# Patient Record
Sex: MALE | Race: WHITE | Employment: OTHER | URBAN - METROPOLITAN AREA
[De-identification: names, ages, dates, MRNs, and addresses within clinical notes are randomized per-mention and may not be internally consistent; named-entity substitution may affect disease eponyms.]

---

## 2017-05-13 PROBLEM — J96.01 ACUTE RESPIRATORY FAILURE WITH HYPOXEMIA (HCC): Status: ACTIVE | Noted: 2017-05-13

## 2017-07-11 PROBLEM — H01.004 BLEPHARITIS OF LEFT UPPER EYELID: Status: ACTIVE | Noted: 2017-07-11

## 2017-07-11 PROBLEM — H52.209 ASTIGMATISM: Status: ACTIVE | Noted: 2017-07-11

## 2017-07-11 PROBLEM — H01.001 BLEPHARITIS OF RIGHT UPPER EYELID: Status: ACTIVE | Noted: 2017-07-11

## 2017-12-06 LAB
INR BLD: 1.8
PROTIME: NORMAL SECONDS

## 2018-03-14 ENCOUNTER — ANTI-COAG VISIT (OUTPATIENT)
Dept: PRIMARY CARE CLINIC | Age: 73
End: 2018-03-14

## 2018-03-14 LAB — INR BLD: 2.5

## 2018-03-21 ENCOUNTER — ANTI-COAG VISIT (OUTPATIENT)
Dept: PRIMARY CARE CLINIC | Age: 73
End: 2018-03-21

## 2018-03-21 LAB — INR BLD: 2.9

## 2018-03-27 ENCOUNTER — HOSPITAL ENCOUNTER (OUTPATIENT)
Age: 73
Discharge: HOME | End: 2018-03-27
Payer: MEDICARE

## 2018-03-27 LAB
ALBUMIN SERPL-MCNC: 4.2 G/DL (ref 3.5–5.2)
ALP BLD-CCNC: 64 U/L (ref 40–129)
ALT SERPL-CCNC: 15 U/L (ref 0–40)
ANION GAP SERPL CALCULATED.3IONS-SCNC: 11 MMOL/L (ref 7–16)
AST SERPL-CCNC: 21 U/L (ref 0–39)
BILIRUB SERPL-MCNC: 0.6 MG/DL (ref 0–1.2)
BUN BLDV-MCNC: 35 MG/DL (ref 8–23)
CALCIUM SERPL-MCNC: 10.1 MG/DL (ref 8.6–10.2)
CHLORIDE BLD-SCNC: 107 MMOL/L (ref 98–107)
CO2: 26 MMOL/L (ref 22–29)
CREAT SERPL-MCNC: 1.3 MG/DL (ref 0.7–1.2)
GFR AFRICAN AMERICAN: >60
GFR NON-AFRICAN AMERICAN: 54 ML/MIN/1.73
GLUCOSE BLD-MCNC: 111 MG/DL (ref 74–109)
HBA1C MFR BLD: 4.7 % (ref 4.8–5.9)
POTASSIUM SERPL-SCNC: 4.5 MMOL/L (ref 3.5–5)
SODIUM BLD-SCNC: 144 MMOL/L (ref 132–146)
T4 FREE: 1.04 NG/DL (ref 0.93–1.7)
TOTAL PROTEIN: 7 G/DL (ref 6.4–8.3)
TSH SERPL DL<=0.05 MIU/L-ACNC: 2.66 UIU/ML (ref 0.27–4.2)

## 2018-03-27 PROCEDURE — 83036 HEMOGLOBIN GLYCOSYLATED A1C: CPT

## 2018-03-27 PROCEDURE — 84586 ASSAY OF VIP: CPT

## 2018-03-27 PROCEDURE — 80053 COMPREHEN METABOLIC PANEL: CPT

## 2018-03-27 PROCEDURE — 84443 ASSAY THYROID STIM HORMONE: CPT

## 2018-03-27 PROCEDURE — 36415 COLL VENOUS BLD VENIPUNCTURE: CPT

## 2018-03-27 PROCEDURE — 84439 ASSAY OF FREE THYROXINE: CPT

## 2018-03-28 ENCOUNTER — ANTI-COAG VISIT (OUTPATIENT)
Dept: PRIMARY CARE CLINIC | Age: 73
End: 2018-03-28

## 2018-03-28 LAB — INR BLD: 2.5

## 2018-04-04 ENCOUNTER — ANTI-COAG VISIT (OUTPATIENT)
Dept: PRIMARY CARE CLINIC | Age: 73
End: 2018-04-04

## 2018-04-04 LAB — INR BLD: 3.1

## 2018-04-09 RX ORDER — SOTALOL HYDROCHLORIDE 160 MG/1
160 TABLET ORAL 2 TIMES DAILY
Qty: 60 TABLET | Refills: 5 | Status: CANCELLED | OUTPATIENT
Start: 2018-04-09

## 2018-04-11 ENCOUNTER — ANTI-COAG VISIT (OUTPATIENT)
Dept: PRIMARY CARE CLINIC | Age: 73
End: 2018-04-11

## 2018-04-11 LAB — INR BLD: 3.3

## 2018-04-18 ENCOUNTER — ANTI-COAG VISIT (OUTPATIENT)
Dept: PRIMARY CARE CLINIC | Age: 73
End: 2018-04-18

## 2018-04-18 LAB — INR BLD: 3.5

## 2018-04-24 PROBLEM — J96.01 ACUTE RESPIRATORY FAILURE WITH HYPOXEMIA (HCC): Status: RESOLVED | Noted: 2017-05-13 | Resolved: 2018-04-24

## 2018-04-24 PROBLEM — R60.0 LOWER LEG EDEMA: Status: ACTIVE | Noted: 2018-04-24

## 2018-04-24 PROBLEM — E21.3 HYPERPARATHYROIDISM (HCC): Status: ACTIVE | Noted: 2018-03-23

## 2018-04-25 ENCOUNTER — ANTI-COAG VISIT (OUTPATIENT)
Dept: PRIMARY CARE CLINIC | Age: 73
End: 2018-04-25

## 2018-04-25 LAB — INR BLD: 3.1

## 2018-05-02 ENCOUNTER — ANTI-COAG VISIT (OUTPATIENT)
Dept: PRIMARY CARE CLINIC | Age: 73
End: 2018-05-02

## 2018-05-02 LAB — INR BLD: 2.4

## 2018-05-08 LAB
Lab: NORMAL
REPORT: NORMAL
THIS TEST SENT TO: NORMAL

## 2018-05-09 ENCOUNTER — ANTI-COAG VISIT (OUTPATIENT)
Dept: PRIMARY CARE CLINIC | Age: 73
End: 2018-05-09

## 2018-05-09 LAB — INR BLD: 2.7

## 2018-05-16 ENCOUNTER — ANTI-COAG VISIT (OUTPATIENT)
Dept: PRIMARY CARE CLINIC | Age: 73
End: 2018-05-16

## 2018-05-16 LAB — INR BLD: 3.1

## 2018-05-16 RX ORDER — WARFARIN SODIUM 5 MG/1
TABLET ORAL
Qty: 30 TABLET | Refills: 3 | Status: CANCELLED | OUTPATIENT
Start: 2018-05-16

## 2018-05-16 RX ORDER — WARFARIN SODIUM 1 MG/1
1 TABLET ORAL DAILY PRN
Qty: 30 TABLET | Refills: 3 | Status: CANCELLED | OUTPATIENT
Start: 2018-05-16

## 2018-05-16 RX ORDER — WARFARIN SODIUM 5 MG/1
5 TABLET ORAL
Qty: 30 TABLET | Status: CANCELLED | OUTPATIENT
Start: 2018-05-16

## 2018-05-16 RX ORDER — WARFARIN SODIUM 5 MG/1
5 TABLET ORAL DAILY
Qty: 30 TABLET | Refills: 2 | Status: SHIPPED | OUTPATIENT
Start: 2018-05-16 | End: 2018-08-07 | Stop reason: SDUPTHER

## 2018-05-16 RX ORDER — WARFARIN SODIUM 1 MG/1
1 TABLET ORAL DAILY PRN
Qty: 30 TABLET | Refills: 2 | Status: SHIPPED | OUTPATIENT
Start: 2018-05-16 | End: 2019-02-26 | Stop reason: SDUPTHER

## 2018-05-16 RX ORDER — WARFARIN SODIUM 1 MG/1
1 TABLET ORAL DAILY PRN
COMMUNITY
End: 2018-05-16 | Stop reason: SDUPTHER

## 2018-05-23 ENCOUNTER — ANTI-COAG VISIT (OUTPATIENT)
Dept: PRIMARY CARE CLINIC | Age: 73
End: 2018-05-23

## 2018-05-23 LAB — INR BLD: 3

## 2018-05-30 ENCOUNTER — ANTI-COAG VISIT (OUTPATIENT)
Dept: PRIMARY CARE CLINIC | Age: 73
End: 2018-05-30

## 2018-05-30 LAB — INR BLD: 2.5

## 2018-06-06 ENCOUNTER — ANTI-COAG VISIT (OUTPATIENT)
Dept: PRIMARY CARE CLINIC | Age: 73
End: 2018-06-06

## 2018-06-06 LAB — INR BLD: 2.4

## 2018-06-13 ENCOUNTER — ANTI-COAG VISIT (OUTPATIENT)
Dept: PRIMARY CARE CLINIC | Age: 73
End: 2018-06-13

## 2018-06-13 LAB — INR BLD: 2.4

## 2018-06-22 ENCOUNTER — ANTI-COAG VISIT (OUTPATIENT)
Dept: PRIMARY CARE CLINIC | Age: 73
End: 2018-06-22

## 2018-06-22 LAB — INR BLD: 2.9

## 2018-06-29 ENCOUNTER — ANTI-COAG VISIT (OUTPATIENT)
Dept: PRIMARY CARE CLINIC | Age: 73
End: 2018-06-29

## 2018-06-29 LAB — INR BLD: 3.3

## 2018-07-06 ENCOUNTER — ANTI-COAG VISIT (OUTPATIENT)
Dept: PRIMARY CARE CLINIC | Age: 73
End: 2018-07-06

## 2018-07-06 LAB — INR BLD: 3.6

## 2018-07-06 RX ORDER — ALLOPURINOL 100 MG/1
100 TABLET ORAL SEE ADMIN INSTRUCTIONS
Qty: 38 TABLET | Refills: 1 | Status: SHIPPED | OUTPATIENT
Start: 2018-07-06 | End: 2018-12-27 | Stop reason: SDUPTHER

## 2018-07-13 ENCOUNTER — ANTI-COAG VISIT (OUTPATIENT)
Dept: PRIMARY CARE CLINIC | Age: 73
End: 2018-07-13

## 2018-07-13 LAB — INR BLD: 3.7

## 2018-07-20 ENCOUNTER — ANTI-COAG VISIT (OUTPATIENT)
Dept: PRIMARY CARE CLINIC | Age: 73
End: 2018-07-20

## 2018-07-20 LAB — INR BLD: 3.5

## 2018-07-27 ENCOUNTER — ANTI-COAG VISIT (OUTPATIENT)
Dept: PRIMARY CARE CLINIC | Age: 73
End: 2018-07-27

## 2018-07-27 LAB — INR BLD: 2.4

## 2018-08-03 LAB — INR BLD: 2.3

## 2018-08-06 ENCOUNTER — ANTI-COAG VISIT (OUTPATIENT)
Dept: PRIMARY CARE CLINIC | Age: 73
End: 2018-08-06

## 2018-08-07 DIAGNOSIS — E34.0 CARCINOID SYNDROME (HCC): ICD-10-CM

## 2018-08-07 RX ORDER — WARFARIN SODIUM 5 MG/1
TABLET ORAL
Qty: 30 TABLET | Refills: 2 | Status: SHIPPED | OUTPATIENT
Start: 2018-08-07 | End: 2018-10-27 | Stop reason: SDUPTHER

## 2018-08-10 ENCOUNTER — ANTI-COAG VISIT (OUTPATIENT)
Dept: PRIMARY CARE CLINIC | Age: 73
End: 2018-08-10

## 2018-08-10 LAB — INR BLD: 2.3

## 2018-08-15 ENCOUNTER — OFFICE VISIT (OUTPATIENT)
Dept: PRIMARY CARE CLINIC | Age: 73
End: 2018-08-15
Payer: MEDICARE

## 2018-08-15 VITALS
HEIGHT: 67 IN | SYSTOLIC BLOOD PRESSURE: 90 MMHG | TEMPERATURE: 98.4 F | WEIGHT: 232 LBS | HEART RATE: 72 BPM | BODY MASS INDEX: 36.41 KG/M2 | DIASTOLIC BLOOD PRESSURE: 62 MMHG | OXYGEN SATURATION: 96 % | RESPIRATION RATE: 20 BRPM

## 2018-08-15 DIAGNOSIS — I48.20 CHRONIC ATRIAL FIBRILLATION (HCC): ICD-10-CM

## 2018-08-15 DIAGNOSIS — E11.9 TYPE 2 DIABETES MELLITUS WITHOUT COMPLICATION, WITHOUT LONG-TERM CURRENT USE OF INSULIN (HCC): ICD-10-CM

## 2018-08-15 DIAGNOSIS — J44.9 CHRONIC OBSTRUCTIVE PULMONARY DISEASE, UNSPECIFIED COPD TYPE (HCC): Primary | ICD-10-CM

## 2018-08-15 DIAGNOSIS — L81.9 PIGMENTED SKIN LESION: ICD-10-CM

## 2018-08-15 DIAGNOSIS — J30.9 ALLERGIC RHINITIS, UNSPECIFIED SEASONALITY, UNSPECIFIED TRIGGER: ICD-10-CM

## 2018-08-15 DIAGNOSIS — Z95.0 S/P CARDIAC PACEMAKER PROCEDURE: ICD-10-CM

## 2018-08-15 DIAGNOSIS — N18.30 STAGE 3 CHRONIC KIDNEY DISEASE (HCC): ICD-10-CM

## 2018-08-15 DIAGNOSIS — E34.0 CARCINOID SYNDROME (HCC): ICD-10-CM

## 2018-08-15 DIAGNOSIS — E34.2 EXCESSIVE VASOACTIVE INTESTINAL PEPTIDE SECRETION: ICD-10-CM

## 2018-08-15 DIAGNOSIS — E21.3 HYPERPARATHYROIDISM (HCC): ICD-10-CM

## 2018-08-15 PROCEDURE — 99214 OFFICE O/P EST MOD 30 MIN: CPT | Performed by: INTERNAL MEDICINE

## 2018-08-15 ASSESSMENT — ENCOUNTER SYMPTOMS
BLURRED VISION: 0
STRIDOR: 0
EYE REDNESS: 1
DIARRHEA: 0
HEMOPTYSIS: 0
SHORTNESS OF BREATH: 1
DOUBLE VISION: 0
EYE PAIN: 0
BLOOD IN STOOL: 0
NAUSEA: 0

## 2018-08-15 NOTE — PROGRESS NOTES
of the abdomen precludes identifying any masses   Musculoskeletal: Normal range of motion. He exhibits edema ( trace of edema both lower extremities). Neurological: He is alert and oriented to person, place, and time. Skin: Skin is warm and dry. Capillary refill takes less than 2 seconds. Psychiatric: He has a normal mood and affect. Vitals reviewed. Assessment/Plan:  Emiliano Bertrand was seen today for congestion, cough and dizziness. Patient hasn't been seen in the past 6 months. He did see his pulmonologist recently and made no changes    He's been seen by nephrology as well no changes made    Follows up with cardiology and electrophysiology on a regular basis regarding his pacemaker    Endocrinologist followed up on the carcinoid syndrome, and injections of Sandostatin    His seeing endocrine surgeon Dr. Fernando Hall, at the [facility] for carcinoid syndrome in the past    Skin lesions declines to see pulmonology    Last colonoscopy 12/30/13 Dr. Jenna Garcia    No laboratory studies ordered by me, they will be ordered by endocrine and nephrology we will get a copy of those test results    Diagnoses and all orders for this visit:    Chronic obstructive pulmonary disease, unspecified COPD type (HCC);all his up with pulmonary    Carcinoid syndrome (HCC);follows up with endocrine both locally and at the [facility] clinic    Type 2 diabetes mellitus without complication, without long-term current use of insulin ([address] labs monitored by his nephrologist and endocrinologist    Hyperparathyroidism [facility]); on a treated by endocrine    Chronic atrial fibrillation ([address] cc electrophysiology    S/P cardiac pacemaker procedure;  electrophysiologist    Stage 3 chronic kidney disease; for allergy    Excessive vasoactive intestinal peptide secretion; endocrinology.     Pigmented skin lesion; see dermatology when he agrees    Allergic rhinitis, unspecified seasonality, unspecified trigger; device patient to use

## 2018-08-17 ENCOUNTER — ANTI-COAG VISIT (OUTPATIENT)
Dept: PRIMARY CARE CLINIC | Age: 73
End: 2018-08-17

## 2018-08-17 LAB — INR BLD: 2.7

## 2018-08-24 ENCOUNTER — ANTI-COAG VISIT (OUTPATIENT)
Dept: PRIMARY CARE CLINIC | Age: 73
End: 2018-08-24

## 2018-08-24 LAB — INR BLD: 3.3

## 2018-08-31 ENCOUNTER — ANTI-COAG VISIT (OUTPATIENT)
Dept: PRIMARY CARE CLINIC | Age: 73
End: 2018-08-31

## 2018-08-31 LAB — INR BLD: 2

## 2018-09-04 ENCOUNTER — HOSPITAL ENCOUNTER (OUTPATIENT)
Age: 73
Discharge: HOME | End: 2018-09-04
Payer: MEDICARE

## 2018-09-04 LAB
ALBUMIN SERPL-MCNC: 4 G/DL (ref 3.5–5.2)
ALP BLD-CCNC: 67 U/L (ref 40–129)
ALT SERPL-CCNC: 18 U/L (ref 0–40)
ANION GAP SERPL CALCULATED.3IONS-SCNC: 9 MMOL/L (ref 7–16)
AST SERPL-CCNC: 22 U/L (ref 0–39)
BASOPHILS ABSOLUTE: 0.07 E9/L (ref 0–0.2)
BASOPHILS RELATIVE PERCENT: 1 % (ref 0–2)
BILIRUB SERPL-MCNC: 0.6 MG/DL (ref 0–1.2)
BILIRUBIN URINE: NEGATIVE
BLOOD, URINE: NEGATIVE
BUN BLDV-MCNC: 30 MG/DL (ref 8–23)
CALCIUM SERPL-MCNC: 10.2 MG/DL (ref 8.6–10.2)
CHLORIDE BLD-SCNC: 106 MMOL/L (ref 98–107)
CLARITY: CLEAR
CO2: 27 MMOL/L (ref 22–29)
COLOR: YELLOW
CREAT SERPL-MCNC: 1.6 MG/DL (ref 0.7–1.2)
CREATININE URINE: 149 MG/DL (ref 40–278)
EOSINOPHILS ABSOLUTE: 0.22 E9/L (ref 0.05–0.5)
EOSINOPHILS RELATIVE PERCENT: 3.1 % (ref 0–6)
GFR AFRICAN AMERICAN: 51
GFR NON-AFRICAN AMERICAN: 42 ML/MIN/1.73
GLUCOSE BLD-MCNC: 121 MG/DL (ref 74–109)
GLUCOSE URINE: NEGATIVE MG/DL
HCT VFR BLD CALC: 39 % (ref 37–54)
HEMOGLOBIN: 13.3 G/DL (ref 12.5–16.5)
IMMATURE GRANULOCYTES #: 0.04 E9/L
IMMATURE GRANULOCYTES %: 0.6 % (ref 0–5)
KETONES, URINE: ABNORMAL MG/DL
LEUKOCYTE ESTERASE, URINE: NEGATIVE
LYMPHOCYTES ABSOLUTE: 2.03 E9/L (ref 1.5–4)
LYMPHOCYTES RELATIVE PERCENT: 28.4 % (ref 20–42)
MCH RBC QN AUTO: 32.4 PG (ref 26–35)
MCHC RBC AUTO-ENTMCNC: 34.1 % (ref 32–34.5)
MCV RBC AUTO: 94.9 FL (ref 80–99.9)
MONOCYTES ABSOLUTE: 0.76 E9/L (ref 0.1–0.95)
MONOCYTES RELATIVE PERCENT: 10.6 % (ref 2–12)
NEUTROPHILS ABSOLUTE: 4.03 E9/L (ref 1.8–7.3)
NEUTROPHILS RELATIVE PERCENT: 56.3 % (ref 43–80)
NITRITE, URINE: NEGATIVE
PARATHYROID HORMONE INTACT: 120 PG/ML (ref 15–65)
PDW BLD-RTO: 14.4 FL (ref 11.5–15)
PH UA: 5.5 (ref 5–9)
PHOSPHORUS: 3.7 MG/DL (ref 2.5–4.5)
PLATELET # BLD: 229 E9/L (ref 130–450)
PMV BLD AUTO: 8.9 FL (ref 7–12)
POTASSIUM SERPL-SCNC: 4.3 MMOL/L (ref 3.5–5)
PROTEIN PROTEIN: 16 MG/DL (ref 0–12)
PROTEIN UA: NEGATIVE MG/DL
PROTEIN/CREAT RATIO: 0.1
PROTEIN/CREAT RATIO: 0.1 (ref 0–0.2)
RBC # BLD: 4.11 E12/L (ref 3.8–5.8)
SODIUM BLD-SCNC: 142 MMOL/L (ref 132–146)
SPECIFIC GRAVITY UA: 1.02 (ref 1–1.03)
TOTAL PROTEIN: 6.7 G/DL (ref 6.4–8.3)
UROBILINOGEN, URINE: 0.2 E.U./DL
WBC # BLD: 7.2 E9/L (ref 4.5–11.5)

## 2018-09-04 PROCEDURE — 84100 ASSAY OF PHOSPHORUS: CPT

## 2018-09-04 PROCEDURE — 84156 ASSAY OF PROTEIN URINE: CPT

## 2018-09-04 PROCEDURE — 81003 URINALYSIS AUTO W/O SCOPE: CPT

## 2018-09-04 PROCEDURE — 80053 COMPREHEN METABOLIC PANEL: CPT

## 2018-09-04 PROCEDURE — 82570 ASSAY OF URINE CREATININE: CPT

## 2018-09-04 PROCEDURE — 36415 COLL VENOUS BLD VENIPUNCTURE: CPT

## 2018-09-04 PROCEDURE — 85025 COMPLETE CBC W/AUTO DIFF WBC: CPT

## 2018-09-04 PROCEDURE — 83970 ASSAY OF PARATHORMONE: CPT

## 2018-09-07 ENCOUNTER — ANTI-COAG VISIT (OUTPATIENT)
Dept: PRIMARY CARE CLINIC | Age: 73
End: 2018-09-07

## 2018-09-07 LAB — INR BLD: 2.4

## 2018-09-14 ENCOUNTER — ANTI-COAG VISIT (OUTPATIENT)
Dept: PRIMARY CARE CLINIC | Age: 73
End: 2018-09-14

## 2018-09-14 LAB — INR BLD: 2.1

## 2018-09-21 ENCOUNTER — ANTI-COAG VISIT (OUTPATIENT)
Dept: PRIMARY CARE CLINIC | Age: 73
End: 2018-09-21

## 2018-09-21 LAB — INR BLD: 2.2

## 2018-09-28 ENCOUNTER — ANTI-COAG VISIT (OUTPATIENT)
Dept: PRIMARY CARE CLINIC | Age: 73
End: 2018-09-28

## 2018-09-28 LAB — INR BLD: 2.3

## 2018-09-28 RX ORDER — SIMVASTATIN 40 MG
40 TABLET ORAL DAILY
Qty: 90 TABLET | Refills: 1 | Status: SHIPPED | OUTPATIENT
Start: 2018-09-28 | End: 2019-03-26 | Stop reason: SDUPTHER

## 2018-10-05 LAB — INR BLD: 2.2

## 2018-10-08 ENCOUNTER — ANTI-COAG VISIT (OUTPATIENT)
Dept: PRIMARY CARE CLINIC | Age: 73
End: 2018-10-08

## 2018-10-12 ENCOUNTER — ANTI-COAG VISIT (OUTPATIENT)
Dept: PRIMARY CARE CLINIC | Age: 73
End: 2018-10-12

## 2018-10-12 LAB — INR BLD: 2.3

## 2018-10-15 ENCOUNTER — HOSPITAL ENCOUNTER (OUTPATIENT)
Age: 73
Discharge: HOME | End: 2018-10-15
Payer: MEDICARE

## 2018-10-15 LAB
ALBUMIN SERPL-MCNC: 4 G/DL (ref 3.5–5.2)
ALP BLD-CCNC: 58 U/L (ref 40–129)
ALT SERPL-CCNC: 21 U/L (ref 0–40)
ANION GAP SERPL CALCULATED.3IONS-SCNC: 9 MMOL/L (ref 7–16)
AST SERPL-CCNC: 22 U/L (ref 0–39)
BILIRUB SERPL-MCNC: 0.6 MG/DL (ref 0–1.2)
BUN BLDV-MCNC: 30 MG/DL (ref 8–23)
CALCIUM SERPL-MCNC: 10.1 MG/DL (ref 8.6–10.2)
CHLORIDE BLD-SCNC: 109 MMOL/L (ref 98–107)
CO2: 28 MMOL/L (ref 22–29)
CREAT SERPL-MCNC: 1.6 MG/DL (ref 0.7–1.2)
GFR AFRICAN AMERICAN: 51
GFR NON-AFRICAN AMERICAN: 42 ML/MIN/1.73
GLUCOSE BLD-MCNC: 132 MG/DL (ref 74–109)
POTASSIUM SERPL-SCNC: 4.4 MMOL/L (ref 3.5–5)
SODIUM BLD-SCNC: 146 MMOL/L (ref 132–146)
T4 FREE: 1.13 NG/DL (ref 0.93–1.7)
TOTAL PROTEIN: 6.7 G/DL (ref 6.4–8.3)
TSH SERPL DL<=0.05 MIU/L-ACNC: 2.26 UIU/ML (ref 0.27–4.2)

## 2018-10-15 PROCEDURE — 84586 ASSAY OF VIP: CPT

## 2018-10-15 PROCEDURE — 84443 ASSAY THYROID STIM HORMONE: CPT

## 2018-10-15 PROCEDURE — 36415 COLL VENOUS BLD VENIPUNCTURE: CPT

## 2018-10-15 PROCEDURE — 84439 ASSAY OF FREE THYROXINE: CPT

## 2018-10-15 PROCEDURE — 80053 COMPREHEN METABOLIC PANEL: CPT

## 2018-10-16 ENCOUNTER — HOSPITAL ENCOUNTER (OUTPATIENT)
Age: 73
Discharge: HOME | End: 2018-10-16
Payer: MEDICARE

## 2018-10-16 LAB
DIABETIC RETINOPATHY: NEGATIVE
HBA1C MFR BLD: 4.9 % (ref 4–5.6)

## 2018-10-16 PROCEDURE — 36415 COLL VENOUS BLD VENIPUNCTURE: CPT

## 2018-10-16 PROCEDURE — 83036 HEMOGLOBIN GLYCOSYLATED A1C: CPT

## 2018-10-19 ENCOUNTER — ANTI-COAG VISIT (OUTPATIENT)
Dept: PRIMARY CARE CLINIC | Age: 73
End: 2018-10-19

## 2018-10-19 LAB — INR BLD: 1.4

## 2018-10-29 ENCOUNTER — ANTI-COAG VISIT (OUTPATIENT)
Dept: PRIMARY CARE CLINIC | Age: 73
End: 2018-10-29

## 2018-10-29 LAB — INR BLD: 1.3

## 2018-10-29 RX ORDER — WARFARIN SODIUM 5 MG/1
TABLET ORAL
Qty: 30 TABLET | Refills: 2 | Status: SHIPPED | OUTPATIENT
Start: 2018-10-29 | End: 2019-01-24 | Stop reason: SDUPTHER

## 2018-10-30 ENCOUNTER — TELEPHONE (OUTPATIENT)
Dept: PRIMARY CARE CLINIC | Age: 73
End: 2018-10-30

## 2018-10-30 DIAGNOSIS — I48.20 CHRONIC ATRIAL FIBRILLATION (HCC): Primary | ICD-10-CM

## 2018-11-01 ENCOUNTER — OFFICE VISIT (OUTPATIENT)
Dept: PRIMARY CARE CLINIC | Age: 73
End: 2018-11-01
Payer: MEDICARE

## 2018-11-01 ENCOUNTER — ANTI-COAG VISIT (OUTPATIENT)
Dept: PRIMARY CARE CLINIC | Age: 73
End: 2018-11-01

## 2018-11-01 ENCOUNTER — HOSPITAL ENCOUNTER (OUTPATIENT)
Age: 73
Discharge: HOME | End: 2018-11-03
Payer: MEDICARE

## 2018-11-01 VITALS
SYSTOLIC BLOOD PRESSURE: 116 MMHG | BODY MASS INDEX: 35.79 KG/M2 | OXYGEN SATURATION: 96 % | HEIGHT: 67 IN | DIASTOLIC BLOOD PRESSURE: 78 MMHG | TEMPERATURE: 98.8 F | HEART RATE: 74 BPM | RESPIRATION RATE: 12 BRPM | WEIGHT: 228 LBS

## 2018-11-01 DIAGNOSIS — R42 DIZZINESS: ICD-10-CM

## 2018-11-01 DIAGNOSIS — Z23 NEEDS FLU SHOT: Primary | ICD-10-CM

## 2018-11-01 DIAGNOSIS — I48.20 CHRONIC ATRIAL FIBRILLATION (HCC): ICD-10-CM

## 2018-11-01 LAB
INR BLD: 2.1
INR BLD: 2.1
PROTHROMBIN TIME: 23.9 SEC (ref 9.3–12.4)

## 2018-11-01 PROCEDURE — 93000 ELECTROCARDIOGRAM COMPLETE: CPT | Performed by: FAMILY MEDICINE

## 2018-11-01 PROCEDURE — 99213 OFFICE O/P EST LOW 20 MIN: CPT | Performed by: FAMILY MEDICINE

## 2018-11-01 PROCEDURE — G0008 ADMIN INFLUENZA VIRUS VAC: HCPCS | Performed by: FAMILY MEDICINE

## 2018-11-01 PROCEDURE — 90686 IIV4 VACC NO PRSV 0.5 ML IM: CPT | Performed by: FAMILY MEDICINE

## 2018-11-01 PROCEDURE — 85610 PROTHROMBIN TIME: CPT

## 2018-11-01 ASSESSMENT — ENCOUNTER SYMPTOMS
RHINORRHEA: 0
SORE THROAT: 0
WHEEZING: 0
COUGH: 1
SHORTNESS OF BREATH: 0
ABDOMINAL PAIN: 0

## 2018-11-01 NOTE — PROGRESS NOTES
Vaccine Information Sheet, \"Influenza - Inactivated\"  given to Rashawn Liang, or parent/legal guardian of  Rashawn Liang and verbalized understanding. Patient responses:    Have you ever had a reaction to a flu vaccine? No  Are you able to eat eggs without adverse effects? Yes  Do you have any current illness? No  Have you ever had [place] Syndrome? No    Flu vaccine given per order. Please see immunization tab.

## 2018-11-05 LAB
Lab: NORMAL
REPORT: NORMAL
THIS TEST SENT TO: NORMAL

## 2018-11-08 ENCOUNTER — ANTI-COAG VISIT (OUTPATIENT)
Dept: PRIMARY CARE CLINIC | Age: 73
End: 2018-11-08

## 2018-11-08 LAB — INR BLD: 1.8

## 2018-11-15 ENCOUNTER — ANTI-COAG VISIT (OUTPATIENT)
Dept: PRIMARY CARE CLINIC | Age: 73
End: 2018-11-15

## 2018-11-15 LAB — INR BLD: 1.8

## 2018-11-19 ENCOUNTER — PROCEDURE VISIT (OUTPATIENT)
Dept: AUDIOLOGY | Age: 73
End: 2018-11-19
Payer: MEDICARE

## 2018-11-19 ENCOUNTER — OFFICE VISIT (OUTPATIENT)
Dept: ENT CLINIC | Age: 73
End: 2018-11-19
Payer: MEDICARE

## 2018-11-19 VITALS
OXYGEN SATURATION: 96 % | BODY MASS INDEX: 35.94 KG/M2 | HEART RATE: 76 BPM | SYSTOLIC BLOOD PRESSURE: 103 MMHG | WEIGHT: 229 LBS | HEIGHT: 67 IN | DIASTOLIC BLOOD PRESSURE: 63 MMHG

## 2018-11-19 DIAGNOSIS — R42 VERTIGO: ICD-10-CM

## 2018-11-19 DIAGNOSIS — H91.92 UNILATERAL HEARING LOSS, LEFT: Primary | ICD-10-CM

## 2018-11-19 PROCEDURE — 99204 OFFICE O/P NEW MOD 45 MIN: CPT | Performed by: OTOLARYNGOLOGY

## 2018-11-19 PROCEDURE — 92557 COMPREHENSIVE HEARING TEST: CPT | Performed by: AUDIOLOGIST

## 2018-11-19 PROCEDURE — 92567 TYMPANOMETRY: CPT | Performed by: AUDIOLOGIST

## 2018-11-19 ASSESSMENT — ENCOUNTER SYMPTOMS
SORE THROAT: 0
ABDOMINAL PAIN: 0
EYE DISCHARGE: 0
SHORTNESS OF BREATH: 0
NAUSEA: 0
EYE REDNESS: 0
BLOOD IN STOOL: 0
WHEEZING: 0
TROUBLE SWALLOWING: 0
COUGH: 0
SINUS PRESSURE: 1
EYE PAIN: 0
DIARRHEA: 0
BACK PAIN: 0
RHINORRHEA: 1
VOMITING: 0

## 2018-11-20 ENCOUNTER — TELEPHONE (OUTPATIENT)
Dept: ENT CLINIC | Age: 73
End: 2018-11-20

## 2018-11-20 DIAGNOSIS — H91.90 UNILATERAL HEARING LOSS, UNSPECIFIED LATERALITY: Primary | ICD-10-CM

## 2018-11-21 ENCOUNTER — ANTI-COAG VISIT (OUTPATIENT)
Dept: PRIMARY CARE CLINIC | Age: 73
End: 2018-11-21

## 2018-11-21 LAB — INR BLD: 2.1

## 2018-11-28 ENCOUNTER — ANTI-COAG VISIT (OUTPATIENT)
Dept: PRIMARY CARE CLINIC | Age: 73
End: 2018-11-28

## 2018-11-28 ENCOUNTER — HOSPITAL ENCOUNTER (OUTPATIENT)
Age: 73
Discharge: HOME | End: 2018-11-30
Payer: MEDICARE

## 2018-11-28 DIAGNOSIS — E78.5 HYPERLIPIDEMIA, UNSPECIFIED HYPERLIPIDEMIA TYPE: ICD-10-CM

## 2018-11-28 LAB
CHOLESTEROL, TOTAL: 172 MG/DL (ref 0–199)
HDLC SERPL-MCNC: 49 MG/DL
INR BLD: 2.9
LDL CHOLESTEROL CALCULATED: 82 MG/DL (ref 0–99)
TRIGL SERPL-MCNC: 205 MG/DL (ref 0–149)
VLDLC SERPL CALC-MCNC: 41 MG/DL

## 2018-11-28 PROCEDURE — 80061 LIPID PANEL: CPT

## 2018-12-05 ENCOUNTER — ANTI-COAG VISIT (OUTPATIENT)
Dept: PRIMARY CARE CLINIC | Age: 73
End: 2018-12-05

## 2018-12-05 LAB — INR BLD: 2.4

## 2018-12-06 ENCOUNTER — HOSPITAL ENCOUNTER (OUTPATIENT)
Dept: CT IMAGING | Age: 73
Discharge: HOME | End: 2018-12-08
Payer: MEDICARE

## 2018-12-06 DIAGNOSIS — H91.90 UNILATERAL HEARING LOSS, UNSPECIFIED LATERALITY: ICD-10-CM

## 2018-12-06 PROCEDURE — 70480 CT ORBIT/EAR/FOSSA W/O DYE: CPT

## 2018-12-10 ENCOUNTER — OFFICE VISIT (OUTPATIENT)
Dept: ENT CLINIC | Age: 73
End: 2018-12-10
Payer: MEDICARE

## 2018-12-10 VITALS
HEIGHT: 67 IN | WEIGHT: 229 LBS | DIASTOLIC BLOOD PRESSURE: 75 MMHG | BODY MASS INDEX: 35.94 KG/M2 | OXYGEN SATURATION: 97 % | SYSTOLIC BLOOD PRESSURE: 109 MMHG | HEART RATE: 83 BPM

## 2018-12-10 DIAGNOSIS — H81.03 MENIERE'S DISEASE (COCHLEAR HYDROPS), BILATERAL: Primary | ICD-10-CM

## 2018-12-10 PROCEDURE — 99213 OFFICE O/P EST LOW 20 MIN: CPT | Performed by: OTOLARYNGOLOGY

## 2018-12-12 ENCOUNTER — ANTI-COAG VISIT (OUTPATIENT)
Dept: PRIMARY CARE CLINIC | Age: 73
End: 2018-12-12

## 2018-12-12 LAB — INR BLD: 2.6

## 2018-12-19 ENCOUNTER — ANTI-COAG VISIT (OUTPATIENT)
Dept: PRIMARY CARE CLINIC | Age: 73
End: 2018-12-19

## 2018-12-19 LAB — INR BLD: 2.7

## 2018-12-22 ASSESSMENT — ENCOUNTER SYMPTOMS
FACIAL SWELLING: 0
SHORTNESS OF BREATH: 0
COUGH: 0
VOMITING: 0

## 2018-12-26 ENCOUNTER — ANTI-COAG VISIT (OUTPATIENT)
Dept: PRIMARY CARE CLINIC | Age: 73
End: 2018-12-26

## 2018-12-26 LAB — INR BLD: 2.8

## 2018-12-27 ENCOUNTER — TELEPHONE (OUTPATIENT)
Dept: PRIMARY CARE CLINIC | Age: 73
End: 2018-12-27

## 2018-12-27 RX ORDER — ALLOPURINOL 100 MG/1
100 TABLET ORAL SEE ADMIN INSTRUCTIONS
Qty: 38 TABLET | Refills: 1 | Status: SHIPPED | OUTPATIENT
Start: 2018-12-27 | End: 2019-06-21 | Stop reason: SDUPTHER

## 2019-01-02 ENCOUNTER — ANTI-COAG VISIT (OUTPATIENT)
Dept: PRIMARY CARE CLINIC | Age: 74
End: 2019-01-02

## 2019-01-02 LAB — INR BLD: 2.3

## 2019-01-09 ENCOUNTER — ANTI-COAG VISIT (OUTPATIENT)
Dept: PRIMARY CARE CLINIC | Age: 74
End: 2019-01-09

## 2019-01-09 LAB — INR BLD: 2.7

## 2019-01-16 ENCOUNTER — ANTI-COAG VISIT (OUTPATIENT)
Dept: PRIMARY CARE CLINIC | Age: 74
End: 2019-01-16

## 2019-01-16 LAB — INR BLD: 2.3

## 2019-01-23 ENCOUNTER — ANTI-COAG VISIT (OUTPATIENT)
Dept: PRIMARY CARE CLINIC | Age: 74
End: 2019-01-23

## 2019-01-23 LAB — INR BLD: 2.7

## 2019-01-24 RX ORDER — WARFARIN SODIUM 5 MG/1
TABLET ORAL
Qty: 30 TABLET | Refills: 2 | Status: SHIPPED | OUTPATIENT
Start: 2019-01-24 | End: 2019-02-26 | Stop reason: SDUPTHER

## 2019-01-30 ENCOUNTER — ANTI-COAG VISIT (OUTPATIENT)
Dept: PRIMARY CARE CLINIC | Age: 74
End: 2019-01-30

## 2019-01-30 LAB — INR BLD: 2.5

## 2019-02-06 ENCOUNTER — ANTI-COAG VISIT (OUTPATIENT)
Dept: PRIMARY CARE CLINIC | Age: 74
End: 2019-02-06

## 2019-02-06 LAB — INR BLD: 2.3

## 2019-02-13 ENCOUNTER — ANTI-COAG VISIT (OUTPATIENT)
Dept: PRIMARY CARE CLINIC | Age: 74
End: 2019-02-13

## 2019-02-13 LAB — INR BLD: 2.5

## 2019-02-20 ENCOUNTER — ANTI-COAG VISIT (OUTPATIENT)
Dept: PRIMARY CARE CLINIC | Age: 74
End: 2019-02-20

## 2019-02-20 LAB — INR BLD: 2.3

## 2019-02-26 ENCOUNTER — OFFICE VISIT (OUTPATIENT)
Dept: PRIMARY CARE CLINIC | Age: 74
End: 2019-02-26
Payer: MEDICARE

## 2019-02-26 VITALS
WEIGHT: 228 LBS | DIASTOLIC BLOOD PRESSURE: 62 MMHG | HEART RATE: 72 BPM | HEIGHT: 67 IN | BODY MASS INDEX: 35.79 KG/M2 | SYSTOLIC BLOOD PRESSURE: 100 MMHG | TEMPERATURE: 98.9 F | OXYGEN SATURATION: 97 % | RESPIRATION RATE: 16 BRPM

## 2019-02-26 DIAGNOSIS — I48.20 CHRONIC ATRIAL FIBRILLATION (HCC): ICD-10-CM

## 2019-02-26 DIAGNOSIS — E11.9 TYPE 2 DIABETES MELLITUS WITHOUT COMPLICATION, WITHOUT LONG-TERM CURRENT USE OF INSULIN (HCC): ICD-10-CM

## 2019-02-26 DIAGNOSIS — E34.2 EXCESSIVE VASOACTIVE INTESTINAL PEPTIDE SECRETION: ICD-10-CM

## 2019-02-26 DIAGNOSIS — E11.21 TYPE 2 DIABETES MELLITUS WITH DIABETIC NEPHROPATHY, WITHOUT LONG-TERM CURRENT USE OF INSULIN (HCC): ICD-10-CM

## 2019-02-26 DIAGNOSIS — N18.30 STAGE 3 CHRONIC KIDNEY DISEASE (HCC): ICD-10-CM

## 2019-02-26 DIAGNOSIS — J44.9 CHRONIC OBSTRUCTIVE PULMONARY DISEASE, UNSPECIFIED COPD TYPE (HCC): Primary | ICD-10-CM

## 2019-02-26 DIAGNOSIS — E21.3 HYPERPARATHYROIDISM (HCC): ICD-10-CM

## 2019-02-26 DIAGNOSIS — E34.0 CARCINOID SYNDROME (HCC): ICD-10-CM

## 2019-02-26 DIAGNOSIS — R42 DIZZINESS: ICD-10-CM

## 2019-02-26 PROBLEM — H01.004 BLEPHARITIS OF LEFT UPPER EYELID: Status: RESOLVED | Noted: 2017-07-11 | Resolved: 2019-02-26

## 2019-02-26 PROBLEM — H01.001 BLEPHARITIS OF RIGHT UPPER EYELID: Status: RESOLVED | Noted: 2017-07-11 | Resolved: 2019-02-26

## 2019-02-26 PROCEDURE — 99214 OFFICE O/P EST MOD 30 MIN: CPT | Performed by: NURSE PRACTITIONER

## 2019-02-26 RX ORDER — WARFARIN SODIUM 1 MG/1
1 TABLET ORAL DAILY PRN
Qty: 30 TABLET | Refills: 5 | Status: SHIPPED | OUTPATIENT
Start: 2019-02-26 | End: 2019-10-20 | Stop reason: SDUPTHER

## 2019-02-26 RX ORDER — WARFARIN SODIUM 5 MG/1
5 TABLET ORAL DAILY
Qty: 30 TABLET | Refills: 5 | Status: SHIPPED | OUTPATIENT
Start: 2019-02-26 | End: 2019-11-15 | Stop reason: SDUPTHER

## 2019-02-26 ASSESSMENT — PATIENT HEALTH QUESTIONNAIRE - PHQ9
SUM OF ALL RESPONSES TO PHQ QUESTIONS 1-9: 0
1. LITTLE INTEREST OR PLEASURE IN DOING THINGS: 0
SUM OF ALL RESPONSES TO PHQ9 QUESTIONS 1 & 2: 0
2. FEELING DOWN, DEPRESSED OR HOPELESS: 0
SUM OF ALL RESPONSES TO PHQ QUESTIONS 1-9: 0

## 2019-02-26 ASSESSMENT — ENCOUNTER SYMPTOMS
DIARRHEA: 1
SHORTNESS OF BREATH: 1
COUGH: 1
CONSTIPATION: 1

## 2019-02-27 ENCOUNTER — ANTI-COAG VISIT (OUTPATIENT)
Dept: PRIMARY CARE CLINIC | Age: 74
End: 2019-02-27

## 2019-02-27 LAB — INR BLD: 2.9

## 2019-02-28 ENCOUNTER — TELEPHONE (OUTPATIENT)
Dept: PRIMARY CARE CLINIC | Age: 74
End: 2019-02-28

## 2019-03-06 ENCOUNTER — ANTI-COAG VISIT (OUTPATIENT)
Dept: PRIMARY CARE CLINIC | Age: 74
End: 2019-03-06

## 2019-03-06 LAB — INR BLD: 2.7

## 2019-03-11 ENCOUNTER — HOSPITAL ENCOUNTER (OUTPATIENT)
Age: 74
Discharge: HOME | End: 2019-03-11
Payer: MEDICARE

## 2019-03-11 LAB
ALBUMIN SERPL-MCNC: 4.3 G/DL (ref 3.5–5.2)
ALP BLD-CCNC: 56 U/L (ref 40–129)
ALT SERPL-CCNC: 16 U/L (ref 0–40)
ANION GAP SERPL CALCULATED.3IONS-SCNC: 12 MMOL/L (ref 7–16)
AST SERPL-CCNC: 24 U/L (ref 0–39)
BASOPHILS ABSOLUTE: 0.08 E9/L (ref 0–0.2)
BASOPHILS RELATIVE PERCENT: 0.8 % (ref 0–2)
BILIRUB SERPL-MCNC: 0.8 MG/DL (ref 0–1.2)
BILIRUBIN URINE: NEGATIVE
BLOOD, URINE: NEGATIVE
BUN BLDV-MCNC: 35 MG/DL (ref 8–23)
CALCIUM SERPL-MCNC: 10.2 MG/DL (ref 8.6–10.2)
CHLORIDE BLD-SCNC: 106 MMOL/L (ref 98–107)
CLARITY: CLEAR
CO2: 25 MMOL/L (ref 22–29)
COLOR: YELLOW
CREAT SERPL-MCNC: 1.6 MG/DL (ref 0.7–1.2)
CREATININE URINE: 79 MG/DL (ref 40–278)
EOSINOPHILS ABSOLUTE: 0.09 E9/L (ref 0.05–0.5)
EOSINOPHILS RELATIVE PERCENT: 0.9 % (ref 0–6)
GFR AFRICAN AMERICAN: 51
GFR NON-AFRICAN AMERICAN: 42 ML/MIN/1.73
GLUCOSE BLD-MCNC: 100 MG/DL (ref 74–99)
GLUCOSE URINE: NEGATIVE MG/DL
HCT VFR BLD CALC: 40.8 % (ref 37–54)
HEMOGLOBIN: 13.8 G/DL (ref 12.5–16.5)
IMMATURE GRANULOCYTES #: 0.07 E9/L
IMMATURE GRANULOCYTES %: 0.7 % (ref 0–5)
KETONES, URINE: NEGATIVE MG/DL
LEUKOCYTE ESTERASE, URINE: NEGATIVE
LYMPHOCYTES ABSOLUTE: 1.63 E9/L (ref 1.5–4)
LYMPHOCYTES RELATIVE PERCENT: 16.7 % (ref 20–42)
MCH RBC QN AUTO: 32.3 PG (ref 26–35)
MCHC RBC AUTO-ENTMCNC: 33.8 % (ref 32–34.5)
MCV RBC AUTO: 95.6 FL (ref 80–99.9)
MONOCYTES ABSOLUTE: 0.66 E9/L (ref 0.1–0.95)
MONOCYTES RELATIVE PERCENT: 6.8 % (ref 2–12)
NEUTROPHILS ABSOLUTE: 7.22 E9/L (ref 1.8–7.3)
NEUTROPHILS RELATIVE PERCENT: 74.1 % (ref 43–80)
NITRITE, URINE: NEGATIVE
PDW BLD-RTO: 13.9 FL (ref 11.5–15)
PH UA: 5.5 (ref 5–9)
PLATELET # BLD: 197 E9/L (ref 130–450)
PMV BLD AUTO: 9.5 FL (ref 7–12)
POTASSIUM SERPL-SCNC: 4.9 MMOL/L (ref 3.5–5)
PROTEIN PROTEIN: 9 MG/DL (ref 0–12)
PROTEIN UA: NEGATIVE MG/DL
PROTEIN/CREAT RATIO: 0.1
PROTEIN/CREAT RATIO: 0.1 (ref 0–0.2)
RBC # BLD: 4.27 E12/L (ref 3.8–5.8)
SODIUM BLD-SCNC: 143 MMOL/L (ref 132–146)
SPECIFIC GRAVITY UA: 1.01 (ref 1–1.03)
TOTAL PROTEIN: 7 G/DL (ref 6.4–8.3)
UROBILINOGEN, URINE: 0.2 E.U./DL
VITAMIN D 25-HYDROXY: 29 NG/ML (ref 30–100)
WBC # BLD: 9.8 E9/L (ref 4.5–11.5)

## 2019-03-11 PROCEDURE — 82570 ASSAY OF URINE CREATININE: CPT

## 2019-03-11 PROCEDURE — 85025 COMPLETE CBC W/AUTO DIFF WBC: CPT

## 2019-03-11 PROCEDURE — 82306 VITAMIN D 25 HYDROXY: CPT

## 2019-03-11 PROCEDURE — 81003 URINALYSIS AUTO W/O SCOPE: CPT

## 2019-03-11 PROCEDURE — 36415 COLL VENOUS BLD VENIPUNCTURE: CPT

## 2019-03-11 PROCEDURE — 84156 ASSAY OF PROTEIN URINE: CPT

## 2019-03-11 PROCEDURE — 80053 COMPREHEN METABOLIC PANEL: CPT

## 2019-03-13 ENCOUNTER — ANTI-COAG VISIT (OUTPATIENT)
Dept: PRIMARY CARE CLINIC | Age: 74
End: 2019-03-13
Payer: MEDICARE

## 2019-03-13 DIAGNOSIS — I48.20 CHRONIC ATRIAL FIBRILLATION (HCC): ICD-10-CM

## 2019-03-13 LAB — INR BLD: 2.6

## 2019-03-13 PROCEDURE — 93793 ANTICOAG MGMT PT WARFARIN: CPT | Performed by: INTERNAL MEDICINE

## 2019-03-20 ENCOUNTER — ANTI-COAG VISIT (OUTPATIENT)
Dept: PRIMARY CARE CLINIC | Age: 74
End: 2019-03-20
Payer: MEDICARE

## 2019-03-20 DIAGNOSIS — I48.20 CHRONIC ATRIAL FIBRILLATION (HCC): ICD-10-CM

## 2019-03-20 LAB — INR BLD: 2.4

## 2019-03-20 PROCEDURE — 93793 ANTICOAG MGMT PT WARFARIN: CPT | Performed by: INTERNAL MEDICINE

## 2019-03-26 DIAGNOSIS — R60.0 LOWER LEG EDEMA: ICD-10-CM

## 2019-03-26 RX ORDER — HYDROCHLOROTHIAZIDE 25 MG/1
25 TABLET ORAL DAILY
Qty: 90 TABLET | Refills: 1 | Status: SHIPPED | OUTPATIENT
Start: 2019-03-26 | End: 2019-10-21 | Stop reason: SDUPTHER

## 2019-03-26 RX ORDER — SIMVASTATIN 40 MG
40 TABLET ORAL DAILY
Qty: 90 TABLET | Refills: 1 | Status: SHIPPED | OUTPATIENT
Start: 2019-03-26 | End: 2019-09-05 | Stop reason: SDUPTHER

## 2019-03-27 ENCOUNTER — ANTI-COAG VISIT (OUTPATIENT)
Dept: PRIMARY CARE CLINIC | Age: 74
End: 2019-03-27
Payer: MEDICARE

## 2019-03-27 DIAGNOSIS — I48.20 CHRONIC ATRIAL FIBRILLATION (HCC): ICD-10-CM

## 2019-03-27 LAB — INR BLD: 2.1

## 2019-03-27 PROCEDURE — 93793 ANTICOAG MGMT PT WARFARIN: CPT | Performed by: INTERNAL MEDICINE

## 2019-04-03 ENCOUNTER — ANTI-COAG VISIT (OUTPATIENT)
Dept: PRIMARY CARE CLINIC | Age: 74
End: 2019-04-03
Payer: MEDICARE

## 2019-04-03 DIAGNOSIS — I48.20 CHRONIC ATRIAL FIBRILLATION (HCC): ICD-10-CM

## 2019-04-03 LAB — INR BLD: 2.3

## 2019-04-03 PROCEDURE — 93793 ANTICOAG MGMT PT WARFARIN: CPT | Performed by: INTERNAL MEDICINE

## 2019-04-03 NOTE — PROGRESS NOTES
Previous INR: 2.10     Previous dose: 7.5mg Sun and thurs and 5mg all others     Current INR: 2.30     Recommendation: Continue 7.5mg Sun and thurs and 5mg all others     Next INR: 1 week     Mandeep Myers MD  4/3/2019  1:08 PM

## 2019-04-10 ENCOUNTER — ANTI-COAG VISIT (OUTPATIENT)
Dept: PRIMARY CARE CLINIC | Age: 74
End: 2019-04-10
Payer: MEDICARE

## 2019-04-10 DIAGNOSIS — I48.20 CHRONIC ATRIAL FIBRILLATION (HCC): ICD-10-CM

## 2019-04-10 LAB — INR BLD: 2.3

## 2019-04-10 PROCEDURE — 93793 ANTICOAG MGMT PT WARFARIN: CPT | Performed by: INTERNAL MEDICINE

## 2019-04-10 NOTE — PROGRESS NOTES
Previous INR: 2.30           Previous dose: 7.5mg Sun and thurs and 5mg all others      Current INR: 2.30             Recommendation: Continue 7.5mg Sun and thurs and 5mg all others      Next INR: 1 week     Frank Mccrary MD  4/10/2019  3:00 PM

## 2019-04-16 ENCOUNTER — HOSPITAL ENCOUNTER (OUTPATIENT)
Age: 74
Discharge: HOME | End: 2019-04-16
Payer: MEDICARE

## 2019-04-16 LAB
ALBUMIN SERPL-MCNC: 4.4 G/DL (ref 3.5–5.2)
ALP BLD-CCNC: 62 U/L (ref 40–129)
ALT SERPL-CCNC: 17 U/L (ref 0–40)
ANION GAP SERPL CALCULATED.3IONS-SCNC: 10 MMOL/L (ref 7–16)
AST SERPL-CCNC: 21 U/L (ref 0–39)
BACTERIA: NORMAL /HPF
BASOPHILS ABSOLUTE: 0.07 E9/L (ref 0–0.2)
BASOPHILS RELATIVE PERCENT: 1 % (ref 0–2)
BILIRUB SERPL-MCNC: 0.8 MG/DL (ref 0–1.2)
BILIRUBIN URINE: NEGATIVE
BLOOD, URINE: NEGATIVE
BUN BLDV-MCNC: 34 MG/DL (ref 8–23)
CALCIUM SERPL-MCNC: 10.4 MG/DL (ref 8.6–10.2)
CASTS: NORMAL /LPF
CHLORIDE BLD-SCNC: 107 MMOL/L (ref 98–107)
CHOLESTEROL, TOTAL: 174 MG/DL (ref 0–199)
CLARITY: CLEAR
CO2: 26 MMOL/L (ref 22–29)
COLOR: YELLOW
CREAT SERPL-MCNC: 1.6 MG/DL (ref 0.7–1.2)
CREATININE URINE: 238 MG/DL (ref 40–278)
CREATININE URINE: 250 MG/DL (ref 40–278)
EOSINOPHILS ABSOLUTE: 0.12 E9/L (ref 0.05–0.5)
EOSINOPHILS RELATIVE PERCENT: 1.8 % (ref 0–6)
GFR AFRICAN AMERICAN: 51
GFR NON-AFRICAN AMERICAN: 42 ML/MIN/1.73
GLUCOSE BLD-MCNC: 113 MG/DL (ref 74–99)
GLUCOSE URINE: NEGATIVE MG/DL
HBA1C MFR BLD: 4.8 % (ref 4–5.6)
HCT VFR BLD CALC: 40.6 % (ref 37–54)
HDLC SERPL-MCNC: 47 MG/DL
HEMOGLOBIN: 13.7 G/DL (ref 12.5–16.5)
IMMATURE GRANULOCYTES #: 0.03 E9/L
IMMATURE GRANULOCYTES %: 0.4 % (ref 0–5)
KETONES, URINE: NEGATIVE MG/DL
LDL CHOLESTEROL CALCULATED: 78 MG/DL (ref 0–99)
LEUKOCYTE ESTERASE, URINE: NEGATIVE
LYMPHOCYTES ABSOLUTE: 1.96 E9/L (ref 1.5–4)
LYMPHOCYTES RELATIVE PERCENT: 29 % (ref 20–42)
MCH RBC QN AUTO: 32.5 PG (ref 26–35)
MCHC RBC AUTO-ENTMCNC: 33.7 % (ref 32–34.5)
MCV RBC AUTO: 96.2 FL (ref 80–99.9)
MICROALBUMIN UR-MCNC: 298.7 MG/L
MICROALBUMIN/CREAT UR-RTO: 119.5 (ref 0–30)
MONOCYTES ABSOLUTE: 0.73 E9/L (ref 0.1–0.95)
MONOCYTES RELATIVE PERCENT: 10.8 % (ref 2–12)
NEUTROPHILS ABSOLUTE: 3.84 E9/L (ref 1.8–7.3)
NEUTROPHILS RELATIVE PERCENT: 57 % (ref 43–80)
NITRITE, URINE: NEGATIVE
PARATHYROID HORMONE INTACT: 136 PG/ML (ref 15–65)
PDW BLD-RTO: 14 FL (ref 11.5–15)
PH UA: 6 (ref 5–9)
PLATELET # BLD: 213 E9/L (ref 130–450)
PMV BLD AUTO: 8.9 FL (ref 7–12)
POTASSIUM SERPL-SCNC: 4.3 MMOL/L (ref 3.5–5)
PROTEIN PROTEIN: 55 MG/DL (ref 0–12)
PROTEIN UA: 30 MG/DL
PROTEIN/CREAT RATIO: 0.2
PROTEIN/CREAT RATIO: 0.2 (ref 0–0.2)
RBC # BLD: 4.22 E12/L (ref 3.8–5.8)
RBC UA: NORMAL /HPF (ref 0–2)
SODIUM BLD-SCNC: 143 MMOL/L (ref 132–146)
SPECIFIC GRAVITY UA: 1.02 (ref 1–1.03)
T4 FREE: 1.04 NG/DL (ref 0.93–1.7)
TOTAL PROTEIN: 7.1 G/DL (ref 6.4–8.3)
TRIGL SERPL-MCNC: 247 MG/DL (ref 0–149)
TSH SERPL DL<=0.05 MIU/L-ACNC: 2.16 UIU/ML (ref 0.27–4.2)
UROBILINOGEN, URINE: 0.2 E.U./DL
VLDLC SERPL CALC-MCNC: 49 MG/DL
WBC # BLD: 6.8 E9/L (ref 4.5–11.5)
WBC UA: NORMAL /HPF (ref 0–5)

## 2019-04-16 PROCEDURE — 84439 ASSAY OF FREE THYROXINE: CPT

## 2019-04-16 PROCEDURE — 36415 COLL VENOUS BLD VENIPUNCTURE: CPT

## 2019-04-16 PROCEDURE — 80053 COMPREHEN METABOLIC PANEL: CPT

## 2019-04-16 PROCEDURE — 82044 UR ALBUMIN SEMIQUANTITATIVE: CPT

## 2019-04-16 PROCEDURE — 85025 COMPLETE CBC W/AUTO DIFF WBC: CPT

## 2019-04-16 PROCEDURE — 84586 ASSAY OF VIP: CPT

## 2019-04-16 PROCEDURE — 81001 URINALYSIS AUTO W/SCOPE: CPT

## 2019-04-16 PROCEDURE — 84156 ASSAY OF PROTEIN URINE: CPT

## 2019-04-16 PROCEDURE — 83970 ASSAY OF PARATHORMONE: CPT

## 2019-04-16 PROCEDURE — 82570 ASSAY OF URINE CREATININE: CPT

## 2019-04-16 PROCEDURE — 84443 ASSAY THYROID STIM HORMONE: CPT

## 2019-04-16 PROCEDURE — 80061 LIPID PANEL: CPT

## 2019-04-16 PROCEDURE — 83036 HEMOGLOBIN GLYCOSYLATED A1C: CPT

## 2019-04-17 ENCOUNTER — ANTI-COAG VISIT (OUTPATIENT)
Dept: PRIMARY CARE CLINIC | Age: 74
End: 2019-04-17
Payer: MEDICARE

## 2019-04-17 DIAGNOSIS — I48.20 CHRONIC ATRIAL FIBRILLATION (HCC): ICD-10-CM

## 2019-04-17 PROBLEM — R60.0 LOWER LEG EDEMA: Status: RESOLVED | Noted: 2018-04-24 | Resolved: 2019-04-17

## 2019-04-17 PROBLEM — R42 DIZZINESS: Status: RESOLVED | Noted: 2018-11-01 | Resolved: 2019-04-17

## 2019-04-17 LAB — INR BLD: 2.3

## 2019-04-17 PROCEDURE — 93793 ANTICOAG MGMT PT WARFARIN: CPT | Performed by: INTERNAL MEDICINE

## 2019-04-17 NOTE — PROGRESS NOTES
Previous INR: 2.30           Previous dose: 7.5mg Sun and thurs and 5mg all others      Current INR: 2.30             Recommendation: Continue 7.5mg Sun and thurs and 5mg all others      Next INR: 1 week     Aura Gastelum MD  4/17/2019  12:10 PM

## 2019-04-23 LAB
Lab: NORMAL
REPORT: NORMAL
THIS TEST SENT TO: NORMAL

## 2019-04-24 ENCOUNTER — ANTI-COAG VISIT (OUTPATIENT)
Dept: PRIMARY CARE CLINIC | Age: 74
End: 2019-04-24
Payer: MEDICARE

## 2019-04-24 DIAGNOSIS — I48.20 CHRONIC ATRIAL FIBRILLATION (HCC): ICD-10-CM

## 2019-04-24 LAB — INR BLD: 2.5

## 2019-04-24 PROCEDURE — 93793 ANTICOAG MGMT PT WARFARIN: CPT | Performed by: INTERNAL MEDICINE

## 2019-04-24 NOTE — PROGRESS NOTES
Previous INR: 2.30           Previous dose: 7.5mg Sun and thurs and 5mg all others      Current INR: 2.50             Recommendation: Continue 7.5mg Sun and thurs and 5mg all others      Next INR: 1 April Cam MD  4/24/2019  1:03 PM

## 2019-05-01 ENCOUNTER — ANTI-COAG VISIT (OUTPATIENT)
Dept: PRIMARY CARE CLINIC | Age: 74
End: 2019-05-01
Payer: MEDICARE

## 2019-05-01 DIAGNOSIS — I48.20 CHRONIC ATRIAL FIBRILLATION (HCC): ICD-10-CM

## 2019-05-01 LAB — INR BLD: 2

## 2019-05-01 PROCEDURE — 93793 ANTICOAG MGMT PT WARFARIN: CPT | Performed by: INTERNAL MEDICINE

## 2019-05-01 NOTE — PROGRESS NOTES
Previous XAT: 9.54           Previous dose: 7.5mg Sun and thurs and 5mg all others      Current INR: 2.00             Recommendation: Continue 7.5mg Sun and thurs and 5mg all others      Next INR: 1 week    Chris Young MD  5/1/2019  1:04 PM

## 2019-05-08 ENCOUNTER — ANTI-COAG VISIT (OUTPATIENT)
Dept: PRIMARY CARE CLINIC | Age: 74
End: 2019-05-08
Payer: MEDICARE

## 2019-05-08 DIAGNOSIS — I48.20 CHRONIC ATRIAL FIBRILLATION (HCC): ICD-10-CM

## 2019-05-08 LAB — INR BLD: 2.7

## 2019-05-08 PROCEDURE — 93793 ANTICOAG MGMT PT WARFARIN: CPT | Performed by: INTERNAL MEDICINE

## 2019-05-15 ENCOUNTER — ANTI-COAG VISIT (OUTPATIENT)
Dept: PRIMARY CARE CLINIC | Age: 74
End: 2019-05-15
Payer: MEDICARE

## 2019-05-15 DIAGNOSIS — I48.20 CHRONIC ATRIAL FIBRILLATION (HCC): ICD-10-CM

## 2019-05-15 LAB — INR BLD: 2

## 2019-05-15 PROCEDURE — 93793 ANTICOAG MGMT PT WARFARIN: CPT | Performed by: INTERNAL MEDICINE

## 2019-05-15 NOTE — PROGRESS NOTES
Previous INR: 2.70           Previous dose: 7.5mg Sun and thurs and 5mg all others      Current INR: 2.00             Recommendation: Continue 7.5mg Sun and thurs and 5mg all others      Next INR: 1 week    Frank Mccrary MD  5/15/2019  2:02 PM

## 2019-05-22 ENCOUNTER — ANTI-COAG VISIT (OUTPATIENT)
Dept: PRIMARY CARE CLINIC | Age: 74
End: 2019-05-22
Payer: MEDICARE

## 2019-05-22 DIAGNOSIS — I48.20 CHRONIC ATRIAL FIBRILLATION (HCC): ICD-10-CM

## 2019-05-22 LAB — INR BLD: 2.7

## 2019-05-22 PROCEDURE — 93793 ANTICOAG MGMT PT WARFARIN: CPT | Performed by: INTERNAL MEDICINE

## 2019-05-22 NOTE — PROGRESS NOTES
Previous INR: 2.0           Previous dose: 7.5mg Sun and thurs and 5mg all others      Current INR: 2.70             Recommendation: Continue 7.5mg Sun and thurs and 5mg all others      Next INR: 1 week    Roopa Barth MD  5/22/2019  12:22 PM

## 2019-05-30 ENCOUNTER — ANTI-COAG VISIT (OUTPATIENT)
Dept: PRIMARY CARE CLINIC | Age: 74
End: 2019-05-30
Payer: MEDICARE

## 2019-05-30 DIAGNOSIS — I48.20 CHRONIC ATRIAL FIBRILLATION (HCC): ICD-10-CM

## 2019-05-30 LAB — INR BLD: 2.2

## 2019-05-30 PROCEDURE — 93793 ANTICOAG MGMT PT WARFARIN: CPT | Performed by: INTERNAL MEDICINE

## 2019-05-30 NOTE — PROGRESS NOTES
Previous INR: 2.70           Previous dose: 7.5mg Sun and thurs and 5mg all others      Current INR: 2.20             Recommendation: Continue 7.5mg Sun and thurs and 5mg all others      Next INR: 1 week    Areli Wade MD  5/30/2019  12:24 PM

## 2019-05-31 NOTE — PROGRESS NOTES
5/31/2019    Advised Regla INR 2.2, continue dose:  7.5 mg Sun and Thurs and 5 mg all other days. Redraw 6/6/2019.

## 2019-06-06 ENCOUNTER — ANTI-COAG VISIT (OUTPATIENT)
Dept: PRIMARY CARE CLINIC | Age: 74
End: 2019-06-06
Payer: MEDICARE

## 2019-06-06 DIAGNOSIS — I48.20 CHRONIC ATRIAL FIBRILLATION (HCC): ICD-10-CM

## 2019-06-06 LAB — INR BLD: 2.7

## 2019-06-06 PROCEDURE — 93793 ANTICOAG MGMT PT WARFARIN: CPT | Performed by: INTERNAL MEDICINE

## 2019-06-06 NOTE — PROGRESS NOTES
Previous INR: 2.20           Previous dose: 7.5mg Sun and thurs and 5mg all others      Current INR: 2.70             Recommendation: Continue 7.5mg Sun and thurs and 5mg all others      Next INR: 1 week    Anselmo Brambila MD  6/6/2019  5:17 PM

## 2019-06-07 ENCOUNTER — TELEPHONE (OUTPATIENT)
Dept: PRIMARY CARE CLINIC | Age: 74
End: 2019-06-07

## 2019-06-13 ENCOUNTER — ANTI-COAG VISIT (OUTPATIENT)
Dept: PRIMARY CARE CLINIC | Age: 74
End: 2019-06-13
Payer: MEDICARE

## 2019-06-13 DIAGNOSIS — I48.20 CHRONIC ATRIAL FIBRILLATION (HCC): ICD-10-CM

## 2019-06-13 LAB — INR BLD: 2.9

## 2019-06-13 PROCEDURE — 93793 ANTICOAG MGMT PT WARFARIN: CPT | Performed by: INTERNAL MEDICINE

## 2019-06-20 ENCOUNTER — ANTI-COAG VISIT (OUTPATIENT)
Dept: PRIMARY CARE CLINIC | Age: 74
End: 2019-06-20
Payer: MEDICARE

## 2019-06-20 DIAGNOSIS — I48.20 CHRONIC ATRIAL FIBRILLATION (HCC): ICD-10-CM

## 2019-06-20 LAB — INR BLD: 2.7

## 2019-06-20 PROCEDURE — 93793 ANTICOAG MGMT PT WARFARIN: CPT | Performed by: INTERNAL MEDICINE

## 2019-06-20 NOTE — PROGRESS NOTES
Previous OUY: 7.20           Previous dose: 7.5mg Sun and thurs and 5mg all others      Current INR: 2.70             Recommendation: Continue 7.5mg Sun and thurs and 5mg all others      Next INR: 1 week    Steve Delgado MD  6/20/2019  12:40 PM

## 2019-06-21 RX ORDER — ALLOPURINOL 100 MG/1
TABLET ORAL
Qty: 38 TABLET | Refills: 1 | Status: SHIPPED | OUTPATIENT
Start: 2019-06-21 | End: 2019-12-10 | Stop reason: SDUPTHER

## 2019-06-27 ENCOUNTER — ANTI-COAG VISIT (OUTPATIENT)
Dept: PRIMARY CARE CLINIC | Age: 74
End: 2019-06-27
Payer: MEDICARE

## 2019-06-27 DIAGNOSIS — I48.20 CHRONIC ATRIAL FIBRILLATION (HCC): ICD-10-CM

## 2019-06-27 LAB — INR BLD: 2.8

## 2019-06-27 PROCEDURE — 93793 ANTICOAG MGMT PT WARFARIN: CPT | Performed by: INTERNAL MEDICINE

## 2019-06-27 NOTE — PROGRESS NOTES
Previous INR: 2.70           Previous dose: 7.5mg Sun and thurs and 5mg all others      Current INR: 2.80             Recommendation: Continue 7.5mg Sun and thurs and 5mg all others      Next INR: 1 week    Edward Mederos MD  6/27/2019  12:50 PM

## 2019-07-03 ENCOUNTER — ANTI-COAG VISIT (OUTPATIENT)
Dept: PRIMARY CARE CLINIC | Age: 74
End: 2019-07-03
Payer: MEDICARE

## 2019-07-03 DIAGNOSIS — I48.20 CHRONIC ATRIAL FIBRILLATION (HCC): ICD-10-CM

## 2019-07-03 LAB — INR BLD: 3

## 2019-07-03 PROCEDURE — 93793 ANTICOAG MGMT PT WARFARIN: CPT | Performed by: INTERNAL MEDICINE

## 2019-07-03 NOTE — PROGRESS NOTES
Previous INR: 2.80           Previous dose: 7.5mg Sun and thurs and 5mg all others      Current INR: 3.00            Recommendation: Continue 7.5mg Sun and thurs and 5mg all others      Next INR: 1 week    Maggi Garcia MD  7/3/2019  1:27 PM

## 2019-07-11 ENCOUNTER — ANTI-COAG VISIT (OUTPATIENT)
Dept: PRIMARY CARE CLINIC | Age: 74
End: 2019-07-11
Payer: MEDICARE

## 2019-07-11 DIAGNOSIS — I48.20 CHRONIC ATRIAL FIBRILLATION (HCC): ICD-10-CM

## 2019-07-11 LAB — INR BLD: 2.3

## 2019-07-11 PROCEDURE — 93793 ANTICOAG MGMT PT WARFARIN: CPT | Performed by: INTERNAL MEDICINE

## 2019-07-18 ENCOUNTER — ANTI-COAG VISIT (OUTPATIENT)
Dept: PRIMARY CARE CLINIC | Age: 74
End: 2019-07-18
Payer: MEDICARE

## 2019-07-18 DIAGNOSIS — I48.20 CHRONIC ATRIAL FIBRILLATION (HCC): ICD-10-CM

## 2019-07-18 LAB — INR BLD: 3.3

## 2019-07-18 PROCEDURE — 93793 ANTICOAG MGMT PT WARFARIN: CPT | Performed by: INTERNAL MEDICINE

## 2019-07-18 NOTE — PROGRESS NOTES
7/18/2019    Advised patient's daughter Osiris Martinez INR 3.3, take 5 mg today only, then 7.5mg Sun/Thurs and 5mg all other days.   Redraw 7/25/2019

## 2019-07-25 ENCOUNTER — ANTI-COAG VISIT (OUTPATIENT)
Dept: PRIMARY CARE CLINIC | Age: 74
End: 2019-07-25
Payer: MEDICARE

## 2019-07-25 DIAGNOSIS — I48.20 CHRONIC ATRIAL FIBRILLATION (HCC): ICD-10-CM

## 2019-07-25 LAB — INR BLD: 2.9

## 2019-07-25 PROCEDURE — 93793 ANTICOAG MGMT PT WARFARIN: CPT | Performed by: INTERNAL MEDICINE

## 2019-08-01 ENCOUNTER — ANTI-COAG VISIT (OUTPATIENT)
Dept: PRIMARY CARE CLINIC | Age: 74
End: 2019-08-01
Payer: MEDICARE

## 2019-08-01 DIAGNOSIS — I48.20 CHRONIC ATRIAL FIBRILLATION (HCC): ICD-10-CM

## 2019-08-01 LAB — INR BLD: 2.2

## 2019-08-01 PROCEDURE — 93793 ANTICOAG MGMT PT WARFARIN: CPT | Performed by: INTERNAL MEDICINE

## 2019-08-01 NOTE — PROGRESS NOTES
Previous INR: 2.90      Previous dose:  7.5mg Sun and thurs and 5mg all others     Current INR: 2.20            Recommendation: continue 7.5mg Sun and thurs and 5mg all others      Next INR: 1 week    Evon Guerra MD  8/1/2019  12:15 PM

## 2019-08-08 ENCOUNTER — ANTI-COAG VISIT (OUTPATIENT)
Dept: PRIMARY CARE CLINIC | Age: 74
End: 2019-08-08
Payer: MEDICARE

## 2019-08-08 DIAGNOSIS — I48.20 CHRONIC ATRIAL FIBRILLATION (HCC): ICD-10-CM

## 2019-08-08 LAB — INR BLD: 2.8

## 2019-08-08 PROCEDURE — 93793 ANTICOAG MGMT PT WARFARIN: CPT | Performed by: INTERNAL MEDICINE

## 2019-08-15 ENCOUNTER — ANTI-COAG VISIT (OUTPATIENT)
Dept: PRIMARY CARE CLINIC | Age: 74
End: 2019-08-15
Payer: MEDICARE

## 2019-08-15 DIAGNOSIS — I48.20 CHRONIC ATRIAL FIBRILLATION (HCC): ICD-10-CM

## 2019-08-15 LAB — INR BLD: 2.6

## 2019-08-15 PROCEDURE — 93793 ANTICOAG MGMT PT WARFARIN: CPT | Performed by: INTERNAL MEDICINE

## 2019-08-15 NOTE — PROGRESS NOTES
Previous INR: 2.80      Previous dose:  7.5mg Sun and thurs and 5mg all others     Current INR: 2.60            Recommendation: continue 7.5mg Sun and thurs and 5mg all others      Next INR: 1 week    Mireille Dallas MD  8/15/2019  12:57 PM

## 2019-08-22 ENCOUNTER — ANTI-COAG VISIT (OUTPATIENT)
Dept: PRIMARY CARE CLINIC | Age: 74
End: 2019-08-22
Payer: MEDICARE

## 2019-08-22 DIAGNOSIS — I48.20 CHRONIC ATRIAL FIBRILLATION (HCC): ICD-10-CM

## 2019-08-22 LAB — INR BLD: 1.6

## 2019-08-22 PROCEDURE — 93793 ANTICOAG MGMT PT WARFARIN: CPT | Performed by: INTERNAL MEDICINE

## 2019-08-29 ENCOUNTER — ANTI-COAG VISIT (OUTPATIENT)
Dept: PRIMARY CARE CLINIC | Age: 74
End: 2019-08-29
Payer: MEDICARE

## 2019-08-29 DIAGNOSIS — I48.20 CHRONIC ATRIAL FIBRILLATION (HCC): ICD-10-CM

## 2019-08-29 LAB — INR BLD: 2.5

## 2019-08-29 PROCEDURE — 93793 ANTICOAG MGMT PT WARFARIN: CPT | Performed by: INTERNAL MEDICINE

## 2019-08-29 NOTE — PROGRESS NOTES
Previous INR: 1.60      Previous dose:  7.5mg Sun and thurs and 5mg all others     Current INR: 2.50            Recommendation: Continue 7.5mg Sun and thurs and 5mg all others      Next INR: 1 week    Giovanna Estrada MD  8/29/2019  4:37 PM

## 2019-08-30 NOTE — PROGRESS NOTES
8/30/2019    Lukas Marvin [phone], INR 2.5, continue 7.5mg Sun/Thur and 5mg all other days. Redraw 9/5/2019.

## 2019-09-03 ENCOUNTER — HOSPITAL ENCOUNTER (OUTPATIENT)
Age: 74
Discharge: HOME | End: 2019-09-03
Payer: MEDICARE

## 2019-09-03 LAB
ALBUMIN SERPL-MCNC: 4.4 G/DL (ref 3.5–5.2)
ALP BLD-CCNC: 63 U/L (ref 40–129)
ALT SERPL-CCNC: 19 U/L (ref 0–40)
ANION GAP SERPL CALCULATED.3IONS-SCNC: 11 MMOL/L (ref 7–16)
AST SERPL-CCNC: 23 U/L (ref 0–39)
BASOPHILS ABSOLUTE: 0.05 E9/L (ref 0–0.2)
BASOPHILS RELATIVE PERCENT: 0.6 % (ref 0–2)
BILIRUB SERPL-MCNC: 0.7 MG/DL (ref 0–1.2)
BILIRUBIN URINE: NEGATIVE
BLOOD, URINE: NEGATIVE
BUN BLDV-MCNC: 27 MG/DL (ref 8–23)
CALCIUM SERPL-MCNC: 10.3 MG/DL (ref 8.6–10.2)
CHLORIDE BLD-SCNC: 107 MMOL/L (ref 98–107)
CLARITY: CLEAR
CO2: 27 MMOL/L (ref 22–29)
COLOR: YELLOW
CREAT SERPL-MCNC: 1.6 MG/DL (ref 0.7–1.2)
CREATININE URINE: 103 MG/DL (ref 40–278)
EOSINOPHILS ABSOLUTE: 0.15 E9/L (ref 0.05–0.5)
EOSINOPHILS RELATIVE PERCENT: 1.9 % (ref 0–6)
GFR AFRICAN AMERICAN: 51
GFR NON-AFRICAN AMERICAN: 42 ML/MIN/1.73
GLUCOSE BLD-MCNC: 120 MG/DL (ref 74–99)
GLUCOSE URINE: NEGATIVE MG/DL
HCT VFR BLD CALC: 40.9 % (ref 37–54)
HEMOGLOBIN: 14 G/DL (ref 12.5–16.5)
IMMATURE GRANULOCYTES #: 0.05 E9/L
IMMATURE GRANULOCYTES %: 0.6 % (ref 0–5)
KETONES, URINE: NEGATIVE MG/DL
LEUKOCYTE ESTERASE, URINE: NEGATIVE
LYMPHOCYTES ABSOLUTE: 2.3 E9/L (ref 1.5–4)
LYMPHOCYTES RELATIVE PERCENT: 29.5 % (ref 20–42)
MAGNESIUM: 2.1 MG/DL (ref 1.6–2.6)
MCH RBC QN AUTO: 33.1 PG (ref 26–35)
MCHC RBC AUTO-ENTMCNC: 34.2 % (ref 32–34.5)
MCV RBC AUTO: 96.7 FL (ref 80–99.9)
MONOCYTES ABSOLUTE: 0.75 E9/L (ref 0.1–0.95)
MONOCYTES RELATIVE PERCENT: 9.6 % (ref 2–12)
NEUTROPHILS ABSOLUTE: 4.5 E9/L (ref 1.8–7.3)
NEUTROPHILS RELATIVE PERCENT: 57.8 % (ref 43–80)
NITRITE, URINE: NEGATIVE
PARATHYROID HORMONE INTACT: 117 PG/ML (ref 15–65)
PDW BLD-RTO: 14.1 FL (ref 11.5–15)
PH UA: 5.5 (ref 5–9)
PHOSPHORUS: 3.5 MG/DL (ref 2.5–4.5)
PLATELET # BLD: 207 E9/L (ref 130–450)
PMV BLD AUTO: 9 FL (ref 7–12)
POTASSIUM SERPL-SCNC: 4.5 MMOL/L (ref 3.5–5)
PROTEIN PROTEIN: 9 MG/DL (ref 0–12)
PROTEIN UA: NEGATIVE MG/DL
PROTEIN/CREAT RATIO: 0.1
PROTEIN/CREAT RATIO: 0.1 (ref 0–0.2)
RBC # BLD: 4.23 E12/L (ref 3.8–5.8)
SODIUM BLD-SCNC: 145 MMOL/L (ref 132–146)
SPECIFIC GRAVITY UA: 1.01 (ref 1–1.03)
TOTAL PROTEIN: 7.2 G/DL (ref 6.4–8.3)
UROBILINOGEN, URINE: 0.2 E.U./DL
VITAMIN D 25-HYDROXY: 24 NG/ML (ref 30–100)
WBC # BLD: 7.8 E9/L (ref 4.5–11.5)

## 2019-09-03 PROCEDURE — 84100 ASSAY OF PHOSPHORUS: CPT

## 2019-09-03 PROCEDURE — 83970 ASSAY OF PARATHORMONE: CPT

## 2019-09-03 PROCEDURE — 85025 COMPLETE CBC W/AUTO DIFF WBC: CPT

## 2019-09-03 PROCEDURE — 36415 COLL VENOUS BLD VENIPUNCTURE: CPT

## 2019-09-03 PROCEDURE — 82306 VITAMIN D 25 HYDROXY: CPT

## 2019-09-03 PROCEDURE — 82570 ASSAY OF URINE CREATININE: CPT

## 2019-09-03 PROCEDURE — 84156 ASSAY OF PROTEIN URINE: CPT

## 2019-09-03 PROCEDURE — 80053 COMPREHEN METABOLIC PANEL: CPT

## 2019-09-03 PROCEDURE — 83735 ASSAY OF MAGNESIUM: CPT

## 2019-09-03 PROCEDURE — 81003 URINALYSIS AUTO W/O SCOPE: CPT

## 2019-09-05 ENCOUNTER — ANTI-COAG VISIT (OUTPATIENT)
Dept: PRIMARY CARE CLINIC | Age: 74
End: 2019-09-05

## 2019-09-05 ENCOUNTER — TELEPHONE (OUTPATIENT)
Dept: PRIMARY CARE CLINIC | Age: 74
End: 2019-09-05

## 2019-09-05 ENCOUNTER — OFFICE VISIT (OUTPATIENT)
Dept: PRIMARY CARE CLINIC | Age: 74
End: 2019-09-05
Payer: MEDICARE

## 2019-09-05 VITALS
HEART RATE: 70 BPM | RESPIRATION RATE: 16 BRPM | TEMPERATURE: 98.4 F | DIASTOLIC BLOOD PRESSURE: 72 MMHG | BODY MASS INDEX: 35.31 KG/M2 | WEIGHT: 225 LBS | SYSTOLIC BLOOD PRESSURE: 118 MMHG | HEIGHT: 67 IN | OXYGEN SATURATION: 96 %

## 2019-09-05 DIAGNOSIS — J44.9 CHRONIC OBSTRUCTIVE PULMONARY DISEASE, UNSPECIFIED COPD TYPE (HCC): ICD-10-CM

## 2019-09-05 DIAGNOSIS — E21.3 HYPERPARATHYROIDISM (HCC): ICD-10-CM

## 2019-09-05 DIAGNOSIS — E11.9 TYPE 2 DIABETES MELLITUS WITHOUT COMPLICATION, WITHOUT LONG-TERM CURRENT USE OF INSULIN (HCC): Primary | ICD-10-CM

## 2019-09-05 DIAGNOSIS — R53.83 OTHER FATIGUE: ICD-10-CM

## 2019-09-05 DIAGNOSIS — I48.20 CHRONIC ATRIAL FIBRILLATION (HCC): ICD-10-CM

## 2019-09-05 DIAGNOSIS — E55.9 VITAMIN D DEFICIENCY: ICD-10-CM

## 2019-09-05 DIAGNOSIS — E78.2 MIXED HYPERLIPIDEMIA: ICD-10-CM

## 2019-09-05 DIAGNOSIS — M1A.09X0 IDIOPATHIC CHRONIC GOUT OF MULTIPLE SITES WITHOUT TOPHUS: ICD-10-CM

## 2019-09-05 DIAGNOSIS — D3A.00 CARCINOID (EXCEPT OF APPENDIX): ICD-10-CM

## 2019-09-05 DIAGNOSIS — Z12.5 SCREENING FOR MALIGNANT NEOPLASM OF PROSTATE: ICD-10-CM

## 2019-09-05 DIAGNOSIS — E34.0 CARCINOID SYNDROME (HCC): ICD-10-CM

## 2019-09-05 DIAGNOSIS — I25.10 CORONARY ARTERY DISEASE INVOLVING NATIVE CORONARY ARTERY OF NATIVE HEART WITHOUT ANGINA PECTORIS: ICD-10-CM

## 2019-09-05 DIAGNOSIS — I10 ESSENTIAL HYPERTENSION: ICD-10-CM

## 2019-09-05 DIAGNOSIS — N18.30 STAGE 3 CHRONIC KIDNEY DISEASE (HCC): ICD-10-CM

## 2019-09-05 LAB — INR BLD: 2.5

## 2019-09-05 PROCEDURE — 99214 OFFICE O/P EST MOD 30 MIN: CPT | Performed by: INTERNAL MEDICINE

## 2019-09-05 RX ORDER — SIMVASTATIN 40 MG
40 TABLET ORAL DAILY
Qty: 90 TABLET | Refills: 2 | Status: SHIPPED | OUTPATIENT
Start: 2019-09-05 | End: 2019-12-10 | Stop reason: SDUPTHER

## 2019-09-05 RX ORDER — MONTELUKAST SODIUM 10 MG/1
10 TABLET ORAL DAILY
Qty: 30 TABLET | Refills: 5 | Status: SHIPPED | OUTPATIENT
Start: 2019-09-05 | End: 2019-12-12

## 2019-09-05 ASSESSMENT — ENCOUNTER SYMPTOMS
BLOOD IN STOOL: 0
ABDOMINAL PAIN: 0
COUGH: 1
VOMITING: 0
RHINORRHEA: 1
SORE THROAT: 0
CHEST TIGHTNESS: 0
DIARRHEA: 0
EYE PAIN: 0
SHORTNESS OF BREATH: 1
NAUSEA: 0
CONSTIPATION: 0

## 2019-09-05 NOTE — PROGRESS NOTES
pectoris  - Continue present treatment   - s/p stent  - following with cardiology and EP   - on sotalol   - on simvastatin   - no aspirin due to coumadin   - stable     Essential hypertension  - Continue present treatment   - watch diet   - on hctz   - on sotalol for afib   - stable     Mixed hyperlipidemia  - Continue present treatment   - watch diet   - on simvastatin  - follow labs     Idiopathic chronic gout of multiple sites without tophus  - continue present treatment  - watch diet   - on allopurinol   - stable     Hyperparathyroidism ([address]  - Possible due to CKD     Vitamin D deficiency  - Continue present treatment  - not on treatment at present   - follow labs     Return in about 6 months (around 3/5/2020) for check up and review.     Orders Placed This Encounter   Procedures    CBC Auto Differential     Standing Status:   Future     Standing Expiration Date:   9/5/2020    Comprehensive Metabolic Panel     Standing Status:   Future     Standing Expiration Date:   9/5/2020    Hemoglobin A1C     Standing Status:   Future     Standing Expiration Date:   9/5/2020    Lipid, Fasting     Standing Status:   Future     Standing Expiration Date:   9/5/2020    Magnesium     Standing Status:   Future     Standing Expiration Date:   9/5/2020    Urinalysis     Standing Status:   Future     Standing Expiration Date:   9/5/2020    TSH without Reflex     Standing Status:   Future     Standing Expiration Date:   9/5/2020    Psa screening     Standing Status:   Future     Standing Expiration Date:   9/5/2020    Microalbumin, Ur     Standing Status:   Future     Standing Expiration Date:   9/5/2020    Uric Acid     Standing Status:   Future     Standing Expiration Date:   9/5/2020       Requested Prescriptions     Signed Prescriptions Disp Refills    simvastatin (ZOCOR) 40 MG tablet 90 tablet 2     Sig: Take 1 tablet by mouth daily    montelukast (SINGULAIR) 10 MG tablet 30 tablet 5     Sig: Take 1 tablet by mouth

## 2019-09-05 NOTE — PROGRESS NOTES
Previous INR: 2.50     Previous dose:  7.5mg Sun and thurs and 5mg all others     Current INR: 2.50            Recommendation: Continue 7.5mg Sun and thurs and 5mg all others      Next INR: 1 week    Maggi Garcia MD  9/5/2019  4:33 PM

## 2019-09-06 NOTE — PROGRESS NOTES
9/6/2019    Advised Regla INR 2.5, continue 7.5mg Sun/Thurs and 5mg all other days.   Rredraw 9/12/2019

## 2019-09-12 ENCOUNTER — ANTI-COAG VISIT (OUTPATIENT)
Dept: PRIMARY CARE CLINIC | Age: 74
End: 2019-09-12
Payer: MEDICARE

## 2019-09-12 DIAGNOSIS — I48.20 CHRONIC ATRIAL FIBRILLATION (HCC): ICD-10-CM

## 2019-09-12 LAB — INR BLD: 2.9

## 2019-09-12 PROCEDURE — 93793 ANTICOAG MGMT PT WARFARIN: CPT | Performed by: INTERNAL MEDICINE

## 2019-09-12 NOTE — PROGRESS NOTES
9/12/2019    Advised Regla INR 2.9, continue 7.5 mg Sun/Thurs and 5 mg all other days. Redraw 9/19/2019.

## 2019-09-19 ENCOUNTER — ANTI-COAG VISIT (OUTPATIENT)
Dept: PRIMARY CARE CLINIC | Age: 74
End: 2019-09-19
Payer: MEDICARE

## 2019-09-19 DIAGNOSIS — I48.20 CHRONIC ATRIAL FIBRILLATION (HCC): ICD-10-CM

## 2019-09-19 LAB — INR BLD: 2.9

## 2019-09-19 PROCEDURE — 93793 ANTICOAG MGMT PT WARFARIN: CPT | Performed by: INTERNAL MEDICINE

## 2019-09-26 ENCOUNTER — ANTI-COAG VISIT (OUTPATIENT)
Dept: PRIMARY CARE CLINIC | Age: 74
End: 2019-09-26
Payer: MEDICARE

## 2019-09-26 DIAGNOSIS — I48.20 CHRONIC ATRIAL FIBRILLATION (HCC): ICD-10-CM

## 2019-09-26 LAB — INR BLD: 2.8

## 2019-09-26 PROCEDURE — 93793 ANTICOAG MGMT PT WARFARIN: CPT | Performed by: INTERNAL MEDICINE

## 2019-09-26 NOTE — PROGRESS NOTES
Previous INR: 2.90     Previous dose:  7.5mg Sun and thurs and 5mg all others     Current INR: 2.80            Recommendation: Continue 7.5mg Sun and thurs and 5mg all others      Next INR: 1 week    Eva Johnson MD  9/26/2019  11:29 AM

## 2019-10-03 ENCOUNTER — ANTI-COAG VISIT (OUTPATIENT)
Dept: PRIMARY CARE CLINIC | Age: 74
End: 2019-10-03
Payer: MEDICARE

## 2019-10-03 DIAGNOSIS — I48.20 CHRONIC ATRIAL FIBRILLATION (HCC): ICD-10-CM

## 2019-10-03 LAB — INR BLD: 2.2

## 2019-10-03 PROCEDURE — 93793 ANTICOAG MGMT PT WARFARIN: CPT | Performed by: INTERNAL MEDICINE

## 2019-10-10 ENCOUNTER — ANTI-COAG VISIT (OUTPATIENT)
Dept: PRIMARY CARE CLINIC | Age: 74
End: 2019-10-10
Payer: MEDICARE

## 2019-10-10 DIAGNOSIS — I48.20 CHRONIC ATRIAL FIBRILLATION (HCC): ICD-10-CM

## 2019-10-10 LAB — INR BLD: 2.2

## 2019-10-10 PROCEDURE — 93793 ANTICOAG MGMT PT WARFARIN: CPT | Performed by: INTERNAL MEDICINE

## 2019-10-17 ENCOUNTER — ANTI-COAG VISIT (OUTPATIENT)
Dept: PRIMARY CARE CLINIC | Age: 74
End: 2019-10-17
Payer: MEDICARE

## 2019-10-17 ENCOUNTER — HOSPITAL ENCOUNTER (OUTPATIENT)
Age: 74
Discharge: HOME | End: 2019-10-17
Payer: MEDICARE

## 2019-10-17 DIAGNOSIS — I48.20 CHRONIC ATRIAL FIBRILLATION (HCC): ICD-10-CM

## 2019-10-17 LAB
ALBUMIN SERPL-MCNC: 4.2 G/DL (ref 3.5–5.2)
ALP BLD-CCNC: 60 U/L (ref 40–129)
ALT SERPL-CCNC: 16 U/L (ref 0–40)
ANION GAP SERPL CALCULATED.3IONS-SCNC: 8 MMOL/L (ref 7–16)
AST SERPL-CCNC: 19 U/L (ref 0–39)
BILIRUB SERPL-MCNC: 0.6 MG/DL (ref 0–1.2)
BUN BLDV-MCNC: 33 MG/DL (ref 8–23)
CALCIUM SERPL-MCNC: 10.4 MG/DL (ref 8.6–10.2)
CHLORIDE BLD-SCNC: 109 MMOL/L (ref 98–107)
CO2: 27 MMOL/L (ref 22–29)
CREAT SERPL-MCNC: 1.7 MG/DL (ref 0.7–1.2)
GFR AFRICAN AMERICAN: 48
GFR NON-AFRICAN AMERICAN: 39 ML/MIN/1.73
GLUCOSE BLD-MCNC: 115 MG/DL (ref 74–99)
HBA1C MFR BLD: 4.9 % (ref 4–5.6)
INR BLD: 2.5
PARATHYROID HORMONE INTACT: 118 PG/ML (ref 15–65)
POTASSIUM SERPL-SCNC: 4.9 MMOL/L (ref 3.5–5)
SODIUM BLD-SCNC: 144 MMOL/L (ref 132–146)
T4 FREE: 0.94 NG/DL (ref 0.93–1.7)
TOTAL PROTEIN: 7 G/DL (ref 6.4–8.3)
TSH SERPL DL<=0.05 MIU/L-ACNC: 1.85 UIU/ML (ref 0.27–4.2)
VITAMIN D 25-HYDROXY: 28 NG/ML (ref 30–100)

## 2019-10-17 PROCEDURE — 93793 ANTICOAG MGMT PT WARFARIN: CPT | Performed by: INTERNAL MEDICINE

## 2019-10-17 PROCEDURE — 83970 ASSAY OF PARATHORMONE: CPT

## 2019-10-17 PROCEDURE — 84439 ASSAY OF FREE THYROXINE: CPT

## 2019-10-17 PROCEDURE — 82652 VIT D 1 25-DIHYDROXY: CPT

## 2019-10-17 PROCEDURE — 84443 ASSAY THYROID STIM HORMONE: CPT

## 2019-10-17 PROCEDURE — 36415 COLL VENOUS BLD VENIPUNCTURE: CPT

## 2019-10-17 PROCEDURE — 80053 COMPREHEN METABOLIC PANEL: CPT

## 2019-10-17 PROCEDURE — 84586 ASSAY OF VIP: CPT

## 2019-10-17 PROCEDURE — 83036 HEMOGLOBIN GLYCOSYLATED A1C: CPT

## 2019-10-17 PROCEDURE — 82306 VITAMIN D 25 HYDROXY: CPT

## 2019-10-20 LAB — VITAMIN D 1,25-DIHYDROXY: 45.5 PG/ML (ref 19.9–79.3)

## 2019-10-21 DIAGNOSIS — R60.0 LOWER LEG EDEMA: ICD-10-CM

## 2019-10-21 RX ORDER — HYDROCHLOROTHIAZIDE 25 MG/1
25 TABLET ORAL DAILY
Qty: 90 TABLET | Refills: 1 | Status: SHIPPED | OUTPATIENT
Start: 2019-10-21 | End: 2019-11-18 | Stop reason: SDUPTHER

## 2019-10-21 RX ORDER — WARFARIN SODIUM 1 MG/1
1 TABLET ORAL DAILY PRN
Qty: 30 TABLET | Refills: 5 | Status: SHIPPED
Start: 2019-10-21 | End: 2020-10-26 | Stop reason: SDUPTHER

## 2019-10-24 ENCOUNTER — ANTI-COAG VISIT (OUTPATIENT)
Dept: PRIMARY CARE CLINIC | Age: 74
End: 2019-10-24
Payer: MEDICARE

## 2019-10-24 DIAGNOSIS — I48.20 CHRONIC ATRIAL FIBRILLATION (HCC): ICD-10-CM

## 2019-10-24 LAB — INR BLD: 2.2

## 2019-10-24 PROCEDURE — 93793 ANTICOAG MGMT PT WARFARIN: CPT | Performed by: INTERNAL MEDICINE

## 2019-10-30 LAB
Lab: NORMAL
REPORT: NORMAL
THIS TEST SENT TO: NORMAL

## 2019-10-31 ENCOUNTER — ANTI-COAG VISIT (OUTPATIENT)
Dept: PRIMARY CARE CLINIC | Age: 74
End: 2019-10-31
Payer: MEDICARE

## 2019-10-31 DIAGNOSIS — I48.20 CHRONIC ATRIAL FIBRILLATION (HCC): ICD-10-CM

## 2019-10-31 LAB — INR BLD: 1.8

## 2019-10-31 PROCEDURE — 93793 ANTICOAG MGMT PT WARFARIN: CPT | Performed by: INTERNAL MEDICINE

## 2019-11-07 ENCOUNTER — ANTI-COAG VISIT (OUTPATIENT)
Dept: PRIMARY CARE CLINIC | Age: 74
End: 2019-11-07
Payer: MEDICARE

## 2019-11-07 DIAGNOSIS — I48.20 CHRONIC ATRIAL FIBRILLATION (HCC): ICD-10-CM

## 2019-11-07 LAB — INR BLD: 1.6

## 2019-11-07 PROCEDURE — 93793 ANTICOAG MGMT PT WARFARIN: CPT | Performed by: INTERNAL MEDICINE

## 2019-11-13 ENCOUNTER — ANTI-COAG VISIT (OUTPATIENT)
Dept: PRIMARY CARE CLINIC | Age: 74
End: 2019-11-13

## 2019-11-13 LAB — INR BLD: 2.5

## 2019-11-15 RX ORDER — WARFARIN SODIUM 5 MG/1
5 TABLET ORAL DAILY
Qty: 30 TABLET | Refills: 5 | Status: SHIPPED | OUTPATIENT
Start: 2019-11-15 | End: 2019-11-15 | Stop reason: SDUPTHER

## 2019-11-15 RX ORDER — WARFARIN SODIUM 5 MG/1
5 TABLET ORAL DAILY
Qty: 90 TABLET | Refills: 1 | Status: SHIPPED
Start: 2019-11-15 | End: 2020-05-12 | Stop reason: SDUPTHER

## 2019-11-21 ENCOUNTER — ANTI-COAG VISIT (OUTPATIENT)
Dept: PRIMARY CARE CLINIC | Age: 74
End: 2019-11-21
Payer: MEDICARE

## 2019-11-21 DIAGNOSIS — I48.20 CHRONIC ATRIAL FIBRILLATION (HCC): ICD-10-CM

## 2019-11-21 LAB — INR BLD: 3.5

## 2019-11-21 PROCEDURE — 93793 ANTICOAG MGMT PT WARFARIN: CPT | Performed by: INTERNAL MEDICINE

## 2019-11-27 ENCOUNTER — ANTI-COAG VISIT (OUTPATIENT)
Dept: PRIMARY CARE CLINIC | Age: 74
End: 2019-11-27
Payer: MEDICARE

## 2019-11-27 DIAGNOSIS — I48.20 CHRONIC ATRIAL FIBRILLATION (HCC): ICD-10-CM

## 2019-11-27 LAB — INR BLD: 2.6

## 2019-11-27 PROCEDURE — 93793 ANTICOAG MGMT PT WARFARIN: CPT | Performed by: INTERNAL MEDICINE

## 2019-12-05 ENCOUNTER — ANTI-COAG VISIT (OUTPATIENT)
Dept: PRIMARY CARE CLINIC | Age: 74
End: 2019-12-05
Payer: MEDICARE

## 2019-12-05 DIAGNOSIS — I48.20 CHRONIC ATRIAL FIBRILLATION (HCC): ICD-10-CM

## 2019-12-05 LAB — INR BLD: 2.8

## 2019-12-05 PROCEDURE — 93793 ANTICOAG MGMT PT WARFARIN: CPT | Performed by: INTERNAL MEDICINE

## 2019-12-10 DIAGNOSIS — E78.2 MIXED HYPERLIPIDEMIA: ICD-10-CM

## 2019-12-10 RX ORDER — SIMVASTATIN 40 MG
40 TABLET ORAL DAILY
Qty: 90 TABLET | Refills: 1 | Status: SHIPPED
Start: 2019-12-10 | End: 2020-05-12 | Stop reason: SDUPTHER

## 2019-12-10 RX ORDER — ALLOPURINOL 100 MG/1
TABLET ORAL
Qty: 38 TABLET | Refills: 1 | Status: SHIPPED
Start: 2019-12-10 | End: 2020-05-20 | Stop reason: SDUPTHER

## 2019-12-12 ENCOUNTER — ANTI-COAG VISIT (OUTPATIENT)
Dept: PRIMARY CARE CLINIC | Age: 74
End: 2019-12-12
Payer: MEDICARE

## 2019-12-12 DIAGNOSIS — I48.20 CHRONIC ATRIAL FIBRILLATION (HCC): ICD-10-CM

## 2019-12-12 LAB — INR BLD: 2.5

## 2019-12-12 PROCEDURE — 93793 ANTICOAG MGMT PT WARFARIN: CPT | Performed by: INTERNAL MEDICINE

## 2019-12-13 ENCOUNTER — TELEPHONE (OUTPATIENT)
Dept: PRIMARY CARE CLINIC | Age: 74
End: 2019-12-13

## 2019-12-16 ENCOUNTER — PROCEDURE VISIT (OUTPATIENT)
Dept: AUDIOLOGY | Age: 74
End: 2019-12-16
Payer: MEDICARE

## 2019-12-16 ENCOUNTER — OFFICE VISIT (OUTPATIENT)
Dept: ENT CLINIC | Age: 74
End: 2019-12-16
Payer: MEDICARE

## 2019-12-16 VITALS
HEIGHT: 67 IN | DIASTOLIC BLOOD PRESSURE: 58 MMHG | WEIGHT: 224 LBS | HEART RATE: 79 BPM | SYSTOLIC BLOOD PRESSURE: 96 MMHG | BODY MASS INDEX: 35.16 KG/M2

## 2019-12-16 DIAGNOSIS — H93.13 BILATERAL TINNITUS: ICD-10-CM

## 2019-12-16 DIAGNOSIS — H81.03 MENIERE'S DISEASE (COCHLEAR HYDROPS), BILATERAL: Primary | ICD-10-CM

## 2019-12-16 DIAGNOSIS — H90.3 SENSORINEURAL HEARING LOSS, BILATERAL: Primary | ICD-10-CM

## 2019-12-16 DIAGNOSIS — R42 VERTIGO: ICD-10-CM

## 2019-12-16 PROCEDURE — 99213 OFFICE O/P EST LOW 20 MIN: CPT | Performed by: OTOLARYNGOLOGY

## 2019-12-16 PROCEDURE — 92557 COMPREHENSIVE HEARING TEST: CPT | Performed by: AUDIOLOGIST

## 2019-12-19 ENCOUNTER — ANTI-COAG VISIT (OUTPATIENT)
Dept: PRIMARY CARE CLINIC | Age: 74
End: 2019-12-19
Payer: MEDICARE

## 2019-12-19 DIAGNOSIS — I48.20 CHRONIC ATRIAL FIBRILLATION (HCC): ICD-10-CM

## 2019-12-19 LAB — INR BLD: 2.6

## 2019-12-19 PROCEDURE — 93793 ANTICOAG MGMT PT WARFARIN: CPT | Performed by: INTERNAL MEDICINE

## 2019-12-26 ENCOUNTER — ANTI-COAG VISIT (OUTPATIENT)
Dept: PRIMARY CARE CLINIC | Age: 74
End: 2019-12-26
Payer: MEDICARE

## 2019-12-26 DIAGNOSIS — I48.20 CHRONIC ATRIAL FIBRILLATION (HCC): ICD-10-CM

## 2019-12-26 LAB — INR BLD: 2.6

## 2019-12-26 PROCEDURE — 93793 ANTICOAG MGMT PT WARFARIN: CPT | Performed by: INTERNAL MEDICINE

## 2019-12-26 ASSESSMENT — ENCOUNTER SYMPTOMS
SHORTNESS OF BREATH: 0
VOMITING: 0
COUGH: 0

## 2020-01-02 ENCOUNTER — ANTI-COAG VISIT (OUTPATIENT)
Dept: PRIMARY CARE CLINIC | Age: 75
End: 2020-01-02
Payer: MEDICARE

## 2020-01-02 LAB — INR BLD: 2.8

## 2020-01-02 PROCEDURE — 93793 ANTICOAG MGMT PT WARFARIN: CPT | Performed by: INTERNAL MEDICINE

## 2020-01-02 NOTE — PROGRESS NOTES
Previous INR: 2.60     Previous dose:  7.5mg Sun,tues and thurs and 5mg all others       Current ZFT: 7.80        Recommendation: Continue 7.5mg Sun,tues and thurs and 5mg all others       Next INR: 1 week    Ernst Baron MD  1/2/2020  12:38 PM

## 2020-01-09 ENCOUNTER — ANTI-COAG VISIT (OUTPATIENT)
Dept: PRIMARY CARE CLINIC | Age: 75
End: 2020-01-09
Payer: MEDICARE

## 2020-01-09 LAB — INR BLD: 2.8

## 2020-01-09 PROCEDURE — 93793 ANTICOAG MGMT PT WARFARIN: CPT | Performed by: INTERNAL MEDICINE

## 2020-01-09 NOTE — PROGRESS NOTES
Previous INR: 2.80     Previous dose:  7.5mg Sun, tues and thurs and 5mg all others       Current KFZ: 4.98        Recommendation: Continue 7.5mg Sun, tues and thurs and 5mg all others       Next INR: 1 week    Priyanka Su MD  1/9/2020  10:07 AM

## 2020-01-16 ENCOUNTER — ANTI-COAG VISIT (OUTPATIENT)
Dept: PRIMARY CARE CLINIC | Age: 75
End: 2020-01-16
Payer: MEDICARE

## 2020-01-16 LAB — INR BLD: 3.4

## 2020-01-16 PROCEDURE — 93793 ANTICOAG MGMT PT WARFARIN: CPT | Performed by: INTERNAL MEDICINE

## 2020-01-16 NOTE — PROGRESS NOTES
Previous INR: 2.80     Previous dose:  7.5mg Sun, tues and thurs and 5mg all others       Current INR: 3.40       Recommendation: 2.5mg x 1 then Continue 7.5mg Sun, tues and thurs and 5mg all others       Next INR: 1 week    Dang Moses MD  1/16/2020  2:43 PM

## 2020-01-23 ENCOUNTER — ANTI-COAG VISIT (OUTPATIENT)
Dept: PRIMARY CARE CLINIC | Age: 75
End: 2020-01-23
Payer: MEDICARE

## 2020-01-23 LAB — INR BLD: 2.3

## 2020-01-23 PROCEDURE — 93793 ANTICOAG MGMT PT WARFARIN: CPT | Performed by: INTERNAL MEDICINE

## 2020-01-30 ENCOUNTER — ANTI-COAG VISIT (OUTPATIENT)
Dept: PRIMARY CARE CLINIC | Age: 75
End: 2020-01-30
Payer: MEDICARE

## 2020-01-30 LAB — INR BLD: 2.5

## 2020-01-30 PROCEDURE — 93793 ANTICOAG MGMT PT WARFARIN: CPT | Performed by: INTERNAL MEDICINE

## 2020-02-06 ENCOUNTER — ANTI-COAG VISIT (OUTPATIENT)
Dept: PRIMARY CARE CLINIC | Age: 75
End: 2020-02-06
Payer: MEDICARE

## 2020-02-06 LAB — INR BLD: 2.5

## 2020-02-06 PROCEDURE — 93793 ANTICOAG MGMT PT WARFARIN: CPT | Performed by: INTERNAL MEDICINE

## 2020-02-06 NOTE — PROGRESS NOTES
Previous INR: 2.50     Previous dose: 7.5mg Sun, tues and thurs and 5mg all others       Current INR: 2.50     Recommendation:  Continue 7.5mg Sun, tues and thurs and 5mg all others       Next INR: 1 week    Lucie Berumen MD  2/6/2020  1:36 PM

## 2020-02-13 ENCOUNTER — ANTI-COAG VISIT (OUTPATIENT)
Dept: PRIMARY CARE CLINIC | Age: 75
End: 2020-02-13
Payer: MEDICARE

## 2020-02-13 LAB — INR BLD: 2.1

## 2020-02-13 PROCEDURE — 93793 ANTICOAG MGMT PT WARFARIN: CPT | Performed by: INTERNAL MEDICINE

## 2020-02-13 NOTE — PROGRESS NOTES
Previous INR: 2.50     Previous dose: 7.5mg Sun, tues and thurs and 5mg all others       Current INR: 2.10     Recommendation:  Continue 7.5mg Sun, tues and thurs and 5mg all others       Next INR: 1 week    Hortencia Bass MD  2/13/2020  11:07 AM

## 2020-02-20 ENCOUNTER — ANTI-COAG VISIT (OUTPATIENT)
Dept: PRIMARY CARE CLINIC | Age: 75
End: 2020-02-20
Payer: MEDICARE

## 2020-02-20 LAB — INR BLD: 2.3

## 2020-02-20 PROCEDURE — 93793 ANTICOAG MGMT PT WARFARIN: CPT | Performed by: INTERNAL MEDICINE

## 2020-02-20 NOTE — PROGRESS NOTES
Previous INR: 2.10     Previous dose: 7.5mg Sun, tues and thurs and 5mg all others       Current INR: 2.30     Recommendation:  Continue 7.5mg Sun, tues and thurs and 5mg all others       Next INR: 1 week    Sienna Swain MD  2/20/2020  12:40 PM

## 2020-02-20 NOTE — PROGRESS NOTES
Advised daughter patient to continue current dose 7.5 MG Sun-Tures-Thurs and 5 [facility] all other days and recheck 2/27/2020

## 2020-02-27 ENCOUNTER — ANTI-COAG VISIT (OUTPATIENT)
Dept: PRIMARY CARE CLINIC | Age: 75
End: 2020-02-27
Payer: MEDICARE

## 2020-02-27 LAB — INR BLD: 2.9

## 2020-02-27 PROCEDURE — 93793 ANTICOAG MGMT PT WARFARIN: CPT | Performed by: INTERNAL MEDICINE

## 2020-02-27 NOTE — PROGRESS NOTES
Daughter advised to have patient continue 7.5 MG Sun-Tues-Thurs and 5 MG all other days.   Recheck 3/5/2020

## 2020-03-03 ENCOUNTER — HOSPITAL ENCOUNTER (OUTPATIENT)
Age: 75
Discharge: HOME | End: 2020-03-03
Payer: MEDICARE

## 2020-03-03 LAB
ALBUMIN SERPL-MCNC: 4.2 G/DL (ref 3.5–5.2)
ALP BLD-CCNC: 65 U/L (ref 40–129)
ALT SERPL-CCNC: 18 U/L (ref 0–40)
ANION GAP SERPL CALCULATED.3IONS-SCNC: 12 MMOL/L (ref 7–16)
AST SERPL-CCNC: 24 U/L (ref 0–39)
BASOPHILS ABSOLUTE: 0.07 E9/L (ref 0–0.2)
BASOPHILS RELATIVE PERCENT: 1 % (ref 0–2)
BILIRUB SERPL-MCNC: 0.7 MG/DL (ref 0–1.2)
BILIRUBIN URINE: NEGATIVE
BLOOD, URINE: NEGATIVE
BUN BLDV-MCNC: 31 MG/DL (ref 8–23)
CALCIUM SERPL-MCNC: 10.2 MG/DL (ref 8.6–10.2)
CHLORIDE BLD-SCNC: 109 MMOL/L (ref 98–107)
CLARITY: CLEAR
CO2: 23 MMOL/L (ref 22–29)
COLOR: YELLOW
CREAT SERPL-MCNC: 1.6 MG/DL (ref 0.7–1.2)
CREATININE URINE: 99 MG/DL (ref 40–278)
EOSINOPHILS ABSOLUTE: 0.13 E9/L (ref 0.05–0.5)
EOSINOPHILS RELATIVE PERCENT: 1.9 % (ref 0–6)
GFR AFRICAN AMERICAN: 51
GFR NON-AFRICAN AMERICAN: 42 ML/MIN/1.73
GLUCOSE BLD-MCNC: 121 MG/DL (ref 74–99)
GLUCOSE URINE: NEGATIVE MG/DL
HCT VFR BLD CALC: 40.3 % (ref 37–54)
HEMOGLOBIN: 13.9 G/DL (ref 12.5–16.5)
IMMATURE GRANULOCYTES #: 0.09 E9/L
IMMATURE GRANULOCYTES %: 1.3 % (ref 0–5)
KETONES, URINE: NEGATIVE MG/DL
LEUKOCYTE ESTERASE, URINE: NEGATIVE
LYMPHOCYTES ABSOLUTE: 1.76 E9/L (ref 1.5–4)
LYMPHOCYTES RELATIVE PERCENT: 26.3 % (ref 20–42)
MAGNESIUM: 1.9 MG/DL (ref 1.6–2.6)
MCH RBC QN AUTO: 32.3 PG (ref 26–35)
MCHC RBC AUTO-ENTMCNC: 34.5 % (ref 32–34.5)
MCV RBC AUTO: 93.7 FL (ref 80–99.9)
MONOCYTES ABSOLUTE: 0.65 E9/L (ref 0.1–0.95)
MONOCYTES RELATIVE PERCENT: 9.7 % (ref 2–12)
NEUTROPHILS ABSOLUTE: 3.98 E9/L (ref 1.8–7.3)
NEUTROPHILS RELATIVE PERCENT: 59.8 % (ref 43–80)
NITRITE, URINE: NEGATIVE
PARATHYROID HORMONE INTACT: 155 PG/ML (ref 15–65)
PDW BLD-RTO: 14.2 FL (ref 11.5–15)
PH UA: 5.5 (ref 5–9)
PHOSPHORUS: 3.1 MG/DL (ref 2.5–4.5)
PLATELET # BLD: 224 E9/L (ref 130–450)
PMV BLD AUTO: 8.5 FL (ref 7–12)
POTASSIUM SERPL-SCNC: 4.9 MMOL/L (ref 3.5–5)
PROTEIN PROTEIN: 8 MG/DL (ref 0–12)
PROTEIN UA: NEGATIVE MG/DL
PROTEIN/CREAT RATIO: 0.1
PROTEIN/CREAT RATIO: 0.1 (ref 0–0.2)
RBC # BLD: 4.3 E12/L (ref 3.8–5.8)
SODIUM BLD-SCNC: 144 MMOL/L (ref 132–146)
SPECIFIC GRAVITY UA: 1.02 (ref 1–1.03)
TOTAL PROTEIN: 7.2 G/DL (ref 6.4–8.3)
UROBILINOGEN, URINE: 0.2 E.U./DL
VITAMIN D 25-HYDROXY: 26 NG/ML (ref 30–100)
WBC # BLD: 6.7 E9/L (ref 4.5–11.5)

## 2020-03-03 PROCEDURE — 81003 URINALYSIS AUTO W/O SCOPE: CPT

## 2020-03-03 PROCEDURE — 80053 COMPREHEN METABOLIC PANEL: CPT

## 2020-03-03 PROCEDURE — 83735 ASSAY OF MAGNESIUM: CPT

## 2020-03-03 PROCEDURE — 85025 COMPLETE CBC W/AUTO DIFF WBC: CPT

## 2020-03-03 PROCEDURE — 82306 VITAMIN D 25 HYDROXY: CPT

## 2020-03-03 PROCEDURE — 82570 ASSAY OF URINE CREATININE: CPT

## 2020-03-03 PROCEDURE — 84100 ASSAY OF PHOSPHORUS: CPT

## 2020-03-03 PROCEDURE — 36415 COLL VENOUS BLD VENIPUNCTURE: CPT

## 2020-03-03 PROCEDURE — 83970 ASSAY OF PARATHORMONE: CPT

## 2020-03-03 PROCEDURE — 84156 ASSAY OF PROTEIN URINE: CPT

## 2020-03-05 ENCOUNTER — HOSPITAL ENCOUNTER (OUTPATIENT)
Age: 75
Discharge: HOME | End: 2020-03-07
Payer: MEDICARE

## 2020-03-05 ENCOUNTER — ANTI-COAG VISIT (OUTPATIENT)
Dept: PRIMARY CARE CLINIC | Age: 75
End: 2020-03-05
Payer: MEDICARE

## 2020-03-05 ENCOUNTER — TELEPHONE (OUTPATIENT)
Dept: PRIMARY CARE CLINIC | Age: 75
End: 2020-03-05

## 2020-03-05 LAB
ALBUMIN SERPL-MCNC: 4.2 G/DL (ref 3.5–5.2)
ALP BLD-CCNC: 60 U/L (ref 40–129)
ALT SERPL-CCNC: 15 U/L (ref 0–40)
ANION GAP SERPL CALCULATED.3IONS-SCNC: 15 MMOL/L (ref 7–16)
AST SERPL-CCNC: 22 U/L (ref 0–39)
BACTERIA: ABNORMAL /HPF
BASOPHILS ABSOLUTE: 0.05 E9/L (ref 0–0.2)
BASOPHILS RELATIVE PERCENT: 0.8 % (ref 0–2)
BILIRUB SERPL-MCNC: 0.7 MG/DL (ref 0–1.2)
BILIRUBIN URINE: NEGATIVE
BLOOD, URINE: NEGATIVE
BUN BLDV-MCNC: 30 MG/DL (ref 8–23)
CALCIUM SERPL-MCNC: 9.9 MG/DL (ref 8.6–10.2)
CHLORIDE BLD-SCNC: 104 MMOL/L (ref 98–107)
CHOLESTEROL, FASTING: 170 MG/DL (ref 0–199)
CLARITY: CLEAR
CO2: 22 MMOL/L (ref 22–29)
COLOR: YELLOW
CREAT SERPL-MCNC: 1.7 MG/DL (ref 0.7–1.2)
EOSINOPHILS ABSOLUTE: 0.1 E9/L (ref 0.05–0.5)
EOSINOPHILS RELATIVE PERCENT: 1.7 % (ref 0–6)
GFR AFRICAN AMERICAN: 48
GFR NON-AFRICAN AMERICAN: 39 ML/MIN/1.73
GLUCOSE BLD-MCNC: 91 MG/DL (ref 74–99)
GLUCOSE URINE: NEGATIVE MG/DL
HBA1C MFR BLD: 4.8 % (ref 4–5.6)
HCT VFR BLD CALC: 40.6 % (ref 37–54)
HDLC SERPL-MCNC: 51 MG/DL
HEMOGLOBIN: 12.9 G/DL (ref 12.5–16.5)
IMMATURE GRANULOCYTES #: 0.05 E9/L
IMMATURE GRANULOCYTES %: 0.8 % (ref 0–5)
INR BLD: 2.6
KETONES, URINE: NEGATIVE MG/DL
LDL CHOLESTEROL CALCULATED: 73 MG/DL (ref 0–99)
LEUKOCYTE ESTERASE, URINE: ABNORMAL
LYMPHOCYTES ABSOLUTE: 1.5 E9/L (ref 1.5–4)
LYMPHOCYTES RELATIVE PERCENT: 25.2 % (ref 20–42)
MAGNESIUM: 2.3 MG/DL (ref 1.6–2.6)
MCH RBC QN AUTO: 30.6 PG (ref 26–35)
MCHC RBC AUTO-ENTMCNC: 31.8 % (ref 32–34.5)
MCV RBC AUTO: 96.4 FL (ref 80–99.9)
MICROALBUMIN UR-MCNC: 35.2 MG/L
MONOCYTES ABSOLUTE: 0.68 E9/L (ref 0.1–0.95)
MONOCYTES RELATIVE PERCENT: 11.4 % (ref 2–12)
NEUTROPHILS ABSOLUTE: 3.57 E9/L (ref 1.8–7.3)
NEUTROPHILS RELATIVE PERCENT: 60.1 % (ref 43–80)
NITRITE, URINE: NEGATIVE
PDW BLD-RTO: 14.4 FL (ref 11.5–15)
PH UA: 5.5 (ref 5–9)
PLATELET # BLD: 209 E9/L (ref 130–450)
PMV BLD AUTO: 9 FL (ref 7–12)
POTASSIUM SERPL-SCNC: 4.7 MMOL/L (ref 3.5–5)
PROSTATE SPECIFIC ANTIGEN: <0.01 NG/ML (ref 0–4)
PROTEIN UA: NEGATIVE MG/DL
RBC # BLD: 4.21 E12/L (ref 3.8–5.8)
RBC UA: ABNORMAL /HPF (ref 0–2)
SODIUM BLD-SCNC: 141 MMOL/L (ref 132–146)
SPECIFIC GRAVITY UA: 1.02 (ref 1–1.03)
TOTAL PROTEIN: 6.6 G/DL (ref 6.4–8.3)
TRIGLYCERIDE, FASTING: 230 MG/DL (ref 0–149)
TSH SERPL DL<=0.05 MIU/L-ACNC: 1.83 UIU/ML (ref 0.27–4.2)
URIC ACID, SERUM: 7.9 MG/DL (ref 3.4–7)
UROBILINOGEN, URINE: 1 E.U./DL
VLDLC SERPL CALC-MCNC: 46 MG/DL
WBC # BLD: 6 E9/L (ref 4.5–11.5)
WBC UA: ABNORMAL /HPF (ref 0–5)

## 2020-03-05 PROCEDURE — G0103 PSA SCREENING: HCPCS

## 2020-03-05 PROCEDURE — 83735 ASSAY OF MAGNESIUM: CPT

## 2020-03-05 PROCEDURE — 80053 COMPREHEN METABOLIC PANEL: CPT

## 2020-03-05 PROCEDURE — 84550 ASSAY OF BLOOD/URIC ACID: CPT

## 2020-03-05 PROCEDURE — 85025 COMPLETE CBC W/AUTO DIFF WBC: CPT

## 2020-03-05 PROCEDURE — 80061 LIPID PANEL: CPT

## 2020-03-05 PROCEDURE — 81001 URINALYSIS AUTO W/SCOPE: CPT

## 2020-03-05 PROCEDURE — 83036 HEMOGLOBIN GLYCOSYLATED A1C: CPT

## 2020-03-05 PROCEDURE — 84443 ASSAY THYROID STIM HORMONE: CPT

## 2020-03-05 PROCEDURE — 93793 ANTICOAG MGMT PT WARFARIN: CPT | Performed by: INTERNAL MEDICINE

## 2020-03-05 PROCEDURE — 82044 UR ALBUMIN SEMIQUANTITATIVE: CPT

## 2020-03-05 NOTE — PROGRESS NOTES
Advised daughter patient to continue 7.5 MG Sun-Tues-Thurs and 5 MG all other days.   Recheck 3/12/2020

## 2020-03-07 ENCOUNTER — TELEPHONE (OUTPATIENT)
Dept: FAMILY MEDICINE CLINIC | Age: 75
End: 2020-03-07

## 2020-03-07 NOTE — LETTER
[facility]  [address] [facility] [address]  Phone: [phone]  Fax: [phone]    Kamilah Servin MD      March 23, 2020     [address]  [facility] [address]      Dear Emery Delgadillo:    Below are the results from your recent visit:    Resulted Orders   Comprehensive Metabolic Panel   Result Value Ref Range    Sodium 141 132 - 146 mmol/L    Potassium 4.7 3.5 - 5.0 mmol/L    Chloride 104 98 - 107 mmol/L    CO2 22 22 - 29 mmol/L    Anion Gap 15 7 - 16 mmol/L    Glucose 91 74 - 99 mg/dL    BUN 30 (H) 8 - 23 mg/dL    CREATININE 1.7 (H) 0.7 - 1.2 mg/dL    GFR Non-African American 39 >=60 mL/min/1.73      Comment:      Chronic Kidney Disease: less than 60 ml/min/1.73 sq.m. Kidney Failure: less than 15 ml/min/1.73 sq.m. Results valid for patients 18 years and older.       GFR  48     Calcium 9.9 8.6 - 10.2 mg/dL    Total Protein 6.6 6.4 - 8.3 g/dL    Alb 4.2 3.5 - 5.2 g/dL    Total Bilirubin 0.7 0.0 - 1.2 mg/dL    Alkaline Phosphatase 60 40 - 129 U/L    ALT 15 0 - 40 U/L    AST 22 0 - 39 U/L   Hemoglobin A1C   Result Value Ref Range    Hemoglobin A1C 4.8 4.0 - 5.6 %   Lipid, Fasting   Result Value Ref Range    Cholesterol, Fasting 170 0 - 199 mg/dL    Triglyceride, Fasting 230 (H) 0 - 149 mg/dL    HDL 51 >40 mg/dL    LDL Calculated 73 0 - 99 mg/dL    VLDL Cholesterol Calculated 46 mg/dL   CBC Auto Differential   Result Value Ref Range    WBC 6.0 4.5 - 11.5 E9/L    RBC 4.21 3.80 - 5.80 E12/L    Hemoglobin 12.9 12.5 - 16.5 g/dL    Hematocrit 40.6 37.0 - 54.0 %    MCV 96.4 80.0 - 99.9 fL    MCH 30.6 26.0 - 35.0 pg    MCHC 31.8 (L) 32.0 - 34.5 %    RDW 14.4 11.5 - 15.0 fL    Platelets 536 765 - 421 E9/L    MPV 9.0 7.0 - 12.0 fL    Neutrophils % 60.1 43.0 - 80.0 %    Immature Granulocytes % 0.8 0.0 - 5.0 %    Lymphocytes % 25.2 20.0 - 42.0 %    Monocytes % 11.4 2.0 - 12.0 %    Eosinophils % 1.7 0.0 - 6.0 %    Basophils % 0.8 0.0 - 2.0 % Neutrophils Absolute 3.57 1.80 - 7.30 E9/L    Immature Granulocytes # 0.05 E9/L    Lymphocytes Absolute 1.50 1.50 - 4.00 E9/L    Monocytes Absolute 0.68 0.10 - 0.95 E9/L    Eosinophils Absolute 0.10 0.05 - 0.50 E9/L    Basophils Absolute 0.05 0.00 - 0.20 E9/L   Magnesium   Result Value Ref Range    Magnesium 2.3 1.6 - 2.6 mg/dL   TSH without Reflex   Result Value Ref Range    TSH 1.830 0.270 - 4.200 uIU/mL   Psa screening   Result Value Ref Range    PSA <0.01 0.00 - 4.00 ng/mL   Uric Acid   Result Value Ref Range    Uric Acid, Serum 7.9 (H) 3.4 - 7.0 mg/dL   Urinalysis   Result Value Ref Range    Color, UA Yellow Straw/Yellow    Clarity, UA Clear Clear    Glucose, Ur Negative Negative mg/dL    Bilirubin Urine Negative Negative    Ketones, Urine Negative Negative mg/dL    Specific Gravity, UA 1.025 1.005 - 1.030    Blood, Urine Negative Negative    pH, UA 5.5 5.0 - 9.0    Protein, UA Negative Negative mg/dL    Urobilinogen, Urine 1.0 <2.0 E.U./dL    Nitrite, Urine Negative Negative    Leukocyte Esterase, Urine TRACE (A) Negative   Microalbumin, Ur   Result Value Ref Range    Microalbumin, Random Urine 35.2 (H) Not Established mg/L   Microscopic Urinalysis   Result Value Ref Range    WBC, UA 0-1 0 - 5 /HPF    RBC, UA NONE 0 - 2 /HPF    Bacteria, UA RARE (A) None Seen /HPF     The test results show:    Labs still show kidney dysfunction, results similar to previous lab results     A1c for 3-month sugar control assessment was normal at 4.8, would need to reevaluate medication and make sure no low blood sugars     Uric acid level was again elevated and would consider increasing allopurinol dose     Thyroid labs were normal     Cholesterol levels were fair, triglycerides were elevated.   Would recommend continued present treatment and would consider adding vascepa help with the triglycerides and added cardiovascular risk reduction benefit     PSA for prostate was normal

## 2020-03-07 NOTE — TELEPHONE ENCOUNTER
Please let the patient know that blood work results showed    Labs still show kidney dysfunction, results similar to previous lab results    A1c for 3-month sugar control assessment was normal at 4.8, would need to reevaluate medication and make sure no low blood sugars    Uric acid level was again elevated and would consider increasing allopurinol dose    Thyroid labs were normal    Cholesterol levels were fair, triglycerides were elevated.   Would recommend continued present treatment and would consider adding vascepa help with the triglycerides and added cardiovascular risk reduction benefit    PSA for prostate was normal    Urine analysis showed rare bacteria slight white cells and some microscopic protein but otherwise was normal    Electrolytes and liver functions were normal    Blood counts were normal    Thanks

## 2020-03-12 ENCOUNTER — ANTI-COAG VISIT (OUTPATIENT)
Dept: PRIMARY CARE CLINIC | Age: 75
End: 2020-03-12
Payer: MEDICARE

## 2020-03-12 LAB — INR BLD: 1.9

## 2020-03-12 PROCEDURE — 93793 ANTICOAG MGMT PT WARFARIN: CPT | Performed by: INTERNAL MEDICINE

## 2020-03-12 NOTE — PROGRESS NOTES
Previous INR: 2.60     Previous dose: 7.5mg Sun, tues and thurs and 5mg all others       Current INR: 1.90     Recommendation:  7.5mg on Friday Continue 7.5mg Sun, tues and thurs and 5mg all others       Next INR: 1 week    Lillian Pisano MD  3/12/2020  5:01 PM

## 2020-03-19 LAB — INR BLD: 2.8

## 2020-03-20 ENCOUNTER — ANTI-COAG VISIT (OUTPATIENT)
Dept: FAMILY MEDICINE CLINIC | Age: 75
End: 2020-03-20
Payer: MEDICARE

## 2020-03-20 ENCOUNTER — TELEPHONE (OUTPATIENT)
Dept: FAMILY MEDICINE CLINIC | Age: 75
End: 2020-03-20

## 2020-03-20 PROCEDURE — 93793 ANTICOAG MGMT PT WARFARIN: CPT | Performed by: INTERNAL MEDICINE

## 2020-03-26 LAB
INR BLD: 2.7
INR BLD: 2.7

## 2020-03-27 ENCOUNTER — ANTI-COAG VISIT (OUTPATIENT)
Dept: FAMILY MEDICINE CLINIC | Age: 75
End: 2020-03-27
Payer: MEDICARE

## 2020-03-27 ENCOUNTER — ANTI-COAG VISIT (OUTPATIENT)
Dept: FAMILY MEDICINE CLINIC | Age: 75
End: 2020-03-27

## 2020-03-27 PROCEDURE — 93793 ANTICOAG MGMT PT WARFARIN: CPT | Performed by: INTERNAL MEDICINE

## 2020-03-27 NOTE — PROGRESS NOTES
Previous INR: 2.80     Previous dose: 7.5mg Sun, tues and thurs and 5mg all others        Current INR: 2.70     Recommendation:  7.5mg on Friday Continue 7.5mg Sun, tues and thurs and 5mg all others       Next INR: 1 week    [facility]  3/27/2020  1:05 PM

## 2020-04-02 ENCOUNTER — ANTI-COAG VISIT (OUTPATIENT)
Dept: PRIMARY CARE CLINIC | Age: 75
End: 2020-04-02
Payer: MEDICARE

## 2020-04-02 LAB — INR BLD: 2.6

## 2020-04-02 PROCEDURE — 93793 ANTICOAG MGMT PT WARFARIN: CPT | Performed by: INTERNAL MEDICINE

## 2020-04-02 NOTE — PROGRESS NOTES
Previous INR: 2.70     Previous dose: 7.5mg Sun, tues and thurs and 5mg all others       Current INR: 2.60     Recommendation:  Continue 7.5mg Sun, tues and thurs and 5mg all others       Next INR: 1 week    Pernell Dillard MD  4/2/2020  2:32 PM

## 2020-04-09 ENCOUNTER — ANTI-COAG VISIT (OUTPATIENT)
Dept: FAMILY MEDICINE CLINIC | Age: 75
End: 2020-04-09

## 2020-04-09 LAB
INR BLD: 3.4
INR BLD: 3.4

## 2020-04-09 NOTE — PROGRESS NOTES
Spoke with pt's daughter with instructions. Pt's daughter states she has always checked and called in every week for years she wanted to double check the next test should be 2-3 weeks and not 1 week. Pt's daughter also states the pt was just started on vitamin D 50,000 units per week and states she was not sure if that would be the reason why his INR was a little high. Please advise.

## 2020-04-09 NOTE — PROGRESS NOTES
His INR remains slightly high at 3.4. He needs to take Coumadin 7.5 mg on Sundays and Wednesdays with 5 mg all other days. He should only take 5 mg of Coumadin today. Have him recheck his INR in 2-3 weeks.

## 2020-04-10 ENCOUNTER — ANTI-COAG VISIT (OUTPATIENT)
Dept: PRIMARY CARE CLINIC | Age: 75
End: 2020-04-10

## 2020-04-10 NOTE — PROGRESS NOTES
INR is high for atrial fib at 3.4, but is still in range. Change the 7.5mg to just Sunday and Thursday with 5mg on Mon,Tues,Wed,Friday and Saturday.   Recheck INR in 3-4 weeks

## 2020-04-16 ENCOUNTER — ANTI-COAG VISIT (OUTPATIENT)
Dept: PRIMARY CARE CLINIC | Age: 75
End: 2020-04-16

## 2020-04-16 LAB — INR BLD: 3.1

## 2020-04-23 ENCOUNTER — TELEPHONE (OUTPATIENT)
Dept: FAMILY MEDICINE CLINIC | Age: 75
End: 2020-04-23

## 2020-04-23 LAB — INR BLD: 2.8

## 2020-04-24 ENCOUNTER — ANTI-COAG VISIT (OUTPATIENT)
Dept: PRIMARY CARE CLINIC | Age: 75
End: 2020-04-24
Payer: MEDICARE

## 2020-04-24 PROCEDURE — 93793 ANTICOAG MGMT PT WARFARIN: CPT | Performed by: INTERNAL MEDICINE

## 2020-04-24 NOTE — PROGRESS NOTES
Previous INR: 3.10     Previous dose: 5mg x 1 then 7.5mg Sun and thurs and 5mg all others       Current INR: 2.80     Recommendation:  Continue 7.5mg Sun and thurs and 5mg all others         Next INR: 1 week    Miheala Curtis MD  4/24/2020  3:27 PM

## 2020-04-27 RX ORDER — HYDROCHLOROTHIAZIDE 25 MG/1
25 TABLET ORAL DAILY
Qty: 90 TABLET | Refills: 0 | Status: SHIPPED | OUTPATIENT
Start: 2020-04-27 | End: 2020-08-11 | Stop reason: SDUPTHER

## 2020-04-27 NOTE — TELEPHONE ENCOUNTER
Last Appointment:  9/5/2019  Future Appointments   Date Time Provider Raegan Gold   5/5/2020  8:15 AM DO RIVERA Hernandez   12/21/2020 10:00 AM DO Amadeo Dumont [facility]   12/23/2020  8:00 AM DO RIVERA Churchill      Daughter Rachel Forbes calling in for a refill on hctz to [facility] pended

## 2020-04-28 ENCOUNTER — TELEPHONE (OUTPATIENT)
Dept: PRIMARY CARE CLINIC | Age: 75
End: 2020-04-28

## 2020-04-28 NOTE — TELEPHONE ENCOUNTER
I called and spoke w/ Dr Napolean Olszewski office. I confirmed that Dr Brandie Turcios is internal med and sees pt every 6 months. Dr Brandie Turcios and Goble Osler are friends but the distance to Dr Napolean Olszewski office makes managing his Coumadin difficult. Dr Brandie Turcios is okay w/ you seeing the pt to manage Coumadin and addressing issues in between his 6 month appointments.

## 2020-04-30 ENCOUNTER — ANTI-COAG VISIT (OUTPATIENT)
Dept: PRIMARY CARE CLINIC | Age: 75
End: 2020-04-30
Payer: MEDICARE

## 2020-04-30 ENCOUNTER — TELEPHONE (OUTPATIENT)
Dept: FAMILY MEDICINE CLINIC | Age: 75
End: 2020-04-30

## 2020-04-30 LAB — INR BLD: 3.7

## 2020-04-30 PROCEDURE — 93793 ANTICOAG MGMT PT WARFARIN: CPT | Performed by: INTERNAL MEDICINE

## 2020-05-07 ENCOUNTER — ANTI-COAG VISIT (OUTPATIENT)
Dept: FAMILY MEDICINE CLINIC | Age: 75
End: 2020-05-07
Payer: MEDICARE

## 2020-05-07 LAB — INTERNATIONAL NORMALIZATION RATIO, POC: 3.3

## 2020-05-07 PROCEDURE — 85610 PROTHROMBIN TIME: CPT | Performed by: INTERNAL MEDICINE

## 2020-05-07 PROCEDURE — 93793 ANTICOAG MGMT PT WARFARIN: CPT | Performed by: INTERNAL MEDICINE

## 2020-05-12 ENCOUNTER — TELEPHONE (OUTPATIENT)
Dept: FAMILY MEDICINE CLINIC | Age: 75
End: 2020-05-12

## 2020-05-12 ENCOUNTER — OFFICE VISIT (OUTPATIENT)
Dept: FAMILY MEDICINE CLINIC | Age: 75
End: 2020-05-12
Payer: MEDICARE

## 2020-05-12 VITALS
TEMPERATURE: 98.1 F | DIASTOLIC BLOOD PRESSURE: 82 MMHG | HEART RATE: 77 BPM | WEIGHT: 228 LBS | OXYGEN SATURATION: 97 % | SYSTOLIC BLOOD PRESSURE: 126 MMHG | HEIGHT: 67 IN | BODY MASS INDEX: 35.79 KG/M2

## 2020-05-12 PROBLEM — M25.561 CHRONIC PAIN OF BOTH KNEES: Status: ACTIVE | Noted: 2020-05-12

## 2020-05-12 PROBLEM — G89.29 CHRONIC PAIN OF BOTH KNEES: Status: ACTIVE | Noted: 2020-05-12

## 2020-05-12 PROBLEM — M25.562 CHRONIC PAIN OF BOTH KNEES: Status: ACTIVE | Noted: 2020-05-12

## 2020-05-12 PROCEDURE — 99214 OFFICE O/P EST MOD 30 MIN: CPT | Performed by: INTERNAL MEDICINE

## 2020-05-12 RX ORDER — SIMVASTATIN 40 MG
40 TABLET ORAL DAILY
Qty: 90 TABLET | Refills: 1 | Status: SHIPPED
Start: 2020-05-12 | End: 2020-10-26 | Stop reason: SDUPTHER

## 2020-05-12 RX ORDER — CHOLECALCIFEROL (VITAMIN D3) 1250 MCG
1 CAPSULE ORAL WEEKLY
COMMUNITY

## 2020-05-12 RX ORDER — WARFARIN SODIUM 5 MG/1
TABLET ORAL
Qty: 90 TABLET | Refills: 1 | Status: SHIPPED
Start: 2020-05-12 | End: 2020-10-26 | Stop reason: SDUPTHER

## 2020-05-12 ASSESSMENT — ENCOUNTER SYMPTOMS
NAUSEA: 0
SHORTNESS OF BREATH: 1
ABDOMINAL PAIN: 0
EYE PAIN: 0
COUGH: 1
CHEST TIGHTNESS: 0
BLOOD IN STOOL: 0
RHINORRHEA: 1
SORE THROAT: 0
CONSTIPATION: 0
VOMITING: 0
DIARRHEA: 0

## 2020-05-12 NOTE — PROGRESS NOTES
MG SL tablet, Place 0.4 mg under the tongue every 5 minutes as needed for Chest pain up to max of 3 total doses. If no relief after 1 dose, call 911., Disp: , Rfl:     albuterol (PROVENTIL) (5 MG/ML) 0.5% nebulizer solution, Take 0.5 mLs by nebulization every 6 hours as needed for Wheezing, Disp: 120 each, Rfl: 0    glimepiride (AMARYL) 2 MG tablet, Take 0.5 mg by mouth every morning (before breakfast) , Disp: , Rfl:     octreotide ACETATE (SANDOSTATIN LAR DEPOT) 30 MG injection, Inject 60 mg into the muscle once Every two weeks, Disp: , Rfl:     Glucose Blood (BLOOD GLUCOSE TEST STRIPS) STRP, Freestyle lite, Disp: 120 strip, Rfl: 0    Fingerstix Lancets MISC, , Disp: 100 each, Rfl: 2    Allergies   Allergen Reactions    Lisinopril      Other reaction(s): Other: See Comments  Black out, nausea       Past Medical History:   Diagnosis Date    Atrial fibrillation ([address] 1/4/2014    CAD (coronary artery disease)     Cancer (HCC)     [facility]    Carcinoid syndrome (HCC)     Chronic kidney disease     COPD (chronic obstructive pulmonary disease) ([address]     Diabetes mellitus ([address]     Dizziness 11/1/2018    Hypertension     Idiopathic chronic gout of multiple sites without tophus 9/5/2019    Meniere disease     MI (myocardial infarction) ([address]     x3    Mixed hyperlipidemia 9/5/2019    Obesity     Pacemaker     Type 2 diabetes mellitus without complication ([address]        Past Surgical History:   Procedure Laterality Date    CARDIAC SURGERY      stents/pacemaker    COLONOSCOPY      CORONARY ANGIOPLASTY       CORONARY ANGIOPLASTY WITH STENT PLACEMENT      HERNIA REPAIR      LUNG SURGERY Right 2014    Dr. Vikram Titus for \"infection around lung\"    PACEMAKER INSERTION      TUMOR REMOVAL      tumors removed from breast ,arms     UPPER GASTROINTESTINAL ENDOSCOPY         Family History   Adopted: Yes   Problem Relation Age of Onset    Other Mother         Pt. is adopted    Other Father         Pt.  is adopted. Social History     Socioeconomic History    Marital status: Single     Spouse name: None    Number of children: 3    Years of education: 12    Highest education level: None   Occupational History    Occupation: [employer] worker     Comment: retired   Social Needs    Financial resource strain: Not hard at all   Suzie-Ximena insecurity     Worry: Never true     Inability: Never true    Transportation needs     Medical: No     Non-medical: No   Tobacco Use    Smoking status: Former Smoker     Packs/day: 2.00     Years: 40.00     Pack years: 80.00     Last attempt to quit: 2000     Years since quittin.6    Smokeless tobacco: Never Used   Substance and Sexual Activity    Alcohol use: No    Drug use: No    Sexual activity: Not Currently     Partners: Female   Lifestyle    Physical activity     Days per week: 3 days     Minutes per session: 20 min    Stress: Only a little   Relationships    Social connections     Talks on phone: More than three times a week     Gets together: More than three times a week     Attends Jewish service: 1 to 4 times per year     Active member of club or organization: No     Attends meetings of clubs or organizations: Never     Relationship status:     Intimate partner violence     Fear of current or ex partner: No     Emotionally abused: No     Physically abused: No     Forced sexual activity: No   Other Topics Concern    None   Social History Narrative    None        Vitals:    20 0812   BP: 126/82   Site: Left Upper Arm   Position: Sitting   Cuff Size: Large Adult   Pulse: 77   Temp: 98.1 °F (36.7 °C)   TempSrc: Temporal   SpO2: 97%   Weight: 228 lb (103.4 kg)   Height: 5' 7\" (1.702 m)       Exam:  Physical Exam  Constitutional:       Appearance: He is well-developed. HENT:      Head: Normocephalic and atraumatic.       Right Ear: External ear normal.      Left Ear: External ear normal.   Eyes:      Pupils: Pupils are equal, round, EP   - on sotalol - decreased dose (12/2019)   - on coumadin - INR today 2.5 - continue current dose   - stable     Chronic obstructive pulmonary disease, unspecified COPD type ([address]  - Continue present treatment   - following with pulmonary   - stopped anoro   - now on trelegy   - has home nebulizer with albuterol   - has chronic cough   - has tried flonase or nasacort  - has tried mult antihistamine   - trial of singulair     Coronary artery disease involving native coronary artery of native heart without angina pectoris  - Continue present treatment   - s/p stent  - following with cardiology and EP   - on sotalol   - on simvastatin   - no aspirin due to coumadin   - stable     Essential hypertension  - Continue present treatment   - watch diet   - on hctz - nephrology decreased to 12.5mg   - on sotalol for afib   - stable     Mixed hyperlipidemia  - Continue present treatment   - watch diet   - on simvastatin  - follow labs     Idiopathic chronic gout of multiple sites without tophus  - continue present treatment  - watch diet   - on allopurinol - increase to daily   - stable     Hyperparathyroidism ([facility]  - Possible due to CKD     Vitamin D deficiency  - Continue present treatment  - nephrology added 50k weekly   - follow labs     Chronic pain of both knees  - check xray     Return in about 6 months (around 11/12/2020) for check up and review and labs.     Orders Placed This Encounter   Procedures    XR KNEE LEFT (MIN 4 VIEWS)     Standing Status:   Future     Number of Occurrences:   1     Standing Expiration Date:   5/12/2021     Order Specific Question:   Reason for exam:     Answer:   swelling    XR KNEE RIGHT (MIN 4 VIEWS)     Standing Status:   Future     Number of Occurrences:   1     Standing Expiration Date:   5/12/2021     Order Specific Question:   Reason for exam:     Answer:   swelling    CBC Auto Differential     Standing Status:   Future     Standing Expiration Date:   5/12/2021   aNi Garcia

## 2020-05-12 NOTE — TELEPHONE ENCOUNTER
Please let the patient know that knee x-ray per radiology report suggested: Moderate right knee arthritis and mild left knee arthritis, no overt joint effusions (fluid collections).      If becomes bothersome could consider referral to orthopedics    Thanks

## 2020-05-14 ENCOUNTER — ANTI-COAG VISIT (OUTPATIENT)
Dept: FAMILY MEDICINE CLINIC | Age: 75
End: 2020-05-14
Payer: MEDICARE

## 2020-05-14 LAB — INR BLD: 3

## 2020-05-14 PROCEDURE — 93793 ANTICOAG MGMT PT WARFARIN: CPT | Performed by: INTERNAL MEDICINE

## 2020-05-20 ENCOUNTER — HOSPITAL ENCOUNTER (OUTPATIENT)
Age: 75
Discharge: HOME | End: 2020-05-20
Payer: MEDICARE

## 2020-05-20 LAB
ALBUMIN SERPL-MCNC: 4.1 G/DL (ref 3.5–5.2)
ALP BLD-CCNC: 59 U/L (ref 40–129)
ALT SERPL-CCNC: 15 U/L (ref 0–40)
ANION GAP SERPL CALCULATED.3IONS-SCNC: 9 MMOL/L (ref 7–16)
AST SERPL-CCNC: 22 U/L (ref 0–39)
BILIRUB SERPL-MCNC: 0.7 MG/DL (ref 0–1.2)
BUN BLDV-MCNC: 32 MG/DL (ref 8–23)
CALCIUM SERPL-MCNC: 10.4 MG/DL (ref 8.6–10.2)
CHLORIDE BLD-SCNC: 105 MMOL/L (ref 98–107)
CHOLESTEROL, TOTAL: 176 MG/DL (ref 0–199)
CO2: 28 MMOL/L (ref 22–29)
CORTISOL TOTAL: 8.2 MCG/DL (ref 2.68–18.4)
CREAT SERPL-MCNC: 1.6 MG/DL (ref 0.7–1.2)
GFR AFRICAN AMERICAN: 51
GFR NON-AFRICAN AMERICAN: 42 ML/MIN/1.73
GLUCOSE BLD-MCNC: 115 MG/DL (ref 74–99)
HBA1C MFR BLD: 5.1 % (ref 4–5.6)
HDLC SERPL-MCNC: 52 MG/DL
LDL CHOLESTEROL CALCULATED: 86 MG/DL (ref 0–99)
PARATHYROID HORMONE INTACT: 131 PG/ML (ref 15–65)
POTASSIUM SERPL-SCNC: 4.5 MMOL/L (ref 3.5–5)
SODIUM BLD-SCNC: 142 MMOL/L (ref 132–146)
TOTAL PROTEIN: 7.4 G/DL (ref 6.4–8.3)
TRIGL SERPL-MCNC: 192 MG/DL (ref 0–149)
VLDLC SERPL CALC-MCNC: 38 MG/DL

## 2020-05-20 PROCEDURE — 36415 COLL VENOUS BLD VENIPUNCTURE: CPT

## 2020-05-20 PROCEDURE — 83970 ASSAY OF PARATHORMONE: CPT

## 2020-05-20 PROCEDURE — 80061 LIPID PANEL: CPT

## 2020-05-20 PROCEDURE — 80053 COMPREHEN METABOLIC PANEL: CPT

## 2020-05-20 PROCEDURE — 82533 TOTAL CORTISOL: CPT

## 2020-05-20 PROCEDURE — 84586 ASSAY OF VIP: CPT

## 2020-05-20 PROCEDURE — 83036 HEMOGLOBIN GLYCOSYLATED A1C: CPT

## 2020-05-20 PROCEDURE — 82652 VIT D 1 25-DIHYDROXY: CPT

## 2020-05-20 RX ORDER — ALLOPURINOL 100 MG/1
TABLET ORAL
Qty: 38 TABLET | Refills: 1 | Status: SHIPPED
Start: 2020-05-20 | End: 2020-10-26 | Stop reason: SDUPTHER

## 2020-05-20 NOTE — TELEPHONE ENCOUNTER
Name of Medication(s) Requested:  Allopurinol    Pharmacy Requested:   [facility]    Medication(s) pended? [x] Yes  [] No    Last Appointment:  5/12/2020    Future appts:  Future Appointments   Date Time Provider Raegan Gold   10/26/2020  8:15 AM Hannah Julien  [address] [place] [address]   12/2/2020  8:00 AM Bryanna Bradley, DO [facility]   12/21/2020 10:00 AM Claudeen Groom, DO [facility]   12/23/2020  8:00 AM Tyrel Craft, DO [facility]        Does patient need call back?   [] Yes  [x] No

## 2020-05-21 ENCOUNTER — ANTI-COAG VISIT (OUTPATIENT)
Dept: FAMILY MEDICINE CLINIC | Age: 75
End: 2020-05-21
Payer: MEDICARE

## 2020-05-21 LAB — INR BLD: 3

## 2020-05-21 PROCEDURE — 93793 ANTICOAG MGMT PT WARFARIN: CPT | Performed by: INTERNAL MEDICINE

## 2020-05-21 NOTE — PROGRESS NOTES
Previous INR: 3.0     Previous dose: 5mg daily             Current INR: 3.0     Recommendation: continue 5mg daily       Next INR: 1 week    Milton Flannery  5/21/2020  10:54 AM

## 2020-05-22 LAB — VITAMIN D 1,25-DIHYDROXY: 55.4 PG/ML (ref 19.9–79.3)

## 2020-05-28 ENCOUNTER — ANTI-COAG VISIT (OUTPATIENT)
Dept: FAMILY MEDICINE CLINIC | Age: 75
End: 2020-05-28
Payer: MEDICARE

## 2020-05-28 LAB — INR BLD: 2.5

## 2020-05-28 PROCEDURE — 93793 ANTICOAG MGMT PT WARFARIN: CPT | Performed by: INTERNAL MEDICINE

## 2020-05-28 NOTE — PROGRESS NOTES
Previous INR: 3.0     Previous dose: 5mg daily             Current INR: 2.5     Recommendation: continue 5mg daily       Next INR: 1 week    Nav New  5/28/2020  10:47 AM

## 2020-06-01 LAB
Lab: NORMAL
REPORT: NORMAL
THIS TEST SENT TO: NORMAL

## 2020-06-04 ENCOUNTER — ANTI-COAG VISIT (OUTPATIENT)
Dept: FAMILY MEDICINE CLINIC | Age: 75
End: 2020-06-04
Payer: MEDICARE

## 2020-06-04 LAB — INR BLD: 3.3

## 2020-06-04 PROCEDURE — 93793 ANTICOAG MGMT PT WARFARIN: CPT | Performed by: INTERNAL MEDICINE

## 2020-06-11 ENCOUNTER — ANTI-COAG VISIT (OUTPATIENT)
Dept: FAMILY MEDICINE CLINIC | Age: 75
End: 2020-06-11
Payer: MEDICARE

## 2020-06-11 LAB — INR BLD: 2.3

## 2020-06-11 PROCEDURE — 93793 ANTICOAG MGMT PT WARFARIN: CPT | Performed by: INTERNAL MEDICINE

## 2020-06-18 ENCOUNTER — ANTI-COAG VISIT (OUTPATIENT)
Dept: FAMILY MEDICINE CLINIC | Age: 75
End: 2020-06-18
Payer: MEDICARE

## 2020-06-18 LAB — INR BLD: 2.4

## 2020-06-18 PROCEDURE — 93793 ANTICOAG MGMT PT WARFARIN: CPT | Performed by: INTERNAL MEDICINE

## 2020-06-18 NOTE — PROGRESS NOTES
Previous INR: 2.30     Previous dose:  5mg daily            Current INR: 2.40     Recommendation: continue 5mg daily       Next INR: 1 week    Bryanta Memory  6/18/2020  11:37 AM

## 2020-06-25 ENCOUNTER — ANTI-COAG VISIT (OUTPATIENT)
Dept: FAMILY MEDICINE CLINIC | Age: 75
End: 2020-06-25
Payer: MEDICARE

## 2020-06-25 LAB — INR BLD: 2.5

## 2020-06-25 PROCEDURE — 93793 ANTICOAG MGMT PT WARFARIN: CPT | Performed by: INTERNAL MEDICINE

## 2020-07-02 ENCOUNTER — ANTI-COAG VISIT (OUTPATIENT)
Dept: FAMILY MEDICINE CLINIC | Age: 75
End: 2020-07-02
Payer: MEDICARE

## 2020-07-02 LAB — INR BLD: 2.6

## 2020-07-02 PROCEDURE — 93793 ANTICOAG MGMT PT WARFARIN: CPT | Performed by: INTERNAL MEDICINE

## 2020-07-02 NOTE — PROGRESS NOTES
Previous INR: 2.50     Previous dose:  5mg daily            Current INR: 2.60     Recommendation: continue 5mg daily       Next INR: 1 week    Tanisha Arriola  7/2/2020  12:15 PM

## 2020-07-09 ENCOUNTER — ANTI-COAG VISIT (OUTPATIENT)
Dept: FAMILY MEDICINE CLINIC | Age: 75
End: 2020-07-09
Payer: MEDICARE

## 2020-07-09 LAB — INR BLD: 2.1

## 2020-07-09 PROCEDURE — 93793 ANTICOAG MGMT PT WARFARIN: CPT | Performed by: INTERNAL MEDICINE

## 2020-07-09 NOTE — PROGRESS NOTES
Previous INR: 2.60     Previous dose:  5mg daily            Current INR: 2.10     Recommendation: continue 5mg daily       Next INR: 1 week    Judie Jeong  7/9/2020  12:22 PM

## 2020-07-16 ENCOUNTER — ANTI-COAG VISIT (OUTPATIENT)
Dept: FAMILY MEDICINE CLINIC | Age: 75
End: 2020-07-16
Payer: MEDICARE

## 2020-07-16 LAB — INR BLD: 1.8

## 2020-07-16 PROCEDURE — 93793 ANTICOAG MGMT PT WARFARIN: CPT | Performed by: INTERNAL MEDICINE

## 2020-07-22 LAB
AVERAGE GLUCOSE: NORMAL
HBA1C MFR BLD: 5.4 %

## 2020-07-23 ENCOUNTER — ANTI-COAG VISIT (OUTPATIENT)
Dept: FAMILY MEDICINE CLINIC | Age: 75
End: 2020-07-23
Payer: MEDICARE

## 2020-07-23 LAB — INR BLD: 2.3

## 2020-07-23 PROCEDURE — 93793 ANTICOAG MGMT PT WARFARIN: CPT | Performed by: INTERNAL MEDICINE

## 2020-07-23 NOTE — PROGRESS NOTES
Previous INR: 1.80     Previous dose: 7.5mg x 1 today then continued 5mg daily              Current INR: 2.30      Recommendation: continue 5mg daily       Next INR: 1 week    Chandana Ingram  7/23/2020  12:04 PM

## 2020-07-30 ENCOUNTER — ANTI-COAG VISIT (OUTPATIENT)
Dept: FAMILY MEDICINE CLINIC | Age: 75
End: 2020-07-30
Payer: MEDICARE

## 2020-07-30 LAB — INR BLD: 2

## 2020-07-30 PROCEDURE — 93793 ANTICOAG MGMT PT WARFARIN: CPT | Performed by: INTERNAL MEDICINE

## 2020-07-30 NOTE — PROGRESS NOTES
Previous INR: 2.30     Previous dose: 5mg daily              Current INR: 2.00     Recommendation: continue 5mg daily       Next INR: 1 week    Catrachito Elizalde  7/30/2020  12:07 PM

## 2020-08-06 ENCOUNTER — ANTI-COAG VISIT (OUTPATIENT)
Dept: FAMILY MEDICINE CLINIC | Age: 75
End: 2020-08-06
Payer: MEDICARE

## 2020-08-06 LAB — INR BLD: 1.9

## 2020-08-06 PROCEDURE — 93793 ANTICOAG MGMT PT WARFARIN: CPT | Performed by: INTERNAL MEDICINE

## 2020-08-13 ENCOUNTER — ANTI-COAG VISIT (OUTPATIENT)
Dept: FAMILY MEDICINE CLINIC | Age: 75
End: 2020-08-13
Payer: MEDICARE

## 2020-08-13 LAB — INR BLD: 1.7

## 2020-08-13 PROCEDURE — 93793 ANTICOAG MGMT PT WARFARIN: CPT | Performed by: INTERNAL MEDICINE

## 2020-08-13 RX ORDER — WARFARIN SODIUM 1 MG/1
1 TABLET ORAL DAILY
Qty: 30 TABLET | Refills: 3 | Status: SHIPPED
Start: 2020-08-13 | End: 2021-12-06 | Stop reason: SDUPTHER

## 2020-08-13 NOTE — PROGRESS NOTES
Previous INR: 1.90     Previous dose: 7.5mg x 1 then 5mg daily              Current INR: 1.70     Recommendation: would recommend adding 1mg tablet and changing to 6mg (5mg +1mg tablets) on mon and Friday and 5mg all others        Next INR: 1 week    Requested Prescriptions     Pending Prescriptions Disp Refills    warfarin (COUMADIN) 1 MG tablet 30 tablet 3     Sig: Take 1 tablet by mouth daily     Pended - not sent - Did not know which [facility] to send to     Maria Luisa Amaya  8/13/2020  12:26 PM

## 2020-08-20 ENCOUNTER — ANTI-COAG VISIT (OUTPATIENT)
Dept: FAMILY MEDICINE CLINIC | Age: 75
End: 2020-08-20
Payer: MEDICARE

## 2020-08-20 LAB — INR BLD: 2.3

## 2020-08-20 PROCEDURE — 93793 ANTICOAG MGMT PT WARFARIN: CPT | Performed by: INTERNAL MEDICINE

## 2020-08-21 ENCOUNTER — CARE COORDINATION (OUTPATIENT)
Dept: CARE COORDINATION | Age: 75
End: 2020-08-21

## 2020-08-21 NOTE — CARE COORDINATION
ACM spoke with patient's daughter and primary contact, Theo Swartz (on HIPAA), to discuss care coordination. Theo Swartz states that at this time his health is being managed well and she cannot identify any needs for which care coordination needs to be involved. Enrollment into care coordination was declined at this time. ACM encouraged Regla/pt to reach out through PCP if any needs develop or they feel care coordination would be beneficial, understanding verbalized.      -Pt will be temporarily excluded from CC at this time

## 2020-08-27 ENCOUNTER — ANTI-COAG VISIT (OUTPATIENT)
Dept: FAMILY MEDICINE CLINIC | Age: 75
End: 2020-08-27
Payer: MEDICARE

## 2020-08-27 LAB — INR BLD: 2.4

## 2020-08-27 PROCEDURE — 93793 ANTICOAG MGMT PT WARFARIN: CPT | Performed by: INTERNAL MEDICINE

## 2020-08-27 NOTE — PROGRESS NOTES
Previous INR: 2.30     Previous dose:  6mg (5mg +1mg tablets) on mon and Friday and 5mg all others                 Current INR: 2.40     Recommendation: continue 6mg (5mg +1mg tablets) on mon and Friday and 5mg all others        Next INR: 1 week     Natacha Fofana  8/27/2020  10:44 AM

## 2020-09-03 ENCOUNTER — ANTI-COAG VISIT (OUTPATIENT)
Dept: FAMILY MEDICINE CLINIC | Age: 75
End: 2020-09-03
Payer: MEDICARE

## 2020-09-03 LAB — INR BLD: 2.3

## 2020-09-03 PROCEDURE — 93793 ANTICOAG MGMT PT WARFARIN: CPT | Performed by: INTERNAL MEDICINE

## 2020-09-03 NOTE — PROGRESS NOTES
Previous INR: 2.40     Previous dose:  6mg (5mg +1mg tablets) on mon and Friday and 5mg all others                 Current INR: 2.30     Recommendation: continue 6mg (5mg +1mg tablets) on mon and Friday and 5mg all others        Next INR: 1 week     Teresa Sue  9/3/2020  2:34 PM

## 2020-09-10 ENCOUNTER — ANTI-COAG VISIT (OUTPATIENT)
Dept: FAMILY MEDICINE CLINIC | Age: 75
End: 2020-09-10
Payer: MEDICARE

## 2020-09-10 LAB — INR BLD: 2.5

## 2020-09-10 PROCEDURE — 93793 ANTICOAG MGMT PT WARFARIN: CPT | Performed by: INTERNAL MEDICINE

## 2020-09-10 NOTE — PROGRESS NOTES
Previous INR: 2.30     Previous dose:  6mg (5mg +1mg tablets) on mon and Friday and 5mg all others                 Current INR: 2.50     Recommendation: continue 6mg (5mg +1mg tablets) on mon and Friday and 5mg all others        Next INR: 1 week     Harmony   9/10/2020  1:52 PM

## 2020-09-17 ENCOUNTER — ANTI-COAG VISIT (OUTPATIENT)
Dept: FAMILY MEDICINE CLINIC | Age: 75
End: 2020-09-17
Payer: MEDICARE

## 2020-09-17 LAB — INR BLD: 2.1

## 2020-09-17 PROCEDURE — 93793 ANTICOAG MGMT PT WARFARIN: CPT | Performed by: INTERNAL MEDICINE

## 2020-09-17 NOTE — PROGRESS NOTES
Previous INR: 2.50     Previous dose:  6mg (5mg +1mg tablets) on mon and Friday and 5mg all others                 Current INR: 2.10     Recommendation: continue 6mg (5mg +1mg tablets) on mon and Friday and 5mg all others        Next INR: 1 week     Eulogio Perez  9/17/2020  4:35 PM

## 2020-09-24 ENCOUNTER — HOSPITAL ENCOUNTER (OUTPATIENT)
Age: 75
Discharge: HOME | End: 2020-09-24
Payer: MEDICARE

## 2020-09-24 ENCOUNTER — ANTI-COAG VISIT (OUTPATIENT)
Dept: FAMILY MEDICINE CLINIC | Age: 75
End: 2020-09-24
Payer: MEDICARE

## 2020-09-24 LAB
ALBUMIN SERPL-MCNC: 4.2 G/DL (ref 3.5–5.2)
ALP BLD-CCNC: 68 U/L (ref 40–129)
ALT SERPL-CCNC: 14 U/L (ref 0–40)
ANION GAP SERPL CALCULATED.3IONS-SCNC: 9 MMOL/L (ref 7–16)
AST SERPL-CCNC: 22 U/L (ref 0–39)
BASOPHILS ABSOLUTE: 0.04 E9/L (ref 0–0.2)
BASOPHILS RELATIVE PERCENT: 0.6 % (ref 0–2)
BILIRUB SERPL-MCNC: 0.8 MG/DL (ref 0–1.2)
BILIRUBIN URINE: NEGATIVE
BLOOD, URINE: NEGATIVE
BUN BLDV-MCNC: 35 MG/DL (ref 8–23)
CALCIUM SERPL-MCNC: 10.6 MG/DL (ref 8.6–10.2)
CHLORIDE BLD-SCNC: 102 MMOL/L (ref 98–107)
CLARITY: CLEAR
CO2: 28 MMOL/L (ref 22–29)
COLOR: YELLOW
CREAT SERPL-MCNC: 1.8 MG/DL (ref 0.7–1.2)
CREATININE URINE: 74 MG/DL (ref 40–278)
EOSINOPHILS ABSOLUTE: 0.07 E9/L (ref 0.05–0.5)
EOSINOPHILS RELATIVE PERCENT: 1 % (ref 0–6)
GFR AFRICAN AMERICAN: 45
GFR NON-AFRICAN AMERICAN: 37 ML/MIN/1.73
GLUCOSE BLD-MCNC: 140 MG/DL (ref 74–99)
GLUCOSE URINE: NEGATIVE MG/DL
HCT VFR BLD CALC: 42.1 % (ref 37–54)
HEMOGLOBIN: 14.4 G/DL (ref 12.5–16.5)
IMMATURE GRANULOCYTES #: 0.04 E9/L
IMMATURE GRANULOCYTES %: 0.6 % (ref 0–5)
INR BLD: 2.1
KETONES, URINE: NEGATIVE MG/DL
LEUKOCYTE ESTERASE, URINE: NEGATIVE
LYMPHOCYTES ABSOLUTE: 1.29 E9/L (ref 1.5–4)
LYMPHOCYTES RELATIVE PERCENT: 19.2 % (ref 20–42)
MAGNESIUM: 2.1 MG/DL (ref 1.6–2.6)
MCH RBC QN AUTO: 32.5 PG (ref 26–35)
MCHC RBC AUTO-ENTMCNC: 34.2 % (ref 32–34.5)
MCV RBC AUTO: 95 FL (ref 80–99.9)
MONOCYTES ABSOLUTE: 0.62 E9/L (ref 0.1–0.95)
MONOCYTES RELATIVE PERCENT: 9.2 % (ref 2–12)
NEUTROPHILS ABSOLUTE: 4.65 E9/L (ref 1.8–7.3)
NEUTROPHILS RELATIVE PERCENT: 69.4 % (ref 43–80)
NITRITE, URINE: NEGATIVE
PDW BLD-RTO: 13.7 FL (ref 11.5–15)
PH UA: 6 (ref 5–9)
PHOSPHORUS: 2.7 MG/DL (ref 2.5–4.5)
PLATELET # BLD: 216 E9/L (ref 130–450)
PMV BLD AUTO: 8.9 FL (ref 7–12)
POTASSIUM SERPL-SCNC: 4.6 MMOL/L (ref 3.5–5)
PROTEIN PROTEIN: 6 MG/DL (ref 0–12)
PROTEIN UA: NEGATIVE MG/DL
PROTEIN/CREAT RATIO: 0.1
PROTEIN/CREAT RATIO: 0.1 (ref 0–0.2)
RBC # BLD: 4.43 E12/L (ref 3.8–5.8)
SODIUM BLD-SCNC: 139 MMOL/L (ref 132–146)
SPECIFIC GRAVITY UA: 1.01 (ref 1–1.03)
TOTAL PROTEIN: 7.1 G/DL (ref 6.4–8.3)
UROBILINOGEN, URINE: 0.2 E.U./DL
VITAMIN D 25-HYDROXY: 33 NG/ML (ref 30–100)
WBC # BLD: 6.7 E9/L (ref 4.5–11.5)

## 2020-09-24 PROCEDURE — 85025 COMPLETE CBC W/AUTO DIFF WBC: CPT

## 2020-09-24 PROCEDURE — 80053 COMPREHEN METABOLIC PANEL: CPT

## 2020-09-24 PROCEDURE — 83735 ASSAY OF MAGNESIUM: CPT

## 2020-09-24 PROCEDURE — 82306 VITAMIN D 25 HYDROXY: CPT

## 2020-09-24 PROCEDURE — 84156 ASSAY OF PROTEIN URINE: CPT

## 2020-09-24 PROCEDURE — 81003 URINALYSIS AUTO W/O SCOPE: CPT

## 2020-09-24 PROCEDURE — 93793 ANTICOAG MGMT PT WARFARIN: CPT | Performed by: INTERNAL MEDICINE

## 2020-09-24 PROCEDURE — 84100 ASSAY OF PHOSPHORUS: CPT

## 2020-09-24 PROCEDURE — 36415 COLL VENOUS BLD VENIPUNCTURE: CPT

## 2020-09-24 PROCEDURE — 82570 ASSAY OF URINE CREATININE: CPT

## 2020-09-24 NOTE — PROGRESS NOTES
Previous INR: 2.10     Previous dose:  6mg (5mg +1mg tablets) on mon and Friday and 5mg all others                 Current INR: 2.10     Recommendation: continue 6mg (5mg +1mg tablets) on mon and Friday and 5mg all others        Next INR: 1 week     Natacha Fofana  9/24/2020  2:18 PM

## 2020-10-01 ENCOUNTER — ANTI-COAG VISIT (OUTPATIENT)
Dept: FAMILY MEDICINE CLINIC | Age: 75
End: 2020-10-01
Payer: MEDICARE

## 2020-10-01 LAB — INR BLD: 2.2

## 2020-10-01 PROCEDURE — 93793 ANTICOAG MGMT PT WARFARIN: CPT | Performed by: INTERNAL MEDICINE

## 2020-10-01 NOTE — PROGRESS NOTES
Previous INR: 2.10     Previous dose:  6mg (5mg +1mg tablets) on mon and Friday and 5mg all others                 Current INR: 2.20     Recommendation: continue 6mg (5mg +1mg tablets) on mon and Friday and 5mg all others        Next INR: 1 week     Antwon Giles  10/1/2020  12:36 PM

## 2020-10-08 ENCOUNTER — ANTI-COAG VISIT (OUTPATIENT)
Dept: FAMILY MEDICINE CLINIC | Age: 75
End: 2020-10-08
Payer: MEDICARE

## 2020-10-08 LAB — INR BLD: 2.2

## 2020-10-08 PROCEDURE — 93793 ANTICOAG MGMT PT WARFARIN: CPT | Performed by: INTERNAL MEDICINE

## 2020-10-08 NOTE — PROGRESS NOTES
Previous INR: 2.20     Previous dose:  6mg (5mg +1mg tablets) on mon and Friday and 5mg all others                 Current INR: 2.20     Recommendation: continue 6mg (5mg +1mg tablets) on mon and Friday and 5mg all others        Next INR: 1 week     Harmony Lal  10/8/2020  4:27 PM

## 2020-10-10 ENCOUNTER — HOSPITAL ENCOUNTER (OUTPATIENT)
Age: 75
Discharge: HOME | End: 2020-10-10
Payer: MEDICARE

## 2020-10-10 LAB
ALBUMIN SERPL-MCNC: 4.3 G/DL (ref 3.5–5.2)
ALP BLD-CCNC: 65 U/L (ref 40–129)
ALT SERPL-CCNC: 15 U/L (ref 0–40)
ANION GAP SERPL CALCULATED.3IONS-SCNC: 10 MMOL/L (ref 7–16)
AST SERPL-CCNC: 22 U/L (ref 0–39)
BASOPHILS ABSOLUTE: 0.04 E9/L (ref 0–0.2)
BASOPHILS RELATIVE PERCENT: 0.7 % (ref 0–2)
BILIRUB SERPL-MCNC: 0.9 MG/DL (ref 0–1.2)
BUN BLDV-MCNC: 36 MG/DL (ref 8–23)
CALCIUM SERPL-MCNC: 10.5 MG/DL (ref 8.6–10.2)
CHLORIDE BLD-SCNC: 105 MMOL/L (ref 98–107)
CHOLESTEROL, FASTING: 177 MG/DL (ref 0–199)
CO2: 26 MMOL/L (ref 22–29)
CREAT SERPL-MCNC: 1.9 MG/DL (ref 0.7–1.2)
EOSINOPHILS ABSOLUTE: 0.12 E9/L (ref 0.05–0.5)
EOSINOPHILS RELATIVE PERCENT: 2 % (ref 0–6)
GFR AFRICAN AMERICAN: 42
GFR NON-AFRICAN AMERICAN: 35 ML/MIN/1.73
GLUCOSE BLD-MCNC: 84 MG/DL (ref 74–99)
HBA1C MFR BLD: 4.8 % (ref 4–5.6)
HCT VFR BLD CALC: 41.6 % (ref 37–54)
HDLC SERPL-MCNC: 54 MG/DL
HEMOGLOBIN: 14.2 G/DL (ref 12.5–16.5)
IMMATURE GRANULOCYTES #: 0.05 E9/L
IMMATURE GRANULOCYTES %: 0.8 % (ref 0–5)
LDL CHOLESTEROL CALCULATED: 77 MG/DL (ref 0–99)
LYMPHOCYTES ABSOLUTE: 2.04 E9/L (ref 1.5–4)
LYMPHOCYTES RELATIVE PERCENT: 33.7 % (ref 20–42)
MAGNESIUM: 2 MG/DL (ref 1.6–2.6)
MCH RBC QN AUTO: 32.5 PG (ref 26–35)
MCHC RBC AUTO-ENTMCNC: 34.1 % (ref 32–34.5)
MCV RBC AUTO: 95.2 FL (ref 80–99.9)
MICROALBUMIN UR-MCNC: 29.7 MG/L
MONOCYTES ABSOLUTE: 0.63 E9/L (ref 0.1–0.95)
MONOCYTES RELATIVE PERCENT: 10.4 % (ref 2–12)
NEUTROPHILS ABSOLUTE: 3.18 E9/L (ref 1.8–7.3)
NEUTROPHILS RELATIVE PERCENT: 52.4 % (ref 43–80)
PDW BLD-RTO: 13.8 FL (ref 11.5–15)
PLATELET # BLD: 200 E9/L (ref 130–450)
PMV BLD AUTO: 8.9 FL (ref 7–12)
POTASSIUM SERPL-SCNC: 3.7 MMOL/L (ref 3.5–5)
RBC # BLD: 4.37 E12/L (ref 3.8–5.8)
SODIUM BLD-SCNC: 141 MMOL/L (ref 132–146)
TOTAL PROTEIN: 7.2 G/DL (ref 6.4–8.3)
TRIGLYCERIDE, FASTING: 229 MG/DL (ref 0–149)
TSH SERPL DL<=0.05 MIU/L-ACNC: 2.61 UIU/ML (ref 0.27–4.2)
URIC ACID, SERUM: 9.8 MG/DL (ref 3.4–7)
VITAMIN D 25-HYDROXY: 35 NG/ML (ref 30–100)
VLDLC SERPL CALC-MCNC: 46 MG/DL
WBC # BLD: 6.1 E9/L (ref 4.5–11.5)

## 2020-10-10 PROCEDURE — 80053 COMPREHEN METABOLIC PANEL: CPT

## 2020-10-10 PROCEDURE — 83735 ASSAY OF MAGNESIUM: CPT

## 2020-10-10 PROCEDURE — 80061 LIPID PANEL: CPT

## 2020-10-10 PROCEDURE — 83036 HEMOGLOBIN GLYCOSYLATED A1C: CPT

## 2020-10-10 PROCEDURE — 85025 COMPLETE CBC W/AUTO DIFF WBC: CPT

## 2020-10-10 PROCEDURE — 82306 VITAMIN D 25 HYDROXY: CPT

## 2020-10-10 PROCEDURE — 36415 COLL VENOUS BLD VENIPUNCTURE: CPT

## 2020-10-10 PROCEDURE — 84443 ASSAY THYROID STIM HORMONE: CPT

## 2020-10-10 PROCEDURE — 82044 UR ALBUMIN SEMIQUANTITATIVE: CPT

## 2020-10-10 PROCEDURE — 84550 ASSAY OF BLOOD/URIC ACID: CPT

## 2020-10-11 ENCOUNTER — TELEPHONE (OUTPATIENT)
Dept: PRIMARY CARE CLINIC | Age: 75
End: 2020-10-11

## 2020-10-11 NOTE — LETTER
[facility]  [address] [place] [address]  Phone: [phone]  Fax: [phone]    Edmund Amaral MD        October 12, 2020     [address]  [facility] [address]      Dear Leonardo Mohamud:    Below are the results from your recent visit:    Resulted Orders   Microalbumin, Ur   Result Value Ref Range    Microalbumin, Random Urine 29.7 (H) Not Established mg/L   CBC Auto Differential   Result Value Ref Range    WBC 6.1 4.5 - 11.5 E9/L    RBC 4.37 3.80 - 5.80 E12/L    Hemoglobin 14.2 12.5 - 16.5 g/dL    Hematocrit 41.6 37.0 - 54.0 %    MCV 95.2 80.0 - 99.9 fL    MCH 32.5 26.0 - 35.0 pg    MCHC 34.1 32.0 - 34.5 %    RDW 13.8 11.5 - 15.0 fL    Platelets 062 347 - 182 E9/L    MPV 8.9 7.0 - 12.0 fL    Neutrophils % 52.4 43.0 - 80.0 %    Immature Granulocytes % 0.8 0.0 - 5.0 %    Lymphocytes % 33.7 20.0 - 42.0 %    Monocytes % 10.4 2.0 - 12.0 %    Eosinophils % 2.0 0.0 - 6.0 %    Basophils % 0.7 0.0 - 2.0 %    Neutrophils Absolute 3.18 1.80 - 7.30 E9/L    Immature Granulocytes # 0.05 E9/L    Lymphocytes Absolute 2.04 1.50 - 4.00 E9/L    Monocytes Absolute 0.63 0.10 - 0.95 E9/L    Eosinophils Absolute 0.12 0.05 - 0.50 E9/L    Basophils Absolute 0.04 0.00 - 0.20 E9/L   Comprehensive Metabolic Panel   Result Value Ref Range    Sodium 141 132 - 146 mmol/L    Potassium 3.7 3.5 - 5.0 mmol/L    Chloride 105 98 - 107 mmol/L    CO2 26 22 - 29 mmol/L    Anion Gap 10 7 - 16 mmol/L    Glucose 84 74 - 99 mg/dL    BUN 36 (H) 8 - 23 mg/dL    CREATININE 1.9 (H) 0.7 - 1.2 mg/dL    GFR Non-African American 35 >=60 mL/min/1.73      Comment:      Chronic Kidney Disease: less than 60 ml/min/1.73 sq.m. Kidney Failure: less than 15 ml/min/1.73 sq.m. Results valid for patients 18 years and older.       GFR  42     Calcium 10.5 (H) 8.6 - 10.2 mg/dL    Total Protein 7.2 6.4 - 8.3 g/dL    Alb 4.3 3.5 - 5.2 g/dL    Total Bilirubin 0.9 0.0 - 1.2 mg/dL Alkaline Phosphatase 65 40 - 129 U/L    ALT 15 0 - 40 U/L    AST 22 0 - 39 U/L   Hemoglobin A1C   Result Value Ref Range    Hemoglobin A1C 4.8 4.0 - 5.6 %   Lipid, Fasting   Result Value Ref Range    Cholesterol, Fasting 177 0 - 199 mg/dL    Triglyceride, Fasting 229 (H) 0 - 149 mg/dL    HDL 54 >40 mg/dL    LDL Calculated 77 0 - 99 mg/dL    VLDL Cholesterol Calculated 46 mg/dL   Magnesium   Result Value Ref Range    Magnesium 2.0 1.6 - 2.6 mg/dL   Vitamin D 25 Hydroxy   Result Value Ref Range    Vit D, 25-Hydroxy 35 30 - 100 ng/mL      Comment:      <20 ng/mL. ........... Bry Salter Deficient  20-30 ng/mL. ......... Bry Salter Insufficient   ng/mL. ........ Bry Salter Sufficient  >100 ng/mL. .......... Bry Salter Toxic     Uric Acid   Result Value Ref Range    Uric Acid, Serum 9.8 (H) 3.4 - 7.0 mg/dL   TSH without Reflex   Result Value Ref Range    TSH 2.610 0.270 - 4.200 uIU/mL                               The test results show:      Labs still showed kidney dysfunction, overall similar when compared to previous. Would recommend to continue follow-up with nephrology     Calcium was elevated. Recommend further evaluation and follow-up with nephrology     A1c for 3-month sugar control assessment was normal at 4.8, would consider adjustment to diabetic medications     Cholesterol levels were fair. Triglycerides were slightly elevated. Recommend to continue present medications     Uric acid level was increased, may need to consider medications, would discuss with nephrology     Thyroid levels were normal     Vitamin D levels were normal     Urine analysis showed slight protein     Other electrolytes and liver functions were normal     Blood counts were normal      If you have any questions or concerns, please don't hesitate to call.     Sincerely,        Belem Curtis MD

## 2020-10-12 NOTE — TELEPHONE ENCOUNTER
Courtney Marquez informed. She would like a referral to a nephrologist in the [facility] area. Do you want to reduce his diabetic medication now or wait for his appt on 10/26? Result copy mailed.

## 2020-10-13 NOTE — TELEPHONE ENCOUNTER
Believe per chart review patient is already following with a nephrologist    Regards to his diabetes medication I guess we can wait until his upcoming appointment

## 2020-10-13 NOTE — TELEPHONE ENCOUNTER
Rk Howard called back and said she did just find ou that he has a kidney doctor. He actually just seen Dr Nilson Griffin a week or so ago. He lowered his water pill at that visit. She said discussing all this on his next appointment is ok. Please close.

## 2020-10-15 ENCOUNTER — ANTI-COAG VISIT (OUTPATIENT)
Dept: FAMILY MEDICINE CLINIC | Age: 75
End: 2020-10-15
Payer: MEDICARE

## 2020-10-15 LAB — INR BLD: 2.3

## 2020-10-15 PROCEDURE — 93793 ANTICOAG MGMT PT WARFARIN: CPT | Performed by: INTERNAL MEDICINE

## 2020-10-15 NOTE — PROGRESS NOTES
Previous INR: 2.20     Previous dose:  6mg (5mg +1mg tablets) on mon and Friday and 5mg all others                 Current INR: 2.30     Recommendation: continue 6mg (5mg +1mg tablets) on mon and Friday and 5mg all others        Next INR: 1 week     Jace Box  10/15/2020  12:57 PM

## 2020-10-22 ENCOUNTER — ANTI-COAG VISIT (OUTPATIENT)
Dept: FAMILY MEDICINE CLINIC | Age: 75
End: 2020-10-22
Payer: MEDICARE

## 2020-10-22 LAB — INR BLD: 2

## 2020-10-22 PROCEDURE — 93793 ANTICOAG MGMT PT WARFARIN: CPT | Performed by: INTERNAL MEDICINE

## 2020-10-22 NOTE — PROGRESS NOTES
Previous INR: 2.30     Previous dose:  6mg (5mg +1mg tablets) on mon and Friday and 5mg all others                 Current INR: 2.00     Recommendation: continue 6mg (5mg +1mg tablets) on mon and Friday and 5mg all others        Next INR: 1 week     Edmund Amaral  10/22/2020  12:37 PM

## 2020-10-26 ENCOUNTER — OFFICE VISIT (OUTPATIENT)
Dept: FAMILY MEDICINE CLINIC | Age: 75
End: 2020-10-26
Payer: MEDICARE

## 2020-10-26 VITALS
HEIGHT: 67 IN | TEMPERATURE: 97.3 F | WEIGHT: 236 LBS | DIASTOLIC BLOOD PRESSURE: 70 MMHG | SYSTOLIC BLOOD PRESSURE: 128 MMHG | OXYGEN SATURATION: 98 % | BODY MASS INDEX: 37.04 KG/M2 | HEART RATE: 70 BPM

## 2020-10-26 PROCEDURE — 99214 OFFICE O/P EST MOD 30 MIN: CPT | Performed by: INTERNAL MEDICINE

## 2020-10-26 RX ORDER — ALLOPURINOL 100 MG/1
TABLET ORAL
Qty: 38 TABLET | Refills: 1 | Status: SHIPPED
Start: 2020-10-26 | End: 2021-04-05 | Stop reason: SDUPTHER

## 2020-10-26 RX ORDER — WARFARIN SODIUM 5 MG/1
TABLET ORAL
Qty: 90 TABLET | Refills: 1 | Status: SHIPPED
Start: 2020-10-26 | End: 2021-05-27 | Stop reason: SDUPTHER

## 2020-10-26 RX ORDER — SIMVASTATIN 40 MG
40 TABLET ORAL DAILY
Qty: 90 TABLET | Refills: 1 | Status: SHIPPED
Start: 2020-10-26 | End: 2021-04-16 | Stop reason: SDUPTHER

## 2020-10-26 RX ORDER — WARFARIN SODIUM 1 MG/1
1 TABLET ORAL DAILY PRN
Qty: 30 TABLET | Refills: 5 | Status: SHIPPED
Start: 2020-10-26 | End: 2020-12-23

## 2020-10-26 ASSESSMENT — ENCOUNTER SYMPTOMS
NAUSEA: 0
SHORTNESS OF BREATH: 1
VOMITING: 0
BLOOD IN STOOL: 0
COUGH: 1
CHEST TIGHTNESS: 0
EYE PAIN: 0
RHINORRHEA: 1
ABDOMINAL PAIN: 0
DIARRHEA: 0
CONSTIPATION: 0
SORE THROAT: 0

## 2020-10-26 NOTE — PROGRESS NOTES
[facility]) Physicians   Internal Medicine     10/26/2020  Jorge Patricia : 1945 Sex: male  Age:73 y.o. Chief Complaint   Patient presents with    6 Month Follow-Up    Diabetes        HPI:   Patient presents to office for review and management of chronic medical conditions.    - States has diabetes. States follows with endocrinology. On glimepiride and tradjenta. No overt hypoglycemia. Follows with optho. On Statin. 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 States following with endocrinology and with [facility] clinic. Receiving Sandostatin monthly. Has had excessive vasoactive intestinal peptide secretion     - States has high cholesterol. Trying to watch diet. On simvastatin. No reported side effects    - States has High blood pressure. Not checking blood pressure at home. On HCTZ and sotalolol. Nephrology decreased hctz to 12.5mg     - States has Atrial fibrillation. On Sotalol and coumadin. Has pacemaker. Following with cardiology and EP. States decreased sotalol     - States has CAD. Stats s/p Sent. States cardiomyopathy. Following with cardiology and EP. S/p pacer for simus node dysfunction. Last follow up  () - no medication changes follow-up in 6 months    - States has COPD. States has had previous surgery for lobectomy for concern for mesothelioma but was negative. Currently following with pulmonary. On anoro. Also has nebulizer. - States has chronic kidney disease. Following with nephrology. Last follow up  (10/20) - decreased diuretic dose decreased hydrochlorothiazide to 5 days a week, skipping Tuesday and Saturday, follow-up in 3 months     - States has elevated uric acid. States on allopurinol.     - States has vitamin D def. On vitamin D 50k weekly. ROS:  Review of Systems   Constitutional: Negative for appetite change, chills, fever and unexpected weight change.    HENT: Positive for rhinorrhea. Negative for congestion and sore throat. Eyes: Negative for pain and visual disturbance. Respiratory: Positive for cough and shortness of breath. Negative for chest tightness. Cardiovascular: Negative for chest pain and palpitations. Gastrointestinal: Negative for abdominal pain, blood in stool, constipation, diarrhea, nausea and vomiting. Genitourinary: Negative for difficulty urinating, dysuria, hematuria, scrotal swelling, testicular pain and urgency. Musculoskeletal: Negative for arthralgias and gait problem. Skin: Negative for rash. Neurological: Negative for dizziness, syncope, weakness, light-headedness and headaches. Hematological: Negative for adenopathy. Does not bruise/bleed easily. Psychiatric/Behavioral: Negative for suicidal ideas. The patient is not nervous/anxious.           Current Outpatient Medications:     allopurinol (ZYLOPRIM) 100 MG tablet, TAKE 1 TABLET BY MOUTH [id number], Disp: 38 tablet, Rfl: 1    simvastatin (ZOCOR) 40 MG tablet, Take 1 tablet by mouth daily, Disp: 90 tablet, Rfl: 1    warfarin (COUMADIN) 1 MG tablet, Take 1 tablet by mouth daily as needed (ADDITIONAL DOSE), Disp: 30 tablet, Rfl: 5    warfarin (COUMADIN) 5 MG tablet, 1 po qd or as directed, Disp: 90 tablet, Rfl: 1    warfarin (COUMADIN) 1 MG tablet, Take 1 tablet by mouth daily, Disp: 30 tablet, Rfl: 3    hydrocortisone (CORTEF) 10 MG tablet, TAKE 1 1/2 TABLETS BY MOUTH EVERY MORNING AND 1 TABLET EVERY EVENING, Disp: 225 tablet, Rfl: 1    hydroCHLOROthiazide (HYDRODIURIL) 25 MG tablet, Take 1 tablet by mouth daily (Patient taking differently: Take 25 mg by mouth daily 1 po 5 days a week (hold on Tues and Sat)), Disp: 90 tablet, Rfl: 0    Cholecalciferol (VITAMIN D3) 1.25 MG (60591 UT) CAPS, Take 1 capsule by mouth once a week, Disp: , Rfl:     linagliptin (TRADJENTA) 5 MG tablet, Take 1 tablet by mouth daily Lot#[id number] Exp.7/2020, Disp: 28 tablet, Rfl: 0   umeclidinium-vilanterol (ANORO ELLIPTA) 62.5-25 MCG/INH AEPB inhaler, Inhale 1 puff into the lungs daily, Disp: , Rfl:     sotalol (BETAPACE) 160 MG tablet, Take 1 tablet by mouth 2 times daily (Patient taking differently: Take 160 mg by mouth daily ), Disp: 60 tablet, Rfl: 3    nitroGLYCERIN (NITROSTAT) 0.4 MG SL tablet, Place 0.4 mg under the tongue every 5 minutes as needed for Chest pain up to max of 3 total doses. If no relief after 1 dose, call 911., Disp: , Rfl:     albuterol (PROVENTIL) (5 MG/ML) 0.5% nebulizer solution, Take 0.5 mLs by nebulization every 6 hours as needed for Wheezing, Disp: 120 each, Rfl: 0    glimepiride (AMARYL) 2 MG tablet, Take 0.5 mg by mouth every morning (before breakfast) , Disp: , Rfl:     octreotide ACETATE (SANDOSTATIN LAR DEPOT) 30 MG injection, Inject 60 mg into the muscle once Every two weeks, Disp: , Rfl:     Glucose Blood (BLOOD GLUCOSE TEST STRIPS) STRP, Freestyle lite, Disp: 120 strip, Rfl: 0    Fingerstix Lancets MISC, , Disp: 100 each, Rfl: 2    Allergies   Allergen Reactions    Lisinopril      Other reaction(s):  Other: See Comments  Black out, nausea       Past Medical History:   Diagnosis Date    Atrial fibrillation ([address] 1/4/2014    CAD (coronary artery disease)     Cancer (HCC)     VIPoma    Carcinoid syndrome (HCC)     Chronic kidney disease     COPD (chronic obstructive pulmonary disease) ([address]     Diabetes mellitus ([address]     Dizziness 11/1/2018    Hypertension     Idiopathic chronic gout of multiple sites without tophus 9/5/2019    Meniere disease     MI (myocardial infarction) ([address]     x3    Mixed hyperlipidemia 9/5/2019    Obesity     Pacemaker     Type 2 diabetes mellitus without complication ([address]        Past Surgical History:   Procedure Laterality Date    CARDIAC SURGERY      stents/pacemaker    COLONOSCOPY      CORONARY ANGIOPLASTY       CORONARY ANGIOPLASTY WITH STENT PLACEMENT      HERNIA REPAIR      LUNG SURGERY Right 2014 Dr. Madsen Henderson for \"infection around lung\"    PACEMAKER INSERTION      TUMOR REMOVAL      tumors removed from breast ,arms     UPPER GASTROINTESTINAL ENDOSCOPY         Family History   Adopted: Yes   Problem Relation Age of Onset    Other Mother         Pt. is adopted    Other Father         Pt. is adopted. Social History     Socioeconomic History    Marital status: Single     Spouse name: None    Number of children: 3    Years of education: 12    Highest education level: None   Occupational History    Occupation: [employer] worker     Comment: retired   Social Needs    Financial resource strain: Not hard at all   Suzie-Ximena insecurity     Worry: Never true     Inability: Never true    Transportation needs     Medical: No     Non-medical: No   Tobacco Use    Smoking status: Former Smoker     Packs/day: 2.00     Years: 40.00     Pack years: 80.00     Last attempt to quit: 2000     Years since quittin.1    Smokeless tobacco: Never Used   Substance and Sexual Activity    Alcohol use: No    Drug use: No    Sexual activity: Not Currently     Partners: Female   Lifestyle    Physical activity     Days per week: 3 days     Minutes per session: 20 min    Stress: Only a little   Relationships    Social connections     Talks on phone: More than three times a week     Gets together: More than three times a week     Attends Confucianist service: 1 to 4 times per year     Active member of club or organization: No     Attends meetings of clubs or organizations: Never     Relationship status:      Intimate partner violence     Fear of current or ex partner: No     Emotionally abused: No     Physically abused: No     Forced sexual activity: No   Other Topics Concern    None   Social History Narrative    None        Vitals:    10/26/20 0811   BP: 128/70   Pulse: 70   Temp: 97.3 °F (36.3 °C)   SpO2: 98%   Weight: 236 lb (107 kg)   Height: 5' 7\" (1.702 m)       Exam:  Physical Exam  Constitutional:       Appearance: He is well-developed. HENT:      Head: Normocephalic and atraumatic. Right Ear: External ear normal.      Left Ear: External ear normal.   Eyes:      Pupils: Pupils are equal, round, and reactive to light. Neck:      Musculoskeletal: Normal range of motion and neck supple. Thyroid: No thyromegaly. Cardiovascular:      Rate and Rhythm: Normal rate and regular rhythm. Heart sounds: Normal heart sounds. No murmur. Pulmonary:      Effort: Pulmonary effort is normal.      Breath sounds: Decreased breath sounds and rhonchi present. No wheezing or rales. Abdominal:      General: Bowel sounds are normal.      Palpations: Abdomen is soft. Tenderness: There is no abdominal tenderness. There is no guarding or rebound. Musculoskeletal: Normal range of motion. Lymphadenopathy:      Cervical: No cervical adenopathy. Skin:     General: Skin is warm and dry. Neurological:      Mental Status: He is alert and oriented to person, place, and time. Cranial Nerves: No cranial nerve deficit. Psychiatric:         Judgment: Judgment normal.         Assessment and Plan:    Robert Quintero was seen today for atrial fibrillation, hyperlipidemia and health maintenance.     Diagnoses and all orders for this visit:    Type 2 diabetes mellitus without complication, without long-term current use of insulin ([address]  - Continue present treatment   - watch diet   - monitor blood glucose at home   - following with endocrinology   - on glimepiride   - on tradjenta   - follow A1c - last was 4.8 (10/2020)   - discussed decreasing medications     Not on Aspirin - coumadin   Not on A/ARB - CKD   On statin   Follows with optho     Carcinoid (except of appendix) ([address]  - Continue present treatment  - following with endocrinology and endocrinology surgery (dr. Payton Boles) at [facility]  - States excessive Vasoactive intestinal peptide excretion   - On sandostatin monthly   - on cortef   - Stable Stage 3 chronic kidney disease ([facility]  - Continue present treatment   - following with nephrology   - follow labs     Chronic atrial fibrillation ([facility]  - Continue present treatment   - s/p pacer  - following with cardiology and EP   - on sotalol - decreased dose (12/2019)   - on coumadin - INR today 2.5 - continue current dose   - stable     Chronic obstructive pulmonary disease, unspecified COPD type ([facility]  - Continue present treatment   - following with pulmonary   - on anoro - could not afford trelegy   - has home nebulizer with albuterol   - has chronic cough   - has tried flonase or nasacort  - has tried mult antihistamine     Coronary artery disease involving native coronary artery of native heart without angina pectoris  - Continue present treatment   - s/p stent  - following with cardiology and EP   - on sotalol   - on simvastatin   - no aspirin due to coumadin   - stable     Essential hypertension  - Continue present treatment   - watch diet   - on hctz - nephrology decreased to 12.5mg   - on sotalol for afib   - stable     Mixed hyperlipidemia  - Continue present treatment   - watch diet   - on simvastatin  - follow labs     Idiopathic chronic gout of multiple sites without tophus  - continue present treatment  - watch diet   - on allopurinol - increase to daily   - stable     Hyperparathyroidism ([facility]  - Possible due to CKD     Vitamin D deficiency  - Continue present treatment  - nephrology added 50k weekly   - follow labs     Chronic pain of both knees  - check xray     Class 2 obesity due to excess calories without serious comorbidity with body mass index (BMI) of 36.0 to 36.9 in adult  - discussed diet and exercise     Return in about 6 months (around 4/26/2021) for check up and review.     Orders Placed This Encounter   Procedures    CBC Auto Differential     Standing Status:   Future     Standing Expiration Date:   10/26/2021    Comprehensive Metabolic Panel     Standing Status:   Future Standing Expiration Date:   10/26/2021    TSH without Reflex     Standing Status:   Future     Standing Expiration Date:   10/26/2021    Uric Acid     Standing Status:   Future     Standing Expiration Date:   10/26/2021    Urinalysis     Standing Status:   Future     Standing Expiration Date:   10/26/2021    Hemoglobin A1C     Standing Status:   Future     Standing Expiration Date:   10/26/2021    Lipid, Fasting     Standing Status:   Future     Standing Expiration Date:   10/26/2021    Magnesium     Standing Status:   Future     Standing Expiration Date:   10/26/2021    Vitamin D 25 Hydroxy     Standing Status:   Future     Standing Expiration Date:   10/26/2021    Microalbumin, Ur     Standing Status:   Future     Standing Expiration Date:   10/26/2021     Requested Prescriptions     Signed Prescriptions Disp Refills    allopurinol (ZYLOPRIM) 100 MG tablet 38 tablet 1     Sig: TAKE 1 TABLET BY MOUTH [id number]    simvastatin (ZOCOR) 40 MG tablet 90 tablet 1     Sig: Take 1 tablet by mouth daily    warfarin (COUMADIN) 1 MG tablet 30 tablet 5     Sig: Take 1 tablet by mouth daily as needed (ADDITIONAL DOSE)    warfarin (COUMADIN) 5 MG tablet 90 tablet 1     Si po qd or as directed        Alfonso Bhandari MD  10/26/2020  9:34 AM

## 2020-10-29 ENCOUNTER — ANTI-COAG VISIT (OUTPATIENT)
Dept: FAMILY MEDICINE CLINIC | Age: 75
End: 2020-10-29
Payer: MEDICARE

## 2020-10-29 LAB — INR BLD: 2.3

## 2020-10-29 PROCEDURE — 93793 ANTICOAG MGMT PT WARFARIN: CPT | Performed by: INTERNAL MEDICINE

## 2020-10-29 NOTE — PROGRESS NOTES
Previous INR: 2.00     Previous dose:  6mg (5mg +1mg tablets) on mon and Friday and 5mg all others                 Current INR: 2.30     Recommendation: continue 6mg (5mg +1mg tablets) on mon and Friday and 5mg all others        Next INR: 1 week     Belem Curtis  10/29/2020  4:09 PM

## 2020-11-05 ENCOUNTER — ANTI-COAG VISIT (OUTPATIENT)
Dept: FAMILY MEDICINE CLINIC | Age: 75
End: 2020-11-05
Payer: MEDICARE

## 2020-11-05 LAB — INR BLD: 2.2

## 2020-11-05 PROCEDURE — 93793 ANTICOAG MGMT PT WARFARIN: CPT | Performed by: INTERNAL MEDICINE

## 2020-11-05 NOTE — PROGRESS NOTES
Previous INR: 2.30     Previous dose:  6mg (5mg +1mg tablets) on mon and Friday and 5mg all others                 Current INR: 2.20     Recommendation: continue 6mg (5mg +1mg tablets) on mon and Friday and 5mg all others        Next INR: 1 week     Rimma Prince  11/5/2020  12:11 PM

## 2020-11-12 ENCOUNTER — ANTI-COAG VISIT (OUTPATIENT)
Dept: FAMILY MEDICINE CLINIC | Age: 75
End: 2020-11-12
Payer: MEDICARE

## 2020-11-12 LAB — INR BLD: 2.6

## 2020-11-12 PROCEDURE — 93793 ANTICOAG MGMT PT WARFARIN: CPT | Performed by: INTERNAL MEDICINE

## 2020-11-12 NOTE — PROGRESS NOTES
Previous INR: 2.20     Previous dose:  6mg (5mg +1mg tablets) on mon and Friday and 5mg all others                 Current INR: 2.60     Recommendation: continue 6mg (5mg +1mg tablets) on mon and Friday and 5mg all others        Next INR: 1 week     Antwon Giles  11/12/2020  12:06 PM

## 2020-11-19 ENCOUNTER — ANTI-COAG VISIT (OUTPATIENT)
Dept: FAMILY MEDICINE CLINIC | Age: 75
End: 2020-11-19
Payer: MEDICARE

## 2020-11-19 LAB — INR BLD: 2.6

## 2020-11-19 PROCEDURE — 93793 ANTICOAG MGMT PT WARFARIN: CPT | Performed by: INTERNAL MEDICINE

## 2020-11-19 NOTE — PROGRESS NOTES
Previous INR: 2.60     Previous dose:  6mg (5mg +1mg tablets) on mon and Friday and 5mg all others                 Current INR: 2.60     Recommendation: continue 6mg (5mg +1mg tablets) on mon and Friday and 5mg all others        Next INR: 1 week     Sunil Rocha  11/19/2020  5:33 PM

## 2020-11-24 ENCOUNTER — ANTI-COAG VISIT (OUTPATIENT)
Dept: FAMILY MEDICINE CLINIC | Age: 75
End: 2020-11-24
Payer: MEDICARE

## 2020-11-24 LAB — INR BLD: 2.6

## 2020-11-24 PROCEDURE — 93793 ANTICOAG MGMT PT WARFARIN: CPT | Performed by: INTERNAL MEDICINE

## 2020-12-03 ENCOUNTER — ANTI-COAG VISIT (OUTPATIENT)
Dept: FAMILY MEDICINE CLINIC | Age: 75
End: 2020-12-03
Payer: MEDICARE

## 2020-12-03 LAB — INR BLD: 3.5

## 2020-12-03 PROCEDURE — 93793 ANTICOAG MGMT PT WARFARIN: CPT | Performed by: INTERNAL MEDICINE

## 2020-12-03 NOTE — PROGRESS NOTES
Previous INR: 2.60     Previous dose:  6mg (5mg +1mg tablets) on mon and Friday and 5mg all others                 Current INR: 3.50     Recommendation: hold x 1 then continue 6mg (5mg +1mg tablets) on mon and Friday and 5mg all others        Next INR: 1 week     Yuko Rothman  12/3/2020  1:02 PM

## 2020-12-10 ENCOUNTER — ANTI-COAG VISIT (OUTPATIENT)
Dept: FAMILY MEDICINE CLINIC | Age: 75
End: 2020-12-10
Payer: MEDICARE

## 2020-12-10 LAB — INR BLD: 1.8

## 2020-12-10 PROCEDURE — 93793 ANTICOAG MGMT PT WARFARIN: CPT | Performed by: INTERNAL MEDICINE

## 2020-12-10 NOTE — PROGRESS NOTES
Previous INR: 3.50     Previous dose:  held x 1 then continue 6mg (5mg +1mg tablets) on mon and Friday and 5mg all others              Current INR: 1.80     Recommendation: 6mg x 1 (12/10/20), then continue 6mg (5mg +1mg tablets) on mon and Friday and 5mg all others        Next INR: 1 week     Warriormine Pain  12/10/2020  10:50 AM

## 2020-12-17 ENCOUNTER — ANTI-COAG VISIT (OUTPATIENT)
Dept: FAMILY MEDICINE CLINIC | Age: 75
End: 2020-12-17
Payer: MEDICARE

## 2020-12-17 LAB — INR BLD: 2.8

## 2020-12-17 PROCEDURE — 93793 ANTICOAG MGMT PT WARFARIN: CPT | Performed by: INTERNAL MEDICINE

## 2020-12-17 NOTE — PROGRESS NOTES
Previous INR: 1.80     Previous dose:  6mg x 1 (12/10/20), then continued 6mg (5mg +1mg tablets) on mon and Friday and 5mg all others                 Current INR: 2.80     Recommendation: continue 6mg (5mg +1mg tablets) on mon and Friday and 5mg all others        Next INR: 1 week     Belem Curtis  12/17/2020  12:36 PM

## 2020-12-23 ENCOUNTER — ANTI-COAG VISIT (OUTPATIENT)
Dept: FAMILY MEDICINE CLINIC | Age: 75
End: 2020-12-23
Payer: MEDICARE

## 2020-12-23 LAB — INR BLD: 2.7

## 2020-12-23 PROCEDURE — 93793 ANTICOAG MGMT PT WARFARIN: CPT | Performed by: INTERNAL MEDICINE

## 2020-12-23 NOTE — PROGRESS NOTES
Previous INR: 2.80     Previous dose:  6mg (5mg +1mg tablets) on mon and Friday and 5mg all others                 Current INR: 2.70     Recommendation: continue 6mg (5mg +1mg tablets) on mon and Friday and 5mg all others        Next INR: 1 week     Sara Ahumada  12/23/2020  1:35 PM

## 2020-12-28 ENCOUNTER — TELEPHONE (OUTPATIENT)
Dept: ENT CLINIC | Age: 75
End: 2020-12-28

## 2020-12-28 NOTE — TELEPHONE ENCOUNTER
Patient mailed a letter into the [facility] office stating he canceled his appointment 12/21/2020 due to his hearing getting a lot worse since last December and has tried hearing aids but nothing has seemed to help hearing. Patient feels the only way for hearing to improve is surgery however, he is on a fixed income and can not afford the surgery.

## 2020-12-30 ENCOUNTER — ANTI-COAG VISIT (OUTPATIENT)
Dept: FAMILY MEDICINE CLINIC | Age: 75
End: 2020-12-30
Payer: MEDICARE

## 2020-12-30 LAB — INR BLD: 3.1

## 2020-12-30 PROCEDURE — 93793 ANTICOAG MGMT PT WARFARIN: CPT | Performed by: INTERNAL MEDICINE

## 2020-12-30 NOTE — PROGRESS NOTES
Previous INR: 2.70     Previous dose:  6mg (5mg +1mg tablets) on mon and Friday and 5mg all others                 Current INR: 3.10     Recommendation: 2.5mg x 1 today (12/30/20) then continue 6mg (5mg +1mg tablets) on mon and Friday and 5mg all others        Next INR: 1 week     Carenna Jump  12/30/2020  12:42 PM

## 2021-01-04 ENCOUNTER — HOSPITAL ENCOUNTER (OUTPATIENT)
Age: 76
Discharge: HOME | End: 2021-01-04
Payer: MEDICARE

## 2021-01-04 LAB
ALBUMIN SERPL-MCNC: 4.2 G/DL (ref 3.5–5.2)
ALP BLD-CCNC: 64 U/L (ref 40–129)
ALT SERPL-CCNC: 17 U/L (ref 0–40)
ANION GAP SERPL CALCULATED.3IONS-SCNC: 6 MMOL/L (ref 7–16)
AST SERPL-CCNC: 23 U/L (ref 0–39)
BASOPHILS ABSOLUTE: 0.06 E9/L (ref 0–0.2)
BASOPHILS RELATIVE PERCENT: 0.9 % (ref 0–2)
BILIRUB SERPL-MCNC: 0.7 MG/DL (ref 0–1.2)
BUN BLDV-MCNC: 28 MG/DL (ref 8–23)
CALCIUM SERPL-MCNC: 10.3 MG/DL (ref 8.6–10.2)
CHLORIDE BLD-SCNC: 105 MMOL/L (ref 98–107)
CO2: 28 MMOL/L (ref 22–29)
CREAT SERPL-MCNC: 1.6 MG/DL (ref 0.7–1.2)
CREATININE URINE: 95 MG/DL (ref 40–278)
EOSINOPHILS ABSOLUTE: 0.16 E9/L (ref 0.05–0.5)
EOSINOPHILS RELATIVE PERCENT: 2.4 % (ref 0–6)
GFR AFRICAN AMERICAN: 51
GFR NON-AFRICAN AMERICAN: 42 ML/MIN/1.73
GLUCOSE BLD-MCNC: 131 MG/DL (ref 74–99)
HCT VFR BLD CALC: 41.9 % (ref 37–54)
HEMOGLOBIN: 14.1 G/DL (ref 12.5–16.5)
IMMATURE GRANULOCYTES #: 0.04 E9/L
IMMATURE GRANULOCYTES %: 0.6 % (ref 0–5)
LYMPHOCYTES ABSOLUTE: 1.72 E9/L (ref 1.5–4)
LYMPHOCYTES RELATIVE PERCENT: 25.6 % (ref 20–42)
MAGNESIUM: 2 MG/DL (ref 1.6–2.6)
MCH RBC QN AUTO: 32.4 PG (ref 26–35)
MCHC RBC AUTO-ENTMCNC: 33.7 % (ref 32–34.5)
MCV RBC AUTO: 96.3 FL (ref 80–99.9)
MONOCYTES ABSOLUTE: 0.67 E9/L (ref 0.1–0.95)
MONOCYTES RELATIVE PERCENT: 10 % (ref 2–12)
NEUTROPHILS ABSOLUTE: 4.06 E9/L (ref 1.8–7.3)
NEUTROPHILS RELATIVE PERCENT: 60.5 % (ref 43–80)
PDW BLD-RTO: 13.9 FL (ref 11.5–15)
PHOSPHORUS: 3 MG/DL (ref 2.5–4.5)
PLATELET # BLD: 217 E9/L (ref 130–450)
PMV BLD AUTO: 8.8 FL (ref 7–12)
POTASSIUM SERPL-SCNC: 4.6 MMOL/L (ref 3.5–5)
PROTEIN PROTEIN: 11 MG/DL (ref 0–12)
PROTEIN/CREAT RATIO: 0.1
PROTEIN/CREAT RATIO: 0.1 (ref 0–0.2)
RBC # BLD: 4.35 E12/L (ref 3.8–5.8)
SODIUM BLD-SCNC: 139 MMOL/L (ref 132–146)
TOTAL PROTEIN: 7 G/DL (ref 6.4–8.3)
URIC ACID, SERUM: 10.7 MG/DL (ref 3.4–7)
VITAMIN D 25-HYDROXY: 34 NG/ML (ref 30–100)
WBC # BLD: 6.7 E9/L (ref 4.5–11.5)

## 2021-01-04 PROCEDURE — 80053 COMPREHEN METABOLIC PANEL: CPT

## 2021-01-04 PROCEDURE — 84100 ASSAY OF PHOSPHORUS: CPT

## 2021-01-04 PROCEDURE — 85025 COMPLETE CBC W/AUTO DIFF WBC: CPT

## 2021-01-04 PROCEDURE — 84156 ASSAY OF PROTEIN URINE: CPT

## 2021-01-04 PROCEDURE — 82306 VITAMIN D 25 HYDROXY: CPT

## 2021-01-04 PROCEDURE — 36415 COLL VENOUS BLD VENIPUNCTURE: CPT

## 2021-01-04 PROCEDURE — 84550 ASSAY OF BLOOD/URIC ACID: CPT

## 2021-01-04 PROCEDURE — 83735 ASSAY OF MAGNESIUM: CPT

## 2021-01-04 PROCEDURE — 82570 ASSAY OF URINE CREATININE: CPT

## 2021-01-07 ENCOUNTER — ANTI-COAG VISIT (OUTPATIENT)
Dept: FAMILY MEDICINE CLINIC | Age: 76
End: 2021-01-07
Payer: MEDICARE

## 2021-01-07 DIAGNOSIS — I48.20 CHRONIC ATRIAL FIBRILLATION (HCC): ICD-10-CM

## 2021-01-07 LAB — INR BLD: 2

## 2021-01-07 PROCEDURE — 93793 ANTICOAG MGMT PT WARFARIN: CPT | Performed by: INTERNAL MEDICINE

## 2021-01-07 NOTE — PROGRESS NOTES
Previous INR: 3.10     Previous dose:  2.5mg x 1 today (12/30/20) then continued 6mg (5mg +1mg tablets) on mon and Friday and 5mg all others                  Current INR: 2.00     Recommendation: 6mg (5mg +1mg tablets) on mon and Friday and 5mg all others    Next INR: 1 week     Mily Wilhelm  1/7/2021  2:06 PM

## 2021-01-08 ENCOUNTER — HOSPITAL ENCOUNTER (OUTPATIENT)
Age: 76
Discharge: HOME | End: 2021-01-08
Payer: MEDICARE

## 2021-01-08 LAB
ALBUMIN SERPL-MCNC: 4.1 G/DL (ref 3.5–5.2)
ALP BLD-CCNC: 63 U/L (ref 40–129)
ALT SERPL-CCNC: 17 U/L (ref 0–40)
ANION GAP SERPL CALCULATED.3IONS-SCNC: 10 MMOL/L (ref 7–16)
AST SERPL-CCNC: 24 U/L (ref 0–39)
BILIRUB SERPL-MCNC: 0.8 MG/DL (ref 0–1.2)
BUN BLDV-MCNC: 26 MG/DL (ref 8–23)
CALCIUM SERPL-MCNC: 10.1 MG/DL (ref 8.6–10.2)
CHLORIDE BLD-SCNC: 106 MMOL/L (ref 98–107)
CHOLESTEROL, TOTAL: 175 MG/DL (ref 0–199)
CO2: 26 MMOL/L (ref 22–29)
CREAT SERPL-MCNC: 1.6 MG/DL (ref 0.7–1.2)
GFR AFRICAN AMERICAN: 51
GFR NON-AFRICAN AMERICAN: 42 ML/MIN/1.73
GLUCOSE BLD-MCNC: 116 MG/DL (ref 74–99)
HDLC SERPL-MCNC: 57 MG/DL
LDL CHOLESTEROL CALCULATED: 87 MG/DL (ref 0–99)
PARATHYROID HORMONE INTACT: 132 PG/ML (ref 15–65)
POTASSIUM SERPL-SCNC: 4.3 MMOL/L (ref 3.5–5)
SODIUM BLD-SCNC: 142 MMOL/L (ref 132–146)
TOTAL PROTEIN: 7.1 G/DL (ref 6.4–8.3)
TRIGL SERPL-MCNC: 155 MG/DL (ref 0–149)
VLDLC SERPL CALC-MCNC: 31 MG/DL

## 2021-01-08 PROCEDURE — 36415 COLL VENOUS BLD VENIPUNCTURE: CPT

## 2021-01-08 PROCEDURE — 80061 LIPID PANEL: CPT

## 2021-01-08 PROCEDURE — 80053 COMPREHEN METABOLIC PANEL: CPT

## 2021-01-08 PROCEDURE — 84586 ASSAY OF VIP: CPT

## 2021-01-08 PROCEDURE — 83970 ASSAY OF PARATHORMONE: CPT

## 2021-01-14 ENCOUNTER — ANTI-COAG VISIT (OUTPATIENT)
Dept: FAMILY MEDICINE CLINIC | Age: 76
End: 2021-01-14
Payer: MEDICARE

## 2021-01-14 DIAGNOSIS — I48.20 CHRONIC ATRIAL FIBRILLATION (HCC): ICD-10-CM

## 2021-01-14 LAB — INR BLD: 2.2

## 2021-01-14 PROCEDURE — 93793 ANTICOAG MGMT PT WARFARIN: CPT | Performed by: INTERNAL MEDICINE

## 2021-01-14 NOTE — PROGRESS NOTES
Previous INR: 2.00     Previous dose:  2.5mg x 1 today (12/30/20) then continued 6mg (5mg +1mg tablets) on mon and Friday and 5mg all others                  Current INR: 2.20     Recommendation: 6mg (5mg +1mg tablets) on mon and Friday and 5mg all others    Next INR: 1 week     Garry Hearn  1/14/2021  3:00 PM

## 2021-01-18 LAB
Lab: NORMAL
REPORT: NORMAL
THIS TEST SENT TO: NORMAL

## 2021-01-20 LAB
AVERAGE GLUCOSE: NORMAL
HBA1C MFR BLD: 5.3 %

## 2021-01-21 ENCOUNTER — ANTI-COAG VISIT (OUTPATIENT)
Dept: FAMILY MEDICINE CLINIC | Age: 76
End: 2021-01-21
Payer: MEDICARE

## 2021-01-21 DIAGNOSIS — I48.20 CHRONIC ATRIAL FIBRILLATION (HCC): ICD-10-CM

## 2021-01-21 LAB — INR BLD: 2.3

## 2021-01-21 PROCEDURE — 93793 ANTICOAG MGMT PT WARFARIN: CPT | Performed by: INTERNAL MEDICINE

## 2021-01-21 NOTE — PROGRESS NOTES
Previous INR: 2.20     Previous dose:  6mg (5mg +1mg tablets) on mon and Friday and 5mg all others                  Current INR: 2.30     Recommendation: continue 6mg (5mg +1mg tablets) on mon and Friday and 5mg all others    Next INR: 1 week     Kalpana Roberto  1/21/2021  4:00 PM

## 2021-01-28 ENCOUNTER — ANTI-COAG VISIT (OUTPATIENT)
Dept: FAMILY MEDICINE CLINIC | Age: 76
End: 2021-01-28
Payer: MEDICARE

## 2021-01-28 DIAGNOSIS — I48.20 CHRONIC ATRIAL FIBRILLATION (HCC): ICD-10-CM

## 2021-01-28 LAB — INR BLD: 2.5

## 2021-01-28 PROCEDURE — 93793 ANTICOAG MGMT PT WARFARIN: CPT | Performed by: INTERNAL MEDICINE

## 2021-01-28 NOTE — PROGRESS NOTES
Jhoan doherty
Previous INR: 2.30     Previous dose:  6mg (5mg +1mg tablets) on mon and Friday and 5mg all others                  Current INR: 2.50     Recommendation: continue 6mg (5mg +1mg tablets) on mon and Friday and 5mg all others    Next INR: 1 week     Winter Beam  1/28/2021  11:32 AM
no

## 2021-02-04 ENCOUNTER — ANTI-COAG VISIT (OUTPATIENT)
Dept: FAMILY MEDICINE CLINIC | Age: 76
End: 2021-02-04
Payer: MEDICARE

## 2021-02-04 DIAGNOSIS — I48.20 CHRONIC ATRIAL FIBRILLATION (HCC): ICD-10-CM

## 2021-02-04 LAB — INR BLD: 2.2

## 2021-02-04 PROCEDURE — 93793 ANTICOAG MGMT PT WARFARIN: CPT | Performed by: INTERNAL MEDICINE

## 2021-02-04 NOTE — PROGRESS NOTES
Previous INR: 2.50     Previous dose:  6mg (5mg +1mg tablets) on mon and Friday and 5mg all others                  Current INR: 2.20     Recommendation: continue 6mg (5mg +1mg tablets) on mon and Friday and 5mg all others    Next INR: 1 week     JuanCollege Tonight Drilling  2/4/2021  10:52 AM

## 2021-02-11 ENCOUNTER — ANTI-COAG VISIT (OUTPATIENT)
Dept: FAMILY MEDICINE CLINIC | Age: 76
End: 2021-02-11
Payer: MEDICARE

## 2021-02-11 DIAGNOSIS — I48.20 CHRONIC ATRIAL FIBRILLATION (HCC): ICD-10-CM

## 2021-02-11 LAB — INR BLD: 2.1

## 2021-02-11 PROCEDURE — 93793 ANTICOAG MGMT PT WARFARIN: CPT | Performed by: INTERNAL MEDICINE

## 2021-02-18 ENCOUNTER — ANTI-COAG VISIT (OUTPATIENT)
Dept: FAMILY MEDICINE CLINIC | Age: 76
End: 2021-02-18
Payer: MEDICARE

## 2021-02-18 DIAGNOSIS — I48.20 CHRONIC ATRIAL FIBRILLATION (HCC): ICD-10-CM

## 2021-02-18 LAB — INR BLD: 2.8

## 2021-02-18 PROCEDURE — 93793 ANTICOAG MGMT PT WARFARIN: CPT | Performed by: INTERNAL MEDICINE

## 2021-02-25 ENCOUNTER — ANTI-COAG VISIT (OUTPATIENT)
Dept: FAMILY MEDICINE CLINIC | Age: 76
End: 2021-02-25
Payer: MEDICARE

## 2021-02-25 DIAGNOSIS — I48.20 CHRONIC ATRIAL FIBRILLATION (HCC): ICD-10-CM

## 2021-02-25 LAB — INR BLD: 3.1

## 2021-02-25 PROCEDURE — 93793 ANTICOAG MGMT PT WARFARIN: CPT | Performed by: INTERNAL MEDICINE

## 2021-03-04 ENCOUNTER — ANTI-COAG VISIT (OUTPATIENT)
Dept: FAMILY MEDICINE CLINIC | Age: 76
End: 2021-03-04
Payer: MEDICARE

## 2021-03-04 DIAGNOSIS — I48.20 CHRONIC ATRIAL FIBRILLATION (HCC): ICD-10-CM

## 2021-03-04 LAB — INR BLD: 2.4

## 2021-03-04 PROCEDURE — 93793 ANTICOAG MGMT PT WARFARIN: CPT | Performed by: INTERNAL MEDICINE

## 2021-03-04 NOTE — PROGRESS NOTES
Previous INR: 3.10     Previous dose:   2.5mg x 1 today then continue 6mg (5mg +1mg tablets) on mon and Friday and 5mg all others               Current INR: 2.40     Recommendation: continue 6mg (5mg +1mg tablets) on mon and Friday and 5mg all others    Next INR: 1 week     Meli Murphy  3/4/2021  2:00 PM

## 2021-03-11 ENCOUNTER — ANTI-COAG VISIT (OUTPATIENT)
Dept: FAMILY MEDICINE CLINIC | Age: 76
End: 2021-03-11
Payer: MEDICARE

## 2021-03-11 DIAGNOSIS — I48.20 CHRONIC ATRIAL FIBRILLATION (HCC): ICD-10-CM

## 2021-03-11 LAB — INR BLD: 2.5

## 2021-03-11 PROCEDURE — 93793 ANTICOAG MGMT PT WARFARIN: CPT | Performed by: INTERNAL MEDICINE

## 2021-03-11 NOTE — PROGRESS NOTES
Previous INR: 2.40     Previous dose:   6mg (5mg +1mg tablets) on mon and Friday and 5mg all others               Current INR: 2.50     Recommendation: continue 6mg (5mg +1mg tablets) on mon and Friday and 5mg all others    Next INR: 1 week     Onesimo Becker  3/11/2021  12:09 PM

## 2021-03-18 ENCOUNTER — ANTI-COAG VISIT (OUTPATIENT)
Dept: FAMILY MEDICINE CLINIC | Age: 76
End: 2021-03-18
Payer: MEDICARE

## 2021-03-18 DIAGNOSIS — I48.20 CHRONIC ATRIAL FIBRILLATION (HCC): ICD-10-CM

## 2021-03-18 LAB — INR BLD: 2.5

## 2021-03-18 PROCEDURE — 93793 ANTICOAG MGMT PT WARFARIN: CPT | Performed by: INTERNAL MEDICINE

## 2021-03-18 NOTE — PROGRESS NOTES
Previous INR: 2.50     Previous dose:   6mg (5mg +1mg tablets) on mon and Friday and 5mg all others               Current INR: 2.50     Recommendation: continue 6mg (5mg +1mg tablets) on mon and Friday and 5mg all others    Next INR: 1 week     Rey Rudolph  3/18/2021  12:48 PM

## 2021-03-25 ENCOUNTER — ANTI-COAG VISIT (OUTPATIENT)
Dept: FAMILY MEDICINE CLINIC | Age: 76
End: 2021-03-25
Payer: MEDICARE

## 2021-03-25 DIAGNOSIS — I48.20 CHRONIC ATRIAL FIBRILLATION (HCC): ICD-10-CM

## 2021-03-25 LAB — INR BLD: 2.5

## 2021-03-25 PROCEDURE — 93793 ANTICOAG MGMT PT WARFARIN: CPT | Performed by: INTERNAL MEDICINE

## 2021-03-25 NOTE — PROGRESS NOTES
Previous INR: 2.50     Previous dose:   6mg (5mg +1mg tablets) on mon and Friday and 5mg all others               Current INR: 2.50     Recommendation: continue 6mg (5mg +1mg tablets) on mon and Friday and 5mg all others    Next INR: 1 week     Rylee Rees  3/25/2021  11:59 AM

## 2021-04-01 ENCOUNTER — HOSPITAL ENCOUNTER (OUTPATIENT)
Age: 76
Discharge: HOME | End: 2021-04-01
Payer: MEDICARE

## 2021-04-01 ENCOUNTER — ANTI-COAG VISIT (OUTPATIENT)
Dept: FAMILY MEDICINE CLINIC | Age: 76
End: 2021-04-01
Payer: MEDICARE

## 2021-04-01 DIAGNOSIS — I48.20 CHRONIC ATRIAL FIBRILLATION (HCC): ICD-10-CM

## 2021-04-01 LAB
ALBUMIN SERPL-MCNC: 4.4 G/DL (ref 3.5–5.2)
ALP BLD-CCNC: 62 U/L (ref 40–129)
ALT SERPL-CCNC: 17 U/L (ref 0–40)
ANION GAP SERPL CALCULATED.3IONS-SCNC: 10 MMOL/L (ref 7–16)
AST SERPL-CCNC: 25 U/L (ref 0–39)
BASOPHILS ABSOLUTE: 0.05 E9/L (ref 0–0.2)
BASOPHILS RELATIVE PERCENT: 0.8 % (ref 0–2)
BILIRUB SERPL-MCNC: 0.8 MG/DL (ref 0–1.2)
BUN BLDV-MCNC: 31 MG/DL (ref 8–23)
CALCIUM SERPL-MCNC: 10.7 MG/DL (ref 8.6–10.2)
CHLORIDE BLD-SCNC: 105 MMOL/L (ref 98–107)
CO2: 27 MMOL/L (ref 22–29)
CREAT SERPL-MCNC: 1.8 MG/DL (ref 0.7–1.2)
CREATININE URINE: 225 MG/DL (ref 40–278)
EOSINOPHILS ABSOLUTE: 0.09 E9/L (ref 0.05–0.5)
EOSINOPHILS RELATIVE PERCENT: 1.5 % (ref 0–6)
GFR AFRICAN AMERICAN: 44
GFR NON-AFRICAN AMERICAN: 37 ML/MIN/1.73
GLUCOSE BLD-MCNC: 144 MG/DL (ref 74–99)
HCT VFR BLD CALC: 42.2 % (ref 37–54)
HEMOGLOBIN: 14.2 G/DL (ref 12.5–16.5)
IMMATURE GRANULOCYTES #: 0.04 E9/L
IMMATURE GRANULOCYTES %: 0.7 % (ref 0–5)
INR BLD: 2.2
LYMPHOCYTES ABSOLUTE: 1.45 E9/L (ref 1.5–4)
LYMPHOCYTES RELATIVE PERCENT: 23.8 % (ref 20–42)
MAGNESIUM: 2.2 MG/DL (ref 1.6–2.6)
MCH RBC QN AUTO: 31.8 PG (ref 26–35)
MCHC RBC AUTO-ENTMCNC: 33.6 % (ref 32–34.5)
MCV RBC AUTO: 94.4 FL (ref 80–99.9)
MONOCYTES ABSOLUTE: 0.54 E9/L (ref 0.1–0.95)
MONOCYTES RELATIVE PERCENT: 8.9 % (ref 2–12)
NEUTROPHILS ABSOLUTE: 3.92 E9/L (ref 1.8–7.3)
NEUTROPHILS RELATIVE PERCENT: 64.3 % (ref 43–80)
PDW BLD-RTO: 13.9 FL (ref 11.5–15)
PHOSPHORUS: 3.7 MG/DL (ref 2.5–4.5)
PLATELET # BLD: 195 E9/L (ref 130–450)
PMV BLD AUTO: 8.8 FL (ref 7–12)
POTASSIUM SERPL-SCNC: 4.4 MMOL/L (ref 3.5–5)
PROTEIN PROTEIN: 63 MG/DL (ref 0–12)
PROTEIN/CREAT RATIO: 0.3
PROTEIN/CREAT RATIO: 0.3 (ref 0–0.2)
RBC # BLD: 4.47 E12/L (ref 3.8–5.8)
SODIUM BLD-SCNC: 142 MMOL/L (ref 132–146)
TOTAL PROTEIN: 7.1 G/DL (ref 6.4–8.3)
URIC ACID, SERUM: 10.2 MG/DL (ref 3.4–7)
WBC # BLD: 6.1 E9/L (ref 4.5–11.5)

## 2021-04-01 PROCEDURE — 84100 ASSAY OF PHOSPHORUS: CPT

## 2021-04-01 PROCEDURE — 85025 COMPLETE CBC W/AUTO DIFF WBC: CPT

## 2021-04-01 PROCEDURE — 82570 ASSAY OF URINE CREATININE: CPT

## 2021-04-01 PROCEDURE — 84156 ASSAY OF PROTEIN URINE: CPT

## 2021-04-01 PROCEDURE — 84550 ASSAY OF BLOOD/URIC ACID: CPT

## 2021-04-01 PROCEDURE — 83735 ASSAY OF MAGNESIUM: CPT

## 2021-04-01 PROCEDURE — 36415 COLL VENOUS BLD VENIPUNCTURE: CPT

## 2021-04-01 PROCEDURE — 80053 COMPREHEN METABOLIC PANEL: CPT

## 2021-04-01 PROCEDURE — 93793 ANTICOAG MGMT PT WARFARIN: CPT | Performed by: INTERNAL MEDICINE

## 2021-04-01 NOTE — PROGRESS NOTES
Previous INR: 2.50     Previous dose:   6mg (5mg +1mg tablets) on mon and Friday and 5mg all others               Current INR: 2.20     Recommendation: continue 6mg (5mg +1mg tablets) on mon and Friday and 5mg all others    Next INR: 1 week     Amy Farmer  4/1/2021  5:07 PM

## 2021-04-02 ENCOUNTER — HOSPITAL ENCOUNTER (OUTPATIENT)
Age: 76
Discharge: HOME | End: 2021-04-02
Payer: MEDICARE

## 2021-04-02 DIAGNOSIS — E78.2 MIXED HYPERLIPIDEMIA: ICD-10-CM

## 2021-04-02 DIAGNOSIS — R53.83 OTHER FATIGUE: ICD-10-CM

## 2021-04-02 DIAGNOSIS — E11.9 TYPE 2 DIABETES MELLITUS WITHOUT COMPLICATION, WITHOUT LONG-TERM CURRENT USE OF INSULIN (HCC): ICD-10-CM

## 2021-04-02 DIAGNOSIS — E55.9 VITAMIN D DEFICIENCY: ICD-10-CM

## 2021-04-02 DIAGNOSIS — M1A.09X0 IDIOPATHIC CHRONIC GOUT OF MULTIPLE SITES WITHOUT TOPHUS: ICD-10-CM

## 2021-04-02 LAB
ALBUMIN SERPL-MCNC: 4.3 G/DL (ref 3.5–5.2)
ALP BLD-CCNC: 63 U/L (ref 40–129)
ALT SERPL-CCNC: 17 U/L (ref 0–40)
ANION GAP SERPL CALCULATED.3IONS-SCNC: 9 MMOL/L (ref 7–16)
AST SERPL-CCNC: 26 U/L (ref 0–39)
BACTERIA: ABNORMAL /HPF
BASOPHILS ABSOLUTE: 0.06 E9/L (ref 0–0.2)
BASOPHILS RELATIVE PERCENT: 1 % (ref 0–2)
BILIRUB SERPL-MCNC: 0.8 MG/DL (ref 0–1.2)
BILIRUBIN URINE: NEGATIVE
BLOOD, URINE: NEGATIVE
BUN BLDV-MCNC: 29 MG/DL (ref 8–23)
CALCIUM SERPL-MCNC: 10.6 MG/DL (ref 8.6–10.2)
CHLORIDE BLD-SCNC: 103 MMOL/L (ref 98–107)
CHOLESTEROL, FASTING: 194 MG/DL (ref 0–199)
CLARITY: CLEAR
CO2: 27 MMOL/L (ref 22–29)
COLOR: YELLOW
CREAT SERPL-MCNC: 1.7 MG/DL (ref 0.7–1.2)
EOSINOPHILS ABSOLUTE: 0.12 E9/L (ref 0.05–0.5)
EOSINOPHILS RELATIVE PERCENT: 2 % (ref 0–6)
GFR AFRICAN AMERICAN: 48
GFR NON-AFRICAN AMERICAN: 39 ML/MIN/1.73
GLUCOSE BLD-MCNC: 126 MG/DL (ref 74–99)
GLUCOSE URINE: NEGATIVE MG/DL
HBA1C MFR BLD: 5.1 % (ref 4–5.6)
HCT VFR BLD CALC: 41.7 % (ref 37–54)
HDLC SERPL-MCNC: 52 MG/DL
HEMOGLOBIN: 14.3 G/DL (ref 12.5–16.5)
IMMATURE GRANULOCYTES #: 0.04 E9/L
IMMATURE GRANULOCYTES %: 0.7 % (ref 0–5)
KETONES, URINE: NEGATIVE MG/DL
LDL CHOLESTEROL CALCULATED: 105 MG/DL (ref 0–99)
LEUKOCYTE ESTERASE, URINE: NEGATIVE
LYMPHOCYTES ABSOLUTE: 2.03 E9/L (ref 1.5–4)
LYMPHOCYTES RELATIVE PERCENT: 33.1 % (ref 20–42)
MAGNESIUM: 2.1 MG/DL (ref 1.6–2.6)
MCH RBC QN AUTO: 32.2 PG (ref 26–35)
MCHC RBC AUTO-ENTMCNC: 34.3 % (ref 32–34.5)
MCV RBC AUTO: 93.9 FL (ref 80–99.9)
MICROALBUMIN UR-MCNC: 106.9 MG/L
MONOCYTES ABSOLUTE: 0.67 E9/L (ref 0.1–0.95)
MONOCYTES RELATIVE PERCENT: 10.9 % (ref 2–12)
NEUTROPHILS ABSOLUTE: 3.21 E9/L (ref 1.8–7.3)
NEUTROPHILS RELATIVE PERCENT: 52.3 % (ref 43–80)
NITRITE, URINE: NEGATIVE
PDW BLD-RTO: 13.7 FL (ref 11.5–15)
PH UA: 5.5 (ref 5–9)
PLATELET # BLD: 206 E9/L (ref 130–450)
PMV BLD AUTO: 9 FL (ref 7–12)
POTASSIUM SERPL-SCNC: 4.3 MMOL/L (ref 3.5–5)
PROTEIN UA: NORMAL MG/DL
RBC # BLD: 4.44 E12/L (ref 3.8–5.8)
RBC UA: ABNORMAL /HPF (ref 0–2)
SODIUM BLD-SCNC: 139 MMOL/L (ref 132–146)
SPECIFIC GRAVITY UA: 1.02 (ref 1–1.03)
TOTAL PROTEIN: 7.1 G/DL (ref 6.4–8.3)
TRIGLYCERIDE, FASTING: 184 MG/DL (ref 0–149)
TSH SERPL DL<=0.05 MIU/L-ACNC: 2.44 UIU/ML (ref 0.27–4.2)
URIC ACID, SERUM: 9.8 MG/DL (ref 3.4–7)
UROBILINOGEN, URINE: 0.2 E.U./DL
VITAMIN D 25-HYDROXY: 39 NG/ML (ref 30–100)
VLDLC SERPL CALC-MCNC: 37 MG/DL
WBC # BLD: 6.1 E9/L (ref 4.5–11.5)
WBC UA: ABNORMAL /HPF (ref 0–5)

## 2021-04-02 PROCEDURE — 82306 VITAMIN D 25 HYDROXY: CPT

## 2021-04-02 PROCEDURE — 84443 ASSAY THYROID STIM HORMONE: CPT

## 2021-04-02 PROCEDURE — 80061 LIPID PANEL: CPT

## 2021-04-02 PROCEDURE — 83735 ASSAY OF MAGNESIUM: CPT

## 2021-04-02 PROCEDURE — 84550 ASSAY OF BLOOD/URIC ACID: CPT

## 2021-04-02 PROCEDURE — 83036 HEMOGLOBIN GLYCOSYLATED A1C: CPT

## 2021-04-02 PROCEDURE — 80053 COMPREHEN METABOLIC PANEL: CPT

## 2021-04-02 PROCEDURE — 81001 URINALYSIS AUTO W/SCOPE: CPT

## 2021-04-02 PROCEDURE — 82044 UR ALBUMIN SEMIQUANTITATIVE: CPT

## 2021-04-02 PROCEDURE — 85025 COMPLETE CBC W/AUTO DIFF WBC: CPT

## 2021-04-02 PROCEDURE — 36415 COLL VENOUS BLD VENIPUNCTURE: CPT

## 2021-04-03 ENCOUNTER — TELEPHONE (OUTPATIENT)
Dept: FAMILY MEDICINE CLINIC | Age: 76
End: 2021-04-03

## 2021-04-03 NOTE — TELEPHONE ENCOUNTER
Please let the patient know that blood work results showed    Labs still show kidney dysfunction. Overall results are similar when compared to previous    Calcium levels were also mildly increased and similar when compared to previous. This could be related to kidney dysfunction but may need to consider additional work-up    Uric acid level was also increased and may need to consider change in dose of allopurinol    Cholesterol levels were elevated, when compared to previous. May need to consider change to high intensity statin like Crestor or Lipitor. Continue to watch diet    Sugar was elevated, hemoglobin A1c, as a measure of 3-month sugar control was normal at 5.1.   Recommend to continue to watch diet from carbohydrates and sugars    Thyroid levels were normal    Vitamin D levels were normal    Urine analysis showed protein and rare bacteria but otherwise was normal    Other electrolytes and liver functions were normal    Blood counts were normal    Please send a copy of the reports to the patient    Thanks

## 2021-04-05 DIAGNOSIS — M1A.09X0 IDIOPATHIC CHRONIC GOUT OF MULTIPLE SITES WITHOUT TOPHUS: ICD-10-CM

## 2021-04-05 RX ORDER — ALLOPURINOL 100 MG/1
TABLET ORAL
Qty: 38 TABLET | Refills: 1 | Status: SHIPPED
Start: 2021-04-05 | End: 2021-10-26 | Stop reason: SDUPTHER

## 2021-04-05 NOTE — TELEPHONE ENCOUNTER
Name of Medication(s) Requested:  [address] Requested:   Nichol    Medication(s) pended? [x] Yes  [] No    Last Appointment:  10/26/2020    Future appts:  Future Appointments   Date Time Provider Raegan Gold   4/16/2021  8:45 AM MD AARTI Razo [facility]   4/21/2021  8:45 AM Marni Craft DO [facility]   1/11/2022  8:00 AM Tyrel Craft, DO [facility]        Does patient need call back?   [] Yes  [x] No

## 2021-04-08 ENCOUNTER — ANTI-COAG VISIT (OUTPATIENT)
Dept: FAMILY MEDICINE CLINIC | Age: 76
End: 2021-04-08
Payer: MEDICARE

## 2021-04-08 ENCOUNTER — TELEPHONE (OUTPATIENT)
Dept: FAMILY MEDICINE CLINIC | Age: 76
End: 2021-04-08

## 2021-04-08 DIAGNOSIS — I48.20 CHRONIC ATRIAL FIBRILLATION (HCC): ICD-10-CM

## 2021-04-08 LAB — INR BLD: 2.6

## 2021-04-08 PROCEDURE — 93793 ANTICOAG MGMT PT WARFARIN: CPT | Performed by: INTERNAL MEDICINE

## 2021-04-08 NOTE — PROGRESS NOTES
Previous INR: 2.20     Previous dose:   6mg (5mg +1mg tablets) on mon and Friday and 5mg all others               Current INR: 2.60     Recommendation: continue 6mg (5mg +1mg tablets) on mon and Friday and 5mg all others    Next INR: 1 week     Mary Mathur  4/8/2021  4:07 PM

## 2021-04-15 ENCOUNTER — ANTI-COAG VISIT (OUTPATIENT)
Dept: FAMILY MEDICINE CLINIC | Age: 76
End: 2021-04-15
Payer: MEDICARE

## 2021-04-15 DIAGNOSIS — I48.20 CHRONIC ATRIAL FIBRILLATION (HCC): ICD-10-CM

## 2021-04-15 LAB — INR BLD: 2

## 2021-04-15 PROCEDURE — 93793 ANTICOAG MGMT PT WARFARIN: CPT | Performed by: INTERNAL MEDICINE

## 2021-04-15 NOTE — PROGRESS NOTES
Previous INR: 2.60     Previous dose:   6mg (5mg +1mg tablets) on mon and Friday and 5mg all others               Current INR: 2.00     Recommendation: continue 6mg (5mg +1mg tablets) on mon and Friday and 5mg all others    Next INR: 1 week     Noah Juarez  4/15/2021  2:52 PM

## 2021-04-16 ENCOUNTER — OFFICE VISIT (OUTPATIENT)
Dept: PRIMARY CARE CLINIC | Age: 76
End: 2021-04-16
Payer: MEDICARE

## 2021-04-16 VITALS
TEMPERATURE: 97.5 F | DIASTOLIC BLOOD PRESSURE: 78 MMHG | HEART RATE: 70 BPM | WEIGHT: 232 LBS | RESPIRATION RATE: 16 BRPM | HEIGHT: 67 IN | SYSTOLIC BLOOD PRESSURE: 130 MMHG | BODY MASS INDEX: 36.41 KG/M2

## 2021-04-16 DIAGNOSIS — I48.20 CHRONIC ATRIAL FIBRILLATION (HCC): ICD-10-CM

## 2021-04-16 DIAGNOSIS — E78.2 MIXED HYPERLIPIDEMIA: ICD-10-CM

## 2021-04-16 DIAGNOSIS — I25.10 CORONARY ARTERY DISEASE INVOLVING NATIVE CORONARY ARTERY OF NATIVE HEART WITHOUT ANGINA PECTORIS: ICD-10-CM

## 2021-04-16 DIAGNOSIS — Z12.5 SCREENING FOR MALIGNANT NEOPLASM OF PROSTATE: ICD-10-CM

## 2021-04-16 DIAGNOSIS — J44.9 CHRONIC OBSTRUCTIVE PULMONARY DISEASE, UNSPECIFIED COPD TYPE (HCC): ICD-10-CM

## 2021-04-16 DIAGNOSIS — E11.9 TYPE 2 DIABETES MELLITUS WITHOUT COMPLICATION, WITHOUT LONG-TERM CURRENT USE OF INSULIN (HCC): Primary | ICD-10-CM

## 2021-04-16 DIAGNOSIS — M25.562 CHRONIC PAIN OF BOTH KNEES: ICD-10-CM

## 2021-04-16 DIAGNOSIS — E66.09 CLASS 2 OBESITY DUE TO EXCESS CALORIES WITHOUT SERIOUS COMORBIDITY WITH BODY MASS INDEX (BMI) OF 36.0 TO 36.9 IN ADULT: ICD-10-CM

## 2021-04-16 DIAGNOSIS — M25.561 CHRONIC PAIN OF BOTH KNEES: ICD-10-CM

## 2021-04-16 DIAGNOSIS — I10 ESSENTIAL HYPERTENSION: ICD-10-CM

## 2021-04-16 DIAGNOSIS — N18.31 STAGE 3A CHRONIC KIDNEY DISEASE (HCC): ICD-10-CM

## 2021-04-16 DIAGNOSIS — D3A.00 CARCINOID (EXCEPT OF APPENDIX): ICD-10-CM

## 2021-04-16 DIAGNOSIS — E55.9 VITAMIN D DEFICIENCY: ICD-10-CM

## 2021-04-16 DIAGNOSIS — G89.29 CHRONIC PAIN OF BOTH KNEES: ICD-10-CM

## 2021-04-16 DIAGNOSIS — R53.83 OTHER FATIGUE: ICD-10-CM

## 2021-04-16 PROCEDURE — 99214 OFFICE O/P EST MOD 30 MIN: CPT | Performed by: INTERNAL MEDICINE

## 2021-04-16 RX ORDER — SIMVASTATIN 40 MG
40 TABLET ORAL DAILY
Qty: 90 TABLET | Refills: 1 | Status: SHIPPED
Start: 2021-04-16 | End: 2021-10-15

## 2021-04-16 ASSESSMENT — ENCOUNTER SYMPTOMS
DIARRHEA: 0
CHEST TIGHTNESS: 0
RHINORRHEA: 1
SHORTNESS OF BREATH: 1
SORE THROAT: 0
VOMITING: 0
EYE PAIN: 0
ABDOMINAL PAIN: 0
NAUSEA: 0
CONSTIPATION: 0
COUGH: 1
BLOOD IN STOOL: 0

## 2021-04-16 NOTE — PROGRESS NOTES
[facility]) Physicians   Internal Medicine     2021  Johnny Paula : 1945 Sex: male  Age:76 y.o. Chief Complaint   Patient presents with    Diabetes        HPI:   Patient presents to office for evaluation of the following medical concerns. - States has diabetes. States follows with endocrinology. On glimepiride and tradjenta. No overt hypoglycemia. Follows with optho. On Statin. 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 States following with endocrinology and with [facility]. Receiving Sandostatin monthly. Has had excessive vasoactive intestinal peptide secretion. 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 Trying to watch diet. On simvastatin. No reported side effects    - States has High blood pressure. Not checking blood pressure at home. On HCTZ and sotalolol. Nephrology decreased hctz to 12.5mg     - States has Atrial fibrillation. On Sotalol and coumadin. Has pacemaker. Following with cardiology and EP. States decreased sotalol     - States has CAD. Stats s/p Sent. States cardiomyopathy. Following with cardiology and EP. S/p pacer for simus node dysfunction. Last follow up () - no changes f/u 6 months     - States has COPD. States has had previous surgery for lobectomy for concern for mesothelioma but was negative. Currently following with pulmonary. On anoro. Also has nebulizer. Last visit with pulm per (3/21) -improved post prednisone, continue Anoro, investigate Brovana (was too expensive), follow-up 6 months     - States has chronic kidney disease. Following with nephrology.  Last follow up  (2021) - decreased diuretic dose decreased hydrochlorothiazide to 5 days a week, skipping Tuesday and Saturday, follow-up in 4 months - States has elevated uric acid. States on allopurinol.     - States has vitamin D def. On vitamin D 50k weekly. LAst lab was nornal and improved (4/2021)     - lab results from 4/1/2021 and 4/2/2021 were reviewed with patient 4/16/2021     ROS:  Review of Systems   Constitutional: Negative for appetite change, chills, fever and unexpected weight change. HENT: Positive for rhinorrhea. Negative for congestion and sore throat. Eyes: Negative for pain and visual disturbance. Respiratory: Positive for cough and shortness of breath. Negative for chest tightness. Cardiovascular: Negative for chest pain and palpitations. Gastrointestinal: Negative for abdominal pain, blood in stool, constipation, diarrhea, nausea and vomiting. Genitourinary: Negative for difficulty urinating, dysuria, hematuria, scrotal swelling, testicular pain and urgency. Musculoskeletal: Negative for arthralgias and gait problem. Skin: Negative for rash. Neurological: Negative for dizziness, syncope, weakness, light-headedness and headaches. Hematological: Negative for adenopathy. Does not bruise/bleed easily. Psychiatric/Behavioral: Negative for suicidal ideas. The patient is not nervous/anxious.           Current Outpatient Medications:     simvastatin (ZOCOR) 40 MG tablet, Take 1 tablet by mouth daily, Disp: 90 tablet, Rfl: 1    allopurinol (ZYLOPRIM) 100 MG tablet, TAKE 1 TABLET BY MOUTH [id number], Disp: 38 tablet, Rfl: 1    hydrocortisone (CORTEF) 10 MG tablet, TAKE 1 1/2 TABLETS BY MOUTH EVERY MORNING AND 1 TABLET EVERY EVENING, Disp: 225 tablet, Rfl: 1    hydroCHLOROthiazide (HYDRODIURIL) 25 MG tablet, Take 1 tablet by mouth daily 1 po 5 days a week (hold on Tues and Sat), Disp: 25 tablet, Rfl: 2    warfarin (COUMADIN) 5 MG tablet, 1 po qd or as directed, Disp: 90 tablet, Rfl: 1    warfarin (COUMADIN) 1 MG tablet, Take 1 tablet by mouth daily, Disp: 30 tablet, Rfl: 3    Cholecalciferol (VITAMIN D3) Laterality Date    CARDIAC SURGERY      stents/pacemaker    COLONOSCOPY      CORONARY ANGIOPLASTY       CORONARY ANGIOPLASTY WITH STENT PLACEMENT      HERNIA REPAIR      LUNG SURGERY Right 2014    Dr. Man Elizabeth for \"infection around lung\"    PACEMAKER INSERTION      TUMOR REMOVAL      tumors removed from breast ,arms     UPPER GASTROINTESTINAL ENDOSCOPY         Family History   Adopted: Yes   Problem Relation Age of Onset    Other Mother         Pt. is adopted    Other Father         Pt. is adopted. Social History     Socioeconomic History    Marital status: Single     Spouse name: None    Number of children: 3    Years of education: 12    Highest education level: None   Occupational History    Occupation: [employer] worker     Comment: retired   Social Needs    Financial resource strain: Not hard at all   [employer] insecurity     Worry: Never true     Inability: Never true    Transportation needs     Medical: No     Non-medical: No   Tobacco Use    Smoking status: Former Smoker     Packs/day: 2.00     Years: 40.00     Pack years: 80.00     Quit date: 2000     Years since quittin.6    Smokeless tobacco: Never Used   Substance and Sexual Activity    Alcohol use: No    Drug use: No    Sexual activity: Not Currently     Partners: Female   Lifestyle    Physical activity     Days per week: 3 days     Minutes per session: 20 min    Stress: Only a little   Relationships    Social connections     Talks on phone: More than three times a week     Gets together: More than three times a week     Attends Latter day service: 1 to 4 times per year     Active member of club or organization: No     Attends meetings of clubs or organizations: Never     Relationship status:      Intimate partner violence     Fear of current or ex partner: No     Emotionally abused: No     Physically abused: No     Forced sexual activity: No   Other Topics Concern    None   Social History Narrative  None        Vitals:    04/16/21 0852   BP: 130/78   Pulse: 70   Resp: 16   Temp: 97.5 °F (36.4 °C)   Weight: 232 lb (105.2 kg)   Height: 5' 7\" (1.702 m)       Exam:  Physical Exam  Constitutional:       Appearance: He is well-developed. HENT:      Head: Normocephalic and atraumatic. Right Ear: External ear normal.      Left Ear: External ear normal.   Eyes:      Pupils: Pupils are equal, round, and reactive to light. Neck:      Musculoskeletal: Normal range of motion and neck supple. Thyroid: No thyromegaly. Cardiovascular:      Rate and Rhythm: Normal rate and regular rhythm. Heart sounds: Normal heart sounds. No murmur. Pulmonary:      Effort: Pulmonary effort is normal.      Breath sounds: Decreased breath sounds and rhonchi present. No wheezing or rales. Abdominal:      General: Bowel sounds are normal.      Palpations: Abdomen is soft. Tenderness: There is no abdominal tenderness. There is no guarding or rebound. Musculoskeletal: Normal range of motion. Lymphadenopathy:      Cervical: No cervical adenopathy. Skin:     General: Skin is warm and dry. Neurological:      Mental Status: He is alert and oriented to person, place, and time. Cranial Nerves: No cranial nerve deficit.    Psychiatric:         Judgment: Judgment normal.         Assessment and Plan:    Diagnoses and all orders for this visit:    Type 2 diabetes mellitus without complication, without long-term current use of insulin ([address]  - Continue present treatment   - watch diet   - monitor blood glucose at home   - following with endocrinology   - on glimepiride   - on tradjenta   - follow A1c - last was 5.1 (4/2021)   - discussed decreasing medications     Not on Aspirin - coumadin   Not on A/ARB - CKD   On statin   Follows with optho     Carcinoid (except of appendix) ([address]  - following with endocrinology and endocrinology surgery (dr. Damaris Gomez) at [facility]  - States excessive Vasoactive intestinal peptide excretion   - On sandostatin monthly   - on cortef   - Stable     Stage 3a chronic kidney disease ([facility]  - following with nephrology   - follow labs     Chronic atrial fibrillation ([address]  - s/p pacer  - following with cardiology and EP   - on sotalol - decreased dose (12/2019)   - on coumadin - INR today 2.5 - continue current dose   - stable     Chronic obstructive pulmonary disease, unspecified COPD type ([facility]  - following with pulmonary   - on anoro - could not afford trelegy   - has home nebulizer with albuterol   - has chronic cough   - has tried flonase or nasacort  - has tried mult antihistamine     Coronary artery disease involving native coronary artery of native heart without angina pectoris  - s/p stent  - following with cardiology and EP   - on sotalol   - on simvastatin   - no aspirin due to coumadin   - stable     Essential hypertension  - watch diet   - on hctz - nephrology decreased to 12.5mg   - on sotalol for afib   - stable     Mixed hyperlipidemia  - watch diet   - on simvastatin  - follow labs     Idiopathic chronic gout of multiple sites without tophus  - watch diet   - on allopurinol - increase to daily   - stable     Hyperparathyroidism ([facility]  - Possible due to CKD   - last calcium was elevated     Vitamin D deficiency  - nephrology added 50k weekly   - follow labs     Chronic pain of both knees  - check xray     Class 2 obesity due to excess calories without serious comorbidity with body mass index (BMI) of 36.0 to 36.9 in adult  - discussed diet and exercise     Return for check up and review and labs.     Orders Placed This Encounter   Procedures    CBC Auto Differential     Standing Status:   Future     Standing Expiration Date:   4/16/2022    Comprehensive Metabolic Panel     Standing Status:   Future     Standing Expiration Date:   4/16/2022    Hemoglobin A1C     Standing Status:   Future     Standing Expiration Date:   4/16/2022    Lipid, Fasting     Standing Status:   Future

## 2021-04-20 PROBLEM — M79.89 RIGHT LEG SWELLING: Status: ACTIVE | Noted: 2021-04-20

## 2021-04-22 ENCOUNTER — ANTI-COAG VISIT (OUTPATIENT)
Dept: FAMILY MEDICINE CLINIC | Age: 76
End: 2021-04-22
Payer: MEDICARE

## 2021-04-22 DIAGNOSIS — I48.20 CHRONIC ATRIAL FIBRILLATION (HCC): ICD-10-CM

## 2021-04-22 LAB — INR BLD: 1.8

## 2021-04-22 PROCEDURE — 93793 ANTICOAG MGMT PT WARFARIN: CPT | Performed by: INTERNAL MEDICINE

## 2021-04-22 NOTE — PROGRESS NOTES
Previous INR: 2.00     Previous dose:   6mg (5mg +1mg tablets) on mon and Friday and 5mg all others               Current INR: 1.80     Recommendation: 6mg today then continue 6mg (5mg +1mg tablets) on mon and Friday and 5mg all others    Next INR: 1 week     Chavo Fan  4/22/2021  12:53 PM

## 2021-04-29 ENCOUNTER — NURSE ONLY (OUTPATIENT)
Dept: PRIMARY CARE CLINIC | Age: 76
End: 2021-04-29
Payer: MEDICARE

## 2021-04-29 DIAGNOSIS — I48.20 CHRONIC ATRIAL FIBRILLATION (HCC): Primary | ICD-10-CM

## 2021-04-29 LAB
INTERNATIONAL NORMALIZATION RATIO, POC: 2.1
PROTHROMBIN TIME, POC: NORMAL

## 2021-04-29 PROCEDURE — 93793 ANTICOAG MGMT PT WARFARIN: CPT | Performed by: INTERNAL MEDICINE

## 2021-04-29 PROCEDURE — 85610 PROTHROMBIN TIME: CPT | Performed by: INTERNAL MEDICINE

## 2021-04-29 NOTE — PROGRESS NOTES
Previous INR: 1.80     Previous dose: 6mg x 1 thurs (4/22/2021) then continued 6mg (5mg +1mg tablets) on mon and Friday and 5mg all others    Current INR: 2.1     Recommendation: 6mg (5mg +1mg tablets) on mon and Friday and 5mg all others     Next INR: 1 week    Rigo Forbes MD  4/29/2021  3:36 PM

## 2021-05-06 ENCOUNTER — HOSPITAL ENCOUNTER (OUTPATIENT)
Dept: CT IMAGING | Age: 76
Discharge: HOME | End: 2021-05-06
Payer: MEDICARE

## 2021-05-06 ENCOUNTER — ANTI-COAG VISIT (OUTPATIENT)
Dept: FAMILY MEDICINE CLINIC | Age: 76
End: 2021-05-06
Payer: MEDICARE

## 2021-05-06 DIAGNOSIS — M79.89 RIGHT LEG SWELLING: ICD-10-CM

## 2021-05-06 DIAGNOSIS — I48.20 CHRONIC ATRIAL FIBRILLATION (HCC): ICD-10-CM

## 2021-05-06 LAB — INR BLD: 2.1

## 2021-05-06 PROCEDURE — 74176 CT ABD & PELVIS W/O CONTRAST: CPT

## 2021-05-06 PROCEDURE — 93793 ANTICOAG MGMT PT WARFARIN: CPT | Performed by: INTERNAL MEDICINE

## 2021-05-13 ENCOUNTER — ANTI-COAG VISIT (OUTPATIENT)
Dept: FAMILY MEDICINE CLINIC | Age: 76
End: 2021-05-13
Payer: MEDICARE

## 2021-05-13 DIAGNOSIS — I48.20 CHRONIC ATRIAL FIBRILLATION (HCC): ICD-10-CM

## 2021-05-13 LAB — INR BLD: 2

## 2021-05-13 PROCEDURE — 93793 ANTICOAG MGMT PT WARFARIN: CPT | Performed by: INTERNAL MEDICINE

## 2021-05-20 ENCOUNTER — ANTI-COAG VISIT (OUTPATIENT)
Dept: FAMILY MEDICINE CLINIC | Age: 76
End: 2021-05-20
Payer: MEDICARE

## 2021-05-20 DIAGNOSIS — I48.20 CHRONIC ATRIAL FIBRILLATION (HCC): ICD-10-CM

## 2021-05-20 LAB — INTERNATIONAL NORMALIZATION RATIO, POC: 2.7

## 2021-05-20 PROCEDURE — 85610 PROTHROMBIN TIME: CPT | Performed by: INTERNAL MEDICINE

## 2021-05-20 PROCEDURE — 93793 ANTICOAG MGMT PT WARFARIN: CPT | Performed by: INTERNAL MEDICINE

## 2021-05-20 NOTE — PROGRESS NOTES
Previous INR: 2.00     Previous dose:   6mg (5mg +1mg tablets) on mon and Friday and 5mg all others               Current INR: 2.7     Recommendation: continue 6mg (5mg +1mg tablets) on mon and Friday and 5mg all others    Next INR: 1 week     Norm Calvin MD  5/20/2021  12:23 PM

## 2021-05-27 ENCOUNTER — TELEPHONE (OUTPATIENT)
Dept: FAMILY MEDICINE CLINIC | Age: 76
End: 2021-05-27

## 2021-05-27 ENCOUNTER — ANTI-COAG VISIT (OUTPATIENT)
Dept: FAMILY MEDICINE CLINIC | Age: 76
End: 2021-05-27
Payer: MEDICARE

## 2021-05-27 DIAGNOSIS — I48.20 CHRONIC ATRIAL FIBRILLATION (HCC): ICD-10-CM

## 2021-05-27 LAB
INR BLD: 2.2
INR BLD: NORMAL
PROTIME: NORMAL

## 2021-05-27 PROCEDURE — 93793 ANTICOAG MGMT PT WARFARIN: CPT | Performed by: INTERNAL MEDICINE

## 2021-05-27 RX ORDER — WARFARIN SODIUM 5 MG/1
TABLET ORAL
Qty: 90 TABLET | Refills: 1 | Status: SHIPPED
Start: 2021-05-27 | End: 2021-11-29 | Stop reason: SDUPTHER

## 2021-05-27 NOTE — TELEPHONE ENCOUNTER
Name of Medication(s) Requested:  warfarin    Pharmacy Requested:   [facility]    Medication(s) pended? [x] Yes  [] No    Last Appointment:  4/16/2021    Future appts:  Future Appointments   Date Time Provider Raegan Gold   10/15/2021  9:30 AM MD AARTI Cintron [facility]   10/26/2021  8:15 AM Hira Craft, DO [facility]   1/11/2022  8:00 AM Tyrel Craft, DO [facility]        Does patient need call back?   [] Yes  [x] No

## 2021-06-03 ENCOUNTER — ANTI-COAG VISIT (OUTPATIENT)
Dept: FAMILY MEDICINE CLINIC | Age: 76
End: 2021-06-03
Payer: MEDICARE

## 2021-06-03 DIAGNOSIS — I48.20 CHRONIC ATRIAL FIBRILLATION (HCC): ICD-10-CM

## 2021-06-03 LAB — INR BLD: 2.2

## 2021-06-03 PROCEDURE — 93793 ANTICOAG MGMT PT WARFARIN: CPT | Performed by: INTERNAL MEDICINE

## 2021-06-03 NOTE — PROGRESS NOTES
Previous INR: 2.2     Previous dose:   6mg (5mg +1mg tablets) on mon and Friday and 5mg all others               Current INR: 2.2     Recommendation: continue 6mg (5mg +1mg tablets) on mon and Friday and 5mg all others    Next INR: 1 week     Johnathon Lucas MD  6/3/2021  12:23 PM

## 2021-06-04 ENCOUNTER — CARE COORDINATION (OUTPATIENT)
Dept: CARE COORDINATION | Age: 76
End: 2021-06-04

## 2021-06-04 NOTE — LETTER
6/4/2021    Steven Catherine  [id number] [facility]      Dear Steven Catherine    My name is Jacinta Andrade RN and I am an Ambulatory Care Manager who partners with Dr Joyce Massey to improve patients' health. I've been trying to reach you via phone to let you know that, as a member of your care team, I will work with other providers involved in your care, offer education for your specific health conditions, and connect you with more resources as needed. This program is designed to provide you with the opportunity to have a [facility] partner with you for the following situations:     1) if you come home from the hospital or emergency room   2) to help manage your disease   3) when you would like assistance coordinating services or appointments    This added support is provided at no additional cost to you. My primary focus is to help you achieve specific goals and improve your health. Please call me at [phone] to further discuss how I can support your health care needs.     Sincerely,    Jacinta Andrade RN

## 2021-06-04 NOTE — CARE COORDINATION
Called patient to discuss Care Coordination Program. I left a voice mail message on pt's mobile number and home number listed for pt (home number is daughter Michela chavarria who is listed on pt's current HIPPA form) introducing myself and a brief description of the Care Coordination Program.   Requested a call back to discuss the program, enrollment, and schedule a time to speak with me. Direct contact information given. Will send introduction letter to pt via mail. ACM also will inquire as to pt seeing Dr Eber Smith and Dr Liset Kim.

## 2021-06-10 ENCOUNTER — ANTI-COAG VISIT (OUTPATIENT)
Dept: FAMILY MEDICINE CLINIC | Age: 76
End: 2021-06-10
Payer: MEDICARE

## 2021-06-10 DIAGNOSIS — I48.20 CHRONIC ATRIAL FIBRILLATION (HCC): ICD-10-CM

## 2021-06-10 LAB — INR BLD: 2.3

## 2021-06-10 PROCEDURE — 93793 ANTICOAG MGMT PT WARFARIN: CPT | Performed by: INTERNAL MEDICINE

## 2021-06-10 RX ORDER — SIMVASTATIN 40 MG
40 TABLET ORAL NIGHTLY
Qty: 90 TABLET | Refills: 1 | Status: SHIPPED
Start: 2021-06-10 | End: 2021-11-29 | Stop reason: SDUPTHER

## 2021-06-10 NOTE — PROGRESS NOTES
Needs Simvastatin 40mg sent to [facility]. Previous Rx was sent to [facility] [address]. Rx is ready to be sent.

## 2021-06-10 NOTE — PROGRESS NOTES
Patients daughter called back and was advised of the Updated Coumadin. And said he needs his script sent to [place].

## 2021-06-10 NOTE — PROGRESS NOTES
Previous INR: 2.2     Previous dose:   6mg (5mg +1mg tablets) on mon and Friday and 5mg all others               Current INR: 2.3     Recommendation: continue 6mg (5mg +1mg tablets) on mon and Friday and 5mg all others    Next INR: 1 week     Steve Kelly MD  6/10/2021  4:54 PM

## 2021-06-11 ENCOUNTER — CARE COORDINATION (OUTPATIENT)
Dept: CARE COORDINATION | Age: 76
End: 2021-06-11

## 2021-06-16 ENCOUNTER — CARE COORDINATION (OUTPATIENT)
Dept: CARE COORDINATION | Age: 76
End: 2021-06-16

## 2021-06-16 NOTE — CARE COORDINATION
3rd attempt, AC called and spoke with pt's daughter Lalo Patricia who is listed on pt's current HIPPA form. I introduced myself and gave a description of the Care Coordination Program. Latoshabola Mckeon reports that she will be speaking with the pt this evening and will discuss care coordination and get back to AC on 6/17. Contact information given.

## 2021-06-17 ENCOUNTER — ANTI-COAG VISIT (OUTPATIENT)
Dept: FAMILY MEDICINE CLINIC | Age: 76
End: 2021-06-17
Payer: MEDICARE

## 2021-06-17 DIAGNOSIS — I48.20 CHRONIC ATRIAL FIBRILLATION (HCC): ICD-10-CM

## 2021-06-17 LAB — INR BLD: 2.2

## 2021-06-17 PROCEDURE — 93793 ANTICOAG MGMT PT WARFARIN: CPT | Performed by: INTERNAL MEDICINE

## 2021-06-17 NOTE — CARE COORDINATION
ACM called and left  for pt's daughter Darrell Obrien to follow up to see if she was able to discuss care coordination with the pt. ACM asked for a call back and left contact information. If no call back, no further outreaches will be made at this time.

## 2021-06-17 NOTE — PROGRESS NOTES
Previous INR: 2.3     Previous dose:  6mg (5mg +1mg tablets) on mon and Friday and 5mg all others               Current INR: 2.2     Recommendation: continue 6mg (5mg +1mg tablets) on mon and Friday and 5mg all others    Next INR: 1 week     Suma Colon MD  6/17/2021  9:41 AM

## 2021-06-24 ENCOUNTER — ANTI-COAG VISIT (OUTPATIENT)
Dept: FAMILY MEDICINE CLINIC | Age: 76
End: 2021-06-24
Payer: MEDICARE

## 2021-06-24 DIAGNOSIS — I48.20 CHRONIC ATRIAL FIBRILLATION (HCC): ICD-10-CM

## 2021-06-24 LAB — INR BLD: 2.4

## 2021-06-24 PROCEDURE — 93793 ANTICOAG MGMT PT WARFARIN: CPT | Performed by: INTERNAL MEDICINE

## 2021-06-24 NOTE — PROGRESS NOTES
Previous INR: 2.2     Previous dose:  6mg (5mg +1mg tablets) on mon and Friday and 5mg all others               Current INR: 2.4     Recommendation: continue 6mg (5mg +1mg tablets) on mon and Friday and 5mg all others    Next INR: 1 week     Yoana Steel MD  6/24/2021  12:29 PM

## 2021-07-01 ENCOUNTER — ANTI-COAG VISIT (OUTPATIENT)
Dept: FAMILY MEDICINE CLINIC | Age: 76
End: 2021-07-01
Payer: MEDICARE

## 2021-07-01 DIAGNOSIS — I48.20 CHRONIC ATRIAL FIBRILLATION (HCC): ICD-10-CM

## 2021-07-01 LAB — INR BLD: 2

## 2021-07-01 PROCEDURE — 93793 ANTICOAG MGMT PT WARFARIN: CPT | Performed by: INTERNAL MEDICINE

## 2021-07-01 NOTE — PROGRESS NOTES
Previous INR: 2.4     Previous dose:  6mg (5mg +1mg tablets) on mon and Friday and 5mg all others               Current INR: 2.00     Recommendation: continue 6mg (5mg +1mg tablets) on mon and Friday and 5mg all others    Next INR: 1 week     Bebe Membreno MD  7/1/2021  12:07 PM

## 2021-07-01 NOTE — PROGRESS NOTES
Edger Nails informed. Understands that Warfarin dosing will remain the same and we will recheck the INR in 1 week.

## 2021-07-08 ENCOUNTER — ANTI-COAG VISIT (OUTPATIENT)
Dept: FAMILY MEDICINE CLINIC | Age: 76
End: 2021-07-08
Payer: MEDICARE

## 2021-07-08 DIAGNOSIS — I48.20 CHRONIC ATRIAL FIBRILLATION (HCC): ICD-10-CM

## 2021-07-08 LAB — INR BLD: 2.2

## 2021-07-08 PROCEDURE — 93793 ANTICOAG MGMT PT WARFARIN: CPT | Performed by: INTERNAL MEDICINE

## 2021-07-08 NOTE — PROGRESS NOTES
Previous INR: 2.00     Previous dose:  6mg (5mg +1mg tablets) on mon and Friday and 5mg all others               Current INR: 2.20     Recommendation: continue 6mg (5mg +1mg tablets) on mon and Friday and 5mg all others    Next INR: 1 week     Roney Whyte MD  7/8/2021  12:28 PM

## 2021-07-15 ENCOUNTER — ANTI-COAG VISIT (OUTPATIENT)
Dept: FAMILY MEDICINE CLINIC | Age: 76
End: 2021-07-15
Payer: MEDICARE

## 2021-07-15 ENCOUNTER — HOSPITAL ENCOUNTER (OUTPATIENT)
Age: 76
Discharge: HOME | End: 2021-07-15
Payer: MEDICARE

## 2021-07-15 DIAGNOSIS — I48.20 CHRONIC ATRIAL FIBRILLATION (HCC): ICD-10-CM

## 2021-07-15 LAB
ALBUMIN SERPL-MCNC: 4 G/DL (ref 3.5–5.2)
ALP BLD-CCNC: 64 U/L (ref 40–129)
ALT SERPL-CCNC: 15 U/L (ref 0–40)
ANION GAP SERPL CALCULATED.3IONS-SCNC: 8 MMOL/L (ref 7–16)
AST SERPL-CCNC: 22 U/L (ref 0–39)
BILIRUB SERPL-MCNC: 0.7 MG/DL (ref 0–1.2)
BUN BLDV-MCNC: 35 MG/DL (ref 6–23)
CALCIUM SERPL-MCNC: 10.2 MG/DL (ref 8.6–10.2)
CHLORIDE BLD-SCNC: 105 MMOL/L (ref 98–107)
CO2: 26 MMOL/L (ref 22–29)
CORTISOL TOTAL: 9.73 MCG/DL (ref 2.68–18.4)
CREAT SERPL-MCNC: 1.8 MG/DL (ref 0.7–1.2)
GFR AFRICAN AMERICAN: 44
GFR NON-AFRICAN AMERICAN: 37 ML/MIN/1.73
GLUCOSE BLD-MCNC: 118 MG/DL (ref 74–99)
HBA1C MFR BLD: 4.9 % (ref 4–5.6)
INR BLD: 2.4
PARATHYROID HORMONE INTACT: 93 PG/ML (ref 15–65)
POTASSIUM SERPL-SCNC: 4.4 MMOL/L (ref 3.5–5)
SODIUM BLD-SCNC: 139 MMOL/L (ref 132–146)
TOTAL PROTEIN: 6.7 G/DL (ref 6.4–8.3)

## 2021-07-15 PROCEDURE — 84586 ASSAY OF VIP: CPT

## 2021-07-15 PROCEDURE — 83036 HEMOGLOBIN GLYCOSYLATED A1C: CPT

## 2021-07-15 PROCEDURE — 36415 COLL VENOUS BLD VENIPUNCTURE: CPT

## 2021-07-15 PROCEDURE — 80053 COMPREHEN METABOLIC PANEL: CPT

## 2021-07-15 PROCEDURE — 82652 VIT D 1 25-DIHYDROXY: CPT

## 2021-07-15 PROCEDURE — 82533 TOTAL CORTISOL: CPT

## 2021-07-15 PROCEDURE — 93793 ANTICOAG MGMT PT WARFARIN: CPT | Performed by: INTERNAL MEDICINE

## 2021-07-15 PROCEDURE — 83970 ASSAY OF PARATHORMONE: CPT

## 2021-07-15 NOTE — PROGRESS NOTES
Previous INR: 2.20     Previous dose:  6mg (5mg +1mg tablets) on mon and Friday and 5mg all others               Current INR: 2.40     Recommendation: continue 6mg (5mg +1mg tablets) on mon and Friday and 5mg all others    Next INR: 1 week     Amy Ervin MD  7/15/2021  12:27 PM

## 2021-07-20 LAB — VITAMIN D 1,25-DIHYDROXY: 61.5 PG/ML (ref 19.9–79.3)

## 2021-07-22 ENCOUNTER — ANTI-COAG VISIT (OUTPATIENT)
Dept: FAMILY MEDICINE CLINIC | Age: 76
End: 2021-07-22
Payer: MEDICARE

## 2021-07-22 DIAGNOSIS — I48.20 CHRONIC ATRIAL FIBRILLATION (HCC): ICD-10-CM

## 2021-07-22 LAB
INR BLD: 2.9
Lab: NORMAL
REPORT: NORMAL
THIS TEST SENT TO: NORMAL

## 2021-07-22 PROCEDURE — 93793 ANTICOAG MGMT PT WARFARIN: CPT | Performed by: INTERNAL MEDICINE

## 2021-07-22 NOTE — PROGRESS NOTES
Per dr Rosalio Looney no changes in coumadin dose. Spoke to patients daughter per communication form to advise.

## 2021-07-22 NOTE — PROGRESS NOTES
Previous INR: 2.40     Previous dose:  6mg (5mg +1mg tablets) on mon and Friday and 5mg all others               Current INR: 2.90     Recommendation: continue 6mg (5mg +1mg tablets) on mon and Friday and 5mg all others    Next INR: 1 week     Roney Whyte MD  7/22/2021  5:10 PM

## 2021-07-29 ENCOUNTER — ANTI-COAG VISIT (OUTPATIENT)
Dept: FAMILY MEDICINE CLINIC | Age: 76
End: 2021-07-29
Payer: MEDICARE

## 2021-07-29 DIAGNOSIS — I48.20 CHRONIC ATRIAL FIBRILLATION (HCC): ICD-10-CM

## 2021-07-29 LAB — INR BLD: 2.4

## 2021-07-29 PROCEDURE — 93793 ANTICOAG MGMT PT WARFARIN: CPT | Performed by: INTERNAL MEDICINE

## 2021-07-29 NOTE — PROGRESS NOTES
Previous INR: 2.90     Previous dose:  6mg (5mg +1mg tablets) on mon and Friday and 5mg all others               Current INR: 2.40     Recommendation: continue 6mg (5mg +1mg tablets) on mon and Friday and 5mg all others    Next INR: 1 week     Maxine Dennis MD  7/29/2021  4:21 PM

## 2021-08-05 ENCOUNTER — ANTI-COAG VISIT (OUTPATIENT)
Dept: FAMILY MEDICINE CLINIC | Age: 76
End: 2021-08-05
Payer: MEDICARE

## 2021-08-05 DIAGNOSIS — I48.20 CHRONIC ATRIAL FIBRILLATION (HCC): ICD-10-CM

## 2021-08-05 LAB — INR BLD: 2.4

## 2021-08-05 PROCEDURE — 93793 ANTICOAG MGMT PT WARFARIN: CPT | Performed by: INTERNAL MEDICINE

## 2021-08-07 ENCOUNTER — HOSPITAL ENCOUNTER (OUTPATIENT)
Age: 76
Discharge: HOME | End: 2021-08-07
Payer: MEDICARE

## 2021-08-07 LAB
ALBUMIN SERPL-MCNC: 4 G/DL (ref 3.5–5.2)
ALP BLD-CCNC: 64 U/L (ref 40–129)
ALT SERPL-CCNC: 17 U/L (ref 0–40)
ANION GAP SERPL CALCULATED.3IONS-SCNC: 9 MMOL/L (ref 7–16)
AST SERPL-CCNC: 22 U/L (ref 0–39)
BASOPHILS ABSOLUTE: 0.07 E9/L (ref 0–0.2)
BASOPHILS RELATIVE PERCENT: 1.1 % (ref 0–2)
BILIRUB SERPL-MCNC: 0.6 MG/DL (ref 0–1.2)
BUN BLDV-MCNC: 29 MG/DL (ref 6–23)
CALCIUM SERPL-MCNC: 10.1 MG/DL (ref 8.6–10.2)
CHLORIDE BLD-SCNC: 109 MMOL/L (ref 98–107)
CO2: 25 MMOL/L (ref 22–29)
CREAT SERPL-MCNC: 1.5 MG/DL (ref 0.7–1.2)
CREATININE URINE: 110 MG/DL (ref 40–278)
EOSINOPHILS ABSOLUTE: 0.13 E9/L (ref 0.05–0.5)
EOSINOPHILS RELATIVE PERCENT: 2.1 % (ref 0–6)
GFR AFRICAN AMERICAN: 55
GFR NON-AFRICAN AMERICAN: 45 ML/MIN/1.73
GLUCOSE BLD-MCNC: 115 MG/DL (ref 74–99)
HCT VFR BLD CALC: 38.9 % (ref 37–54)
HEMOGLOBIN: 13.2 G/DL (ref 12.5–16.5)
IMMATURE GRANULOCYTES #: 0.05 E9/L
IMMATURE GRANULOCYTES %: 0.8 % (ref 0–5)
LYMPHOCYTES ABSOLUTE: 1.74 E9/L (ref 1.5–4)
LYMPHOCYTES RELATIVE PERCENT: 28.5 % (ref 20–42)
MAGNESIUM: 2 MG/DL (ref 1.6–2.6)
MCH RBC QN AUTO: 32.8 PG (ref 26–35)
MCHC RBC AUTO-ENTMCNC: 33.9 % (ref 32–34.5)
MCV RBC AUTO: 96.5 FL (ref 80–99.9)
MONOCYTES ABSOLUTE: 0.69 E9/L (ref 0.1–0.95)
MONOCYTES RELATIVE PERCENT: 11.3 % (ref 2–12)
NEUTROPHILS ABSOLUTE: 3.43 E9/L (ref 1.8–7.3)
NEUTROPHILS RELATIVE PERCENT: 56.2 % (ref 43–80)
PARATHYROID HORMONE INTACT: 93 PG/ML (ref 15–65)
PDW BLD-RTO: 13.6 FL (ref 11.5–15)
PHOSPHORUS: 3.1 MG/DL (ref 2.5–4.5)
PLATELET # BLD: 202 E9/L (ref 130–450)
PMV BLD AUTO: 8.6 FL (ref 7–12)
POTASSIUM SERPL-SCNC: 4.8 MMOL/L (ref 3.5–5)
PROTEIN PROTEIN: 9 MG/DL (ref 0–12)
PROTEIN/CREAT RATIO: 0.1
PROTEIN/CREAT RATIO: 0.1 (ref 0–0.2)
RBC # BLD: 4.03 E12/L (ref 3.8–5.8)
SODIUM BLD-SCNC: 143 MMOL/L (ref 132–146)
TOTAL PROTEIN: 6.7 G/DL (ref 6.4–8.3)
VITAMIN D 25-HYDROXY: 40 NG/ML (ref 30–100)
WBC # BLD: 6.1 E9/L (ref 4.5–11.5)

## 2021-08-07 PROCEDURE — 83970 ASSAY OF PARATHORMONE: CPT

## 2021-08-07 PROCEDURE — 85025 COMPLETE CBC W/AUTO DIFF WBC: CPT

## 2021-08-07 PROCEDURE — 82652 VIT D 1 25-DIHYDROXY: CPT

## 2021-08-07 PROCEDURE — 84100 ASSAY OF PHOSPHORUS: CPT

## 2021-08-07 PROCEDURE — 36415 COLL VENOUS BLD VENIPUNCTURE: CPT

## 2021-08-07 PROCEDURE — 84156 ASSAY OF PROTEIN URINE: CPT

## 2021-08-07 PROCEDURE — 82306 VITAMIN D 25 HYDROXY: CPT

## 2021-08-07 PROCEDURE — 80053 COMPREHEN METABOLIC PANEL: CPT

## 2021-08-07 PROCEDURE — 82570 ASSAY OF URINE CREATININE: CPT

## 2021-08-07 PROCEDURE — 83735 ASSAY OF MAGNESIUM: CPT

## 2021-08-10 LAB — VITAMIN D 1,25-DIHYDROXY: 55.6 PG/ML (ref 19.9–79.3)

## 2021-08-12 ENCOUNTER — ANTI-COAG VISIT (OUTPATIENT)
Dept: FAMILY MEDICINE CLINIC | Age: 76
End: 2021-08-12
Payer: MEDICARE

## 2021-08-12 DIAGNOSIS — I48.20 CHRONIC ATRIAL FIBRILLATION (HCC): ICD-10-CM

## 2021-08-12 LAB — INR BLD: 2.3

## 2021-08-12 PROCEDURE — 93793 ANTICOAG MGMT PT WARFARIN: CPT | Performed by: INTERNAL MEDICINE

## 2021-08-12 NOTE — PROGRESS NOTES
Left a detailed message for Jessica Stroud. Tammy Chau will stay on the same dosing and recheck in 1 week .

## 2021-08-19 ENCOUNTER — ANTI-COAG VISIT (OUTPATIENT)
Dept: FAMILY MEDICINE CLINIC | Age: 76
End: 2021-08-19
Payer: MEDICARE

## 2021-08-19 DIAGNOSIS — I48.20 CHRONIC ATRIAL FIBRILLATION (HCC): ICD-10-CM

## 2021-08-19 LAB — INR BLD: 2.3

## 2021-08-19 PROCEDURE — 93793 ANTICOAG MGMT PT WARFARIN: CPT | Performed by: INTERNAL MEDICINE

## 2021-08-26 ENCOUNTER — ANTI-COAG VISIT (OUTPATIENT)
Dept: FAMILY MEDICINE CLINIC | Age: 76
End: 2021-08-26
Payer: MEDICARE

## 2021-08-26 DIAGNOSIS — I48.20 CHRONIC ATRIAL FIBRILLATION (HCC): ICD-10-CM

## 2021-08-26 LAB — INR BLD: 2

## 2021-08-26 PROCEDURE — 93793 ANTICOAG MGMT PT WARFARIN: CPT | Performed by: INTERNAL MEDICINE

## 2021-09-02 ENCOUNTER — ANTI-COAG VISIT (OUTPATIENT)
Dept: FAMILY MEDICINE CLINIC | Age: 76
End: 2021-09-02
Payer: MEDICARE

## 2021-09-02 DIAGNOSIS — I48.20 CHRONIC ATRIAL FIBRILLATION (HCC): ICD-10-CM

## 2021-09-02 LAB
INR BLD: 2.7
INR BLD: 2.7
PROTIME: NORMAL

## 2021-09-02 PROCEDURE — 93793 ANTICOAG MGMT PT WARFARIN: CPT | Performed by: INTERNAL MEDICINE

## 2021-09-02 NOTE — PROGRESS NOTES
Previous INR: 2.00     Previous dose:  6mg (5mg +1mg tablets) on mon and Friday and 5mg all others               Current INR: 2.70     Recommendation: continue 6mg (5mg +1mg tablets) on mon and Friday and 5mg all others    Next INR: 1 week     Belem Curtis MD  9/2/2021  12:13 PM

## 2021-09-09 ENCOUNTER — ANTI-COAG VISIT (OUTPATIENT)
Dept: FAMILY MEDICINE CLINIC | Age: 76
End: 2021-09-09
Payer: MEDICARE

## 2021-09-09 DIAGNOSIS — I48.20 CHRONIC ATRIAL FIBRILLATION (HCC): ICD-10-CM

## 2021-09-09 LAB — INR BLD: 2.8

## 2021-09-09 PROCEDURE — 93793 ANTICOAG MGMT PT WARFARIN: CPT | Performed by: INTERNAL MEDICINE

## 2021-09-09 NOTE — PROGRESS NOTES
Sandra Elizabeth informed of result and Dr Mcdonald Abt recommendations. Pt will take 6mg (5mg +1mg tablets) on mon and Friday and 5mg all other days The patient is asked to return in 1 week.

## 2021-09-09 NOTE — PROGRESS NOTES
Previous INR: 2.70     Previous dose:  6mg (5mg +1mg tablets) on mon and Friday and 5mg all others               Current INR: 2.80     Recommendation: continue 6mg (5mg +1mg tablets) on mon and Friday and 5mg all others    Next INR: 1 week     Chiquis Olivo MD  9/9/2021  4:39 PM

## 2021-09-17 LAB — INR BLD: 2.4

## 2021-09-21 ENCOUNTER — ANTI-COAG VISIT (OUTPATIENT)
Dept: FAMILY MEDICINE CLINIC | Age: 76
End: 2021-09-21

## 2021-09-21 NOTE — PROGRESS NOTES
INR result came through after hours on Thursday. Dr Cassandra Anderson reviewed the result and said no changes and recheck in Thursday this week. I will call Sandra Elizabeth and let her know.

## 2021-09-23 ENCOUNTER — ANTI-COAG VISIT (OUTPATIENT)
Dept: FAMILY MEDICINE CLINIC | Age: 76
End: 2021-09-23
Payer: MEDICARE

## 2021-09-23 DIAGNOSIS — I48.20 CHRONIC ATRIAL FIBRILLATION (HCC): ICD-10-CM

## 2021-09-23 LAB — INR BLD: 2.4

## 2021-09-23 PROCEDURE — 93793 ANTICOAG MGMT PT WARFARIN: CPT | Performed by: INTERNAL MEDICINE

## 2021-09-23 NOTE — PROGRESS NOTES
Previous INR: 2.4     Previous dose:  6mg (5mg +1mg tablets) on mon and Friday and 5mg all others               Current INR: 2.40     Recommendation: continue 6mg (5mg +1mg tablets) on mon and Friday and 5mg all others    Next INR: 1 week     Roney Whyte MD  9/23/2021  12:20 PM

## 2021-09-30 ENCOUNTER — ANTI-COAG VISIT (OUTPATIENT)
Dept: FAMILY MEDICINE CLINIC | Age: 76
End: 2021-09-30
Payer: MEDICARE

## 2021-09-30 DIAGNOSIS — I48.20 CHRONIC ATRIAL FIBRILLATION (HCC): ICD-10-CM

## 2021-09-30 LAB — INR BLD: 2.7

## 2021-09-30 PROCEDURE — 93793 ANTICOAG MGMT PT WARFARIN: CPT | Performed by: INTERNAL MEDICINE

## 2021-09-30 NOTE — PROGRESS NOTES
Previous INR: 2.40     Previous dose:  6mg (5mg +1mg tablets) on mon and Friday and 5mg all others               Current INR: 2.70     Recommendation: continue 6mg (5mg +1mg tablets) on mon and Friday and 5mg all others    Next INR: 1 week     Belem Curtis MD  9/30/2021  10:41 AM

## 2021-10-07 ENCOUNTER — ANTI-COAG VISIT (OUTPATIENT)
Dept: FAMILY MEDICINE CLINIC | Age: 76
End: 2021-10-07
Payer: MEDICARE

## 2021-10-07 DIAGNOSIS — I48.20 CHRONIC ATRIAL FIBRILLATION (HCC): ICD-10-CM

## 2021-10-07 LAB — INR BLD: 2

## 2021-10-07 PROCEDURE — 93793 ANTICOAG MGMT PT WARFARIN: CPT | Performed by: INTERNAL MEDICINE

## 2021-10-07 NOTE — PROGRESS NOTES
Previous INR: 2.70     Previous dose:  6mg (5mg +1mg tablets) on mon and Friday and 5mg all others               Current INR: 2.00     Recommendation: continue 6mg (5mg +1mg tablets) on mon and Friday and 5mg all others    Next INR: 1 week     Milind Desouza MD  10/7/2021  12:59 PM

## 2021-10-14 ENCOUNTER — ANTI-COAG VISIT (OUTPATIENT)
Dept: FAMILY MEDICINE CLINIC | Age: 76
End: 2021-10-14
Payer: MEDICARE

## 2021-10-14 DIAGNOSIS — I48.20 CHRONIC ATRIAL FIBRILLATION (HCC): ICD-10-CM

## 2021-10-14 LAB — INR BLD: 2.5

## 2021-10-14 PROCEDURE — 93793 ANTICOAG MGMT PT WARFARIN: CPT | Performed by: INTERNAL MEDICINE

## 2021-10-14 NOTE — PROGRESS NOTES
Previous INR: 2.00     Previous dose:  6mg (5mg +1mg tablets) on mon and Friday and 5mg all others               Current INR: 2.50     Recommendation: continue 6mg (5mg +1mg tablets) on mon and Friday and 5mg all others    Next INR: 1 week     Lisa Abreu MD  10/14/2021  5:23 PM

## 2021-10-15 ENCOUNTER — OFFICE VISIT (OUTPATIENT)
Dept: PRIMARY CARE CLINIC | Age: 76
End: 2021-10-15
Payer: MEDICARE

## 2021-10-15 VITALS
BODY MASS INDEX: 37.43 KG/M2 | TEMPERATURE: 97.3 F | HEART RATE: 84 BPM | DIASTOLIC BLOOD PRESSURE: 76 MMHG | SYSTOLIC BLOOD PRESSURE: 136 MMHG | OXYGEN SATURATION: 95 % | WEIGHT: 239 LBS

## 2021-10-15 DIAGNOSIS — E66.09 CLASS 2 OBESITY DUE TO EXCESS CALORIES WITHOUT SERIOUS COMORBIDITY WITH BODY MASS INDEX (BMI) OF 36.0 TO 36.9 IN ADULT: ICD-10-CM

## 2021-10-15 DIAGNOSIS — I48.20 CHRONIC ATRIAL FIBRILLATION (HCC): ICD-10-CM

## 2021-10-15 DIAGNOSIS — Z79.899 LONG TERM CURRENT USE OF THERAPEUTIC DRUG: ICD-10-CM

## 2021-10-15 DIAGNOSIS — Z23 NEED FOR INFLUENZA VACCINATION: Primary | ICD-10-CM

## 2021-10-15 DIAGNOSIS — M1A.09X0 IDIOPATHIC CHRONIC GOUT OF MULTIPLE SITES WITHOUT TOPHUS: ICD-10-CM

## 2021-10-15 DIAGNOSIS — R53.83 OTHER FATIGUE: ICD-10-CM

## 2021-10-15 DIAGNOSIS — I10 ESSENTIAL HYPERTENSION: ICD-10-CM

## 2021-10-15 DIAGNOSIS — S00.452A FOREIGN BODY OF LEFT EXTERNAL EAR: ICD-10-CM

## 2021-10-15 DIAGNOSIS — N18.31 STAGE 3A CHRONIC KIDNEY DISEASE (HCC): ICD-10-CM

## 2021-10-15 DIAGNOSIS — Z12.5 SCREENING FOR MALIGNANT NEOPLASM OF PROSTATE: ICD-10-CM

## 2021-10-15 DIAGNOSIS — E11.9 TYPE 2 DIABETES MELLITUS WITHOUT COMPLICATION, WITHOUT LONG-TERM CURRENT USE OF INSULIN (HCC): ICD-10-CM

## 2021-10-15 DIAGNOSIS — J44.9 CHRONIC OBSTRUCTIVE PULMONARY DISEASE, UNSPECIFIED COPD TYPE (HCC): ICD-10-CM

## 2021-10-15 DIAGNOSIS — H61.22 IMPACTED CERUMEN, LEFT EAR: ICD-10-CM

## 2021-10-15 DIAGNOSIS — E78.2 MIXED HYPERLIPIDEMIA: ICD-10-CM

## 2021-10-15 DIAGNOSIS — I25.10 CORONARY ARTERY DISEASE INVOLVING NATIVE CORONARY ARTERY OF NATIVE HEART WITHOUT ANGINA PECTORIS: ICD-10-CM

## 2021-10-15 DIAGNOSIS — D3A.00 CARCINOID (EXCEPT OF APPENDIX): ICD-10-CM

## 2021-10-15 PROBLEM — H52.209 ASTIGMATISM: Status: RESOLVED | Noted: 2017-07-11 | Resolved: 2021-10-15

## 2021-10-15 PROCEDURE — 99214 OFFICE O/P EST MOD 30 MIN: CPT | Performed by: INTERNAL MEDICINE

## 2021-10-15 PROCEDURE — 90694 VACC AIIV4 NO PRSRV 0.5ML IM: CPT | Performed by: INTERNAL MEDICINE

## 2021-10-15 PROCEDURE — 69210 REMOVE IMPACTED EAR WAX UNI: CPT | Performed by: INTERNAL MEDICINE

## 2021-10-15 PROCEDURE — 69200 CLEAR OUTER EAR CANAL: CPT | Performed by: INTERNAL MEDICINE

## 2021-10-15 PROCEDURE — G0008 ADMIN INFLUENZA VIRUS VAC: HCPCS | Performed by: INTERNAL MEDICINE

## 2021-10-15 SDOH — ECONOMIC STABILITY: FOOD INSECURITY: WITHIN THE PAST 12 MONTHS, YOU WORRIED THAT YOUR FOOD WOULD RUN OUT BEFORE YOU GOT MONEY TO BUY MORE.: NEVER TRUE

## 2021-10-15 SDOH — ECONOMIC STABILITY: FOOD INSECURITY: WITHIN THE PAST 12 MONTHS, THE FOOD YOU BOUGHT JUST DIDN'T LAST AND YOU DIDN'T HAVE MONEY TO GET MORE.: NEVER TRUE

## 2021-10-15 ASSESSMENT — ENCOUNTER SYMPTOMS
BLOOD IN STOOL: 0
CHEST TIGHTNESS: 0
CONSTIPATION: 0
ABDOMINAL PAIN: 0
RHINORRHEA: 1
SHORTNESS OF BREATH: 1
SORE THROAT: 0
DIARRHEA: 0
EYE PAIN: 0
NAUSEA: 0
VOMITING: 0
COUGH: 1

## 2021-10-15 ASSESSMENT — SOCIAL DETERMINANTS OF HEALTH (SDOH): HOW HARD IS IT FOR YOU TO PAY FOR THE VERY BASICS LIKE FOOD, HOUSING, MEDICAL CARE, AND HEATING?: NOT HARD AT ALL

## 2021-10-15 NOTE — PROGRESS NOTES
[facility]) Physicians   Internal Medicine     10/15/2021  Radha Irving : 1945 Sex: male  Age:76 y.o. Chief Complaint   Patient presents with    Hypertension    Diabetes    Atrial Fibrillation        HPI:   Patient presents to office for evaluation of the following medical concerns. - States feels left hearing aide is stuck in canal.     - States has diabetes. States follows with endocrinology. On glimepiride and tradjenta. No overt hypoglycemia. Follows with optho. On Statin. 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 States following with endocrinology and with [facility]. Receiving Sandostatin monthly. Has had excessive vasoactive intestinal peptide secretion. 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 Trying to watch diet. On simvastatin. No reported side effects    - States has High blood pressure. Not checking blood pressure at home. On HCTZ and sotalolol. Nephrology decreased hctz to 12.5mg     - States has Atrial fibrillation. On Sotalol and coumadin. Has pacemaker. Following with cardiology and EP. States decreased sotalol     - States has CAD. Stats s/p Sent. States cardiomyopathy. Following with cardiology and EP. S/p pacer for simus node dysfunction. Last follow up () - no changes f/u 6 months     - States has COPD. States has had previous surgery for lobectomy for concern for mesothelioma but was negative. Following with pulmonary. On anoro. Also has nebulizer. Last visit with pulm per (3/21) -improved post prednisone, continue Anoro, investigate Brovana (was too expensive), follow-up 6 months. States per patient stopped anoroo and orders breztri - helpoed but canot afford.     - States has chronic kidney disease.  Following tablet, Take 1 tablet by mouth daily, Disp: 30 tablet, Rfl: 3    Cholecalciferol (VITAMIN D3) 1.25 MG (55981 UT) CAPS, Take 1 capsule by mouth once a week, Disp: , Rfl:     linagliptin (TRADJENTA) 5 MG tablet, Take 1 tablet by mouth daily Lot#[id number] Exp.7/2020, Disp: 28 tablet, Rfl: 0    umeclidinium-vilanterol (ANORO ELLIPTA) 62.5-25 MCG/INH AEPB inhaler, Inhale 1 puff into the lungs daily, Disp: , Rfl:     sotalol (BETAPACE) 160 MG tablet, Take 1 tablet by mouth 2 times daily (Patient taking differently: Take 160 mg by mouth daily ), Disp: 60 tablet, Rfl: 3    nitroGLYCERIN (NITROSTAT) 0.4 MG SL tablet, Place 0.4 mg under the tongue every 5 minutes as needed for Chest pain up to max of 3 total doses. If no relief after 1 dose, call 911., Disp: , Rfl:     albuterol (PROVENTIL) (5 MG/ML) 0.5% nebulizer solution, Take 0.5 mLs by nebulization every 6 hours as needed for Wheezing, Disp: 120 each, Rfl: 0    glimepiride (AMARYL) 2 MG tablet, Take 0.5 mg by mouth every morning (before breakfast) , Disp: , Rfl:     octreotide ACETATE (SANDOSTATIN LAR DEPOT) 30 MG injection, Inject 60 mg into the muscle once Every two weeks, Disp: , Rfl:     Glucose Blood (BLOOD GLUCOSE TEST STRIPS) STRP, Freestyle lite, Disp: 120 strip, Rfl: 0    Fingerstix Lancets MISC, , Disp: 100 each, Rfl: 2    Allergies   Allergen Reactions    Lisinopril      Other reaction(s):  Other: See Comments  Black out, nausea       Past Medical History:   Diagnosis Date    [facility] 1/4/2014    CAD (coronary artery disease)     Cancer (HCC)     VIPoma    Carcinoid syndrome (HCC)     Chronic kidney disease     COPD [facility]chronic obstructive pulmonary disease) [facility]     Diabetes mellitus ([facility]     Dizziness 11/1/2018    Hypertension     Idiopathic chronic gout of multiple sites without tophus 9/5/2019    Meniere disease     MI (myocardial infarction) ([facility]     x3    Mixed hyperlipidemia 9/5/2019    Obesity     Pacemaker     Type 2 diabetes mellitus without complication (HCC)        Past Surgical History:   Procedure Laterality Date    CARDIAC SURGERY      stents/pacemaker    COLONOSCOPY      CORONARY ANGIOPLASTY       CORONARY ANGIOPLASTY WITH STENT PLACEMENT      HERNIA REPAIR      LUNG SURGERY Right 2014    Dr. Karyle Gist for \"infection around lung\"    PACEMAKER INSERTION      TUMOR REMOVAL      tumors removed from breast ,arms     UPPER GASTROINTESTINAL ENDOSCOPY         Family History   Adopted: Yes   Problem Relation Age of Onset    Other Mother         Pt. is adopted    Other Father         Pt. is adopted. Social History     Socioeconomic History    Marital status: Single     Spouse name: None    Number of children: 3    Years of education: 12    Highest education level: None   Occupational History    Occupation: [employer] worker     Comment: retired   Tobacco Use    Smoking status: Former Smoker     Packs/day: 2.00     Years: 40.00     Pack years: 80.00     Quit date: 2000     Years since quittin.1    Smokeless tobacco: Never Used   Vaping Use    Vaping Use: Never used   Substance and Sexual Activity    Alcohol use: No    Drug use: No    Sexual activity: Not Currently     Partners: Female   Other Topics Concern    None   Social History Narrative    None     Social Determinants of Health     Financial Resource Strain: Low Risk     Difficulty of Paying Living Expenses: Not hard at all   Food Insecurity: No Food Insecurity    Worried About [facility] in the Last Year: Never true    [facility] in the Last Year: Never true   Transportation Needs:     Lack of Transportation (Medical):      Lack of Transportation (Non-Medical):    Physical Activity:     Days of Exercise per Week:     Minutes of Exercise per Session:    Stress:     Feeling of Stress :    Social Connections:     Frequency of Communication with Friends and Family:     Frequency of Social Gatherings with Friends and Family:     Attends [employer] Services:     Active Member of Clubs or Organizations:     Attends Club or Organization Meetings:     Marital Status:    Intimate Partner Violence:     Fear of Current or Ex-Partner:     Emotionally Abused:     Physically Abused:     Sexually Abused:         Vitals:    10/15/21 0924   BP: 136/76   Site: Right Upper Arm   Position: Sitting   Pulse: 84   Temp: 97.3 °F (36.3 °C)   TempSrc: Temporal   SpO2: 95%   Weight: 239 lb (108.4 kg)       Exam:  Physical Exam  Vitals reviewed. Constitutional:       Appearance: He is well-developed. HENT:      Head: Normocephalic and atraumatic. Comments: Hearing aide piece retained - removed with pick ups      Right Ear: External ear normal.      Left Ear: External ear normal. There is impacted cerumen. Eyes:      Pupils: Pupils are equal, round, and reactive to light. Neck:      Thyroid: No thyromegaly. Cardiovascular:      Rate and Rhythm: Normal rate and regular rhythm. Heart sounds: Normal heart sounds. No murmur heard. Pulmonary:      Effort: Pulmonary effort is normal.      Breath sounds: Decreased breath sounds and rhonchi present. No wheezing or rales. Abdominal:      General: Bowel sounds are normal.      Palpations: Abdomen is soft. Tenderness: There is no abdominal tenderness. There is no guarding or rebound. Musculoskeletal:         General: Normal range of motion. Cervical back: Normal range of motion and neck supple. Lymphadenopathy:      Cervical: No cervical adenopathy. Skin:     General: Skin is warm and dry. Neurological:      Mental Status: He is alert and oriented to person, place, and time. Cranial Nerves: No cranial nerve deficit.    Psychiatric:         Judgment: Judgment normal.         Assessment and Plan:    Diagnoses and all orders for this visit:    Foreign body of left external ear  - hearing aid plastic piece removed with pick ups - no trauma noted   - cerumen removed as well with curette - no trauma noted     Type 2 diabetes mellitus without complication, without long-term current use of insulin (HCC)  - Continue present treatment   - watch diet   - monitor blood glucose at home   - following with endocrinology   - on glimepiride   - on tradjenta   - follow A1c - last was 4.9 (7/2021)   - discussed decreasing medications     Not on Aspirin - coumadin   Not on A/ARB - CKD   On statin   Follows with optho     Carcinoid (except of appendix) ([facility]  - following with endocrinology and endocrinology surgery (dr. Barry Howard) at [facility]  - States excessive Vasoactive intestinal peptide excretion   - On sandostatin monthly   - on cortef   - Stable     Stage 3a chronic kidney disease ([facility]  - following with nephrology   - follow labs     Chronic atrial fibrillation ([facility]  - s/p pacer  - following with cardiology and EP   - on sotalol - decreased dose (12/2019)   - on coumadin - INR weekly   - stable     Chronic obstructive pulmonary disease, unspecified COPD type ([facility]  - following with pulmonary   - on anoro - could not afford trelegy   - has home nebulizer with albuterol   - has chronic cough   - has tried flonase or nasacort  - has tried mult antihistamine     Coronary artery disease involving native coronary artery of native heart without angina pectoris  - s/p stent  - following with cardiology and EP   - on sotalol   - on simvastatin   - no aspirin due to coumadin   - stable     Essential hypertension  - watch diet   - on hctz - nephrology decreased to 12.5mg   - on sotalol for afib   - stable     Mixed hyperlipidemia  - watch diet   - on simvastatin  - follow labs     Idiopathic chronic gout of multiple sites without tophus  - watch diet   - on allopurinol - increase to daily   - stable     Hyperparathyroidism ([address]  - Possible due to CKD   - last calcium was elevated     Vitamin D deficiency  - nephrology added 50k weekly   - follow labs     Chronic pain of both knees  - check xray     Class 2 obesity due to excess calories without serious comorbidity with body mass index (BMI) of 36.0 to 36.9 in adult  - discussed diet and exercise     Return in about 6 months (around 4/15/2022) for check up and review and labs.     Orders Placed This Encounter   Procedures    INFLUENZA, QUADV, ADJUVANTED, 72 YRS =, IM, PF, PREFILL SYR, 0.5ML (FLUAD)    CBC Auto Differential     Standing Status:   Future     Standing Expiration Date:   10/15/2022    Comprehensive Metabolic Panel     Standing Status:   Future     Standing Expiration Date:   10/15/2022    Hemoglobin A1C     Standing Status:   Future     Standing Expiration Date:   10/15/2022    Lipid, Fasting     Standing Status:   Future     Standing Expiration Date:   10/15/2022    Magnesium     Standing Status:   Future     Standing Expiration Date:   10/15/2022    Vitamin D 25 Hydroxy     Standing Status:   Future     Standing Expiration Date:   10/15/2022    Vitamin B12 & Folate     Standing Status:   Future     Standing Expiration Date:   10/15/2022    Urinalysis     Standing Status:   Future     Standing Expiration Date:   10/15/2022    TSH without Reflex     Standing Status:   Future     Standing Expiration Date:   10/15/2022    Microalbumin, Ur     Standing Status:   Future     Standing Expiration Date:   10/15/2022    PSA screening     Standing Status:   Future     Standing Expiration Date:   10/15/2022    NH REMOVAL IMPACTED CERUMEN INSTRUMENTATION UNILAT     Requested Prescriptions      No prescriptions requested or ordered in this encounter        Miguel Leonard MD  10/15/2021  10:13 AM

## 2021-10-21 ENCOUNTER — ANTI-COAG VISIT (OUTPATIENT)
Dept: FAMILY MEDICINE CLINIC | Age: 76
End: 2021-10-21
Payer: MEDICARE

## 2021-10-21 DIAGNOSIS — I48.20 CHRONIC ATRIAL FIBRILLATION (HCC): ICD-10-CM

## 2021-10-21 LAB — INR BLD: 2.6

## 2021-10-21 PROCEDURE — 93793 ANTICOAG MGMT PT WARFARIN: CPT | Performed by: INTERNAL MEDICINE

## 2021-10-21 NOTE — PROGRESS NOTES
Previous INR: 2.50     Previous dose:  6mg (5mg +1mg tablets) on mon and Friday and 5mg all others               Current INR: 2.60     Recommendation: continue 6mg (5mg +1mg tablets) on mon and Friday and 5mg all others    Next INR: 1 week     Manisha Martin MD  10/21/2021  4:33 PM

## 2021-10-25 ENCOUNTER — TELEPHONE (OUTPATIENT)
Dept: FAMILY MEDICINE CLINIC | Age: 76
End: 2021-10-25

## 2021-10-25 NOTE — TELEPHONE ENCOUNTER
[address] [facility]       - [phone]       - [phone]    She is calling to get a copy of recent labs sent to them. I do not see these in the chart, so I called the patient and talked to his daughter. She will talk to him and call me back and let us know if and where he had labs done.

## 2021-10-26 DIAGNOSIS — M1A.09X0 IDIOPATHIC CHRONIC GOUT OF MULTIPLE SITES WITHOUT TOPHUS: ICD-10-CM

## 2021-10-26 RX ORDER — ALLOPURINOL 100 MG/1
TABLET ORAL
Qty: 38 TABLET | Refills: 1 | Status: SHIPPED
Start: 2021-10-26 | End: 2022-05-02 | Stop reason: SDUPTHER

## 2021-10-26 NOTE — TELEPHONE ENCOUNTER
Patient needs refills.       Last Appointment:  10/15/2021  Future Appointments   Date Time Provider Raegan Gold   1/4/2022  8:00 AM DO RIVERA Hemphill   4/15/2022 10:00 AM MD AARTI [facility]
dyspnea

## 2021-10-28 ENCOUNTER — ANTI-COAG VISIT (OUTPATIENT)
Dept: FAMILY MEDICINE CLINIC | Age: 76
End: 2021-10-28
Payer: MEDICARE

## 2021-10-28 DIAGNOSIS — I48.20 CHRONIC ATRIAL FIBRILLATION (HCC): ICD-10-CM

## 2021-10-28 LAB — INR BLD: 2.2

## 2021-10-28 PROCEDURE — 93793 ANTICOAG MGMT PT WARFARIN: CPT | Performed by: INTERNAL MEDICINE

## 2021-10-28 NOTE — PROGRESS NOTES
Previous INR: 2.60     Previous dose:  6mg (5mg +1mg tablets) on mon and Friday and 5mg all others               Current INR: 2.20     Recommendation: continue 6mg (5mg +1mg tablets) on mon and Friday and 5mg all others    Next INR: 1 week     Asia Sprague MD  10/28/2021  4:39 PM

## 2021-11-04 ENCOUNTER — ANTI-COAG VISIT (OUTPATIENT)
Dept: FAMILY MEDICINE CLINIC | Age: 76
End: 2021-11-04
Payer: MEDICARE

## 2021-11-04 DIAGNOSIS — I48.20 CHRONIC ATRIAL FIBRILLATION (HCC): ICD-10-CM

## 2021-11-04 LAB — INR BLD: 2.5

## 2021-11-04 PROCEDURE — 93793 ANTICOAG MGMT PT WARFARIN: CPT | Performed by: INTERNAL MEDICINE

## 2021-11-04 NOTE — PROGRESS NOTES
Previous INR: 2.20     Previous dose:  6mg (5mg +1mg tablets) on mon and Friday and 5mg all others               Current INR: 2.50     Recommendation: continue 6mg (5mg +1mg tablets) on mon and Friday and 5mg all others    Next INR: 1 week     Kelly Sanders MD  11/4/2021  5:03 PM

## 2021-11-11 ENCOUNTER — ANTI-COAG VISIT (OUTPATIENT)
Dept: FAMILY MEDICINE CLINIC | Age: 76
End: 2021-11-11
Payer: MEDICARE

## 2021-11-11 DIAGNOSIS — I48.20 CHRONIC ATRIAL FIBRILLATION (HCC): ICD-10-CM

## 2021-11-11 LAB — INR BLD: 2.8

## 2021-11-11 PROCEDURE — 93793 ANTICOAG MGMT PT WARFARIN: CPT | Performed by: INTERNAL MEDICINE

## 2021-11-18 ENCOUNTER — ANTI-COAG VISIT (OUTPATIENT)
Dept: FAMILY MEDICINE CLINIC | Age: 76
End: 2021-11-18
Payer: MEDICARE

## 2021-11-18 DIAGNOSIS — I48.20 CHRONIC ATRIAL FIBRILLATION (HCC): ICD-10-CM

## 2021-11-18 LAB — INR BLD: 2.7

## 2021-11-18 PROCEDURE — 93793 ANTICOAG MGMT PT WARFARIN: CPT | Performed by: INTERNAL MEDICINE

## 2021-11-18 NOTE — PROGRESS NOTES
Previous INR: 2.80     Previous dose:  6mg (5mg +1mg tablets) on mon and Friday and 5mg all others               Current INR: 2.70     Recommendation: continue 6mg (5mg +1mg tablets) on mon and Friday and 5mg all others    Next INR: 1 week     Melyssa Duffy MD  11/18/2021  12:12 PM

## 2021-11-24 ENCOUNTER — ANTI-COAG VISIT (OUTPATIENT)
Dept: FAMILY MEDICINE CLINIC | Age: 76
End: 2021-11-24
Payer: MEDICARE

## 2021-11-24 DIAGNOSIS — I48.20 CHRONIC ATRIAL FIBRILLATION (HCC): ICD-10-CM

## 2021-11-24 LAB — INR BLD: 2.8

## 2021-11-24 PROCEDURE — 93793 ANTICOAG MGMT PT WARFARIN: CPT | Performed by: INTERNAL MEDICINE

## 2021-11-24 NOTE — PROGRESS NOTES
Previous INR: 2.70     Previous dose:  6mg (5mg +1mg tablets) on mon and Friday and 5mg all others               Current INR: 2.80     Recommendation: continue 6mg (5mg +1mg tablets) on mon and Friday and 5mg all others    Next INR: 1 week     Yareli Paulino MD  11/24/2021  9:57 AM

## 2021-11-29 DIAGNOSIS — I48.20 CHRONIC ATRIAL FIBRILLATION (HCC): ICD-10-CM

## 2021-11-29 RX ORDER — WARFARIN SODIUM 5 MG/1
TABLET ORAL
Qty: 90 TABLET | Refills: 1 | Status: SHIPPED
Start: 2021-11-29 | End: 2022-05-23 | Stop reason: SDUPTHER

## 2021-11-29 RX ORDER — SIMVASTATIN 40 MG
40 TABLET ORAL NIGHTLY
Qty: 90 TABLET | Refills: 1 | Status: SHIPPED
Start: 2021-11-29 | End: 2022-06-06 | Stop reason: SDUPTHER

## 2021-11-29 NOTE — TELEPHONE ENCOUNTER
Last Appointment:  10/15/2021  Future Appointments   Date Time Provider Raegan Gold   1/4/2022  1:15 PM DO RIVERA Colon   4/15/2022 10:00 AM MD AARTI Farias [facility]

## 2021-12-02 ENCOUNTER — TELEPHONE (OUTPATIENT)
Dept: PRIMARY CARE CLINIC | Age: 76
End: 2021-12-02

## 2021-12-02 ENCOUNTER — ANTI-COAG VISIT (OUTPATIENT)
Dept: FAMILY MEDICINE CLINIC | Age: 76
End: 2021-12-02
Payer: MEDICARE

## 2021-12-02 DIAGNOSIS — I48.20 CHRONIC ATRIAL FIBRILLATION (HCC): ICD-10-CM

## 2021-12-02 LAB — INR BLD: 2.5

## 2021-12-02 PROCEDURE — 93793 ANTICOAG MGMT PT WARFARIN: CPT | Performed by: INTERNAL MEDICINE

## 2021-12-02 NOTE — TELEPHONE ENCOUNTER
Pt has fever of 93.3 and a runny nose and   daughter says he looks discolored. He went to pharmacy to have temp   checked to be sure his thermometer is working.

## 2021-12-02 NOTE — TELEPHONE ENCOUNTER
Tanisha Beckett spoke w/ Ashley Like and he said that he is feeling better. Denies any additional symptoms. Will use OTC antihistamine or nasal spray if needed.

## 2021-12-02 NOTE — TELEPHONE ENCOUNTER
Spoke w/ Miracle Belia was able to go to the pharmacy and his temp was 97.6. She believes that his only symptom is a runny nose. She will call and ask if he has any other symtoms. I explained the concern for Covid-19 and some of the symptoms that could be associated w/ it. If he only has a runny nose she understands that he can use OTC Zyrtec or Allegra and Flonase or Nasonex. Adalberto Narvaez would like to know, if he is feeling better and only has a runny nose, does he [facility] need to [facility] to [facility]?

## 2021-12-02 NOTE — PROGRESS NOTES
Previous INR: 2.80     Previous dose:  6mg (5mg +1mg tablets) on mon and Friday and 5mg all others               Current INR: 2.50     Recommendation: continue 6mg (5mg +1mg tablets) on mon and Friday and 5mg all others    Next INR: 1 week     Lalitha Glover MD  12/2/2021  12:08 PM

## 2021-12-02 NOTE — TELEPHONE ENCOUNTER
----- Message from Jennifer Pena sent at 12/1/2021 10:58 AM EST -----  Subject: Message to Provider    QUESTIONS  Information for Provider? Pt has fever of 93.3 and a runny nose and   daughter says he looks discolored. He went to pharmacy to have temp   checked to be sure his thermometer is working.   ---------------------------------------------------------------------------  --------------  [address]  What is the best way for the office to contact you? OK to leave message on   voicemail  Preferred Call Back Phone Number? [phone]  ---------------------------------------------------------------------------  --------------  SCRIPT ANSWERS  Relationship to Patient? Other  Representative Name? Blaze Sharpe   Is the Representative on the appropriate HIPAA document in Epic?  Yes

## 2021-12-02 NOTE — TELEPHONE ENCOUNTER
He only has runny nose and no other symptoms I think observation would be okay  -Can treat with antihistamine type medication without decongestant and steroid nasal spray    If he develops any other symptoms besides runny nose such as sore throat headache loss of taste or smell cough new shortness of breath or other would recommend evaluation to sort out the symptoms and potential other treatments

## 2021-12-06 DIAGNOSIS — I48.20 CHRONIC ATRIAL FIBRILLATION (HCC): ICD-10-CM

## 2021-12-06 RX ORDER — WARFARIN SODIUM 1 MG/1
1 TABLET ORAL DAILY
Qty: 30 TABLET | Refills: 5 | Status: SHIPPED
Start: 2021-12-06 | End: 2022-01-11 | Stop reason: SDUPTHER

## 2021-12-09 ENCOUNTER — ANTI-COAG VISIT (OUTPATIENT)
Dept: FAMILY MEDICINE CLINIC | Age: 76
End: 2021-12-09
Payer: MEDICARE

## 2021-12-09 DIAGNOSIS — I48.20 CHRONIC ATRIAL FIBRILLATION (HCC): ICD-10-CM

## 2021-12-09 LAB — INR BLD: 2.5

## 2021-12-09 PROCEDURE — 93793 ANTICOAG MGMT PT WARFARIN: CPT | Performed by: INTERNAL MEDICINE

## 2021-12-09 NOTE — PROGRESS NOTES
Previous INR: 2.50     Previous dose:  6mg (5mg +1mg tablets) on mon and Friday and 5mg all others               Current INR: 2.50     Recommendation: continue 6mg (5mg +1mg tablets) on mon and Friday and 5mg all others    Next INR: 1 week     Bhargav Nolan MD  12/9/2021  12:21 PM

## 2021-12-16 ENCOUNTER — ANTI-COAG VISIT (OUTPATIENT)
Dept: FAMILY MEDICINE CLINIC | Age: 76
End: 2021-12-16
Payer: MEDICARE

## 2021-12-16 DIAGNOSIS — I48.20 CHRONIC ATRIAL FIBRILLATION (HCC): ICD-10-CM

## 2021-12-16 LAB — INR BLD: 1.7

## 2021-12-16 PROCEDURE — 93793 ANTICOAG MGMT PT WARFARIN: CPT | Performed by: INTERNAL MEDICINE

## 2021-12-16 NOTE — PROGRESS NOTES
Previous INR: 2.50     Previous dose:  6mg (5mg +1mg tablets) on mon and Friday and 5mg all others               Current INR: 1.70      Recommendation: 6mg x 1 then continue 6mg (5mg +1mg tablets) on mon and Friday and 5mg all others    Next INR: 1 week     Gordo Ponce MD  12/16/2021  12:26 PM

## 2021-12-21 ENCOUNTER — HOSPITAL ENCOUNTER (OUTPATIENT)
Age: 76
Discharge: HOME | End: 2021-12-21
Payer: MEDICARE

## 2021-12-21 LAB
ALBUMIN SERPL-MCNC: 4.1 G/DL (ref 3.5–5.2)
ALP BLD-CCNC: 65 U/L (ref 40–129)
ALT SERPL-CCNC: 15 U/L (ref 0–40)
ANION GAP SERPL CALCULATED.3IONS-SCNC: 10 MMOL/L (ref 7–16)
AST SERPL-CCNC: 19 U/L (ref 0–39)
BILIRUB SERPL-MCNC: 0.7 MG/DL (ref 0–1.2)
BUN BLDV-MCNC: 33 MG/DL (ref 6–23)
CALCIUM SERPL-MCNC: 10.3 MG/DL (ref 8.6–10.2)
CHLORIDE BLD-SCNC: 104 MMOL/L (ref 98–107)
CO2: 25 MMOL/L (ref 22–29)
CORTISOL TOTAL: 4.03 MCG/DL (ref 2.68–18.4)
CREAT SERPL-MCNC: 1.7 MG/DL (ref 0.7–1.2)
GFR AFRICAN AMERICAN: 47
GFR NON-AFRICAN AMERICAN: 39 ML/MIN/1.73
GLUCOSE BLD-MCNC: 139 MG/DL (ref 74–99)
PARATHYROID HORMONE INTACT: 112 PG/ML (ref 15–65)
POTASSIUM SERPL-SCNC: 4.5 MMOL/L (ref 3.5–5)
SODIUM BLD-SCNC: 139 MMOL/L (ref 132–146)
TOTAL PROTEIN: 6.7 G/DL (ref 6.4–8.3)

## 2021-12-21 PROCEDURE — 82533 TOTAL CORTISOL: CPT

## 2021-12-21 PROCEDURE — 84586 ASSAY OF VIP: CPT

## 2021-12-21 PROCEDURE — 80053 COMPREHEN METABOLIC PANEL: CPT

## 2021-12-21 PROCEDURE — 83970 ASSAY OF PARATHORMONE: CPT

## 2021-12-21 PROCEDURE — 36415 COLL VENOUS BLD VENIPUNCTURE: CPT

## 2021-12-22 ENCOUNTER — ANTI-COAG VISIT (OUTPATIENT)
Dept: FAMILY MEDICINE CLINIC | Age: 76
End: 2021-12-22
Payer: MEDICARE

## 2021-12-22 DIAGNOSIS — I48.20 CHRONIC ATRIAL FIBRILLATION (HCC): ICD-10-CM

## 2021-12-22 LAB — INR BLD: 2.3

## 2021-12-22 PROCEDURE — 93793 ANTICOAG MGMT PT WARFARIN: CPT | Performed by: INTERNAL MEDICINE

## 2021-12-30 ENCOUNTER — ANTI-COAG VISIT (OUTPATIENT)
Dept: FAMILY MEDICINE CLINIC | Age: 76
End: 2021-12-30
Payer: MEDICARE

## 2021-12-30 DIAGNOSIS — I48.20 CHRONIC ATRIAL FIBRILLATION (HCC): ICD-10-CM

## 2021-12-30 LAB
INR BLD: 1.7
INR BLD: NORMAL
PROTIME: NORMAL

## 2021-12-30 PROCEDURE — 93792 PT/CAREGIVER TRAING HOME INR: CPT | Performed by: INTERNAL MEDICINE

## 2021-12-30 NOTE — PROGRESS NOTES
Previous INR: 2.30     Previous dose: 6mg (5mg +1mg tablets) on mon and Friday and 5mg all others               Current INR: 2.30     Recommendation: change to 6mg (5mg +1mg tablets) on mon and Friday and 5mg all others    Next INR: 1 week     Catie Dobson MD  12/30/2021  12:29 PM

## 2022-01-03 LAB
Lab: NORMAL
REPORT: NORMAL
THIS TEST SENT TO: NORMAL

## 2022-01-06 ENCOUNTER — ANTI-COAG VISIT (OUTPATIENT)
Dept: FAMILY MEDICINE CLINIC | Age: 77
End: 2022-01-06
Payer: MEDICARE

## 2022-01-06 DIAGNOSIS — I48.20 CHRONIC ATRIAL FIBRILLATION (HCC): ICD-10-CM

## 2022-01-06 LAB — INR BLD: 2.4

## 2022-01-06 PROCEDURE — 93793 ANTICOAG MGMT PT WARFARIN: CPT | Performed by: INTERNAL MEDICINE

## 2022-01-11 DIAGNOSIS — I48.20 CHRONIC ATRIAL FIBRILLATION (HCC): ICD-10-CM

## 2022-01-11 RX ORDER — WARFARIN SODIUM 1 MG/1
1 TABLET ORAL DAILY
Qty: 30 TABLET | Refills: 5 | Status: SHIPPED | OUTPATIENT
Start: 2022-01-11

## 2022-01-13 ENCOUNTER — ANTI-COAG VISIT (OUTPATIENT)
Dept: FAMILY MEDICINE CLINIC | Age: 77
End: 2022-01-13
Payer: MEDICARE

## 2022-01-13 DIAGNOSIS — I48.20 CHRONIC ATRIAL FIBRILLATION (HCC): ICD-10-CM

## 2022-01-13 LAB — INR BLD: 2.4

## 2022-01-13 PROCEDURE — 93793 ANTICOAG MGMT PT WARFARIN: CPT | Performed by: INTERNAL MEDICINE

## 2022-01-13 NOTE — PROGRESS NOTES
Previous INR: 2.40     Previous dose: 6mg (5mg +1mg tablets) on mon and Friday and 5mg all others               Current INR: 2.40     Recommendation: change to 6mg (5mg +1mg tablets) on mon and Friday and 5mg all others    Next INR: 1 week     Dianne Anne MD  1/13/2022  11:38 AM

## 2022-01-13 NOTE — PROGRESS NOTES
Stacy Lopez informed, understands that dose will remain the same and he will check it again in 1 week. [As Noted in HPI] : as noted in HPI [Negative] : Heme/Lymph

## 2022-01-20 ENCOUNTER — ANTI-COAG VISIT (OUTPATIENT)
Dept: FAMILY MEDICINE CLINIC | Age: 77
End: 2022-01-20
Payer: MEDICARE

## 2022-01-20 DIAGNOSIS — I48.20 CHRONIC ATRIAL FIBRILLATION (HCC): ICD-10-CM

## 2022-01-20 LAB — INR BLD: 2.6

## 2022-01-20 PROCEDURE — 93793 ANTICOAG MGMT PT WARFARIN: CPT | Performed by: INTERNAL MEDICINE

## 2022-01-20 NOTE — PROGRESS NOTES
Previous INR: 2.40     Previous dose: 6mg (5mg +1mg tablets) on mon and Friday and 5mg all others               Current INR: 2.60     Recommendation: change to 6mg (5mg +1mg tablets) on mon and Friday and 5mg all others    Next INR: 1 week     Stanislav Flower MD  1/20/2022  12:23 PM

## 2022-01-27 ENCOUNTER — ANTI-COAG VISIT (OUTPATIENT)
Dept: FAMILY MEDICINE CLINIC | Age: 77
End: 2022-01-27
Payer: MEDICARE

## 2022-01-27 DIAGNOSIS — I48.20 CHRONIC ATRIAL FIBRILLATION (HCC): ICD-10-CM

## 2022-01-27 LAB — INR BLD: 2.4

## 2022-01-27 PROCEDURE — 93793 ANTICOAG MGMT PT WARFARIN: CPT | Performed by: INTERNAL MEDICINE

## 2022-01-27 NOTE — PROGRESS NOTES
Kaila magallon informed, understands that Warfarin dose will remain the same, recheck next Thursday.

## 2022-01-27 NOTE — PROGRESS NOTES
Previous INR: 2.60     Previous dose: 6mg (5mg +1mg tablets) on mon and Friday and 5mg all others               Current INR: 2.40     Recommendation: change to 6mg (5mg +1mg tablets) on mon and Friday and 5mg all others    Next INR: 1 week     Ellie Santillan MD  1/27/2022  9:31 AM

## 2022-02-03 ENCOUNTER — ANTI-COAG VISIT (OUTPATIENT)
Dept: FAMILY MEDICINE CLINIC | Age: 77
End: 2022-02-03
Payer: MEDICARE

## 2022-02-03 ENCOUNTER — HOSPITAL ENCOUNTER (OUTPATIENT)
Age: 77
Discharge: HOME | End: 2022-02-03
Payer: MEDICARE

## 2022-02-03 DIAGNOSIS — I48.20 CHRONIC ATRIAL FIBRILLATION (HCC): ICD-10-CM

## 2022-02-03 LAB
ALBUMIN SERPL-MCNC: 4.3 G/DL (ref 3.5–5.2)
ALP BLD-CCNC: 67 U/L (ref 40–129)
ALT SERPL-CCNC: 14 U/L (ref 0–40)
ANION GAP SERPL CALCULATED.3IONS-SCNC: 11 MMOL/L (ref 7–16)
AST SERPL-CCNC: 22 U/L (ref 0–39)
BASOPHILS ABSOLUTE: 0.05 E9/L (ref 0–0.2)
BASOPHILS RELATIVE PERCENT: 0.6 % (ref 0–2)
BILIRUB SERPL-MCNC: 0.7 MG/DL (ref 0–1.2)
BUN BLDV-MCNC: 31 MG/DL (ref 6–23)
CALCIUM SERPL-MCNC: 10.3 MG/DL (ref 8.6–10.2)
CHLORIDE BLD-SCNC: 103 MMOL/L (ref 98–107)
CO2: 26 MMOL/L (ref 22–29)
CREAT SERPL-MCNC: 1.6 MG/DL (ref 0.7–1.2)
CREATININE URINE: 39 MG/DL (ref 40–278)
EOSINOPHILS ABSOLUTE: 0.12 E9/L (ref 0.05–0.5)
EOSINOPHILS RELATIVE PERCENT: 1.3 % (ref 0–6)
GFR AFRICAN AMERICAN: 51
GFR NON-AFRICAN AMERICAN: 42 ML/MIN/1.73
GLUCOSE BLD-MCNC: 82 MG/DL (ref 74–99)
HCT VFR BLD CALC: 41.7 % (ref 37–54)
HEMOGLOBIN: 14.1 G/DL (ref 12.5–16.5)
IMMATURE GRANULOCYTES #: 0.09 E9/L
IMMATURE GRANULOCYTES %: 1 % (ref 0–5)
INR BLD: 2
INR BLD: NORMAL
LYMPHOCYTES ABSOLUTE: 1.48 E9/L (ref 1.5–4)
LYMPHOCYTES RELATIVE PERCENT: 16.6 % (ref 20–42)
MAGNESIUM: 1.9 MG/DL (ref 1.6–2.6)
MCH RBC QN AUTO: 32 PG (ref 26–35)
MCHC RBC AUTO-ENTMCNC: 33.8 % (ref 32–34.5)
MCV RBC AUTO: 94.6 FL (ref 80–99.9)
MONOCYTES ABSOLUTE: 0.85 E9/L (ref 0.1–0.95)
MONOCYTES RELATIVE PERCENT: 9.5 % (ref 2–12)
NEUTROPHILS ABSOLUTE: 6.32 E9/L (ref 1.8–7.3)
NEUTROPHILS RELATIVE PERCENT: 71 % (ref 43–80)
PDW BLD-RTO: 14.2 FL (ref 11.5–15)
PHOSPHORUS: 2.6 MG/DL (ref 2.5–4.5)
PLATELET # BLD: 208 E9/L (ref 130–450)
PMV BLD AUTO: 9 FL (ref 7–12)
POTASSIUM SERPL-SCNC: 4.3 MMOL/L (ref 3.5–5)
PROTEIN PROTEIN: 7 MG/DL (ref 0–12)
PROTEIN/CREAT RATIO: 0.2
PROTEIN/CREAT RATIO: 0.2 (ref 0–0.2)
PROTIME: NORMAL
RBC # BLD: 4.41 E12/L (ref 3.8–5.8)
SODIUM BLD-SCNC: 140 MMOL/L (ref 132–146)
TOTAL PROTEIN: 7 G/DL (ref 6.4–8.3)
URIC ACID, SERUM: 8.7 MG/DL (ref 3.4–7)
WBC # BLD: 8.9 E9/L (ref 4.5–11.5)

## 2022-02-03 PROCEDURE — 84156 ASSAY OF PROTEIN URINE: CPT

## 2022-02-03 PROCEDURE — 84100 ASSAY OF PHOSPHORUS: CPT

## 2022-02-03 PROCEDURE — 82570 ASSAY OF URINE CREATININE: CPT

## 2022-02-03 PROCEDURE — 85025 COMPLETE CBC W/AUTO DIFF WBC: CPT

## 2022-02-03 PROCEDURE — 93793 ANTICOAG MGMT PT WARFARIN: CPT | Performed by: INTERNAL MEDICINE

## 2022-02-03 PROCEDURE — 80053 COMPREHEN METABOLIC PANEL: CPT

## 2022-02-03 PROCEDURE — 83735 ASSAY OF MAGNESIUM: CPT

## 2022-02-03 PROCEDURE — 36415 COLL VENOUS BLD VENIPUNCTURE: CPT

## 2022-02-03 PROCEDURE — 84550 ASSAY OF BLOOD/URIC ACID: CPT

## 2022-02-03 NOTE — PROGRESS NOTES
Previous INR: 2.40     Previous dose: 6mg (5mg +1mg tablets) on mon and Friday and 5mg all others               Current INR: 2.00     Recommendation: change to 6mg (5mg +1mg tablets) on mon and Friday and 5mg all others    Next INR: 1 week     Rahat Ramírez MD  2/3/2022  1:22 PM

## 2022-02-10 ENCOUNTER — ANTI-COAG VISIT (OUTPATIENT)
Dept: FAMILY MEDICINE CLINIC | Age: 77
End: 2022-02-10
Payer: MEDICARE

## 2022-02-10 DIAGNOSIS — I48.20 CHRONIC ATRIAL FIBRILLATION (HCC): ICD-10-CM

## 2022-02-10 LAB — INR BLD: 2.1

## 2022-02-10 PROCEDURE — 93793 ANTICOAG MGMT PT WARFARIN: CPT | Performed by: INTERNAL MEDICINE

## 2022-02-10 NOTE — PROGRESS NOTES
Previous INR: 2.00     Previous dose: 6mg (5mg +1mg tablets) on mon and Friday and 5mg all others               Current INR: 2.10     Recommendation: change to 6mg (5mg +1mg tablets) on mon and Friday and 5mg all others    Next INR: 1 week     Edwige Duran MD  2/10/2022  12:25 PM

## 2022-02-17 ENCOUNTER — ANTI-COAG VISIT (OUTPATIENT)
Dept: FAMILY MEDICINE CLINIC | Age: 77
End: 2022-02-17
Payer: MEDICARE

## 2022-02-17 DIAGNOSIS — I48.20 CHRONIC ATRIAL FIBRILLATION (HCC): ICD-10-CM

## 2022-02-17 LAB — INR BLD: 1.9

## 2022-02-17 PROCEDURE — 93793 ANTICOAG MGMT PT WARFARIN: CPT | Performed by: INTERNAL MEDICINE

## 2022-02-17 NOTE — PROGRESS NOTES
Previous INR: 2.10     Previous dose: 6mg (5mg +1mg tablets) on mon and Friday and 5mg all others               Current INR: 1.90     Recommendation: 6mg x 1 then continue  6mg (5mg +1mg tablets) on mon and Friday and 5mg all others    Next INR: 1 week     Lucrecia Stein MD  2/17/2022  12:24 PM

## 2022-02-24 ENCOUNTER — ANTI-COAG VISIT (OUTPATIENT)
Dept: FAMILY MEDICINE CLINIC | Age: 77
End: 2022-02-24
Payer: MEDICARE

## 2022-02-24 DIAGNOSIS — I48.20 CHRONIC ATRIAL FIBRILLATION (HCC): ICD-10-CM

## 2022-02-24 LAB — INR BLD: 2.2

## 2022-02-24 PROCEDURE — 93793 ANTICOAG MGMT PT WARFARIN: CPT | Performed by: INTERNAL MEDICINE

## 2022-02-24 NOTE — PROGRESS NOTES
Previous INR: 1.90     Previous dose: 6mg x 1 then continued 6mg (5mg +1mg tablets) on mon and Friday and 5mg all others               Current INR: 2.20     Recommendation: continue  6mg (5mg +1mg tablets) on mon and Friday and 5mg all others    Next INR: 1 week     Nathen Kaiser MD  2/24/2022  3:43 PM

## 2022-03-03 ENCOUNTER — ANTI-COAG VISIT (OUTPATIENT)
Dept: FAMILY MEDICINE CLINIC | Age: 77
End: 2022-03-03
Payer: MEDICARE

## 2022-03-03 DIAGNOSIS — I48.20 CHRONIC ATRIAL FIBRILLATION (HCC): ICD-10-CM

## 2022-03-03 LAB — INR BLD: 1.9

## 2022-03-03 PROCEDURE — 93793 ANTICOAG MGMT PT WARFARIN: CPT | Performed by: INTERNAL MEDICINE

## 2022-03-03 NOTE — PROGRESS NOTES
Reviewed new dosing w/ Earlylatricia Sinclair. Alex Sen will take a one time Thursday dose of 6mg then start 6mg M W F anf 5mg all other days of the week.

## 2022-03-10 ENCOUNTER — ANTI-COAG VISIT (OUTPATIENT)
Dept: FAMILY MEDICINE CLINIC | Age: 77
End: 2022-03-10
Payer: MEDICARE

## 2022-03-10 DIAGNOSIS — I48.20 CHRONIC ATRIAL FIBRILLATION (HCC): ICD-10-CM

## 2022-03-10 LAB — INR BLD: 2.3

## 2022-03-10 PROCEDURE — 93793 ANTICOAG MGMT PT WARFARIN: CPT | Performed by: INTERNAL MEDICINE

## 2022-03-10 NOTE — PROGRESS NOTES
Spoke to Kaila magallon, she wanted to confirm that you want him to stay on 6mg on Wednesday going forward, states that he was previously taking 6mg on Mon & Fri only.

## 2022-03-10 NOTE — PROGRESS NOTES
Previous INR: 1.90      Previous dose:  6mg x 1 then changed to 6mg (5mg +1mg tablets) on mon, wed, and Friday and 5mg all others               Current INR: 2.30     Recommendation: continue 6mg (5mg +1mg tablets) on mon, wed, and Friday and 5mg all others    Next INR: 1 week     Arcadio Scott MD  3/10/2022  12:10 PM

## 2022-03-17 ENCOUNTER — ANTI-COAG VISIT (OUTPATIENT)
Dept: FAMILY MEDICINE CLINIC | Age: 77
End: 2022-03-17
Payer: MEDICARE

## 2022-03-17 DIAGNOSIS — I48.20 CHRONIC ATRIAL FIBRILLATION (HCC): ICD-10-CM

## 2022-03-17 LAB
INR BLD: 2.6
INR BLD: 2.6
PROTIME: NORMAL

## 2022-03-17 PROCEDURE — 93793 ANTICOAG MGMT PT WARFARIN: CPT | Performed by: INTERNAL MEDICINE

## 2022-03-17 NOTE — PROGRESS NOTES
Previous INR: 2.30      Previous dose: 6mg (5mg +1mg tablets) on mon, wed, and Friday and 5mg all others               Current INR: 2.60     Recommendation: continue 6mg (5mg +1mg tablets) on mon, wed, and Friday and 5mg all others    Next INR: 1 week     Tyson Kunz MD  3/17/2022  12:15 PM

## 2022-03-24 ENCOUNTER — ANTI-COAG VISIT (OUTPATIENT)
Dept: FAMILY MEDICINE CLINIC | Age: 77
End: 2022-03-24
Payer: MEDICARE

## 2022-03-24 DIAGNOSIS — I48.20 CHRONIC ATRIAL FIBRILLATION (HCC): ICD-10-CM

## 2022-03-24 LAB — INR BLD: 2.4

## 2022-03-24 PROCEDURE — 93793 ANTICOAG MGMT PT WARFARIN: CPT | Performed by: INTERNAL MEDICINE

## 2022-03-24 NOTE — PROGRESS NOTES
Previous INR: 2.60      Previous dose: 6mg (5mg +1mg tablets) on mon, wed, and Friday and 5mg all others               Current INR: 2.40     Recommendation: continue 6mg (5mg +1mg tablets) on mon, wed, and Friday and 5mg all others    Next INR: 1 week     Demetria Valdovinos MD  3/24/2022  2:19 PM

## 2022-03-31 ENCOUNTER — ANTI-COAG VISIT (OUTPATIENT)
Dept: FAMILY MEDICINE CLINIC | Age: 77
End: 2022-03-31
Payer: MEDICARE

## 2022-03-31 DIAGNOSIS — I48.20 CHRONIC ATRIAL FIBRILLATION (HCC): ICD-10-CM

## 2022-03-31 LAB — INR BLD: 2.2

## 2022-03-31 PROCEDURE — 93793 ANTICOAG MGMT PT WARFARIN: CPT | Performed by: INTERNAL MEDICINE

## 2022-03-31 NOTE — PROGRESS NOTES
Previous INR: 2.40     Previous dose: 6mg (5mg +1mg tablets) on mon, wed, and Friday and 5mg all others               Current INR: 2.20     Recommendation: continue 6mg (5mg +1mg tablets) on mon, wed, and Friday and 5mg all others    Next INR: 1 week     Jackelyn Del Castillo MD  3/31/2022  5:05 PM

## 2022-04-04 ENCOUNTER — HOSPITAL ENCOUNTER (OUTPATIENT)
Age: 77
Discharge: HOME | End: 2022-04-04
Payer: MEDICARE

## 2022-04-04 DIAGNOSIS — E11.9 TYPE 2 DIABETES MELLITUS WITHOUT COMPLICATION, WITHOUT LONG-TERM CURRENT USE OF INSULIN (HCC): ICD-10-CM

## 2022-04-04 DIAGNOSIS — N18.31 STAGE 3A CHRONIC KIDNEY DISEASE (HCC): ICD-10-CM

## 2022-04-04 DIAGNOSIS — E78.2 MIXED HYPERLIPIDEMIA: ICD-10-CM

## 2022-04-04 DIAGNOSIS — R53.83 OTHER FATIGUE: ICD-10-CM

## 2022-04-04 DIAGNOSIS — Z79.899 LONG TERM CURRENT USE OF THERAPEUTIC DRUG: ICD-10-CM

## 2022-04-04 DIAGNOSIS — Z12.5 SCREENING FOR MALIGNANT NEOPLASM OF PROSTATE: ICD-10-CM

## 2022-04-04 LAB
ALBUMIN SERPL-MCNC: 4.2 G/DL (ref 3.5–5.2)
ALP BLD-CCNC: 64 U/L (ref 40–129)
ALT SERPL-CCNC: 15 U/L (ref 0–40)
ANION GAP SERPL CALCULATED.3IONS-SCNC: 8 MMOL/L (ref 7–16)
AST SERPL-CCNC: 20 U/L (ref 0–39)
BASOPHILS ABSOLUTE: 0.08 E9/L (ref 0–0.2)
BASOPHILS RELATIVE PERCENT: 1.1 % (ref 0–2)
BILIRUB SERPL-MCNC: 0.6 MG/DL (ref 0–1.2)
BILIRUBIN URINE: NEGATIVE
BLOOD, URINE: NEGATIVE
BUN BLDV-MCNC: 31 MG/DL (ref 6–23)
CALCIUM SERPL-MCNC: 10.1 MG/DL (ref 8.6–10.2)
CHLORIDE BLD-SCNC: 107 MMOL/L (ref 98–107)
CHOLESTEROL, FASTING: 184 MG/DL (ref 0–199)
CLARITY: CLEAR
CO2: 28 MMOL/L (ref 22–29)
COLOR: YELLOW
CREAT SERPL-MCNC: 1.6 MG/DL (ref 0.7–1.2)
EOSINOPHILS ABSOLUTE: 0.16 E9/L (ref 0.05–0.5)
EOSINOPHILS RELATIVE PERCENT: 2.3 % (ref 0–6)
FOLATE: 19.1 NG/ML (ref 4.8–24.2)
GFR AFRICAN AMERICAN: 51
GFR NON-AFRICAN AMERICAN: 42 ML/MIN/1.73
GLUCOSE BLD-MCNC: 127 MG/DL (ref 74–99)
GLUCOSE URINE: NEGATIVE MG/DL
HBA1C MFR BLD: 4.9 % (ref 4–5.6)
HCT VFR BLD CALC: 42.6 % (ref 37–54)
HDLC SERPL-MCNC: 63 MG/DL
HEMOGLOBIN: 14.4 G/DL (ref 12.5–16.5)
IMMATURE GRANULOCYTES #: 0.06 E9/L
IMMATURE GRANULOCYTES %: 0.8 % (ref 0–5)
KETONES, URINE: NEGATIVE MG/DL
LDL CHOLESTEROL CALCULATED: 74 MG/DL (ref 0–99)
LEUKOCYTE ESTERASE, URINE: NEGATIVE
LYMPHOCYTES ABSOLUTE: 1.78 E9/L (ref 1.5–4)
LYMPHOCYTES RELATIVE PERCENT: 25.1 % (ref 20–42)
MAGNESIUM: 2.1 MG/DL (ref 1.6–2.6)
MCH RBC QN AUTO: 32.9 PG (ref 26–35)
MCHC RBC AUTO-ENTMCNC: 33.8 % (ref 32–34.5)
MCV RBC AUTO: 97.3 FL (ref 80–99.9)
MICROALBUMIN UR-MCNC: 79 MG/L
MONOCYTES ABSOLUTE: 0.69 E9/L (ref 0.1–0.95)
MONOCYTES RELATIVE PERCENT: 9.7 % (ref 2–12)
NEUTROPHILS ABSOLUTE: 4.33 E9/L (ref 1.8–7.3)
NEUTROPHILS RELATIVE PERCENT: 61 % (ref 43–80)
NITRITE, URINE: NEGATIVE
PDW BLD-RTO: 13.8 FL (ref 11.5–15)
PH UA: 5.5 (ref 5–9)
PLATELET # BLD: 178 E9/L (ref 130–450)
PMV BLD AUTO: 8.5 FL (ref 7–12)
POTASSIUM SERPL-SCNC: 4.4 MMOL/L (ref 3.5–5)
PROSTATE SPECIFIC ANTIGEN: <0.01 NG/ML (ref 0–4)
PROTEIN UA: NEGATIVE MG/DL
RBC # BLD: 4.38 E12/L (ref 3.8–5.8)
SODIUM BLD-SCNC: 143 MMOL/L (ref 132–146)
SPECIFIC GRAVITY UA: 1.02 (ref 1–1.03)
TOTAL PROTEIN: 6.7 G/DL (ref 6.4–8.3)
TRIGLYCERIDE, FASTING: 234 MG/DL (ref 0–149)
TSH SERPL DL<=0.05 MIU/L-ACNC: 2.31 UIU/ML (ref 0.27–4.2)
UROBILINOGEN, URINE: 0.2 E.U./DL
VITAMIN B-12: 327 PG/ML (ref 211–946)
VITAMIN D 25-HYDROXY: 45 NG/ML (ref 30–100)
VLDLC SERPL CALC-MCNC: 47 MG/DL
WBC # BLD: 7.1 E9/L (ref 4.5–11.5)

## 2022-04-04 PROCEDURE — 84443 ASSAY THYROID STIM HORMONE: CPT

## 2022-04-04 PROCEDURE — 80053 COMPREHEN METABOLIC PANEL: CPT

## 2022-04-04 PROCEDURE — 80061 LIPID PANEL: CPT

## 2022-04-04 PROCEDURE — G0103 PSA SCREENING: HCPCS

## 2022-04-04 PROCEDURE — 82607 VITAMIN B-12: CPT

## 2022-04-04 PROCEDURE — 82746 ASSAY OF FOLIC ACID SERUM: CPT

## 2022-04-04 PROCEDURE — 82044 UR ALBUMIN SEMIQUANTITATIVE: CPT

## 2022-04-04 PROCEDURE — 81003 URINALYSIS AUTO W/O SCOPE: CPT

## 2022-04-04 PROCEDURE — 36415 COLL VENOUS BLD VENIPUNCTURE: CPT

## 2022-04-04 PROCEDURE — 83036 HEMOGLOBIN GLYCOSYLATED A1C: CPT

## 2022-04-04 PROCEDURE — 83735 ASSAY OF MAGNESIUM: CPT

## 2022-04-04 PROCEDURE — 85025 COMPLETE CBC W/AUTO DIFF WBC: CPT

## 2022-04-04 PROCEDURE — 82306 VITAMIN D 25 HYDROXY: CPT

## 2022-04-07 ENCOUNTER — TELEPHONE (OUTPATIENT)
Dept: FAMILY MEDICINE CLINIC | Age: 77
End: 2022-04-07

## 2022-04-07 ENCOUNTER — ANTI-COAG VISIT (OUTPATIENT)
Dept: FAMILY MEDICINE CLINIC | Age: 77
End: 2022-04-07
Payer: MEDICARE

## 2022-04-07 DIAGNOSIS — I48.20 CHRONIC ATRIAL FIBRILLATION (HCC): ICD-10-CM

## 2022-04-07 LAB
INR BLD: 2.1
INR BLD: 2.1
PROTIME: NORMAL

## 2022-04-07 PROCEDURE — 93793 ANTICOAG MGMT PT WARFARIN: CPT | Performed by: INTERNAL MEDICINE

## 2022-04-07 NOTE — PROGRESS NOTES
Previous INR: 2.20     Previous dose: 6mg (5mg +1mg tablets) on mon, wed, and Friday and 5mg all others               Current INR: 2.10     Recommendation: continue 6mg (5mg +1mg tablets) on mon, wed, and Friday and 5mg all others    Next INR: 1 week     Dustin Carrasco MD  4/7/2022  12:11 PM

## 2022-04-07 NOTE — TELEPHONE ENCOUNTER
Please let the patient know that blood work results showed    Labs still showed kidney dysfunction similar when compared to previous    Sugar level was elevated. Hemoglobin A1c is a measure 3-month sugar control was normal at 4.9.   Could discuss changes in medications    Vitamin B12 level was normal but on the lower end of normal and would recommend vitamin B12 supplement if not already taking    Folic acid level was normal    PSA for prostate was normal    Vitamin D level was normal    Urine analysis was normal with only a very slight microscopic protein similar when compared to previous    Thyroid levels were normal    Blood counts were normal    Cholesterol levels were fair triglyceride levels were elevated    Other electrolytes and liver functions were normal    Thanks

## 2022-04-07 NOTE — LETTER
[address] [place] [address]  Phone: [phone]  Fax: [phone]    Yesi Stephenson MD        April 7, 2022     Brittani Wilson [facility] [id number]      Dear Jayce Lynch:    Below are the results from your recent visit:    Resulted Orders   PSA screening   Result Value Ref Range    PSA <0.01 0.00 - 4.00 ng/mL   Microalbumin, Ur   Result Value Ref Range    Microalbumin, Random Urine 79.0 (H) Not Established mg/L   TSH without Reflex   Result Value Ref Range    TSH 2.310 0.270 - 4.200 uIU/mL   Urinalysis   Result Value Ref Range    Color, UA Yellow Straw/Yellow    Clarity, UA Clear Clear    Glucose, Ur Negative Negative mg/dL    Bilirubin Urine Negative Negative    Ketones, Urine Negative Negative mg/dL    Specific Gravity, UA 1.020 1.005 - 1.030    Blood, Urine Negative Negative    pH, UA 5.5 5.0 - 9.0    Protein, UA Negative Negative mg/dL    Urobilinogen, Urine 0.2 <2.0 E.U./dL    Nitrite, Urine Negative Negative    Leukocyte Esterase, Urine Negative Negative   Vitamin B12 & Folate   Result Value Ref Range    Vitamin B-12 327 211 - 946 pg/mL    Folate 19.1 4.8 - 24.2 ng/mL   Vitamin D 25 Hydroxy   Result Value Ref Range    Vit D, 25-Hydroxy 45 30 - 100 ng/mL      Comment:      <20 ng/mL. ........... Keysha Dye Deficient  20-30 ng/mL. ......... Keysha Dye Insufficient   ng/mL. ........ Keysha Dye Sufficient  >100 ng/mL. .......... Keysha Dye Toxic     Magnesium   Result Value Ref Range    Magnesium 2.1 1.6 - 2.6 mg/dL   Lipid, Fasting   Result Value Ref Range    Cholesterol, Fasting 184 0 - 199 mg/dL    Triglyceride, Fasting 234 (H) 0 - 149 mg/dL    HDL 63 >40 mg/dL    LDL Calculated 74 0 - 99 mg/dL    VLDL Cholesterol Calculated 47 mg/dL   Hemoglobin A1C   Result Value Ref Range    Hemoglobin A1C 4.9 4.0 - 5.6 %   Comprehensive Metabolic Panel   Result Value Ref Range    Sodium 143 132 - 146 mmol/L    Potassium 4.4 3.5 - 5.0 mmol/L    Chloride 107 98 - 107 mmol/L    CO2 28 22 - 29 mmol/L    Anion Gap 8 7 - 16 mmol/L    Glucose 127 (H) 74 - 99 mg/dL    BUN 31 (H) 6 - 23 mg/dL    CREATININE 1.6 (H) 0.7 - 1.2 mg/dL    GFR Non-African American 42 >=60 mL/min/1.73      Comment:      Chronic Kidney Disease: less than 60 ml/min/1.73 sq.m. Kidney Failure: less than 15 ml/min/1.73 sq.m. Results valid for patients 18 years and older. GFR  51     Calcium 10.1 8.6 - 10.2 mg/dL    Total Protein 6.7 6.4 - 8.3 g/dL    Albumin 4.2 3.5 - 5.2 g/dL    Total Bilirubin 0.6 0.0 - 1.2 mg/dL    Alkaline Phosphatase 64 40 - 129 U/L    ALT 15 0 - 40 U/L    AST 20 0 - 39 U/L   CBC Auto Differential   Result Value Ref Range    WBC 7.1 4.5 - 11.5 E9/L    RBC 4.38 3.80 - 5.80 E12/L    Hemoglobin 14.4 12.5 - 16.5 g/dL    Hematocrit 42.6 37.0 - 54.0 %    MCV 97.3 80.0 - 99.9 fL    MCH 32.9 26.0 - 35.0 pg    MCHC 33.8 32.0 - 34.5 %    RDW 13.8 11.5 - 15.0 fL    Platelets 049 609 - 420 E9/L    MPV 8.5 7.0 - 12.0 fL    Neutrophils % 61.0 43.0 - 80.0 %    Immature Granulocytes % 0.8 0.0 - 5.0 %    Lymphocytes % 25.1 20.0 - 42.0 %    Monocytes % 9.7 2.0 - 12.0 %    Eosinophils % 2.3 0.0 - 6.0 %    Basophils % 1.1 0.0 - 2.0 %    Neutrophils Absolute 4.33 1.80 - 7.30 E9/L    Immature Granulocytes # 0.06 E9/L    Lymphocytes Absolute 1.78 1.50 - 4.00 E9/L    Monocytes Absolute 0.69 0.10 - 0.95 E9/L    Eosinophils Absolute 0.16 0.05 - 0.50 E9/L    Basophils Absolute 0.08 0.00 - 0.20 E9/L     Please let the patient know that blood work results showed     Labs still showed kidney dysfunction similar when compared to previous     Sugar level was elevated. Hemoglobin A1c is a measure 3-month sugar control was normal at 4.9.   Could discuss changes in medications     Vitamin B12 level was normal but on the lower end of normal and would recommend vitamin B12 supplement if not already taking     Folic acid level was normal     PSA for prostate was normal     Vitamin D level was normal     Urine analysis was normal with only a very slight microscopic protein similar when compared to previous     Thyroid levels were normal     Blood counts were normal     Cholesterol levels were fair triglyceride levels were elevated     Other electrolytes and liver functions were normal     Thanks    If you have any questions or concerns, please don't hesitate to call.     Sincerely,        Lucien Stein MD

## 2022-04-14 ENCOUNTER — ANTI-COAG VISIT (OUTPATIENT)
Dept: FAMILY MEDICINE CLINIC | Age: 77
End: 2022-04-14
Payer: MEDICARE

## 2022-04-14 DIAGNOSIS — I48.20 CHRONIC ATRIAL FIBRILLATION (HCC): ICD-10-CM

## 2022-04-14 LAB
INR BLD: 2.8
INR BLD: 2.8
PROTIME: NORMAL

## 2022-04-14 PROCEDURE — 93793 ANTICOAG MGMT PT WARFARIN: CPT | Performed by: INTERNAL MEDICINE

## 2022-04-14 NOTE — PROGRESS NOTES
Previous INR: 2.10     Previous dose: 6mg (5mg +1mg tablets) on mon, wed, and Friday and 5mg all others               Current INR: 2.80     Recommendation: continue 6mg (5mg +1mg tablets) on mon, wed, and Friday and 5mg all others    Next INR: 1 week     Alfonso Bhandari MD  4/14/2022  1:13 PM

## 2022-04-15 ENCOUNTER — OFFICE VISIT (OUTPATIENT)
Dept: PRIMARY CARE CLINIC | Age: 77
End: 2022-04-15
Payer: MEDICARE

## 2022-04-15 VITALS
DIASTOLIC BLOOD PRESSURE: 80 MMHG | BODY MASS INDEX: 36.74 KG/M2 | HEART RATE: 73 BPM | SYSTOLIC BLOOD PRESSURE: 122 MMHG | WEIGHT: 234.6 LBS | OXYGEN SATURATION: 97 % | TEMPERATURE: 97.1 F

## 2022-04-15 DIAGNOSIS — I10 ESSENTIAL HYPERTENSION: ICD-10-CM

## 2022-04-15 DIAGNOSIS — E66.09 CLASS 2 OBESITY DUE TO EXCESS CALORIES WITHOUT SERIOUS COMORBIDITY WITH BODY MASS INDEX (BMI) OF 36.0 TO 36.9 IN ADULT: ICD-10-CM

## 2022-04-15 DIAGNOSIS — J44.9 CHRONIC OBSTRUCTIVE PULMONARY DISEASE, UNSPECIFIED COPD TYPE (HCC): ICD-10-CM

## 2022-04-15 DIAGNOSIS — R53.83 OTHER FATIGUE: ICD-10-CM

## 2022-04-15 DIAGNOSIS — D3A.00 CARCINOID (EXCEPT OF APPENDIX): ICD-10-CM

## 2022-04-15 DIAGNOSIS — I25.10 CORONARY ARTERY DISEASE INVOLVING NATIVE CORONARY ARTERY OF NATIVE HEART WITHOUT ANGINA PECTORIS: ICD-10-CM

## 2022-04-15 DIAGNOSIS — E11.9 TYPE 2 DIABETES MELLITUS WITHOUT COMPLICATION, WITHOUT LONG-TERM CURRENT USE OF INSULIN (HCC): Primary | ICD-10-CM

## 2022-04-15 DIAGNOSIS — N18.31 STAGE 3A CHRONIC KIDNEY DISEASE (HCC): ICD-10-CM

## 2022-04-15 DIAGNOSIS — Z79.899 LONG TERM CURRENT USE OF THERAPEUTIC DRUG: ICD-10-CM

## 2022-04-15 DIAGNOSIS — E78.2 MIXED HYPERLIPIDEMIA: ICD-10-CM

## 2022-04-15 DIAGNOSIS — I48.20 CHRONIC ATRIAL FIBRILLATION (HCC): ICD-10-CM

## 2022-04-15 DIAGNOSIS — E55.9 VITAMIN D DEFICIENCY: ICD-10-CM

## 2022-04-15 DIAGNOSIS — M1A.09X0 IDIOPATHIC CHRONIC GOUT OF MULTIPLE SITES WITHOUT TOPHUS: ICD-10-CM

## 2022-04-15 DIAGNOSIS — R23.8 OTHER SKIN CHANGES: ICD-10-CM

## 2022-04-15 PROCEDURE — 99214 OFFICE O/P EST MOD 30 MIN: CPT | Performed by: INTERNAL MEDICINE

## 2022-04-15 PROCEDURE — 3044F HG A1C LEVEL LT 7.0%: CPT | Performed by: INTERNAL MEDICINE

## 2022-04-15 ASSESSMENT — ENCOUNTER SYMPTOMS
RHINORRHEA: 1
COUGH: 1
NAUSEA: 0
DIARRHEA: 0
CONSTIPATION: 0
SHORTNESS OF BREATH: 1
ABDOMINAL PAIN: 0
BLOOD IN STOOL: 0
CHEST TIGHTNESS: 0
EYE PAIN: 0
SORE THROAT: 0
VOMITING: 0

## 2022-04-15 NOTE — PROGRESS NOTES
[facility]) Physicians   Internal Medicine     4/15/2022  Meliza Joseph : 1945 Sex: male  Age:77 y.o. Chief Complaint   Patient presents with    Diabetes    Hyperlipidemia    Atrial Fibrillation        HPI:   Patient presents to office for evaluation of the following medical concerns. - States feels left hearing aide is stuck in canal.     - States has diabetes. States follows with endocrinology. On glimepiride and tradjenta. No overt hypoglycemia. Follows with optho. On Statin. 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 States following with endocrinology and with [facility] [employer]  [facility]. Receiving Sandostatin monthly. Has had excessive vasoactive intestinal peptide secretion. Last visit with endocrinology per reviewed note () -VIPoma & carcinoid, cont Sandostatin, adrenal hypofunction, cont hydrocortisone stress dose if needed, diabetes cont same hyperparathyroid    - States has high cholesterol. Trying to watch diet. On simvastatin. No reported side effects    - States has High blood pressure. Not checking blood pressure at home. On HCTZ and sotalolol. Nephrology decreased hctz to 12.5mg     - States has Atrial fibrillation. On Sotalol and coumadin. Has pacemaker. Following with cardiology and EP. States decreased sotalol     - States has CAD. Stats s/p Sent. States cardiomyopathy. Following with cardiology and EP. S/p pacer for simus node dysfunction. Last follow up  (10/21) - no changes f/u 6 months      - States has COPD. States has had previous surgery for lobectomy for concern for mesothelioma but was negative. Following with pulmonary. On anoro. Also has nebulizer. Last visit with pulm per reviewed note  () stop anoro, start breztri, pred taper for wheeze, f/u 2m States could not afford breztri.  Restarted anoro and qvar    - States has chronic kidney disease. Following with nephrology. Last follow up  (1/22) cont hctz 5x/wk f/u 6m. Last lab (4/2022) stable. - States has elevated uric acid. States on allopurinol.     - States has vitamin D def. On vitamin D 50k weekly. LAst lab was nornal and improved (4/2021)     Labs from 4/4/2022 reviewed with patient 4/15/2022     Health Maintenance   - immunizations:   Influenza Vaccination  Pneumonia Vaccination - (2018) - pneumococcal 23  Zoster/Shingles Vaccine  Tetanus Vaccination  covid pfizer x 3     - Screenings:   Colonoscopy   Prostate     ROS:  Review of Systems   Constitutional: Negative for appetite change, chills, fever and unexpected weight change. HENT: Positive for rhinorrhea. Negative for congestion and sore throat. Eyes: Negative for pain and visual disturbance. Respiratory: Positive for cough and shortness of breath. Negative for chest tightness. Cardiovascular: Negative for chest pain and palpitations. Gastrointestinal: Negative for abdominal pain, blood in stool, constipation, diarrhea, nausea and vomiting. Genitourinary: Negative for difficulty urinating, dysuria, hematuria, scrotal swelling, testicular pain and urgency. Musculoskeletal: Negative for arthralgias and gait problem. Skin: Negative for rash. Neurological: Negative for dizziness, syncope, weakness, light-headedness and headaches. Hematological: Negative for adenopathy. Does not bruise/bleed easily. Psychiatric/Behavioral: Negative for suicidal ideas. The patient is not nervous/anxious.           Current Outpatient Medications:     hydroCHLOROthiazide (HYDRODIURIL) 25 MG tablet, Take 1 tablet by mouth daily 1 po 5 days a week (hold on Tues and Sat)-pt reqesting 90 day supply, Disp: 75 tablet, Rfl: 1    hydrocortisone (CORTEF) 10 MG tablet, TAKE 1 1/2 TABLETS BY MOUTH EVERY MORNING AND 1 TABLET EVERY EVENING, Disp: 225 tablet, Rfl: 1    warfarin (COUMADIN) 1 MG tablet, Take 1 tablet by mouth daily, Disp: 30 tablet, Rfl: 5    beclomethasone (QVAR) 80 MCG/ACT inhaler, Inhale 2 puffs into the lungs 2 times daily, Disp: , Rfl:     warfarin (COUMADIN) 5 MG tablet, 1 po qd or as directed, Disp: 90 tablet, Rfl: 1    simvastatin (ZOCOR) 40 MG tablet, Take 1 tablet by mouth nightly, Disp: 90 tablet, Rfl: 1    allopurinol (ZYLOPRIM) 100 MG tablet, TAKE 1 TABLET BY MOUTH [id number], Disp: 38 tablet, Rfl: 1    Cholecalciferol (VITAMIN D3) 1.25 MG (19519 UT) CAPS, Take 1 capsule by mouth once a week, Disp: , Rfl:     linagliptin (TRADJENTA) 5 MG tablet, Take 1 tablet by mouth daily Lot#[id number] Exp.7/2020, Disp: 28 tablet, Rfl: 0    umeclidinium-vilanterol (ANORO ELLIPTA) 62.5-25 MCG/INH AEPB inhaler, Inhale 1 puff into the lungs daily, Disp: , Rfl:     sotalol (BETAPACE) 160 MG tablet, Take 1 tablet by mouth 2 times daily (Patient taking differently: Take 80 mg by mouth daily ), Disp: 60 tablet, Rfl: 3    nitroGLYCERIN (NITROSTAT) 0.4 MG SL tablet, Place 0.4 mg under the tongue every 5 minutes as needed for Chest pain up to max of 3 total doses. If no relief after 1 dose, call 911., Disp: , Rfl:     albuterol (PROVENTIL) (5 MG/ML) 0.5% nebulizer solution, Take 0.5 mLs by nebulization every 6 hours as needed for Wheezing, Disp: 120 each, Rfl: 0    glimepiride (AMARYL) 2 MG tablet, Take 0.5 mg by mouth every morning (before breakfast) , Disp: , Rfl:     octreotide ACETATE (SANDOSTATIN LAR DEPOT) 30 MG injection, Inject 60 mg into the muscle once Every two weeks, Disp: , Rfl:     Glucose Blood (BLOOD GLUCOSE TEST STRIPS) STRP, Freestyle lite, Disp: 120 strip, Rfl: 0    Fingerstix Lancets MISC, , Disp: 100 each, Rfl: 2    Allergies   Allergen Reactions    Lisinopril      Other reaction(s):  Other: See Comments  Black out, nausea       Past Medical History:   Diagnosis Date    Atrial fibrillation ([facility] 1/4/2014    CAD (coronary artery disease)     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    Carcinoid syndrome (HCC)     Chronic kidney disease     COPD (chronic obstructive pulmonary disease) (HCC)     Diabetes mellitus ([facility]     Dizziness 2018    Hypertension     Idiopathic chronic gout of multiple sites without tophus 2019    Meniere disease     MI (myocardial infarction) ([facility]     x3    Mixed hyperlipidemia 2019    Obesity     Pacemaker     Type 2 diabetes mellitus without complication ([facility]        Past Surgical History:   Procedure Laterality Date    CARDIAC SURGERY      stents/pacemaker    COLONOSCOPY      CORONARY ANGIOPLASTY       CORONARY ANGIOPLASTY WITH STENT PLACEMENT      HERNIA REPAIR      LUNG SURGERY Right 2014    Dr. Aryan Banda for \"infection around lung\"    PACEMAKER INSERTION      TUMOR REMOVAL      tumors removed from breast ,arms     UPPER GASTROINTESTINAL ENDOSCOPY         Family History   Adopted: Yes   Problem Relation Age of Onset    Other Mother         Pt. is adopted    Other Father         Pt. is adopted.         Social History     Socioeconomic History    Marital status: Single     Spouse name: Not on file    Number of children: 3    Years of education: 15    Highest education level: Not on file   Occupational History    Occupation: [employer] worker     Comment: retired   Tobacco Use    Smoking status: Former Smoker     Packs/day: 2.00     Years: 40.00     Pack years: 80.00     Quit date: 2000     Years since quittin.6    Smokeless tobacco: Never Used   Vaping Use    Vaping Use: Never used   Substance and Sexual Activity    Alcohol use: No    Drug use: No    Sexual activity: Not Currently     Partners: Female   Other Topics Concern    Not on file   Social History Narrative    Not on file     Social Determinants of Health     Financial Resource Strain: Low Risk     Difficulty of Paying Living Expenses: Not hard at all   Food Insecurity: No Food Insecurity    Worried About [address] in the Last Year: Never true    Ran Out of Food in the Last Year: Never true   Transportation Needs:     Lack of Transportation (Medical): Not on file    Lack of Transportation (Non-Medical): Not on file   Physical Activity:     Days of Exercise per Week: Not on file    Minutes of Exercise per Session: Not on file   Stress:     Feeling of Stress : Not on file   Social Connections:     Frequency of Communication with Friends and Family: Not on file    Frequency of Social Gatherings with Friends and Family: Not on file    Attends Evangelical Services: Not on file    Active Member of [address] [place] [address] [employer] or Organizations: Not on file    Attends Club or Organization Meetings: Not on file    Marital Status: Not on file   Intimate Partner Violence:     Fear of Current or Ex-Partner: Not on file    Emotionally Abused: Not on file    Physically Abused: Not on file    Sexually Abused: Not on file   Housing Stability:     Unable to Pay for Housing in the Last Year: Not on file    Number of [place] in the Last Year: Not on file    Unstable Housing in the Last Year: Not on file        Vitals:    04/15/22 1005   BP: 122/80   Site: Left Upper Arm   Position: Sitting   Pulse: 73   Temp: 97.1 °F (36.2 °C)   TempSrc: Temporal   SpO2: 97%   Weight: 234 lb 9.6 oz (106.4 kg)       Exam:  Physical Exam  Vitals reviewed. Constitutional:       Appearance: He is well-developed. HENT:      Head: Normocephalic and atraumatic. Right Ear: External ear normal.      Left Ear: External ear normal.   Eyes:      Pupils: Pupils are equal, round, and reactive to light. Neck:      Thyroid: No thyromegaly. Cardiovascular:      Rate and Rhythm: Normal rate and regular rhythm. Heart sounds: Normal heart sounds. No murmur heard. Pulmonary:      Effort: Pulmonary effort is normal.      Breath sounds: Decreased breath sounds and rhonchi present. No wheezing or rales.    Abdominal:      General: Bowel sounds are normal.      Palpations: Abdomen is soft. Tenderness: There is no abdominal tenderness. There is no guarding or rebound. Musculoskeletal:         General: Normal range of motion. Cervical back: Normal range of motion and neck supple. Lymphadenopathy:      Cervical: No cervical adenopathy. Skin:     General: Skin is warm and dry. Neurological:      Mental Status: He is alert and oriented to person, place, and time. Cranial Nerves: No cranial nerve deficit.    Psychiatric:         Judgment: Judgment normal.         Assessment and Plan:    Diagnoses and all orders for this visit:    Type 2 diabetes mellitus without complication, without long-term current use of insulin ([facility]  - Continue present treatment   - watch diet   - monitor blood glucose at home   - following with endocrinology   - on glimepiride   - on tradjenta   - follow A1c - last was 4.9 (4/2022)   - discussed decreasing medications 4/15/2022    Not on Aspirin - coumadin   Not on A/ARB - CKD   On statin   Follows with optho (3/22)- retinal de, no dm retin    Carcinoid (except of appendix) ([facility]  - following with endocrinology and endocrinology surgery (dr. Ela Diaz) at [facility]  - States excessive Vasoactive intestinal peptide excretion   - On sandostatin monthly   - on cortef   - Stable     Stage 3a chronic kidney disease ([facility]  - following with nephrology   - follow labs     Chronic atrial fibrillation ([facility]  - s/p pacer  - following with cardiology and EP   - on sotalol - decreased dose (12/2019)   - on coumadin - INR weekly   - stable     Chronic obstructive pulmonary disease, unspecified COPD type ([facility]  - following with pulmonary   - on anoro - could not afford trelegy   - has home nebulizer with albuterol   - has chronic cough   - has tried flonase or nasacort  - has tried mult antihistamine   - on anoro  - on qvar   - stable     Coronary artery disease involving native coronary artery of native heart without angina pectoris  - s/p stent  - following with cardiology and EP   - on sotalol   - on simvastatin   - no aspirin due to coumadin   - stable     Essential hypertension  - watch diet   - on hctz - nephrology decreased to 12.5mg   - on sotalol for afib   - stable 4/15/2022    Mixed hyperlipidemia  - watch diet   - on simvastatin  - follow labs     Idiopathic chronic gout of multiple sites without tophus  - watch diet   - on allopurinol - increase to daily   - stable     Hyperparathyroidism ([address]  - Possible due to CKD   - last calcium was elevated     Vitamin D deficiency  - nephrology added 50k weekly   - follow labs     Chronic pain of both knees  - check xray     Class 2 obesity due to excess calories without serious comorbidity with body mass index (BMI) of 36.0 to 36.9 in adult  - discussed diet and exercise     Return in about 6 months (around 10/15/2022) for check up and review and labs.     Orders Placed This Encounter   Procedures    CBC with Auto Differential     Standing Status:   Future     Standing Expiration Date:   4/15/2023    Comprehensive Metabolic Panel     Standing Status:   Future     Standing Expiration Date:   4/15/2023    Hemoglobin A1C     Standing Status:   Future     Standing Expiration Date:   4/15/2023    Lipid, Fasting     Standing Status:   Future     Standing Expiration Date:   4/15/2023    Magnesium     Standing Status:   Future     Standing Expiration Date:   4/15/2023    Vitamin D 25 Hydroxy     Standing Status:   Future     Standing Expiration Date:   4/15/2023    Vitamin B12 & Folate     Standing Status:   Future     Standing Expiration Date:   4/15/2023    Urinalysis     Standing Status:   Future     Standing Expiration Date:   4/15/2023    Uric Acid     Standing Status:   Future     Standing Expiration Date:   4/15/2023    TSH     Standing Status:   Future     Standing Expiration Date:   4/15/2023   Kali Wayne     Standing Status:   Future     Standing Expiration Date:   4/15/2023   Mary Joseph D.O,

## 2022-04-21 ENCOUNTER — ANTI-COAG VISIT (OUTPATIENT)
Dept: FAMILY MEDICINE CLINIC | Age: 77
End: 2022-04-21
Payer: MEDICARE

## 2022-04-21 DIAGNOSIS — I48.20 CHRONIC ATRIAL FIBRILLATION (HCC): ICD-10-CM

## 2022-04-21 LAB
INR BLD: 2.7
INR BLD: 2.7
PROTIME: NORMAL

## 2022-04-21 PROCEDURE — 93793 ANTICOAG MGMT PT WARFARIN: CPT | Performed by: INTERNAL MEDICINE

## 2022-04-21 NOTE — PROGRESS NOTES
Previous INR: 2.80     Previous dose: 6mg (5mg +1mg tablets) on mon, wed, and Friday and 5mg all others               Current INR: 2.70     Recommendation: continue 6mg (5mg +1mg tablets) on mon, wed, and Friday and 5mg all others    Next INR: 1 week     Deidre Goodman MD  4/21/2022  2:07 PM

## 2022-04-28 ENCOUNTER — ANTI-COAG VISIT (OUTPATIENT)
Dept: FAMILY MEDICINE CLINIC | Age: 77
End: 2022-04-28
Payer: MEDICARE

## 2022-04-28 DIAGNOSIS — I48.20 CHRONIC ATRIAL FIBRILLATION (HCC): ICD-10-CM

## 2022-04-28 LAB
INR BLD: 2.7
INR BLD: 2.7
PROTIME: NORMAL

## 2022-04-28 PROCEDURE — 93793 ANTICOAG MGMT PT WARFARIN: CPT | Performed by: INTERNAL MEDICINE

## 2022-04-28 NOTE — PROGRESS NOTES
Previous INR: 2.70     Previous dose: 6mg (5mg +1mg tablets) on mon, wed, and Friday and 5mg all others               Current INR: 2.70     Recommendation: continue 6mg (5mg +1mg tablets) on mon, wed, and Friday and 5mg all others    Next INR: 1 week     Nazario Clarke MD  4/28/2022  1:00 PM

## 2022-05-02 DIAGNOSIS — M1A.09X0 IDIOPATHIC CHRONIC GOUT OF MULTIPLE SITES WITHOUT TOPHUS: ICD-10-CM

## 2022-05-02 RX ORDER — ALLOPURINOL 100 MG/1
TABLET ORAL
Qty: 38 TABLET | Refills: 1 | Status: SHIPPED
Start: 2022-05-02 | End: 2022-10-24 | Stop reason: SDUPTHER

## 2022-05-02 NOTE — TELEPHONE ENCOUNTER
Last Appointment:  4/15/2022  Future Appointments   Date Time Provider Raegan Alla   7/13/2022  8:15 AM DO RIVERA Brothers   10/14/2022 10:45 AM MD AARTI Groves [facility]   10/14/2022 11:00 AM MD AARTI Groves [facility]

## 2022-05-05 ENCOUNTER — ANTI-COAG VISIT (OUTPATIENT)
Dept: FAMILY MEDICINE CLINIC | Age: 77
End: 2022-05-05
Payer: MEDICARE

## 2022-05-05 DIAGNOSIS — I48.20 CHRONIC ATRIAL FIBRILLATION (HCC): ICD-10-CM

## 2022-05-05 LAB
INR BLD: 2.4
INR BLD: 2.4
PROTIME: NORMAL

## 2022-05-05 PROCEDURE — 93793 ANTICOAG MGMT PT WARFARIN: CPT | Performed by: INTERNAL MEDICINE

## 2022-05-05 NOTE — PROGRESS NOTES
Previous INR: 2.70     Previous dose: 6mg (5mg +1mg tablets) on mon, wed, and Friday and 5mg all others               Current INR: 2.40     Recommendation: continue 6mg (5mg +1mg tablets) on mon, wed, and Friday and 5mg all others    Next INR: 1 week     Dustin Carrasco MD  5/5/2022  1:21 PM

## 2022-05-12 ENCOUNTER — ANTI-COAG VISIT (OUTPATIENT)
Dept: FAMILY MEDICINE CLINIC | Age: 77
End: 2022-05-12
Payer: MEDICARE

## 2022-05-12 DIAGNOSIS — I48.20 CHRONIC ATRIAL FIBRILLATION (HCC): ICD-10-CM

## 2022-05-12 LAB
INR BLD: 2.6
INR BLD: 2.6
PROTIME: NORMAL

## 2022-05-12 PROCEDURE — 93793 ANTICOAG MGMT PT WARFARIN: CPT | Performed by: INTERNAL MEDICINE

## 2022-05-12 NOTE — PROGRESS NOTES
Previous INR: 2.40     Previous dose: 6mg (5mg +1mg tablets) on mon, wed, and Friday and 5mg all others               Current INR: 2.60     Recommendation: continue 6mg (5mg +1mg tablets) on mon, wed, and Friday and 5mg all others    Next INR: 1 week     Jody Quiroga MD  5/12/2022  1:01 PM

## 2022-05-19 ENCOUNTER — ANTI-COAG VISIT (OUTPATIENT)
Dept: FAMILY MEDICINE CLINIC | Age: 77
End: 2022-05-19
Payer: MEDICARE

## 2022-05-19 DIAGNOSIS — I48.20 CHRONIC ATRIAL FIBRILLATION (HCC): ICD-10-CM

## 2022-05-19 LAB
INR BLD: 2.1
INR BLD: 2.1
PROTIME: NORMAL

## 2022-05-19 PROCEDURE — 93793 ANTICOAG MGMT PT WARFARIN: CPT | Performed by: INTERNAL MEDICINE

## 2022-05-19 NOTE — PROGRESS NOTES
Previous INR: 2.60     Previous dose: 6mg (5mg +1mg tablets) on mon, wed, and Friday and 5mg all others               Current INR: 2.10     Recommendation: continue 6mg (5mg +1mg tablets) on mon, wed, and Friday and 5mg all others    Next INR: 1 week     Alfonso Bhandari MD  5/19/2022  12:21 PM

## 2022-05-23 DIAGNOSIS — I48.20 CHRONIC ATRIAL FIBRILLATION (HCC): ICD-10-CM

## 2022-05-23 RX ORDER — WARFARIN SODIUM 5 MG/1
TABLET ORAL
Qty: 90 TABLET | Refills: 1 | Status: SHIPPED | OUTPATIENT
Start: 2022-05-23

## 2022-05-23 NOTE — TELEPHONE ENCOUNTER
Last Appointment:  4/15/2022  Future Appointments   Date Time Provider Raegan Alla   8/15/2022  1:45 PM SCHEDULE, MHMEMO Ocampo [facility]   10/14/2022 10:45 AM MD AARTI Hernandez [facility]   10/14/2022 11:00 AM MD AARTI Hernandez RUSSELL [facility]   11/17/2022  1:45 PM SCHEDULE, Smita Nickerson

## 2022-05-26 ENCOUNTER — ANTI-COAG VISIT (OUTPATIENT)
Dept: FAMILY MEDICINE CLINIC | Age: 77
End: 2022-05-26
Payer: MEDICARE

## 2022-05-26 DIAGNOSIS — I48.20 CHRONIC ATRIAL FIBRILLATION (HCC): ICD-10-CM

## 2022-05-26 LAB
INR BLD: 2.7
INR BLD: 2.7
PROTIME: NORMAL

## 2022-05-26 PROCEDURE — 93793 ANTICOAG MGMT PT WARFARIN: CPT | Performed by: INTERNAL MEDICINE

## 2022-05-26 NOTE — PROGRESS NOTES
Previous INR: 2.10     Previous dose: 6mg (5mg +1mg tablets) on mon, wed, and Friday and 5mg all others               Current INR: 2.70     Recommendation: continue 6mg (5mg +1mg tablets) on mon, wed, and Friday and 5mg all others    Next INR: 1 week     Catherine Peter MD  5/26/2022  12:15 PM

## 2022-06-02 ENCOUNTER — ANTI-COAG VISIT (OUTPATIENT)
Dept: FAMILY MEDICINE CLINIC | Age: 77
End: 2022-06-02
Payer: MEDICARE

## 2022-06-02 DIAGNOSIS — I48.20 CHRONIC ATRIAL FIBRILLATION (HCC): ICD-10-CM

## 2022-06-02 LAB
INR BLD: 2.3
INR BLD: 2.3
PROTIME: NORMAL

## 2022-06-02 PROCEDURE — 93793 ANTICOAG MGMT PT WARFARIN: CPT | Performed by: INTERNAL MEDICINE

## 2022-06-02 NOTE — PROGRESS NOTES
Previous INR: 2.70     Previous dose: 6mg (5mg +1mg tablets) on mon, wed, and Friday and 5mg all others               Current INR: 2.30     Recommendation: continue 6mg (5mg +1mg tablets) on mon, wed, and Friday and 5mg all others    Next INR: 1 week     Alfonso Bhandari MD  6/2/2022  12:54 PM

## 2022-06-06 RX ORDER — SIMVASTATIN 40 MG
40 TABLET ORAL NIGHTLY
Qty: 90 TABLET | Refills: 1 | Status: SHIPPED | OUTPATIENT
Start: 2022-06-06

## 2022-06-09 ENCOUNTER — ANTI-COAG VISIT (OUTPATIENT)
Dept: FAMILY MEDICINE CLINIC | Age: 77
End: 2022-06-09
Payer: MEDICARE

## 2022-06-09 DIAGNOSIS — I48.20 CHRONIC ATRIAL FIBRILLATION (HCC): ICD-10-CM

## 2022-06-09 LAB
INR BLD: 2.1
INR BLD: 2.1
PROTIME: NORMAL

## 2022-06-09 PROCEDURE — 93793 ANTICOAG MGMT PT WARFARIN: CPT | Performed by: INTERNAL MEDICINE

## 2022-06-09 NOTE — PROGRESS NOTES
Previous INR: 2.30     Previous dose: 6mg (5mg +1mg tablets) on mon, wed, and Friday and 5mg all others               Current INR: 2.10     Recommendation: continue 6mg (5mg +1mg tablets) on mon, wed, and Friday and 5mg all others    Next INR: 1 week     Shad Stahl MD  6/9/2022  1:03 PM

## 2022-06-09 NOTE — PROGRESS NOTES
Left message for daughter Cindy Peer to call back. Please have her check his INR next Wednesday instead of Thursday as both the doctor and myself will be out of the office next Thursday. Thanks.

## 2022-06-15 ENCOUNTER — ANTI-COAG VISIT (OUTPATIENT)
Dept: FAMILY MEDICINE CLINIC | Age: 77
End: 2022-06-15
Payer: MEDICARE

## 2022-06-15 DIAGNOSIS — I48.20 CHRONIC ATRIAL FIBRILLATION (HCC): ICD-10-CM

## 2022-06-15 LAB
INR BLD: 2.3
INR BLD: 2.3
PROTIME: NORMAL

## 2022-06-15 PROCEDURE — 93793 ANTICOAG MGMT PT WARFARIN: CPT | Performed by: INTERNAL MEDICINE

## 2022-06-15 NOTE — PROGRESS NOTES
Previous INR: 2.10     Previous dose: 6mg (5mg +1mg tablets) on mon, wed, and Friday and 5mg all others               Current INR: 2.30     Recommendation: continue 6mg (5mg +1mg tablets) on mon, wed, and Friday and 5mg all others    Next INR: 1 week     Jace Box MD  6/15/2022  1:44 PM

## 2022-06-21 ENCOUNTER — HOSPITAL ENCOUNTER (OUTPATIENT)
Age: 77
Discharge: HOME | End: 2022-06-21
Payer: MEDICARE

## 2022-06-21 LAB
ALBUMIN SERPL-MCNC: 4.2 G/DL (ref 3.5–5.2)
ALP BLD-CCNC: 64 U/L (ref 40–129)
ALT SERPL-CCNC: 17 U/L (ref 0–40)
ANION GAP SERPL CALCULATED.3IONS-SCNC: 13 MMOL/L (ref 7–16)
AST SERPL-CCNC: 26 U/L (ref 0–39)
BILIRUB SERPL-MCNC: 0.7 MG/DL (ref 0–1.2)
BUN BLDV-MCNC: 37 MG/DL (ref 6–23)
CALCIUM SERPL-MCNC: 10.2 MG/DL (ref 8.6–10.2)
CHLORIDE BLD-SCNC: 105 MMOL/L (ref 98–107)
CO2: 25 MMOL/L (ref 22–29)
CORTISOL TOTAL: 5.84 MCG/DL (ref 2.68–18.4)
CREAT SERPL-MCNC: 1.7 MG/DL (ref 0.7–1.2)
CREATININE URINE: 110 MG/DL (ref 40–278)
GFR AFRICAN AMERICAN: 47
GFR NON-AFRICAN AMERICAN: 39 ML/MIN/1.73
GLUCOSE BLD-MCNC: 101 MG/DL (ref 74–99)
MICROALBUMIN UR-MCNC: 77.5 MG/L
MICROALBUMIN/CREAT UR-RTO: 70.5 (ref 0–30)
PARATHYROID HORMONE INTACT: 137 PG/ML (ref 15–65)
POTASSIUM SERPL-SCNC: 4.5 MMOL/L (ref 3.5–5)
SODIUM BLD-SCNC: 143 MMOL/L (ref 132–146)
T4 FREE: 1.02 NG/DL (ref 0.93–1.7)
TOTAL PROTEIN: 6.7 G/DL (ref 6.4–8.3)
TSH SERPL DL<=0.05 MIU/L-ACNC: 1.56 UIU/ML (ref 0.27–4.2)

## 2022-06-21 PROCEDURE — 82570 ASSAY OF URINE CREATININE: CPT

## 2022-06-21 PROCEDURE — 84586 ASSAY OF VIP: CPT

## 2022-06-21 PROCEDURE — 82533 TOTAL CORTISOL: CPT

## 2022-06-21 PROCEDURE — 84439 ASSAY OF FREE THYROXINE: CPT

## 2022-06-21 PROCEDURE — 36415 COLL VENOUS BLD VENIPUNCTURE: CPT

## 2022-06-21 PROCEDURE — 84443 ASSAY THYROID STIM HORMONE: CPT

## 2022-06-21 PROCEDURE — 82044 UR ALBUMIN SEMIQUANTITATIVE: CPT

## 2022-06-21 PROCEDURE — 80053 COMPREHEN METABOLIC PANEL: CPT

## 2022-06-21 PROCEDURE — 83970 ASSAY OF PARATHORMONE: CPT

## 2022-06-23 ENCOUNTER — ANTI-COAG VISIT (OUTPATIENT)
Dept: FAMILY MEDICINE CLINIC | Age: 77
End: 2022-06-23
Payer: MEDICARE

## 2022-06-23 DIAGNOSIS — I48.20 CHRONIC ATRIAL FIBRILLATION (HCC): ICD-10-CM

## 2022-06-23 LAB
INR BLD: 3.1
INR BLD: 3.1
PROTIME: NORMAL

## 2022-06-23 PROCEDURE — 93793 ANTICOAG MGMT PT WARFARIN: CPT | Performed by: INTERNAL MEDICINE

## 2022-06-23 NOTE — PROGRESS NOTES
Previous INR: 2.30     Previous dose: 6mg (5mg +1mg tablets) on mon, wed, and Friday and 5mg all others               Current INR: 3.10     Recommendation: 2.5mg x 1 then continue 6mg (5mg +1mg tablets) on mon, wed, and Friday and 5mg all others    Next INR: 1 week     Jerry Nj MD  6/23/2022  12:04 PM

## 2022-06-30 ENCOUNTER — ANTI-COAG VISIT (OUTPATIENT)
Dept: FAMILY MEDICINE CLINIC | Age: 77
End: 2022-06-30
Payer: MEDICARE

## 2022-06-30 DIAGNOSIS — I48.20 CHRONIC ATRIAL FIBRILLATION (HCC): ICD-10-CM

## 2022-06-30 LAB
INR BLD: 2.5
INR BLD: NORMAL
PROTIME: NORMAL

## 2022-06-30 PROCEDURE — 93793 ANTICOAG MGMT PT WARFARIN: CPT | Performed by: INTERNAL MEDICINE

## 2022-06-30 NOTE — PROGRESS NOTES
Previous INR: 3.10     Previous dose: 2.5mg x 1 then continued 6mg (5mg +1mg tablets) on mon, wed, and Friday and 5mg all others               Current INR: 2.50     Recommendation: continue 6mg (5mg +1mg tablets) on mon, wed, and Friday and 5mg all others    Next INR: 1 week     Dustin Carrasco MD  6/30/2022  12:10 PM

## 2022-07-03 LAB
Lab: NORMAL
REPORT: NORMAL
THIS TEST SENT TO: NORMAL

## 2022-07-05 LAB
AVERAGE GLUCOSE: NORMAL
HBA1C MFR BLD: 5.2 %

## 2022-07-07 ENCOUNTER — ANTI-COAG VISIT (OUTPATIENT)
Dept: FAMILY MEDICINE CLINIC | Age: 77
End: 2022-07-07
Payer: MEDICARE

## 2022-07-07 DIAGNOSIS — I48.20 CHRONIC ATRIAL FIBRILLATION (HCC): ICD-10-CM

## 2022-07-07 LAB
INR BLD: 2.5
INR BLD: 2.5
PROTIME: NORMAL

## 2022-07-07 PROCEDURE — 93793 ANTICOAG MGMT PT WARFARIN: CPT | Performed by: INTERNAL MEDICINE

## 2022-07-07 NOTE — PROGRESS NOTES
Spoke with patients daughter who is on patients hippa form. .. informed her that patients INR was 2.5 today. Tim Lam PCP wants patient to continue same dose 6 mg Monday weds and Friday. . 5 mg all other days. . recheck in one wk.  Adalberto Narvaez understood

## 2022-07-14 ENCOUNTER — ANTI-COAG VISIT (OUTPATIENT)
Dept: FAMILY MEDICINE CLINIC | Age: 77
End: 2022-07-14
Payer: MEDICARE

## 2022-07-14 DIAGNOSIS — I48.20 CHRONIC ATRIAL FIBRILLATION (HCC): ICD-10-CM

## 2022-07-14 LAB
INR BLD: 2.5
INR BLD: 2.5
PROTIME: NORMAL

## 2022-07-14 PROCEDURE — 93793 ANTICOAG MGMT PT WARFARIN: CPT | Performed by: INTERNAL MEDICINE

## 2022-07-14 NOTE — PROGRESS NOTES
Previous INR: 2.50      Previous dose: 6mg (5mg +1mg tablets) on mon, wed, and Friday and 5mg all others               Current INR: 2.50     Recommendation: continue 6mg (5mg +1mg tablets) on mon, wed, and Friday and 5mg all others    Next INR: 1 week     Yareli Paulino MD  7/14/2022  12:27 PM

## 2022-07-18 ENCOUNTER — TELEPHONE (OUTPATIENT)
Dept: FAMILY MEDICINE CLINIC | Age: 77
End: 2022-07-18

## 2022-07-18 RX ORDER — HYDROCORTISONE 10 MG/1
TABLET ORAL
Qty: 225 TABLET | Refills: 1 | Status: SHIPPED
Start: 2022-07-18 | End: 2022-10-31 | Stop reason: SDUPTHER

## 2022-07-18 NOTE — TELEPHONE ENCOUNTER
Requested Prescriptions     Signed Prescriptions Disp Refills    hydrocortisone (CORTEF) 10 MG tablet 225 tablet 1     Sig: TAKE 1 1/2 TABLETS BY MOUTH EVERY MORNING AND 1 TABLET EVERY EVENING     Authorizing Provider: Elizabeth Jarrett

## 2022-07-18 NOTE — TELEPHONE ENCOUNTER
Last Appointment:  4/15/2022  Future Appointments   Date Time Provider Raegan Gold   8/15/2022  1:45 PM SCHEDULE, [facility]   10/14/2022 10:45 AM MD [facility]   10/14/2022 11:00 AM MD AARTI [facility]   11/17/2022  1:45 PM SCHEDULE, [id number]

## 2022-07-21 ENCOUNTER — ANTI-COAG VISIT (OUTPATIENT)
Dept: FAMILY MEDICINE CLINIC | Age: 77
End: 2022-07-21
Payer: MEDICARE

## 2022-07-21 DIAGNOSIS — I48.11 LONGSTANDING PERSISTENT ATRIAL FIBRILLATION (HCC): Primary | ICD-10-CM

## 2022-07-21 LAB — INR BLD: 2.3

## 2022-07-21 PROCEDURE — 93793 ANTICOAG MGMT PT WARFARIN: CPT | Performed by: INTERNAL MEDICINE

## 2022-07-21 NOTE — PROGRESS NOTES
Previous INR: 2.50      Previous dose: 6mg (5mg +1mg tablets) on mon, wed, and Friday and 5mg all others               Current INR: 2.30     Recommendation: continue 6mg (5mg +1mg tablets) on mon, wed, and Friday and 5mg all others    Next INR: 1 week     Erika Hewitt MD  7/21/2022  10:47 AM

## 2022-07-28 ENCOUNTER — ANTI-COAG VISIT (OUTPATIENT)
Dept: FAMILY MEDICINE CLINIC | Age: 77
End: 2022-07-28
Payer: MEDICARE

## 2022-07-28 DIAGNOSIS — I48.91 ATRIAL FIBRILLATION, UNSPECIFIED TYPE (HCC): Primary | ICD-10-CM

## 2022-07-28 LAB
INR BLD: 2.5
INR BLD: 2.5
PROTIME: NORMAL

## 2022-07-28 PROCEDURE — 93793 ANTICOAG MGMT PT WARFARIN: CPT | Performed by: INTERNAL MEDICINE

## 2022-07-28 NOTE — PROGRESS NOTES
Previous INR: 2.30      Previous dose: 6mg (5mg +1mg tablets) on mon, wed, and Friday and 5mg all others               Current INR: 2.50     Recommendation: continue 6mg (5mg +1mg tablets) on mon, wed, and Friday and 5mg all others    Next INR: 1 week     Hailee Wong MD  7/28/2022  5:04 PM

## 2022-08-04 ENCOUNTER — ANTI-COAG VISIT (OUTPATIENT)
Dept: FAMILY MEDICINE CLINIC | Age: 77
End: 2022-08-04
Payer: MEDICARE

## 2022-08-04 DIAGNOSIS — I48.91 ATRIAL FIBRILLATION, UNSPECIFIED TYPE (HCC): Primary | ICD-10-CM

## 2022-08-04 LAB
INR BLD: 2.6
INR BLD: NORMAL
PROTIME: NORMAL

## 2022-08-04 PROCEDURE — 93793 ANTICOAG MGMT PT WARFARIN: CPT | Performed by: INTERNAL MEDICINE

## 2022-08-05 ENCOUNTER — HOSPITAL ENCOUNTER (OUTPATIENT)
Age: 77
Discharge: HOME | End: 2022-08-05
Payer: MEDICARE

## 2022-08-05 LAB
ALBUMIN SERPL-MCNC: 4 G/DL (ref 3.5–5.2)
ALP BLD-CCNC: 60 U/L (ref 40–129)
ALT SERPL-CCNC: 17 U/L (ref 0–40)
ANION GAP SERPL CALCULATED.3IONS-SCNC: 9 MMOL/L (ref 7–16)
AST SERPL-CCNC: 23 U/L (ref 0–39)
BASOPHILS ABSOLUTE: 0.06 E9/L (ref 0–0.2)
BASOPHILS RELATIVE PERCENT: 0.7 % (ref 0–2)
BILIRUB SERPL-MCNC: 0.6 MG/DL (ref 0–1.2)
BUN BLDV-MCNC: 34 MG/DL (ref 6–23)
CALCIUM SERPL-MCNC: 10.2 MG/DL (ref 8.6–10.2)
CHLORIDE BLD-SCNC: 106 MMOL/L (ref 98–107)
CO2: 26 MMOL/L (ref 22–29)
CREAT SERPL-MCNC: 1.5 MG/DL (ref 0.7–1.2)
CREATININE URINE: 49 MG/DL (ref 40–278)
EOSINOPHILS ABSOLUTE: 0.11 E9/L (ref 0.05–0.5)
EOSINOPHILS RELATIVE PERCENT: 1.3 % (ref 0–6)
GFR AFRICAN AMERICAN: 55
GFR NON-AFRICAN AMERICAN: 45 ML/MIN/1.73
GLUCOSE BLD-MCNC: 98 MG/DL (ref 74–99)
HCT VFR BLD CALC: 42.7 % (ref 37–54)
HEMOGLOBIN: 14.3 G/DL (ref 12.5–16.5)
IMMATURE GRANULOCYTES #: 0.09 E9/L
IMMATURE GRANULOCYTES %: 1 % (ref 0–5)
LYMPHOCYTES ABSOLUTE: 1.29 E9/L (ref 1.5–4)
LYMPHOCYTES RELATIVE PERCENT: 15 % (ref 20–42)
MAGNESIUM: 1.8 MG/DL (ref 1.6–2.6)
MCH RBC QN AUTO: 32.7 PG (ref 26–35)
MCHC RBC AUTO-ENTMCNC: 33.5 % (ref 32–34.5)
MCV RBC AUTO: 97.7 FL (ref 80–99.9)
MONOCYTES ABSOLUTE: 0.74 E9/L (ref 0.1–0.95)
MONOCYTES RELATIVE PERCENT: 8.6 % (ref 2–12)
NEUTROPHILS ABSOLUTE: 6.31 E9/L (ref 1.8–7.3)
NEUTROPHILS RELATIVE PERCENT: 73.4 % (ref 43–80)
PDW BLD-RTO: 13.7 FL (ref 11.5–15)
PHOSPHORUS: 2.7 MG/DL (ref 2.5–4.5)
PLATELET # BLD: 196 E9/L (ref 130–450)
PMV BLD AUTO: 8.8 FL (ref 7–12)
POTASSIUM SERPL-SCNC: 4.5 MMOL/L (ref 3.5–5)
PROTEIN PROTEIN: 6 MG/DL (ref 0–12)
PROTEIN/CREAT RATIO: 0.1
PROTEIN/CREAT RATIO: 0.1 (ref 0–0.2)
RBC # BLD: 4.37 E12/L (ref 3.8–5.8)
SODIUM BLD-SCNC: 141 MMOL/L (ref 132–146)
TOTAL PROTEIN: 6.6 G/DL (ref 6.4–8.3)
URIC ACID, SERUM: 8.9 MG/DL (ref 3.4–7)
WBC # BLD: 8.6 E9/L (ref 4.5–11.5)

## 2022-08-05 PROCEDURE — 84550 ASSAY OF BLOOD/URIC ACID: CPT

## 2022-08-05 PROCEDURE — 83735 ASSAY OF MAGNESIUM: CPT

## 2022-08-05 PROCEDURE — 36415 COLL VENOUS BLD VENIPUNCTURE: CPT

## 2022-08-05 PROCEDURE — 85025 COMPLETE CBC W/AUTO DIFF WBC: CPT

## 2022-08-05 PROCEDURE — 84100 ASSAY OF PHOSPHORUS: CPT

## 2022-08-05 PROCEDURE — 84156 ASSAY OF PROTEIN URINE: CPT

## 2022-08-05 PROCEDURE — 82570 ASSAY OF URINE CREATININE: CPT

## 2022-08-05 PROCEDURE — 80053 COMPREHEN METABOLIC PANEL: CPT

## 2022-08-11 ENCOUNTER — ANTI-COAG VISIT (OUTPATIENT)
Dept: FAMILY MEDICINE CLINIC | Age: 77
End: 2022-08-11
Payer: MEDICARE

## 2022-08-11 DIAGNOSIS — I48.91 ATRIAL FIBRILLATION, UNSPECIFIED TYPE (HCC): Primary | ICD-10-CM

## 2022-08-11 LAB
INR BLD: 2.9
INR BLD: 2.9
PROTIME: NORMAL

## 2022-08-11 PROCEDURE — 93793 ANTICOAG MGMT PT WARFARIN: CPT | Performed by: INTERNAL MEDICINE

## 2022-08-11 NOTE — PROGRESS NOTES
Previous INR: 2.60      Previous dose: 6mg (5mg +1mg tablets) on mon, wed, and Friday and 5mg all others               Current INR: 2.90     Recommendation: continue 6mg (5mg +1mg tablets) on mon, wed, and Friday and 5mg all others    Next INR: 1 week     Ioana Maldonado MD  8/11/2022  4:50 PM

## 2022-08-18 ENCOUNTER — ANTI-COAG VISIT (OUTPATIENT)
Dept: FAMILY MEDICINE CLINIC | Age: 77
End: 2022-08-18
Payer: MEDICARE

## 2022-08-18 DIAGNOSIS — I48.91 ATRIAL FIBRILLATION, UNSPECIFIED TYPE (HCC): Primary | ICD-10-CM

## 2022-08-18 LAB
INR BLD: 2.8
INR BLD: 2.8
PROTIME: NORMAL

## 2022-08-18 PROCEDURE — 93793 ANTICOAG MGMT PT WARFARIN: CPT | Performed by: INTERNAL MEDICINE

## 2022-08-25 LAB — INR BLD: 2.4

## 2022-08-26 ENCOUNTER — ANTI-COAG VISIT (OUTPATIENT)
Dept: FAMILY MEDICINE CLINIC | Age: 77
End: 2022-08-26
Payer: MEDICARE

## 2022-08-26 ENCOUNTER — TELEPHONE (OUTPATIENT)
Dept: PRIMARY CARE CLINIC | Age: 77
End: 2022-08-26

## 2022-08-26 DIAGNOSIS — I48.91 ATRIAL FIBRILLATION, UNSPECIFIED TYPE (HCC): Primary | ICD-10-CM

## 2022-08-26 PROCEDURE — 93793 ANTICOAG MGMT PT WARFARIN: CPT | Performed by: INTERNAL MEDICINE

## 2022-08-26 NOTE — PROGRESS NOTES
Previous INR: 2.80      Previous dose: 6mg (5mg +1mg tablets) on mon, wed, and Friday and 5mg all others               Current INR: 2.40     Recommendation: continue 6mg (5mg +1mg tablets) on mon, wed, and Friday and 5mg all others    Next INR: 1 week     Jacky Pride MD  8/26/2022  9:47 AM

## 2022-09-01 ENCOUNTER — ANTI-COAG VISIT (OUTPATIENT)
Dept: FAMILY MEDICINE CLINIC | Age: 77
End: 2022-09-01
Payer: MEDICARE

## 2022-09-01 DIAGNOSIS — I48.91 ATRIAL FIBRILLATION, UNSPECIFIED TYPE (HCC): Primary | ICD-10-CM

## 2022-09-01 LAB
INR BLD: 2.5
INR BLD: 2.5
PROTIME: NORMAL

## 2022-09-01 PROCEDURE — 93793 ANTICOAG MGMT PT WARFARIN: CPT | Performed by: INTERNAL MEDICINE

## 2022-09-01 NOTE — PROGRESS NOTES
Previous INR: 2.40      Previous dose: 6mg (5mg +1mg tablets) on mon, wed, and Friday and 5mg all others               Current INR: 2.50     Recommendation: continue 6mg (5mg +1mg tablets) on mon, wed, and Friday and 5mg all others    Next INR: 1 week     Hailee Wong MD  9/1/2022  11:33 AM

## 2022-09-07 ENCOUNTER — OFFICE VISIT (OUTPATIENT)
Dept: FAMILY MEDICINE CLINIC | Age: 77
End: 2022-09-07
Payer: MEDICARE

## 2022-09-07 ENCOUNTER — TELEPHONE (OUTPATIENT)
Dept: FAMILY MEDICINE CLINIC | Age: 77
End: 2022-09-07

## 2022-09-07 VITALS
HEIGHT: 67 IN | OXYGEN SATURATION: 97 % | WEIGHT: 233 LBS | TEMPERATURE: 98.1 F | RESPIRATION RATE: 22 BRPM | DIASTOLIC BLOOD PRESSURE: 84 MMHG | BODY MASS INDEX: 36.57 KG/M2 | SYSTOLIC BLOOD PRESSURE: 130 MMHG | HEART RATE: 72 BPM

## 2022-09-07 DIAGNOSIS — M54.41 ACUTE RIGHT-SIDED LOW BACK PAIN WITH RIGHT-SIDED SCIATICA: Primary | ICD-10-CM

## 2022-09-07 PROCEDURE — 99213 OFFICE O/P EST LOW 20 MIN: CPT | Performed by: INTERNAL MEDICINE

## 2022-09-07 PROCEDURE — 1123F ACP DISCUSS/DSCN MKR DOCD: CPT | Performed by: INTERNAL MEDICINE

## 2022-09-07 PROCEDURE — 96372 THER/PROPH/DIAG INJ SC/IM: CPT | Performed by: INTERNAL MEDICINE

## 2022-09-07 RX ORDER — LATANOPROST 50 UG/ML
SOLUTION/ DROPS OPHTHALMIC
COMMUNITY
Start: 2022-07-15

## 2022-09-07 RX ORDER — ERGOCALCIFEROL 1.25 MG/1
CAPSULE ORAL
COMMUNITY
Start: 2022-08-10 | End: 2022-09-20 | Stop reason: ALTCHOICE

## 2022-09-07 RX ORDER — TRIAMCINOLONE ACETONIDE 40 MG/ML
40 INJECTION, SUSPENSION INTRA-ARTICULAR; INTRAMUSCULAR ONCE
Status: COMPLETED | OUTPATIENT
Start: 2022-09-07 | End: 2022-09-07

## 2022-09-07 RX ORDER — METHYLPREDNISOLONE 4 MG/1
TABLET ORAL
Qty: 1 KIT | Refills: 0 | Status: SHIPPED | OUTPATIENT
Start: 2022-09-07 | End: 2022-09-13

## 2022-09-07 RX ORDER — BECLOMETHASONE DIPROPIONATE HFA 80 UG/1
AEROSOL, METERED RESPIRATORY (INHALATION)
COMMUNITY
Start: 2022-07-06

## 2022-09-07 RX ADMIN — TRIAMCINOLONE ACETONIDE 40 MG: 40 INJECTION, SUSPENSION INTRA-ARTICULAR; INTRAMUSCULAR at 08:20

## 2022-09-07 ASSESSMENT — ENCOUNTER SYMPTOMS
EYE PAIN: 0
BLOOD IN STOOL: 0
CHEST TIGHTNESS: 0
SORE THROAT: 0
CONSTIPATION: 0
VOMITING: 0
ABDOMINAL PAIN: 0
COUGH: 1
SHORTNESS OF BREATH: 1
NAUSEA: 0
BACK PAIN: 1
RHINORRHEA: 1
DIARRHEA: 0

## 2022-09-07 NOTE — PROGRESS NOTES
[facility]) Physicians   Internal Medicine     2022  Leroy Pastrana : 1945 Sex: male  Age:77 y.o. Chief Complaint   Patient presents with    Leg Pain     Right x 4 days--getting worse    Hip Pain     Right x 4 days--getting worse        HPI:   Patient presents to office for evaluation of the following medical concerns. - States has been having right lower back, right hip and leg pain for the last 4 days. No trauma or injury. States 7/10 on pain scale. States pain is sharp. No numbness . States possible tingling. Some weakness. States worse with walking. States better sitting. Has been taking tyelnol has not helped. - States feels left hearing aide is stuck in canal.     - States has diabetes. States follows with endocrinology. On glimepiride and tradjenta. No overt hypoglycemia. Follows with optho. On Statin. 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 States following with endocrinology and with [facility] clinic. Receiving Sandostatin monthly. Has had excessive vasoactive intestinal peptide secretion. Last visit with endocrinology per reviewed note () -VIPoma & carcinoid, cont Sandostatin, adrenal hypofunction, cont hydrocortisone stress dose if needed, diabetes cont same hyperparathyroid    - States has high cholesterol. Trying to watch diet. On simvastatin. No reported side effects    - States has High blood pressure. Not checking blood pressure at home. On HCTZ and sotalolol. Nephrology decreased hctz to 12.5mg     - States has Atrial fibrillation. On Sotalol and coumadin. Has pacemaker. Following with cardiology and EP. States decreased sotalol     - States has CAD. Stats s/p Sent. States cardiomyopathy. Following with cardiology and EP. S/p pacer for simus node dysfunction.  Last follow up  (10/21) - no changes f/u 6 months      - States has COPD. States has had previous surgery for lobectomy for concern for mesothelioma but was negative. Following with pulmonary. On anoro. Also has nebulizer. Last visit with pulm per reviewed note  (11/21) stop anoro, start breztri, pred taper for wheeze, f/u 2m States could not afford breztri. Restarted anoro and qvar    - States has chronic kidney disease. Following with nephrology. Last follow up  (1/22) cont hctz 5x/wk f/u 6m. Last lab (4/2022) stable. - States has elevated uric acid. States on allopurinol.     - States has vitamin D def. On vitamin D 50k weekly. LAst lab was nornal and improved (4/2021)     Labs from 4/4/2022 reviewed with patient 9/7/2022     Health Maintenance   - immunizations:   Influenza Vaccination  Pneumonia Vaccination - (2018) - pneumococcal 23  Zoster/Shingles Vaccine  Tetanus Vaccination  covid pfizer x 3     - Screenings:   Colonoscopy   Prostate     ROS:  Review of Systems   Constitutional:  Negative for appetite change, chills, fever and unexpected weight change. HENT:  Positive for rhinorrhea. Negative for congestion and sore throat. Eyes:  Negative for pain and visual disturbance. Respiratory:  Positive for cough and shortness of breath. Negative for chest tightness. Cardiovascular:  Negative for chest pain and palpitations. Gastrointestinal:  Negative for abdominal pain, blood in stool, constipation, diarrhea, nausea and vomiting. Genitourinary:  Negative for difficulty urinating, dysuria, hematuria, scrotal swelling, testicular pain and urgency. Musculoskeletal:  Positive for back pain. Negative for arthralgias and gait problem. Skin:  Negative for rash. Neurological:  Negative for dizziness, syncope, weakness, light-headedness and headaches. Hematological:  Negative for adenopathy. Does not bruise/bleed easily. Psychiatric/Behavioral:  Negative for suicidal ideas. The patient is not nervous/anxious. Current Outpatient Medications:     QVAR REDIHALER 80 MCG/ACT AERB inhaler, INHALE 2 PUFFS BY MOUTH TWICE DAILY, Disp: , Rfl:     vitamin D (ERGOCALCIFEROL) 1.25 MG (71973 UT) CAPS capsule, TAKE 1 CAPSULE BY MOUTH ONE TIME PER WEEK, Disp: , Rfl:     latanoprost (XALATAN) 0.005 % ophthalmic solution, PLACE 1 DROP INTO BOTH EYES EVERY DAY AT BEDTIME, Disp: , Rfl:     methylPREDNISolone (MEDROL DOSEPACK) 4 MG tablet, Take by mouth., Disp: 1 kit, Rfl: 0    hydrocortisone (CORTEF) 10 MG tablet, TAKE 1 1/2 TABLETS BY MOUTH EVERY MORNING AND 1 TABLET EVERY EVENING, Disp: 225 tablet, Rfl: 1    simvastatin (ZOCOR) 40 MG tablet, Take 1 tablet by mouth nightly, Disp: 90 tablet, Rfl: 1    warfarin (COUMADIN) 5 MG tablet, 1 po qd or as directed, Disp: 90 tablet, Rfl: 1    allopurinol (ZYLOPRIM) 100 MG tablet, TAKE 1 TABLET BY MOUTH [id number], Disp: 38 tablet, Rfl: 1    hydroCHLOROthiazide (HYDRODIURIL) 25 MG tablet, Take 1 tablet by mouth daily 1 po 5 days a week (hold on Tues and Sat)-pt reqesting 90 day supply, Disp: 75 tablet, Rfl: 1    warfarin (COUMADIN) 1 MG tablet, Take 1 tablet by mouth daily, Disp: 30 tablet, Rfl: 5    Cholecalciferol (VITAMIN D3) 1.25 MG (61459 UT) CAPS, Take 1 capsule by mouth once a week, Disp: , Rfl:     linagliptin (TRADJENTA) 5 MG tablet, Take 1 tablet by mouth daily Lot#[id number] Exp.7/2020, Disp: 28 tablet, Rfl: 0    umeclidinium-vilanterol (ANORO ELLIPTA) 62.5-25 MCG/INH AEPB inhaler, Inhale 1 puff into the lungs daily, Disp: , Rfl:     sotalol (BETAPACE) 160 MG tablet, Take 1 tablet by mouth 2 times daily (Patient taking differently: Take 160 mg by mouth daily), Disp: 60 tablet, Rfl: 3    nitroGLYCERIN (NITROSTAT) 0.4 MG SL tablet, Place 0.4 mg under the tongue every 5 minutes as needed for Chest pain up to max of 3 total doses.  If no relief after 1 dose, call 911., Disp: , Rfl:     albuterol (PROVENTIL) (5 MG/ML) 0.5% nebulizer solution, Take 0.5 mLs by nebulization every 6 Former     Packs/day: 2.00     Years: 40.00     Pack years: 80.00     Types: Cigarettes     Quit date: 2000     Years since quittin.0    Smokeless tobacco: Never   Vaping Use    Vaping Use: Never used   Substance and Sexual Activity    Alcohol use: No    Drug use: No    Sexual activity: Not Currently     Partners: Female     Social Determinants of Health     Financial Resource Strain: Low Risk     Difficulty of Paying Living Expenses: Not hard at all   Food Insecurity: No Food Insecurity    Worried About [facility] in the Last Year: Never true    [facility] in the Last Year: Never true        Vitals:    22 0751   BP: 130/84   Site: Left Upper Arm   Position: Sitting   Cuff Size: Large Adult   Pulse: 72   Resp: 22   Temp: 98.1 °F (36.7 °C)   TempSrc: Temporal   SpO2: 97%   Weight: 233 lb (105.7 kg)   Height: 5' 7\" (1.702 m)       Exam:  Physical Exam  Vitals reviewed. Constitutional:       Appearance: He is well-developed. HENT:      Head: Normocephalic and atraumatic. Right Ear: External ear normal.      Left Ear: External ear normal.   Eyes:      Pupils: Pupils are equal, round, and reactive to light. Neck:      Thyroid: No thyromegaly. Cardiovascular:      Rate and Rhythm: Normal rate and regular rhythm. Heart sounds: Normal heart sounds. No murmur heard. Pulmonary:      Effort: Pulmonary effort is normal.      Breath sounds: Decreased breath sounds and rhonchi present. No wheezing or rales. Abdominal:      General: Bowel sounds are normal.      Palpations: Abdomen is soft. Tenderness: There is no abdominal tenderness. There is no guarding or rebound. Musculoskeletal:         General: Normal range of motion. Cervical back: Normal range of motion and neck supple. Lumbar back: Tenderness present. Right hip: Tenderness present. Lymphadenopathy:      Cervical: No cervical adenopathy. Skin:     General: Skin is warm and dry.    Neurological: Mental Status: He is alert and oriented to person, place, and time. Cranial Nerves: No cranial nerve deficit.    Psychiatric:         Judgment: Judgment normal.       Assessment and Plan:    Diagnoses and all orders for this visit:    Acute right-sided low back pain with right-sided sciatica  - uncertain as to cause poss lumbar diseas, strain  - home exercises  - Heat and ice alternating   - declines PT   - kenlog 40mg IM x 1   - medrol pack - reviewed on solucortef and DM, need to watch sugars    Type 2 diabetes mellitus without complication, without long-term current use of insulin ([facility] [address]  - Continue present treatment   - watch diet   - monitor blood glucose at home   - following with endocrinology   - on glimepiride   - on tradjenta   - follow A1c - last was 4.9 (4/2022)   - discussed decreasing medications 9/7/2022    Not on Aspirin - coumadin   Not on A/ARB - CKD   On statin   Follows with optho (3/22)- retinal de, no dm retin    Carcinoid (except of appendix) ([facility] [address]  - following with endocrinology and endocrinology surgery (dr. Guicho Matson) at [facility]  - States excessive Vasoactive intestinal peptide excretion   - On sandostatin monthly   - on cortef   - Stable     Stage 3a chronic kidney disease ([facility] [address]  - following with nephrology   - follow labs     Chronic atrial fibrillation ([facility] [address]  - s/p pacer  - following with cardiology and EP   - on sotalol - decreased dose (12/2019)   - on coumadin - INR weekly   - stable     Chronic obstructive pulmonary disease, unspecified COPD type ([facility] [address]  - following with pulmonary   - on anoro - could not afford trelegy   - has home nebulizer with albuterol   - has chronic cough   - has tried flonase or nasacort  - has tried mult antihistamine   - on anoro  - on qvar   - stable     Coronary artery disease involving native coronary artery of native heart without angina pectoris  - s/p stent  - following with cardiology and EP   - on sotalol   - on simvastatin   - no aspirin due to coumadin   - stable     Essential hypertension  - watch diet   - on hctz - nephrology decreased to 12.5mg   - on sotalol for afib   - stable 9/7/2022    Mixed hyperlipidemia  - watch diet   - on simvastatin  - follow labs     Idiopathic chronic gout of multiple sites without tophus  - watch diet   - on allopurinol - increase to daily   - stable     Hyperparathyroidism ([address]  - Possible due to CKD   - last calcium was elevated     Vitamin D deficiency  - nephrology added 50k weekly   - follow labs     Chronic pain of both knees  - check xray     Class 2 obesity due to excess calories without serious comorbidity with body mass index (BMI) of 36.0 to 36.9 in adult  - discussed diet and exercise     Return for check up and review, as scheduled. Orders Placed This Encounter   Procedures    XR HIP RIGHT (2-3 VIEWS)     Standing Status:   Future     Standing Expiration Date:   9/7/2023     Order Specific Question:   Reason for exam:     Answer:   low back pain, right hip pain    XR LUMBAR SPINE (2-3 VIEWS)     Standing Status:   Future     Standing Expiration Date:   9/7/2023     Order Specific Question:   Reason for exam:     Answer:   low back pain, right hip pain     Requested Prescriptions     Signed Prescriptions Disp Refills    methylPREDNISolone (MEDROL DOSEPACK) 4 MG tablet 1 kit 0     Sig: Take by mouth.         Radha Howard MD  9/7/2022  8:19 AM

## 2022-09-07 NOTE — TELEPHONE ENCOUNTER
Please let the patient know that x-ray of the lumbar spine and right hip per radiology report suggested    X-ray of the lumbar spine showed mild multilevel degenerative disc disease, also shows moderate degenerative joint disease at levels L4-S1.   No fractures or dislocations    X-ray of the hip was considered essentially normal.  No significant arthritic changes fractures or dislocation    Recommend current treatment and may consider physical therapy and/or pain management if not improving    Thanks

## 2022-09-08 ENCOUNTER — ANTI-COAG VISIT (OUTPATIENT)
Dept: FAMILY MEDICINE CLINIC | Age: 77
End: 2022-09-08
Payer: MEDICARE

## 2022-09-08 DIAGNOSIS — I48.91 ATRIAL FIBRILLATION, UNSPECIFIED TYPE (HCC): Primary | ICD-10-CM

## 2022-09-08 LAB
INR BLD: 3
INR BLD: NORMAL
PROTIME: NORMAL

## 2022-09-08 PROCEDURE — 93792 PT/CAREGIVER TRAING HOME INR: CPT | Performed by: INTERNAL MEDICINE

## 2022-09-08 NOTE — PROGRESS NOTES
INR today. 3.0. [facility] INR did not work this morning for her when she called it in.       (I do leave at 4:30 PM today)

## 2022-09-08 NOTE — PROGRESS NOTES
Previous INR: 2.50      Previous dose: 6mg (5mg +1mg tablets) on mon, wed, and Friday and 5mg all others               Current INR: 3.00     Recommendation: 2.5mg x 1 then continue 6mg (5mg +1mg tablets) on mon, wed, and Friday and 5mg all others    Next INR: 1 week     Erika Hewitt MD  9/8/2022  4:44 PM

## 2022-09-12 ENCOUNTER — TELEPHONE (OUTPATIENT)
Dept: PRIMARY CARE CLINIC | Age: 77
End: 2022-09-12

## 2022-09-12 DIAGNOSIS — M54.41 ACUTE RIGHT-SIDED LOW BACK PAIN WITH RIGHT-SIDED SCIATICA: Primary | ICD-10-CM

## 2022-09-12 RX ORDER — TRAMADOL HYDROCHLORIDE 50 MG/1
50 TABLET ORAL EVERY 8 HOURS PRN
Qty: 21 TABLET | Refills: 0 | Status: SHIPPED | OUTPATIENT
Start: 2022-09-12 | End: 2022-09-19

## 2022-09-12 NOTE — TELEPHONE ENCOUNTER
Orders Placed This Encounter   Procedures    [address] Kinsey DO, Pain Medicine, [facility]     Referral Priority:   Urgent     Referral Type:   Eval and Treat     Referral Reason:   Specialty Services Required     Referred to Provider:   Yang Hi DO     Requested Specialty:   Pain Medicine     Number of Visits Requested:   1     Referral has been placed to [facility] pain management as above    Requested Prescriptions     Signed Prescriptions Disp Refills    traMADol (ULTRAM) 50 MG tablet 21 tablet 0     Sig: Take 1 tablet by mouth every 8 hours as needed for Pain for up to 7 days. Intended supply: 7 days.  Take lowest dose possible to manage pain     Authorizing Provider: Ashley Lyons     Medication called tramadol or Ultram which is a narcotic pain medication was sent to local pharmacy    Please note that this is a narcotic type medication so has potential for adverse effects such as decreased mental awareness lethargy fatigue abdominal symptoms and many others as well as potential for dependency    If symptoms worsen would recommend reevaluation and/or emergency if not severe    Thanks

## 2022-09-12 NOTE — TELEPHONE ENCOUNTER
Daughter calling regarding patient. Seen last week for hip pain. Pain right now is 10/10- states it is on the same level as a few years ago when he had to have part of his lung removed. When they were called with results, you had mentioned possible pain management or PT. They are willing to do whatever you think he needs or is best, but asking also for some kind of medication to get him through until he is seen. Or if you have any other recommendations.

## 2022-09-15 ENCOUNTER — TELEPHONE (OUTPATIENT)
Dept: FAMILY MEDICINE CLINIC | Age: 77
End: 2022-09-15

## 2022-09-15 ENCOUNTER — ANTI-COAG VISIT (OUTPATIENT)
Dept: FAMILY MEDICINE CLINIC | Age: 77
End: 2022-09-15
Payer: MEDICARE

## 2022-09-15 DIAGNOSIS — I48.91 ATRIAL FIBRILLATION, UNSPECIFIED TYPE (HCC): Primary | ICD-10-CM

## 2022-09-15 DIAGNOSIS — M79.604 RIGHT LEG PAIN: ICD-10-CM

## 2022-09-15 DIAGNOSIS — M25.551 RIGHT HIP PAIN: Primary | ICD-10-CM

## 2022-09-15 LAB
INR BLD: 2.9
INR BLD: 2.9
PROTIME: NORMAL

## 2022-09-15 PROCEDURE — 93793 ANTICOAG MGMT PT WARFARIN: CPT | Performed by: INTERNAL MEDICINE

## 2022-09-15 NOTE — TELEPHONE ENCOUNTER
Orders Placed This Encounter   Procedures    US EXTREMITY RIGHT NON VASC LIMITED     This procedure can be scheduled via [employer]. Access your [employer] account by visiting [email or web address].      Standing Status:   Future     Standing Expiration Date:   9/15/2023     Order Specific Question:   Reason for exam:     Answer:   right hip swellings     I would recommend maybe starting with an ultrasound of that area to see if we can get a characteristic of what the swelling is may be due to    Could schedule patient for [facility] on Friday

## 2022-09-15 NOTE — TELEPHONE ENCOUNTER
Spoke with Caitlin Abarca. She said he is giving her a hard time stating the lump has gone down in size some and he just wants to wait and see what happens. She is going to try talking to him again to see if he is willing to get the u/s and come see Dr Negrita Jorgensen tomorrow and she is going to call me back.

## 2022-09-15 NOTE — TELEPHONE ENCOUNTER
Pt's daughter called stating the pt was seen on 09/07 for R leg and hip pain. She states imaging was done and he was referred to pain management. She states the pt woke up this morning and has a lump on his R upper leg the size of 3 golf balls and this is where the pain is coming from. She wanted a OV with you but I advised for the pt to be seen through [facility] for further evaluation because you had no open appts today. She declined and wanted to know your further recommendations.  Please advise

## 2022-09-15 NOTE — TELEPHONE ENCOUNTER
Diona Shone called back. Patient has agreed to having the u/s and being seen tomorrow. He is scheduled for u/s today at 2:30pm in [facility] and an ov tomorrow at [facility] in [address] [place] [address].

## 2022-09-15 NOTE — PROGRESS NOTES
Previous INR: 3.00     Previous dose: 2.5mg x 1 then continued 6mg (5mg +1mg tablets) on mon, wed, and Friday and 5mg all others               Current INR: 2.90     Recommendation: continue 6mg (5mg +1mg tablets) on mon, wed, and Friday and 5mg all others    Next INR: 1 week     Asael Campuzano MD  9/15/2022  11:45 AM

## 2022-09-16 ENCOUNTER — OFFICE VISIT (OUTPATIENT)
Dept: PRIMARY CARE CLINIC | Age: 77
End: 2022-09-16
Payer: MEDICARE

## 2022-09-16 VITALS
BODY MASS INDEX: 36.49 KG/M2 | SYSTOLIC BLOOD PRESSURE: 142 MMHG | DIASTOLIC BLOOD PRESSURE: 92 MMHG | OXYGEN SATURATION: 96 % | HEART RATE: 74 BPM | TEMPERATURE: 97.1 F | HEIGHT: 67 IN

## 2022-09-16 DIAGNOSIS — M54.41 ACUTE RIGHT-SIDED LOW BACK PAIN WITH RIGHT-SIDED SCIATICA: Primary | ICD-10-CM

## 2022-09-16 PROCEDURE — 99213 OFFICE O/P EST LOW 20 MIN: CPT | Performed by: INTERNAL MEDICINE

## 2022-09-16 PROCEDURE — 1123F ACP DISCUSS/DSCN MKR DOCD: CPT | Performed by: INTERNAL MEDICINE

## 2022-09-16 PROCEDURE — 96372 THER/PROPH/DIAG INJ SC/IM: CPT | Performed by: INTERNAL MEDICINE

## 2022-09-16 RX ORDER — KETOROLAC TROMETHAMINE 30 MG/ML
30 INJECTION, SOLUTION INTRAMUSCULAR; INTRAVENOUS ONCE
Status: COMPLETED | OUTPATIENT
Start: 2022-09-16 | End: 2022-09-16

## 2022-09-16 RX ADMIN — KETOROLAC TROMETHAMINE 30 MG: 30 INJECTION, SOLUTION INTRAMUSCULAR; INTRAVENOUS at 14:20

## 2022-09-16 ASSESSMENT — ENCOUNTER SYMPTOMS
VOMITING: 0
COUGH: 1
NAUSEA: 0
CHEST TIGHTNESS: 0
BLOOD IN STOOL: 0
ABDOMINAL PAIN: 0
EYE PAIN: 0
SORE THROAT: 0
DIARRHEA: 0
CONSTIPATION: 0
SHORTNESS OF BREATH: 1
RHINORRHEA: 1

## 2022-09-16 NOTE — PROGRESS NOTES
[facility] [place]) Physicians   Internal Medicine     2022  Sheldon Dillingham : 1945 Sex: male  Age:77 y.o. Chief Complaint   Patient presents with    [address] done yesterday on right leg          HPI:   Patient presents to office for evaluation of the following medical concerns. - States has been having right lower back, right hip and leg pain for the last 4 days. No trauma or injury. States 7/10 on pain scale. States pain is sharp. No numbness . States possible tingling. Some weakness. States worse with walking. States better sitting. Has been taking tyelnol has not helped. - States feels left hearing aide is stuck in canal.     - States has diabetes. States follows with endocrinology. On glimepiride and tradjenta. No overt hypoglycemia. Follows with optho. On Statin. 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 States following with endocrinology and with [facility]. Receiving Sandostatin monthly. Has had excessive vasoactive intestinal peptide secretion. Last visit with endocrinology per reviewed note () -VIPoma & carcinoid, cont Sandostatin, adrenal hypofunction, cont hydrocortisone stress dose if needed, diabetes cont same hyperparathyroid    - States has high cholesterol. Trying to watch diet. On simvastatin. No reported side effects    - States has High blood pressure. Not checking blood pressure at home. On HCTZ and sotalolol. Nephrology decreased hctz to 12.5mg     - States has Atrial fibrillation. On Sotalol and coumadin. Has pacemaker. Following with cardiology and EP. States decreased sotalol     - States has CAD. Stats s/p Sent. States cardiomyopathy. Following with cardiology and EP. S/p pacer for simus node dysfunction.  Last follow up  (10/21) - no changes f/u 6 months      - States has COPD. States has had previous surgery for lobectomy for concern for mesothelioma but was negative. Following with pulmonary. On anoro. Also has nebulizer. Last visit with pulm per reviewed note  (11/21) stop anoro, start breztri, pred taper for wheeze, f/u 2m States could not afford breztri. Restarted anoro and qvar    - States has chronic kidney disease. Following with nephrology. Last follow up  (1/22) cont hctz 5x/wk f/u 6m. Last lab (4/2022) stable. - States has elevated uric acid. States on allopurinol.     - States has vitamin D def. On vitamin D 50k weekly. LAst lab was nornal and improved (4/2021)     Labs from 4/4/2022 reviewed with patient 9/16/2022     Health Maintenance   - immunizations:   Influenza Vaccination  Pneumonia Vaccination - (2018) - pneumococcal 23  Zoster/Shingles Vaccine  Tetanus Vaccination  covid pfizer x 3     - Screenings:   Colonoscopy   Prostate     ROS:  Review of Systems   Constitutional:  Negative for appetite change, chills, fever and unexpected weight change. HENT:  Positive for rhinorrhea. Negative for congestion and sore throat. Eyes:  Negative for pain and visual disturbance. Respiratory:  Positive for cough and shortness of breath. Negative for chest tightness. Cardiovascular:  Negative for chest pain and palpitations. Gastrointestinal:  Negative for abdominal pain, blood in stool, constipation, diarrhea, nausea and vomiting. Genitourinary:  Negative for difficulty urinating, dysuria, hematuria, scrotal swelling, testicular pain and urgency. Musculoskeletal:  Negative for arthralgias and gait problem. Skin:  Negative for rash. Neurological:  Negative for dizziness, syncope, weakness, light-headedness and headaches. Hematological:  Negative for adenopathy. Does not bruise/bleed easily. Psychiatric/Behavioral:  Negative for suicidal ideas. The patient is not nervous/anxious.         Current Outpatient Medications:     traMADol (ULTRAM) 50 MG tablet, Take 1 tablet by mouth every 8 hours as needed for Pain for up to 7 days. Intended supply: 7 days. Take lowest dose possible to manage pain, Disp: 21 tablet, Rfl: 0    QVAR REDIHALER 80 MCG/ACT AERB inhaler, INHALE 2 PUFFS BY MOUTH TWICE DAILY, Disp: , Rfl:     vitamin D (ERGOCALCIFEROL) 1.25 MG (96472 UT) CAPS capsule, TAKE 1 CAPSULE BY MOUTH ONE TIME PER WEEK, Disp: , Rfl:     latanoprost (XALATAN) 0.005 % ophthalmic solution, PLACE 1 DROP INTO BOTH EYES EVERY DAY AT BEDTIME, Disp: , Rfl:     hydrocortisone (CORTEF) 10 MG tablet, TAKE 1 1/2 TABLETS BY MOUTH EVERY MORNING AND 1 TABLET EVERY EVENING, Disp: 225 tablet, Rfl: 1    simvastatin (ZOCOR) 40 MG tablet, Take 1 tablet by mouth nightly, Disp: 90 tablet, Rfl: 1    warfarin (COUMADIN) 5 MG tablet, 1 po qd or as directed, Disp: 90 tablet, Rfl: 1    allopurinol (ZYLOPRIM) 100 MG tablet, TAKE 1 TABLET BY MOUTH [id number], Disp: 38 tablet, Rfl: 1    hydroCHLOROthiazide (HYDRODIURIL) 25 MG tablet, Take 1 tablet by mouth daily 1 po 5 days a week (hold on Tues and Sat)-pt reqesting 90 day supply, Disp: 75 tablet, Rfl: 1    warfarin (COUMADIN) 1 MG tablet, Take 1 tablet by mouth daily, Disp: 30 tablet, Rfl: 5    Cholecalciferol (VITAMIN D3) 1.25 MG (02546 UT) CAPS, Take 1 capsule by mouth once a week, Disp: , Rfl:     linagliptin (TRADJENTA) 5 MG tablet, Take 1 tablet by mouth daily Lot#[id number] Exp.7/2020, Disp: 28 tablet, Rfl: 0    umeclidinium-vilanterol (ANORO ELLIPTA) 62.5-25 MCG/INH AEPB inhaler, Inhale 1 puff into the lungs daily, Disp: , Rfl:     sotalol (BETAPACE) 160 MG tablet, Take 1 tablet by mouth 2 times daily (Patient taking differently: Take 160 mg by mouth daily), Disp: 60 tablet, Rfl: 3    nitroGLYCERIN (NITROSTAT) 0.4 MG SL tablet, Place 0.4 mg under the tongue every 5 minutes as needed for Chest pain up to max of 3 total doses.  If no relief after 1 dose, call 911., Disp: , Rfl:     albuterol (PROVENTIL) (5 MG/ML) 0.5% nebulizer solution, Take 0.5 mLs by nebulization every 6 hours as needed for Wheezing, Disp: 120 each, Rfl: 0    glimepiride (AMARYL) 2 MG tablet, Take 0.5 mg by mouth every morning (before breakfast) , Disp: , Rfl:     octreotide ACETATE (SANDOSTATIN LAR) 30 MG injection, Inject 60 mg into the muscle once Every two weeks, Disp: , Rfl:     Glucose Blood (BLOOD GLUCOSE TEST STRIPS) STRP, Freestyle lite, Disp: 120 strip, Rfl: 0    Fingerstix Lancets MISC, , Disp: 100 each, Rfl: 2    Allergies   Allergen Reactions    Lisinopril      Other reaction(s): Other: See Comments  Black out, nausea       Past Medical History:   Diagnosis Date    Atrial fibrillation ([facility] 1/4/2014    CAD (coronary artery disease)     Cancer (HCC)     VIPoma    Carcinoid syndrome (HCC)     Chronic kidney disease     COPD (chronic obstructive pulmonary disease) ([facility]     Diabetes mellitus ([facility]     Dizziness 11/1/2018    Hypertension     Idiopathic chronic gout of multiple sites without tophus 9/5/2019    Meniere disease     MI (myocardial infarction) ([facility]     x3    Mixed hyperlipidemia 9/5/2019    Obesity     Pacemaker     Type 2 diabetes mellitus without complication ([facility]        Past Surgical History:   Procedure Laterality Date    CARDIAC SURGERY      stents/pacemaker    COLONOSCOPY      CORONARY ANGIOPLASTY       CORONARY ANGIOPLASTY WITH STENT PLACEMENT      HERNIA REPAIR      LUNG SURGERY Right 2014    Dr. Beatty Flight for \"infection around lung\"    PACEMAKER INSERTION      TUMOR REMOVAL      tumors removed from breast ,arms     UPPER GASTROINTESTINAL ENDOSCOPY         Family History   Adopted: Yes   Problem Relation Age of Onset    Other Mother         Pt. is adopted    Other Father         Pt. is adopted.         Social History     Socioeconomic History    Marital status: Single     Spouse name: None    Number of children: 3    Years of education: 12    Highest education level: None   Occupational History    Occupation: [employer] worker     Comment: Nerves: No cranial nerve deficit.    Psychiatric:         Judgment: Judgment normal.       Assessment and Plan:    Diagnoses and all orders for this visit:    Acute right-sided low back pain with right-sided sciatica  - uncertain as to cause poss lumbar disease, burstitis   - xray of rt hip per radiology report unremarkable   - xray of lumbar spine - moderate facet OA l4-5, multilevel degenerative disc disease   - US LE - no dvt   - home exercises  - Heat and ice alternating   - declines PT   - s/p kenlog 40mg IM x 1   - s/p medrol pack   - tramadol did not help   - will give toradol x 1, no long term nsaids due to ckd3 and anticoagulation (coumadin)   - referral to pain management pending     Type 2 diabetes mellitus without complication, without long-term current use of insulin ([facility]  - Continue present treatment   - watch diet   - monitor blood glucose at home   - following with endocrinology   - on glimepiride   - on tradjenta   - follow A1c - last was 4.9 (4/2022)   - discussed decreasing medications 9/16/2022    Not on Aspirin - coumadin   Not on A/ARB - CKD   On statin   Follows with optho (3/22)- retinal de, no dm retin    Carcinoid (except of appendix) ([facility] [address]  - following with endocrinology and endocrinology surgery (dr. Tim Amaro) at [facility]  - States excessive Vasoactive intestinal peptide excretion   - On sandostatin monthly   - on cortef   - Stable     Stage 3a chronic kidney disease ([address]  - following with nephrology   - follow labs     Chronic atrial fibrillation ([address]  - s/p pacer  - following with cardiology and EP   - on sotalol - decreased dose (12/2019)   - on coumadin - INR weekly   - stable     Chronic obstructive pulmonary disease, unspecified COPD type ([address]  - following with pulmonary   - on anoro - could not afford trelegy   - has home nebulizer with albuterol   - has chronic cough   - has tried flonase or nasacort  - has tried mult antihistamine   - on anoro  - on qvar   - stable     Coronary artery disease involving native coronary artery of native heart without angina pectoris  - s/p stent  - following with cardiology and EP   - on sotalol   - on simvastatin   - no aspirin due to coumadin   - stable     Essential hypertension  - watch diet   - on hctz - nephrology decreased to 12.5mg   - on sotalol for afib   - stable 9/16/2022    Mixed hyperlipidemia  - watch diet   - on simvastatin  - follow labs     Idiopathic chronic gout of multiple sites without tophus  - watch diet   - on allopurinol - increase to daily   - stable     Hyperparathyroidism ([address]  - Possible due to CKD   - last calcium was elevated     Vitamin D deficiency  - nephrology added 50k weekly   - follow labs     Chronic pain of both knees  - check xray     Class 2 obesity due to excess calories without serious comorbidity with body mass index (BMI) of 36.0 to 36.9 in adult  - discussed diet and exercise     Return for check up and review, as scheduled. No orders of the defined types were placed in this encounter.     Requested Prescriptions      No prescriptions requested or ordered in this encounter        Lobo Mills MD  9/16/2022  2:18 PM

## 2022-09-20 ENCOUNTER — OFFICE VISIT (OUTPATIENT)
Dept: PAIN MANAGEMENT | Age: 77
End: 2022-09-20
Payer: MEDICARE

## 2022-09-20 ENCOUNTER — PREP FOR PROCEDURE (OUTPATIENT)
Dept: PAIN MANAGEMENT | Age: 77
End: 2022-09-20

## 2022-09-20 ENCOUNTER — TELEPHONE (OUTPATIENT)
Dept: PAIN MANAGEMENT | Age: 77
End: 2022-09-20

## 2022-09-20 VITALS
OXYGEN SATURATION: 99 % | DIASTOLIC BLOOD PRESSURE: 80 MMHG | SYSTOLIC BLOOD PRESSURE: 136 MMHG | WEIGHT: 233 LBS | RESPIRATION RATE: 16 BRPM | HEART RATE: 71 BPM | BODY MASS INDEX: 35.31 KG/M2 | HEIGHT: 68 IN | TEMPERATURE: 97.5 F

## 2022-09-20 DIAGNOSIS — Z79.01 ANTICOAGULATED ON COUMADIN: ICD-10-CM

## 2022-09-20 DIAGNOSIS — M51.36 DDD (DEGENERATIVE DISC DISEASE), LUMBAR: ICD-10-CM

## 2022-09-20 DIAGNOSIS — M53.3 SACROILIAC DYSFUNCTION: Primary | ICD-10-CM

## 2022-09-20 DIAGNOSIS — M47.817 LUMBOSACRAL SPONDYLOSIS WITHOUT MYELOPATHY: ICD-10-CM

## 2022-09-20 DIAGNOSIS — M48.061 SPINAL STENOSIS OF LUMBAR REGION, UNSPECIFIED WHETHER NEUROGENIC CLAUDICATION PRESENT: ICD-10-CM

## 2022-09-20 DIAGNOSIS — Z95.0 S/P CARDIAC PACEMAKER PROCEDURE: ICD-10-CM

## 2022-09-20 PROCEDURE — 99204 OFFICE O/P NEW MOD 45 MIN: CPT | Performed by: ANESTHESIOLOGY

## 2022-09-20 PROCEDURE — 99205 OFFICE O/P NEW HI 60 MIN: CPT | Performed by: ANESTHESIOLOGY

## 2022-09-20 PROCEDURE — 1123F ACP DISCUSS/DSCN MKR DOCD: CPT | Performed by: ANESTHESIOLOGY

## 2022-09-20 RX ORDER — SODIUM CHLORIDE 0.9 % (FLUSH) 0.9 %
5-40 SYRINGE (ML) INJECTION PRN
Status: CANCELLED | OUTPATIENT
Start: 2022-09-20

## 2022-09-20 RX ORDER — SODIUM CHLORIDE 0.9 % (FLUSH) 0.9 %
5-40 SYRINGE (ML) INJECTION EVERY 12 HOURS SCHEDULED
Status: CANCELLED | OUTPATIENT
Start: 2022-09-20

## 2022-09-20 RX ORDER — SODIUM CHLORIDE 9 MG/ML
INJECTION, SOLUTION INTRAVENOUS PRN
Status: CANCELLED | OUTPATIENT
Start: 2022-09-20

## 2022-09-20 RX ORDER — HYDROCODONE BITARTRATE AND ACETAMINOPHEN 5; 325 MG/1; MG/1
0.5 TABLET ORAL EVERY 8 HOURS PRN
Qty: 15 TABLET | Refills: 0 | Status: SHIPPED | OUTPATIENT
Start: 2022-09-20 | End: 2022-09-30

## 2022-09-20 NOTE — H&P (VIEW-ONLY)
[address]        [address] [facility], [address] [facility]      [phone]                  Consult Note      Patient:  Celso Macario,  1945    Date of Service:  22     Requesting Physician:  Morris Moreland MD    Reason for Consult:      Patient presents with complaints of right low back/ posterior hip and LE pain    HISTORY OF PRESENT ILLNESS:      Mr. Celso Macario is a 65 y.o. male [facility] for evaluation of recent onset right low back / posterior hip pain and right LE pain for over 2 weeks. Sudden onset pain - severe in nature. Pain is constant with intermittent exacerbation when walking and is described as sharp and throbbing. Pain does radiate to right  lower extremity mainly over the thigh but occasionally notices over the right leg. He  has numbness, tingling of the right leg. Patient does not have bladder or bowel dysfunction. Alleviating factors include: rest.  Aggravating factors include: movement, walking, bending, lifting. Pain causes functional limitations/ limits Adl's : Yes    Treated with medrol dose pack, analgesics- Toradol, tramadol. Continues to have significant pain - seen on urgent basis. X ray hip unremarkable    X ray LS spine - DDD    NOTE:  Has a pacemaker- A.fib  On coumadin. COPD - s/p lobectomy  H/o CKD    Previous treatments:   Physical Therapy : cannot do PT do due to significant pain. Medications: - NSAID's : yes             - Membrane stabilizers : no            - Opioids :short course            - Adjuvants or Others : yes,    Spine Surgeries: no    Anticoagulation medications: yes,  and include Warfarin. H/O Smoking: no  H/O alcohol abuse : no  H/O Illicit drug use : no    Imaging:     Xray LS spein and Xray of the right hip: 2022:  FINDINGS:   L-spine: Multilevel degenerative disc disease is overall mild.   Moderate   degenerative facet arthropathy from L4-S1. No fracture or dislocation. Normal MR soft tissues. Right hip: No significant arthropathy. No fracture or dislocation. No other   abnormal bone or soft tissue findings. Impression   Degenerative lumbar spondylosis. Unremarkable right hip. Past Medical History:   Diagnosis Date    Atrial fibrillation ([facility] 1/4/2014    CAD (coronary artery disease)     Cancer (HCC)     VIPoma    Carcinoid syndrome (HCC)     Chronic kidney disease     COPD (chronic obstructive pulmonary disease) ([facility] [facility]     Diabetes mellitus ([facility] [address]     Dizziness 11/1/2018    Hypertension     Idiopathic chronic gout of multiple sites without tophus 9/5/2019    Meniere disease     MI (myocardial infarction) ([facility] [address]     x3    Mixed hyperlipidemia 9/5/2019    Obesity     Pacemaker     Type 2 diabetes mellitus without complication ([facility] [address]        Past Surgical History:   Procedure Laterality Date    CARDIAC SURGERY      stents/pacemaker    COLONOSCOPY      CORONARY ANGIOPLASTY       CORONARY ANGIOPLASTY WITH STENT PLACEMENT      HERNIA REPAIR      LUNG SURGERY Right 2014    Dr. Katharine Florez for \"infection around lung\"    PACEMAKER INSERTION      TUMOR REMOVAL      tumors removed from breast ,arms     UPPER GASTROINTESTINAL ENDOSCOPY         Prior to Admission medications    Medication Sig Start Date End Date Taking?  Authorizing Provider   QVAR REDIHALER 80 MCG/ACT AERB inhaler INHALE 2 PUFFS BY MOUTH TWICE DAILY 7/6/22  Yes Historical Provider, MD   vitamin D (ERGOCALCIFEROL) 1.25 MG (92282 UT) CAPS capsule TAKE 1 CAPSULE BY MOUTH ONE TIME PER WEEK 8/10/22  Yes Historical Provider, MD   latanoprost (XALATAN) 0.005 % ophthalmic solution PLACE 1 DROP INTO BOTH EYES EVERY DAY AT BEDTIME 7/15/22  Yes Historical Provider, MD   hydrocortisone (CORTEF) 10 MG tablet TAKE 1 1/2 TABLETS BY MOUTH EVERY MORNING AND 1 TABLET EVERY EVENING 7/18/22  Yes Gordo Ponce MD   simvastatin (ZOCOR) 40 MG tablet Take 1 tablet by mouth nightly 6/6/22  Yes Elva Silva MD   warfarin (COUMADIN) 5 MG tablet 1 po qd or as directed 5/23/22  Yes Elva Silva MD   allopurinol (ZYLOPRIM) 100 MG tablet TAKE 1 TABLET BY MOUTH [id number] 5/2/22  Yes Elva Silva MD   hydroCHLOROthiazide (HYDRODIURIL) 25 MG tablet Take 1 tablet by mouth daily 1 po 5 days a week (hold on Tues and Sat)-pt reqesting 90 day supply 3/28/22  Yes Tyrel Craft DO   warfarin (COUMADIN) 1 MG tablet Take 1 tablet by mouth daily 1/11/22  Yes Elva Silva MD   Cholecalciferol (VITAMIN D3) 1.25 MG (76327 UT) CAPS Take 1 capsule by mouth once a week   Yes Historical Provider, MD   linagliptin (TRADJENTA) 5 MG tablet Take 1 tablet by mouth daily Lot#[id number] Exp.7/2020 2/28/19  Yes Kareem Moses, APRN - CNP   umeclidinium-vilanterol (ANORO ELLIPTA) 62.5-25 MCG/INH AEPB inhaler Inhale 1 puff into the lungs daily   Yes Historical Provider, MD   sotalol (BETAPACE) 160 MG tablet Take 1 tablet by mouth 2 times daily  Patient taking differently: Take 160 mg by mouth daily 5/18/17  Yes Linda Espana MD   nitroGLYCERIN (NITROSTAT) 0.4 MG SL tablet Place 0.4 mg under the tongue every 5 minutes as needed for Chest pain up to max of 3 total doses. If no relief after 1 dose, call 911. Yes Historical Provider, MD   albuterol (PROVENTIL) (5 MG/ML) 0.5% nebulizer solution Take 0.5 mLs by nebulization every 6 hours as needed for Wheezing 7/25/16  Yes Sarahi Smith MD   glimepiride (AMARYL) 2 MG tablet Take 0.5 mg by mouth every morning (before breakfast)    Yes Historical Provider, MD   octreotide ACETATE (SANDOSTATIN LAR) 30 MG injection Inject 60 mg into the muscle once Every two weeks   Yes Historical Provider, MD   Glucose Blood (BLOOD GLUCOSE TEST STRIPS) STRP Freestyle lite 3/5/12  Yes Shani Steel MD   Fingerstix Lancets MISC  3/5/12  Yes Shani Steel MD       Allergies   Allergen Reactions    Lisinopril      Other reaction(s):  Other: See Comments  Black out, nausea       Social History     Socioeconomic History    Marital status: Single     Spouse name: Not on file    Number of children: 3    Years of education: 12    Highest education level: Not on file   Occupational History    Occupation: [employer]     Comment: retired   Tobacco Use    Smoking status: Former     Packs/day: 2.00     Years: 40.00     Pack years: 80.00     Types: Cigarettes     Quit date: 2000     Years since quittin.0    Smokeless tobacco: Never   Vaping Use    Vaping Use: Never used   Substance and Sexual Activity    Alcohol use: No    Drug use: No    Sexual activity: Not Currently     Partners: Female   Other Topics Concern    Not on file   Social History Narrative    Not on file     Social Determinants of Health     Financial Resource Strain: Low Risk     Difficulty of Paying Living Expenses: Not hard at all   Food Insecurity: No Food Insecurity    Worried About Running Out of Food in the Last Year: Never true    Ran Out of Food in the Last Year: Never true   Transportation Needs: Not on file   Physical Activity: Not on file   Stress: Not on file   Social Connections: Not on file   Intimate Partner Violence: Not on file   Housing Stability: Not on file       Family History   Adopted: Yes   Problem Relation Age of Onset    Other Mother         Pt. is adopted    Other Father         Pt. is adopted. REVIEW OF SYSTEMS:     Patient specifically denies fever/chills, chest pain, shortness of breath, new bowel or bladder complaints. All other review of systems was negative.     Review of Systems - Documented reviewed    PHYSICAL EXAMINATION:      /80   Pulse 71   Temp 97.5 °F (36.4 °C) (Infrared)   Resp 16   Ht 5' 8.4\" (1.737 m)   Wt 233 lb (105.7 kg)   SpO2 99%   BMI 35.01 kg/m²     General:      General appearance:  Pleasant and well-hydrated, in no distress and A & O x 3  Build:Obese  Function: uses a walker to assist in DDD    Has pacemaker- cant get MRI    On coumadin    PLAN:  CT Lumbar spine    Right SIJ injection under fluoroscopy. RBA discussed. Will add him on urgent basis considering that he is in significant pain. Will coordinate with the OR team.    If continued pain, Consider spine interventional after reviewing the CT Spine. (He would need to hold Coumadin for spine interventions)    ZT lido patch for local use. Short course of Norco for prn use 0.5-1 tab bid prn. RBA of opioid use discussed. Counseling :    Patient encouraged to stay active and to watch/lose weight    Encouraged to continue Regular home exercise program as tolerated - stretching / strengthening. Treatment plan discussed with the patient including medication and procedure side effects. Controlled Substances Monitoring: [place]. We discussed with the patient that combining opioids, benzodiazepines, alcohol, illicit drugs or sleep aids increases the risk of respiratory depression including death. We discussed that these medications may cause drowsiness, sedation or dizziness and have counseled the patient not to drive or operate machinery. We have discussed that these medications will be prescribed only by one provider. We have discussed with the patient about age related risk factors and have thoroughly discussed the importance of taking these medications as prescribed. The patient verbalizes understanding. Annalisa Jade MD    Dear Dr. Lashell Rooney,   Thank you for referring Mr. Raji Baxter and allowing us to participate in his care. Please do not hesitate to contact me if you have any questions regarding his care.     Paxton Corea MD    CC:    Yareli Paulino MD  [facility],  [address] [facility]

## 2022-09-20 NOTE — TELEPHONE ENCOUNTER
Call to Brigida Harris that procedure was approved for 9/22/2022 and that Poncho should call him a few days before for the pre op call and after 3:00 PM the business day before with the arrival time. Ernesto Sommers does not need to hold coumadin per Dr. Lisandra Montalvo. Instructed to call office back if any questions. Ernesto Sommers verbalized understanding.     Marilyn Clark RN  Pain Management

## 2022-09-20 NOTE — PROGRESS NOTES
[facility]        [address] [facility], [address] [facility]      [phone]                  Consult Note      Patient:  Kimmie Ward,  1945    Date of Service:  22     Requesting Physician:  Deidre Burkett MD    Reason for Consult:      Patient presents with complaints of right low back/ posterior hip and LE pain    HISTORY OF PRESENT ILLNESS:      Mr. Kimmie Ward is a 65 y.o. male presented [facility] for evaluation of recent onset right low back / posterior hip pain and right LE pain for over 2 weeks. Sudden onset pain - severe in nature. Pain is constant with intermittent exacerbation when walking and is described as sharp and throbbing. Pain does radiate to right  lower extremity mainly over the thigh but occasionally notices over the right leg. He  has numbness, tingling of the right leg. Patient does not have bladder or bowel dysfunction. Alleviating factors include: rest.  Aggravating factors include: movement, walking, bending, lifting. Pain causes functional limitations/ limits Adl's : Yes    Treated with medrol dose pack, analgesics- Toradol, tramadol. Continues to have significant pain - seen on urgent basis. X ray hip unremarkable    X ray LS spine - DDD    NOTE:  Has a pacemaker- A.fib  On coumadin. COPD - s/p lobectomy  H/o CKD    Previous treatments:   Physical Therapy : cannot do PT do due to significant pain. Medications: - NSAID's : yes             - Membrane stabilizers : no            - Opioids :short course            - Adjuvants or Others : yes,    Spine Surgeries: no    Anticoagulation medications: yes,  and include Warfarin. H/O Smoking: no  H/O alcohol abuse : no  H/O Illicit drug use : no    Imaging:     Xray LS spein and Xray of the right hip: 2022:  FINDINGS:   L-spine: Multilevel degenerative disc disease is overall mild.   Moderate   degenerative facet arthropathy from L4-S1. No fracture or dislocation. Normal MR soft tissues. Right hip: No significant arthropathy. No fracture or dislocation. No other   abnormal bone or soft tissue findings. Impression   Degenerative lumbar spondylosis. Unremarkable right hip. Past Medical History:   Diagnosis Date    Atrial fibrillation ([facility] [address] [address] 1/4/2014    CAD (coronary artery disease)     Cancer (HCC)     VIPoma    Carcinoid syndrome (HCC)     Chronic kidney disease     COPD (chronic obstructive pulmonary disease) ([facility] [address]     Diabetes mellitus ([facility] [address]     Dizziness 11/1/2018    Hypertension     Idiopathic chronic gout of multiple sites without tophus 9/5/2019    Meniere disease     MI (myocardial infarction) ([facility] [address]     x3    Mixed hyperlipidemia 9/5/2019    Obesity     Pacemaker     Type 2 diabetes mellitus without complication ([facility] [address]        Past Surgical History:   Procedure Laterality Date    CARDIAC SURGERY      stents/pacemaker    COLONOSCOPY      CORONARY ANGIOPLASTY       CORONARY ANGIOPLASTY WITH STENT PLACEMENT      HERNIA REPAIR      LUNG SURGERY Right 2014    Dr. Akila Melo for \"infection around lung\"    PACEMAKER INSERTION      TUMOR REMOVAL      tumors removed from breast ,arms     UPPER GASTROINTESTINAL ENDOSCOPY         Prior to Admission medications    Medication Sig Start Date End Date Taking?  Authorizing Provider   QVAR REDIHALER 80 MCG/ACT AERB inhaler INHALE 2 PUFFS BY MOUTH TWICE DAILY 7/6/22  Yes Historical Provider, MD   vitamin D (ERGOCALCIFEROL) 1.25 MG (64757 UT) CAPS capsule TAKE 1 CAPSULE BY MOUTH ONE TIME PER WEEK 8/10/22  Yes Historical Provider, MD   latanoprost (XALATAN) 0.005 % ophthalmic solution PLACE 1 DROP INTO BOTH EYES EVERY DAY AT BEDTIME 7/15/22  Yes Historical Provider, MD   hydrocortisone (CORTEF) 10 MG tablet TAKE 1 1/2 TABLETS BY MOUTH EVERY MORNING AND 1 TABLET EVERY EVENING 7/18/22  Yes Elva Silva MD   simvastatin (ZOCOR) 40 MG tablet Take 1 tablet by mouth nightly 6/6/22  Yes Kaz Morel MD   warfarin (COUMADIN) 5 MG tablet 1 po qd or as directed 5/23/22  Yes Kaz Morel MD   allopurinol (ZYLOPRIM) 100 MG tablet TAKE 1 TABLET BY MOUTH [id number] 5/2/22  Yes Kaz Morel MD   hydroCHLOROthiazide (HYDRODIURIL) 25 MG tablet Take 1 tablet by mouth daily 1 po 5 days a week (hold on Tues and Sat)-pt reqesting 90 day supply 3/28/22  Yes Tyrel Craft DO   warfarin (COUMADIN) 1 MG tablet Take 1 tablet by mouth daily 1/11/22  Yes Kaz Morel MD   Cholecalciferol (VITAMIN D3) 1.25 MG (94092 UT) CAPS Take 1 capsule by mouth once a week   Yes Historical Provider, MD   linagliptin (TRADJENTA) 5 MG tablet Take 1 tablet by mouth daily Lot#[id number] Exp.7/2020 2/28/19  Yes Janis Corey APRN - CNP   umeclidinium-vilanterol (ANORO ELLIPTA) 62.5-25 MCG/INH AEPB inhaler Inhale 1 puff into the lungs daily   Yes Historical Provider, MD   sotalol (BETAPACE) 160 MG tablet Take 1 tablet by mouth 2 times daily  Patient taking differently: Take 160 mg by mouth daily 5/18/17  Yes Hira Escobar MD   nitroGLYCERIN (NITROSTAT) 0.4 MG SL tablet Place 0.4 mg under the tongue every 5 minutes as needed for Chest pain up to max of 3 total doses. If no relief after 1 dose, call 911. Yes Historical Provider, MD   albuterol (PROVENTIL) (5 MG/ML) 0.5% nebulizer solution Take 0.5 mLs by nebulization every 6 hours as needed for Wheezing 7/25/16  Yes Joe Rutherford MD   glimepiride (AMARYL) 2 MG tablet Take 0.5 mg by mouth every morning (before breakfast)    Yes Historical Provider, MD   octreotide ACETATE (SANDOSTATIN LAR) 30 MG injection Inject 60 mg into the muscle once Every two weeks   Yes Historical Provider, MD   Glucose Blood (BLOOD GLUCOSE TEST STRIPS) STRP Freestyle lite 3/5/12  Yes Cinthya Whiting MD   Fingerstix Lancets MISC  3/5/12  Yes Cinthya Whiting MD       Allergies   Allergen Reactions    Lisinopril      Other reaction(s):  Other: See Comments  Black out, nausea       Social History     Socioeconomic History    Marital status: Single     Spouse name: Not on file    Number of children: 3    Years of education: 12    Highest education level: Not on file   Occupational History    Occupation: [address] [employer]     Comment: retired   Tobacco Use    Smoking status: Former     Packs/day: 2.00     Years: 40.00     Pack years: 80.00     Types: Cigarettes     Quit date: 2000     Years since quittin.0    Smokeless tobacco: Never   Vaping Use    Vaping Use: Never used   Substance and Sexual Activity    Alcohol use: No    Drug use: No    Sexual activity: Not Currently     Partners: Female   Other Topics Concern    Not on file   Social History Narrative    Not on file     Social Determinants of Health     Financial Resource Strain: Low Risk     Difficulty of Paying Living Expenses: Not hard at all   Food Insecurity: No Food Insecurity    Worried About Running Out of Food in the Last Year: Never true    Ran Out of Food in the Last Year: Never true   Transportation Needs: Not on file   Physical Activity: Not on file   Stress: Not on file   Social Connections: Not on file   Intimate Partner Violence: Not on file   Housing Stability: Not on file       Family History   Adopted: Yes   Problem Relation Age of Onset    Other Mother         Pt. is adopted    Other Father         Pt. is adopted. REVIEW OF SYSTEMS:     Patient specifically denies fever/chills, chest pain, shortness of breath, new bowel or bladder complaints. All other review of systems was negative.     Review of Systems - Documented reviewed    PHYSICAL EXAMINATION:      /80   Pulse 71   Temp 97.5 °F (36.4 °C) (Infrared)   Resp 16   Ht 5' 8.4\" (1.737 m)   Wt 233 lb (105.7 kg)   SpO2 99%   BMI 35.01 kg/m²     General:      General appearance:  Pleasant and well-hydrated, in no distress and A & O x 3  Build:Obese  Function: uses a walker to assist in ambulation    HEENT:    Head:normocephalic, atraumatic  Pupils:regular, round, equal  Sclera: icterus absent    Lungs:    Breathing:normal breathing pattern     CVS:     RRR    Abdomen:    Shape:obese, non-distended, and normal    Cervical spine:    Inspection:normal  Palpation:tenderness paravertebral muscles, tenderness trapezium, left, right : no   Range of motion:no pain    Thoracic spine:     Spine inspection:normal   Palpation:No tenderness over the midline and paraspinal area, bilaterally  Range of motion:normal in flexion, extension rotation bilateral and is not painful. Lumbar spine:    Spine inspection: Normal   Palpation: Tenderness paravertebral muscles Yes right  Range of motion: Decreased, flexion Decreased, Lateral bending, extension and rotation  reduced is painful. Sacroiliac joint tenderness Yes right  IVONNE test: positive right  Gaenslen's test:positive right   Piriformis tenderness: negative bilaterally  SLR : negative bilaterally  Trochanteric bursa tenderness: negative bilaterally  CVA tenderness:No     Musculoskeletal:    Trigger points No     Extremities:    Tremors:None bilaterally upper and lower  Edema:+    Neurological:    Sensory: Normal to light touch     Motor:   No focal deficits    Dermatology:    Skin:no rashes or lesions noted    Assessment/Plan:     Diagnosis Orders   1. Sacroiliac dysfunction        2. DDD (degenerative disc disease), lumbar        3. Lumbosacral spondylosis without myelopathy        4. Spinal stenosis of lumbar region, unspecified whether neurogenic claudication present        5. S/P cardiac pacemaker procedure        6. Anticoagulated on Coumadin            65 y.o. male with h/o acute onset right low back/ posterior hip pain and intermittent right LE pain - mainly over the right thigh. Treated conservatively- oral steroids, analgesics. Xray- hip unremarkable.      Xray LS spine ; DDD     Exam: right SIJ tenderness +    Features of SIJ pain and DDD    Has pacemaker- cant get MRI    On coumadin    PLAN:  CT Lumbar spine    Right SIJ injection under fluoroscopy. RBA discussed. Will add him on urgent basis considering that he is in significant pain. Will coordinate with the OR team.    If continued pain, Consider spine interventional after reviewing the CT Spine. (He would need to hold Coumadin for spine interventions)    ZT lido patch for local use. Short course of Norco for prn use 0.5-1 tab bid prn. RBA of opioid use discussed. Counseling :    Patient encouraged to stay active and to watch/lose weight    Encouraged to continue Regular home exercise program as tolerated - stretching / strengthening. Treatment plan discussed with the patient including medication and procedure side effects. Controlled Substances Monitoring: [place] reviewed. We discussed with the patient that combining opioids, benzodiazepines, alcohol, illicit drugs or sleep aids increases the risk of respiratory depression including death. We discussed that these medications may cause drowsiness, sedation or dizziness and have counseled the patient not to drive or operate machinery. We have discussed that these medications will be prescribed only by one provider. We have discussed with the patient about age related risk factors and have thoroughly discussed the importance of taking these medications as prescribed. The patient verbalizes understanding. Zarina Partida MD    Dear Dr. Micaela Faith,   Thank you for referring Mr. Latasha Yeung and allowing us to participate in his care. Please do not hesitate to contact me if you have any questions regarding his care.     Brittany Mullins MD    CC:    Bryan Liang MD  [facility],  [address] [facility]

## 2022-09-20 NOTE — PROGRESS NOTES
[facility]  INSTRUCTIONS  . .......................................................................................................................................... [x] Parking the day of Surgery is located in the [facility].   Upon entering the door, make immediate right into the surgery reception room    [x]  Bring photo ID and insurance card     [x] You may have a light breakfast day of procedure    [x]  Wear loose comfortable clothing    [x]  Please follow instructions for medications as given per Dr's office    [x] You can expect a call the business day prior to procedure to notify you of your arrival time     [x] Please arrange for     []  Other instructions

## 2022-09-20 NOTE — PROGRESS NOTES
Patient:  JANICE Peña 1945  Date of Service:  22      Do you currently have any of the following:    Fever: No  Headache:  No  Cough: No  Shortness of breath: No  Exposed to anyone with these symptoms: No       Patient presents with complaints of right leg and hip  pain that started 2 weeks ago and has been getting worse. He states the pain began following No specific cause    Pain is constant and is described as aching and throbbing. He rates the pain as a 8/10 on his worst day , 6/10 on his best day, and a 7/10 on average on the VAS scale. Pain does radiate to right leg. He  has tingling of the right leg. Alleviating factors include: rest.  Aggravating factors include:  walking, standing. He states that the pain does not keep him from sleeping at night. He took his last dose of Tramadol last night . He is not on NSAIDS and  is not on anticoagulation medications to include none     Previous treatments: medications. .      Personal Expectations from this treatment:     There were no vitals taken for this visit. No LMP for male patient.

## 2022-09-22 ENCOUNTER — HOSPITAL ENCOUNTER (OUTPATIENT)
Age: 77
Setting detail: OUTPATIENT SURGERY
Discharge: HOME | End: 2022-09-22
Attending: ANESTHESIOLOGY | Admitting: ANESTHESIOLOGY
Payer: MEDICARE

## 2022-09-22 ENCOUNTER — ANTI-COAG VISIT (OUTPATIENT)
Dept: FAMILY MEDICINE CLINIC | Age: 77
End: 2022-09-22
Payer: MEDICARE

## 2022-09-22 ENCOUNTER — HOSPITAL ENCOUNTER (OUTPATIENT)
Dept: GENERAL RADIOLOGY | Age: 77
Setting detail: OUTPATIENT SURGERY
Discharge: HOME | End: 2022-09-24
Attending: ANESTHESIOLOGY
Payer: MEDICARE

## 2022-09-22 VITALS
WEIGHT: 220 LBS | TEMPERATURE: 97.9 F | RESPIRATION RATE: 18 BRPM | SYSTOLIC BLOOD PRESSURE: 136 MMHG | BODY MASS INDEX: 34.53 KG/M2 | DIASTOLIC BLOOD PRESSURE: 80 MMHG | HEIGHT: 67 IN | OXYGEN SATURATION: 98 % | HEART RATE: 70 BPM

## 2022-09-22 DIAGNOSIS — I48.91 ATRIAL FIBRILLATION, UNSPECIFIED TYPE (HCC): Primary | ICD-10-CM

## 2022-09-22 DIAGNOSIS — R52 PAIN MANAGEMENT: ICD-10-CM

## 2022-09-22 LAB
INR BLD: 2.1
INR BLD: 2.1
INR BLD: 2.3
PROTHROMBIN TIME: 24.3 SEC (ref 9.3–12.4)
PROTIME: NORMAL

## 2022-09-22 PROCEDURE — 27096 INJECT SACROILIAC JOINT: CPT | Performed by: ANESTHESIOLOGY

## 2022-09-22 PROCEDURE — 6360000002 HC RX W HCPCS: Performed by: ANESTHESIOLOGY

## 2022-09-22 PROCEDURE — 3209999900 FLUORO FOR SURGICAL PROCEDURES

## 2022-09-22 PROCEDURE — 36415 COLL VENOUS BLD VENIPUNCTURE: CPT

## 2022-09-22 PROCEDURE — 3600000002 HC SURGERY LEVEL 2 BASE: Performed by: ANESTHESIOLOGY

## 2022-09-22 PROCEDURE — 7100000010 HC PHASE II RECOVERY - FIRST 15 MIN: Performed by: ANESTHESIOLOGY

## 2022-09-22 PROCEDURE — 2709999900 HC NON-CHARGEABLE SUPPLY: Performed by: ANESTHESIOLOGY

## 2022-09-22 PROCEDURE — 7100000011 HC PHASE II RECOVERY - ADDTL 15 MIN: Performed by: ANESTHESIOLOGY

## 2022-09-22 PROCEDURE — 2500000003 HC RX 250 WO HCPCS: Performed by: ANESTHESIOLOGY

## 2022-09-22 PROCEDURE — 6360000004 HC RX CONTRAST MEDICATION: Performed by: ANESTHESIOLOGY

## 2022-09-22 PROCEDURE — 85610 PROTHROMBIN TIME: CPT

## 2022-09-22 PROCEDURE — 93793 ANTICOAG MGMT PT WARFARIN: CPT | Performed by: INTERNAL MEDICINE

## 2022-09-22 RX ORDER — METHYLPREDNISOLONE ACETATE 40 MG/ML
INJECTION, SUSPENSION INTRA-ARTICULAR; INTRALESIONAL; INTRAMUSCULAR; SOFT TISSUE PRN
Status: DISCONTINUED | OUTPATIENT
Start: 2022-09-22 | End: 2022-09-22 | Stop reason: ALTCHOICE

## 2022-09-22 RX ORDER — LIDOCAINE HYDROCHLORIDE 5 MG/ML
INJECTION, SOLUTION INFILTRATION; INTRAVENOUS PRN
Status: DISCONTINUED | OUTPATIENT
Start: 2022-09-22 | End: 2022-09-22 | Stop reason: ALTCHOICE

## 2022-09-22 RX ORDER — BUPIVACAINE HYDROCHLORIDE 2.5 MG/ML
INJECTION, SOLUTION EPIDURAL; INFILTRATION; INTRACAUDAL PRN
Status: DISCONTINUED | OUTPATIENT
Start: 2022-09-22 | End: 2022-09-22 | Stop reason: ALTCHOICE

## 2022-09-22 RX ORDER — SODIUM CHLORIDE 0.9 % (FLUSH) 0.9 %
5-40 SYRINGE (ML) INJECTION EVERY 12 HOURS SCHEDULED
Status: DISCONTINUED | OUTPATIENT
Start: 2022-09-22 | End: 2022-09-22 | Stop reason: HOSPADM

## 2022-09-22 RX ORDER — SODIUM CHLORIDE 9 MG/ML
INJECTION, SOLUTION INTRAVENOUS PRN
Status: DISCONTINUED | OUTPATIENT
Start: 2022-09-22 | End: 2022-09-22 | Stop reason: HOSPADM

## 2022-09-22 RX ORDER — SODIUM CHLORIDE 0.9 % (FLUSH) 0.9 %
5-40 SYRINGE (ML) INJECTION PRN
Status: DISCONTINUED | OUTPATIENT
Start: 2022-09-22 | End: 2022-09-22 | Stop reason: HOSPADM

## 2022-09-22 ASSESSMENT — PAIN - FUNCTIONAL ASSESSMENT: PAIN_FUNCTIONAL_ASSESSMENT: 0-10

## 2022-09-22 ASSESSMENT — PAIN DESCRIPTION - LOCATION: LOCATION: LEG

## 2022-09-22 ASSESSMENT — PAIN DESCRIPTION - DESCRIPTORS: DESCRIPTORS: ACHING

## 2022-09-22 ASSESSMENT — PAIN DESCRIPTION - DIRECTION: RADIATING_TOWARDS: DOWN RIGHT LEG

## 2022-09-22 ASSESSMENT — PAIN DESCRIPTION - ORIENTATION: ORIENTATION: RIGHT

## 2022-09-22 NOTE — DISCHARGE INSTRUCTIONS
Sandi [facility]oma  Dr. Enid Chao Block/Radiofrequency  Home Going Instructions    1-Go home, rest for the remainder of the day  2-Please do not lift over 20 pounds the day of the injection  3-If you received sedation No: alcohol, driving, operating lawn mowers, plows, tractors or other dangerous equipment until next morning. Do not make important decisions or sign legal documents for 24 hours. You may experience light headedness, dizziness, nausea or sleepiness after sedation. Do not stay alone. A responsible adult must be with you for 24 hours. You could be nauseated from the medications you have received. Your IV site may be sore and bruised. 4-No dietary restrictions     5-Resume all medications the same day, blood thinners to be resumed 24 hours after injection if you were instructed to stop any. 6-Keep the surgical site clean and dry, you may shower the next morning and remove the      dressing. 7- No sitz baths, tub baths or hot tubs/swimming for 24 hours. 8- If you have any pain at the injection site(s), application of an ice pack to the area should be       helpful, 20 minutes on/20 minutes off for next 48 hours. 9- Call [facility] immediately at if you develop.   Fever greater than 100.4 F  Have bleeding or drainage from the puncture site  Have progressive Leg/arm numbness and or weakness  Loss of control of bowel and or bladder (wet/soil yourself)  Severe headache with inability to lift head  10-You may return to work the next day

## 2022-09-22 NOTE — INTERVAL H&P NOTE
Update History & Physical    The patient's History and Physical of September 20, 2022 was reviewed with the patient and I examined the patient. There was no change. The surgical site was confirmed by the patient and me. Plan: Right SIJ injection. The risks, benefits, expected outcome, and alternative to the recommended procedure have been discussed with the patient. Patient understands and wants to proceed with the procedure.      Electronically signed by Ginny Prieto MD on 9/22/2022

## 2022-09-22 NOTE — PROGRESS NOTES
Previous INR: 2.90     Previous dose: 2.5mg x 1 then continued 6mg (5mg +1mg tablets) on mon, wed, and Friday and 5mg all others               Current INR: 2.10     Recommendation: continue 6mg (5mg +1mg tablets) on mon, wed, and Friday and 5mg all others    Next INR: 1 week     Suzanna Bray MD  9/22/2022  10:41 AM

## 2022-09-22 NOTE — OP NOTE
manner. AP fluoroscopy was used to visualize the sacroiliac joint. The fluoroscopic beam was then obliqued until the anterior and posterior margins of the joint were aligned. The inferior margin of the joint was identified and marked. The skin and subcutaneous tissue about this identified point were anesthestized with 0.5% lidocaine. A 22 gauge 3 1/2 spinal needle was advanced toward the the identified point under fluoroscopic guidance. Once the targeted point was reached and the joint space was entered, negative aspiration was confirmed, and 0.5 cc of Isovue M 200 was injected. The  Joint space was appropriately outlined. Then, after negative aspiration, a solution consisiting of 0.25% marcaine 2 cc and 40 mg DepoMedrol was easily injected. The needle was then removed and the needle insertion site was covered with Band-Aid. Disposition the patient tolerated the procedure well and there were no complications . Vital signs remained stable throughout the procedure. The patient was escorted to the recovery area where they remained until discharge and written discharge instructions for the procedure were given. Plan: Lisa Laser will return to our pain management center as scheduled.      Shelia Dunlap MD

## 2022-09-26 NOTE — PROGRESS NOTES
Yobany doherty. 26 F pmh asthma, SVT s/p ablation p/w atraumatic R knee pain x one month.   on exam RLE: FROM all joints, SILT, minimal ttp medial patella and + pain w/ varus stress, no effusion, no calf ttp/edema, DP/PT pulse 2+.  xrays show no acute pathology.  given NSAID, educated on RICE and f/u with her ortho outpt.  discussed strict return parameters

## 2022-09-27 ENCOUNTER — TELEPHONE (OUTPATIENT)
Dept: PAIN MANAGEMENT | Age: 77
End: 2022-09-27

## 2022-09-27 NOTE — TELEPHONE ENCOUNTER
Dileep Modi daughter, called said her dad had injections on 9/22/2022 and he has not had any relief. She is wondering if he needs to move his follow up appointment up? He is taking Norco 5-325 mg and using the Ztlido patches and neither of those seem to be working. Please advise on next steps.

## 2022-09-29 ENCOUNTER — ANTI-COAG VISIT (OUTPATIENT)
Dept: FAMILY MEDICINE CLINIC | Age: 77
End: 2022-09-29
Payer: MEDICARE

## 2022-09-29 DIAGNOSIS — I48.91 ATRIAL FIBRILLATION, UNSPECIFIED TYPE (HCC): Primary | ICD-10-CM

## 2022-09-29 LAB
INR BLD: 3.5
INR BLD: 3.5
PROTIME: NORMAL

## 2022-09-29 PROCEDURE — 93793 ANTICOAG MGMT PT WARFARIN: CPT | Performed by: INTERNAL MEDICINE

## 2022-10-03 ENCOUNTER — HOSPITAL ENCOUNTER (OUTPATIENT)
Dept: CT IMAGING | Age: 77
Discharge: HOME | End: 2022-10-05
Payer: MEDICARE

## 2022-10-03 DIAGNOSIS — M51.36 DDD (DEGENERATIVE DISC DISEASE), LUMBAR: ICD-10-CM

## 2022-10-03 DIAGNOSIS — M48.061 SPINAL STENOSIS OF LUMBAR REGION, UNSPECIFIED WHETHER NEUROGENIC CLAUDICATION PRESENT: ICD-10-CM

## 2022-10-03 DIAGNOSIS — M47.817 LUMBOSACRAL SPONDYLOSIS WITHOUT MYELOPATHY: ICD-10-CM

## 2022-10-03 PROCEDURE — 72131 CT LUMBAR SPINE W/O DYE: CPT

## 2022-10-05 ENCOUNTER — HOSPITAL ENCOUNTER (OUTPATIENT)
Age: 77
Discharge: HOME | End: 2022-10-05
Payer: MEDICARE

## 2022-10-05 ENCOUNTER — TELEPHONE (OUTPATIENT)
Dept: PAIN MANAGEMENT | Age: 77
End: 2022-10-05

## 2022-10-05 DIAGNOSIS — R53.83 OTHER FATIGUE: ICD-10-CM

## 2022-10-05 DIAGNOSIS — M1A.09X0 IDIOPATHIC CHRONIC GOUT OF MULTIPLE SITES WITHOUT TOPHUS: ICD-10-CM

## 2022-10-05 DIAGNOSIS — E55.9 VITAMIN D DEFICIENCY: ICD-10-CM

## 2022-10-05 DIAGNOSIS — E11.9 TYPE 2 DIABETES MELLITUS WITHOUT COMPLICATION, WITHOUT LONG-TERM CURRENT USE OF INSULIN (HCC): ICD-10-CM

## 2022-10-05 DIAGNOSIS — Z79.899 LONG TERM CURRENT USE OF THERAPEUTIC DRUG: ICD-10-CM

## 2022-10-05 DIAGNOSIS — E78.2 MIXED HYPERLIPIDEMIA: ICD-10-CM

## 2022-10-05 LAB
ALBUMIN SERPL-MCNC: 4.2 G/DL (ref 3.5–5.2)
ALP BLD-CCNC: 61 U/L (ref 40–129)
ALT SERPL-CCNC: 22 U/L (ref 0–40)
ANION GAP SERPL CALCULATED.3IONS-SCNC: 9 MMOL/L (ref 7–16)
AST SERPL-CCNC: 23 U/L (ref 0–39)
BASOPHILS ABSOLUTE: 0.05 E9/L (ref 0–0.2)
BASOPHILS RELATIVE PERCENT: 0.6 % (ref 0–2)
BILIRUB SERPL-MCNC: 0.8 MG/DL (ref 0–1.2)
BILIRUBIN URINE: NEGATIVE
BLOOD, URINE: NEGATIVE
BUN BLDV-MCNC: 36 MG/DL (ref 6–23)
CALCIUM SERPL-MCNC: 10.7 MG/DL (ref 8.6–10.2)
CHLORIDE BLD-SCNC: 104 MMOL/L (ref 98–107)
CHOLESTEROL, FASTING: 203 MG/DL (ref 0–199)
CLARITY: CLEAR
CO2: 28 MMOL/L (ref 22–29)
COLOR: YELLOW
CREAT SERPL-MCNC: 2 MG/DL (ref 0.7–1.2)
EOSINOPHILS ABSOLUTE: 0.18 E9/L (ref 0.05–0.5)
EOSINOPHILS RELATIVE PERCENT: 2.2 % (ref 0–6)
FOLATE: 19.2 NG/ML (ref 4.8–24.2)
GFR AFRICAN AMERICAN: 39
GFR NON-AFRICAN AMERICAN: 32 ML/MIN/1.73
GLUCOSE BLD-MCNC: 126 MG/DL (ref 74–99)
GLUCOSE URINE: NEGATIVE MG/DL
HBA1C MFR BLD: 5.3 % (ref 4–5.6)
HCT VFR BLD CALC: 43.8 % (ref 37–54)
HDLC SERPL-MCNC: 63 MG/DL
HEMOGLOBIN: 14.9 G/DL (ref 12.5–16.5)
IMMATURE GRANULOCYTES #: 0.21 E9/L
IMMATURE GRANULOCYTES %: 2.5 % (ref 0–5)
KETONES, URINE: NEGATIVE MG/DL
LDL CHOLESTEROL CALCULATED: 109 MG/DL (ref 0–99)
LEUKOCYTE ESTERASE, URINE: NEGATIVE
LYMPHOCYTES ABSOLUTE: 1.35 E9/L (ref 1.5–4)
LYMPHOCYTES RELATIVE PERCENT: 16.3 % (ref 20–42)
MAGNESIUM: 2.2 MG/DL (ref 1.6–2.6)
MCH RBC QN AUTO: 33 PG (ref 26–35)
MCHC RBC AUTO-ENTMCNC: 34 % (ref 32–34.5)
MCV RBC AUTO: 96.9 FL (ref 80–99.9)
MICROALBUMIN UR-MCNC: 85.8 MG/L
MONOCYTES ABSOLUTE: 0.83 E9/L (ref 0.1–0.95)
MONOCYTES RELATIVE PERCENT: 10 % (ref 2–12)
NEUTROPHILS ABSOLUTE: 5.66 E9/L (ref 1.8–7.3)
NEUTROPHILS RELATIVE PERCENT: 68.4 % (ref 43–80)
NITRITE, URINE: NEGATIVE
PDW BLD-RTO: 14.4 FL (ref 11.5–15)
PH UA: 6 (ref 5–9)
PLATELET # BLD: 221 E9/L (ref 130–450)
PMV BLD AUTO: 8.7 FL (ref 7–12)
POTASSIUM SERPL-SCNC: 4.5 MMOL/L (ref 3.5–5)
PROTEIN UA: NEGATIVE MG/DL
RBC # BLD: 4.52 E12/L (ref 3.8–5.8)
SODIUM BLD-SCNC: 141 MMOL/L (ref 132–146)
SPECIFIC GRAVITY UA: 1.02 (ref 1–1.03)
TOTAL PROTEIN: 7.1 G/DL (ref 6.4–8.3)
TRIGLYCERIDE, FASTING: 154 MG/DL (ref 0–149)
TSH SERPL DL<=0.05 MIU/L-ACNC: 1.48 UIU/ML (ref 0.27–4.2)
URIC ACID, SERUM: 8.8 MG/DL (ref 3.4–7)
UROBILINOGEN, URINE: 0.2 E.U./DL
VITAMIN B-12: 1230 PG/ML (ref 211–946)
VITAMIN D 25-HYDROXY: 52 NG/ML (ref 30–100)
VLDLC SERPL CALC-MCNC: 31 MG/DL
WBC # BLD: 8.3 E9/L (ref 4.5–11.5)

## 2022-10-05 PROCEDURE — 84550 ASSAY OF BLOOD/URIC ACID: CPT

## 2022-10-05 PROCEDURE — 85025 COMPLETE CBC W/AUTO DIFF WBC: CPT

## 2022-10-05 PROCEDURE — 82044 UR ALBUMIN SEMIQUANTITATIVE: CPT

## 2022-10-05 PROCEDURE — 83735 ASSAY OF MAGNESIUM: CPT

## 2022-10-05 PROCEDURE — 82306 VITAMIN D 25 HYDROXY: CPT

## 2022-10-05 PROCEDURE — 84443 ASSAY THYROID STIM HORMONE: CPT

## 2022-10-05 PROCEDURE — 82607 VITAMIN B-12: CPT

## 2022-10-05 PROCEDURE — 36415 COLL VENOUS BLD VENIPUNCTURE: CPT

## 2022-10-05 PROCEDURE — 82746 ASSAY OF FOLIC ACID SERUM: CPT

## 2022-10-05 PROCEDURE — 83036 HEMOGLOBIN GLYCOSYLATED A1C: CPT

## 2022-10-05 PROCEDURE — 80053 COMPREHEN METABOLIC PANEL: CPT

## 2022-10-05 PROCEDURE — 80061 LIPID PANEL: CPT

## 2022-10-05 PROCEDURE — 81003 URINALYSIS AUTO W/O SCOPE: CPT

## 2022-10-05 NOTE — TELEPHONE ENCOUNTER
Patient's daughter Mary Rodriguez calling in asking for results from Lumbar CT done on 10/03 and what can be done for his leg pain. He has a follow up scheduled for 10/10 but is hoping to get something for relief before then.      Mary Rodriguez - [phone]

## 2022-10-06 ENCOUNTER — ANTI-COAG VISIT (OUTPATIENT)
Dept: FAMILY MEDICINE CLINIC | Age: 77
End: 2022-10-06
Payer: MEDICARE

## 2022-10-06 ENCOUNTER — TELEPHONE (OUTPATIENT)
Dept: FAMILY MEDICINE CLINIC | Age: 77
End: 2022-10-06

## 2022-10-06 DIAGNOSIS — I48.91 ATRIAL FIBRILLATION, UNSPECIFIED TYPE (HCC): Primary | ICD-10-CM

## 2022-10-06 LAB
INR BLD: 2.7
INR BLD: 2.7
PROTIME: NORMAL

## 2022-10-06 PROCEDURE — 93793 ANTICOAG MGMT PT WARFARIN: CPT | Performed by: INTERNAL MEDICINE

## 2022-10-06 NOTE — TELEPHONE ENCOUNTER
Please let the patient know that blood work results showed    Labs showed worsening kidney function. Uncertain if been taking other medications or is related to dehydration. Would avoid NSAID medications, and would suggest contacting nephrology for evaluation    Calcium level was also increased of uncertain cause but could be related to kidney function and or bone issues    Sugar was elevated. Hemoglobin A1c is a measure of 3-month sugar control was normal at 5.3.   No evidence for prediabetes or diabetes at present    Uric acid level was increased but similar when compared to previous    Urine analysis was normal with slight evidence of microscopic protein which was similar when compared to previous    Thyroid level was normal    Cholesterol levels were more elevated when compared to previous    Vitamin D level was normal    Vitamin R25 and folic acid levels were normal    Blood counts were normal    Other electrolytes and liver functions were normal    Thanks

## 2022-10-06 NOTE — PROGRESS NOTES
Previous INR: 3.50     Previous dose: held x 1 day then continued 6mg (5mg +1mg tablets) on mon, wed, and Friday and 5mg all others               Current INR: 2.70     Recommendation: continue 6mg (5mg +1mg tablets) on mon, wed, and Friday and 5mg all others    Next INR: 1 week     Seth Brito MD  10/6/2022  11:52 AM

## 2022-10-07 ENCOUNTER — PREP FOR PROCEDURE (OUTPATIENT)
Dept: PAIN MANAGEMENT | Age: 77
End: 2022-10-07

## 2022-10-07 ENCOUNTER — OFFICE VISIT (OUTPATIENT)
Dept: PAIN MANAGEMENT | Age: 77
End: 2022-10-07
Payer: MEDICARE

## 2022-10-07 VITALS
DIASTOLIC BLOOD PRESSURE: 70 MMHG | HEIGHT: 67 IN | OXYGEN SATURATION: 98 % | HEART RATE: 70 BPM | RESPIRATION RATE: 14 BRPM | TEMPERATURE: 97.5 F | WEIGHT: 220 LBS | SYSTOLIC BLOOD PRESSURE: 120 MMHG | BODY MASS INDEX: 34.53 KG/M2

## 2022-10-07 DIAGNOSIS — M53.3 SACROILIAC DYSFUNCTION: ICD-10-CM

## 2022-10-07 DIAGNOSIS — M51.36 DDD (DEGENERATIVE DISC DISEASE), LUMBAR: Primary | ICD-10-CM

## 2022-10-07 DIAGNOSIS — M54.16 LUMBAR RADICULAR PAIN: ICD-10-CM

## 2022-10-07 DIAGNOSIS — M48.061 SPINAL STENOSIS OF LUMBAR REGION, UNSPECIFIED WHETHER NEUROGENIC CLAUDICATION PRESENT: ICD-10-CM

## 2022-10-07 DIAGNOSIS — M47.817 LUMBOSACRAL SPONDYLOSIS WITHOUT MYELOPATHY: ICD-10-CM

## 2022-10-07 DIAGNOSIS — M70.61 TROCHANTERIC BURSITIS, RIGHT HIP: ICD-10-CM

## 2022-10-07 PROCEDURE — 99213 OFFICE O/P EST LOW 20 MIN: CPT | Performed by: ANESTHESIOLOGY

## 2022-10-07 PROCEDURE — 20610 DRAIN/INJ JOINT/BURSA W/O US: CPT | Performed by: ANESTHESIOLOGY

## 2022-10-07 PROCEDURE — 99214 OFFICE O/P EST MOD 30 MIN: CPT | Performed by: ANESTHESIOLOGY

## 2022-10-07 PROCEDURE — 1123F ACP DISCUSS/DSCN MKR DOCD: CPT | Performed by: ANESTHESIOLOGY

## 2022-10-07 RX ORDER — SODIUM CHLORIDE 0.9 % (FLUSH) 0.9 %
5-40 SYRINGE (ML) INJECTION EVERY 12 HOURS SCHEDULED
Status: CANCELLED | OUTPATIENT
Start: 2022-10-07

## 2022-10-07 RX ORDER — METHYLPREDNISOLONE ACETATE 40 MG/ML
30 INJECTION, SUSPENSION INTRA-ARTICULAR; INTRALESIONAL; INTRAMUSCULAR; SOFT TISSUE ONCE
Status: COMPLETED | OUTPATIENT
Start: 2022-10-07 | End: 2022-10-07

## 2022-10-07 RX ORDER — SODIUM CHLORIDE 0.9 % (FLUSH) 0.9 %
5-40 SYRINGE (ML) INJECTION PRN
Status: CANCELLED | OUTPATIENT
Start: 2022-10-07

## 2022-10-07 RX ORDER — BUPIVACAINE HYDROCHLORIDE 2.5 MG/ML
6 INJECTION, SOLUTION EPIDURAL; INFILTRATION; INTRACAUDAL ONCE
Status: COMPLETED | OUTPATIENT
Start: 2022-10-07 | End: 2022-10-07

## 2022-10-07 RX ORDER — SODIUM CHLORIDE 9 MG/ML
INJECTION, SOLUTION INTRAVENOUS PRN
Status: CANCELLED | OUTPATIENT
Start: 2022-10-07

## 2022-10-07 RX ADMIN — METHYLPREDNISOLONE ACETATE 40 MG: 40 INJECTION, SUSPENSION INTRA-ARTICULAR; INTRALESIONAL; INTRAMUSCULAR; SOFT TISSUE at 16:01

## 2022-10-07 RX ADMIN — BUPIVACAINE HYDROCHLORIDE 75 MG: 2.5 INJECTION, SOLUTION EPIDURAL; INFILTRATION; INTRACAUDAL at 16:00

## 2022-10-07 NOTE — PROGRESS NOTES
Do you currently have any of the following:    Fever: No  Headache:  No  Cough: No  Shortness of breath: No  Exposed to anyone with these symptoms: No                                                                                                                Grabiel Dunham presents to the [facility] on 10/7/2022. Steve Anne is complaining of pain in right hip down to knee. The pain is constant. The pain is described as aching. Pain is rated on his best day at a 5, on his worst day at a 8, and on average at a 6 on the VAS scale. He took his last dose of Norco and Tylenol was on Wednesday 10/05/2022. Steve Anne does not have issues with constipation. Any procedures since your last visit: No, with  % relief. He is not on NSAIDS and  is  on anticoagulation medications to include Coumadin and is managed by Dr. Karin Diaz. Pacemaker or defibrillator: Yes Physician managing device is Dr. Jorge Gar and Consent for Opioid Use Documents Filed        No documents found                       /70   Pulse 70   Temp 97.5 °F (36.4 °C) (Infrared)   Resp 14   Ht 5' 7\" (1.702 m)   Wt 220 lb (99.8 kg)   SpO2 98%   BMI 34.46 kg/m²      No LMP for male patient.

## 2022-10-07 NOTE — PROGRESS NOTES
[facility]  [address] [facility], [address] [place] [address] [place]  [phone]    Follow up Note      Elena Garcia     Date of Visit:  10/7/2022    CC:  Patient presents for follow up   Chief Complaint   Patient presents with    Results     CT scan results. HPI:    Right low back / posterior/lateal hip pain and right LE pain- recent onset. Sudden onset pain - severe in nature    Pain is constant with intermittent exacerbation when walking and is described as sharp and throbbing. Pain does radiate to right  lower extremity mainly over the thigh but occasionally notices over the right leg. X ray hip unremarkable     X ray LS spine - DDD     NOTE:  Has a pacemaker- A.fib  On coumadin. COPD - s/p lobectomy  H/o CKD     Previous treatments:   Physical Therapy : cannot do PT do due to significant pain. Medications: - NSAID's : yes                        - Membrane stabilizers : no                       - Opioids :short course                       - Adjuvants or Others : yes,     Spine Surgeries: no     Anticoagulation medications: yes,  and include Warfarin. H/O Smoking: no  H/O alcohol abuse : no  H/O Illicit drug use : no     Imaging:     CT Lumbar spine: 10/3/2022:     Impression   1. Mild compression deformities again noted involving the T12, L1, and L2   vertebral bodies, when compared to the previous CT scan of the abdomen and   pelvis performed 05/06/2021.       2.  Minimal lumbar scoliosis again seen, with convexity to the right. 3.  Grade 1 spondylolisthesis again seen at L4 over L5.       4.  Osteo-degenerative changes and discogenic disc disease again seen, as   described above. Mild, diffuse, posterior disc bulges at T11-12, L2-3, L3-4, L4-5, and L5-S1. There is an associated left lateral disc protrusion at L2-3. There is an   associated moderate-sized posterior central disc protrusion at L4-5.   Most of   these findings can be seen on the prior CT study. 5.  Mild spinal canal stenosis at L3-4. Moderate to marked spinal canal   stenosis at L4-5.       6.  Other findings, as described above. Xray LS spine and Xray of the right hip: 9/7/2022:  FINDINGS:   L-spine: Multilevel degenerative disc disease is overall mild. Moderate   degenerative facet arthropathy from L4-S1. No fracture or dislocation. Normal MR soft tissues. Right hip: No significant arthropathy. No fracture or dislocation. No other   abnormal bone or soft tissue findings. Impression   Degenerative lumbar spondylosis. Unremarkable right hip. OARRS report[de-identified] reviewed    Past Medical History:   Diagnosis Date    Atrial fibrillation ([address] 01/04/2014    CAD (coronary artery disease)     Cancer (HCC)     VIPoma    Carcinoid syndrome (HCC)     Chronic kidney disease     COPD (chronic obstructive pulmonary disease) (HCC)     DDD (degenerative disc disease), lumbar 09/20/2022    Diabetes mellitus ([address]     Hypertension     Idiopathic chronic gout of multiple sites without tophus 09/05/2019    Meniere disease     MI (myocardial infarction) ([address]     x3    Mixed hyperlipidemia 09/05/2019    Obesity     Pacemaker     Type 2 diabetes mellitus without complication ([address]        Past Surgical History:   Procedure Laterality Date    CARDIAC SURGERY      stents/pacemaker    COLONOSCOPY      CORONARY ANGIOPLASTY       CORONARY ANGIOPLASTY WITH STENT PLACEMENT      HERNIA REPAIR      LUNG SURGERY Right 2014    Dr. Yelena Ariza for \"infection around lung\"    MEDICATION INJECTION Right 9/22/2022    RIGHT SACROILIAC JOINT INJECTION UNDER FLUOROSCOPY performed by Gayle Wright MD at [facility] OR    PACEMAKER PLACEMENT      TUMOR REMOVAL      tumors removed from breast ,arms     UPPER GASTROINTESTINAL ENDOSCOPY         Prior to Admission medications    Medication Sig Start Date End Date Taking?  Authorizing Provider   QVAR REDIHALER 80 MCG/ACT AERB inhaler INHALE 2 injection Inject 60 mg into the muscle once Every two weeks   Yes Historical Provider, MD   Glucose Blood (BLOOD GLUCOSE TEST STRIPS) STRP Freestyle lite 3/5/12  Yes Jenni Ambriz MD   Fingerstix Lancets MISC  3/5/12  Yes Jenni Ambriz MD       Allergies   Allergen Reactions    Lisinopril      Other reaction(s): Other: See Comments  Black out, nausea       Social History     Socioeconomic History    Marital status: Single     Spouse name: Not on file    Number of children: 3    Years of education: 12    Highest education level: Not on file   Occupational History    Occupation: [employer] worker     Comment: retired   Tobacco Use    Smoking status: Former     Packs/day: 2.00     Years: 40.00     Pack years: 80.00     Types: Cigarettes     Quit date: 2000     Years since quittin.0    Smokeless tobacco: Never   Vaping Use    Vaping Use: Never used   Substance and Sexual Activity    Alcohol use: No    Drug use: No    Sexual activity: Not Currently     Partners: Female   Other Topics Concern    Not on file   Social History Narrative    Not on file     Social Determinants of Health     Financial Resource Strain: Low Risk     Difficulty of Paying Living Expenses: Not hard at all   Food Insecurity: No Food Insecurity    Worried About Running Out of Food in the Last Year: Never true    Ran Out of Food in the Last Year: Never true   Transportation Needs: Not on file   Physical Activity: Not on file   Stress: Not on file   Social Connections: Not on file   Intimate Partner Violence: Not on file   Housing Stability: Not on file       Family History   Adopted: Yes   Problem Relation Age of Onset    Other Mother         Pt. is adopted    Other Father         Pt. is adopted. REVIEW OF SYSTEMS:     [facility] denies fever/chills, chest pain, shortness of breath, new bowel or bladder complaints. All other review of systems was negative.     PHYSICAL EXAMINATION:      /70   Pulse 70   Temp 97.5 °F (36.4 °C) (Infrared)   Resp 14   Ht 5' 7\" (1.702 m)   Wt 220 lb (99.8 kg)   SpO2 98%   BMI 34.46 kg/m²   General:       General appearance:  Pleasant and well-hydrated, in no distress and A & O x 3  Build:Obese  Function: uses a walker to assist in ambulation     HEENT:     Head:normocephalic, atraumatic     Lungs:     Breathing:normal breathing pattern      CVS:     RRR     Abdomen:     Shape:obese, non-distended, and normal     Cervical spine:     Inspection:normal     Thoracic spine:                Spine inspection:normal      Lumbar spine:     Spine inspection: Normal   Palpation: Tenderness paravertebral muscles Yes right  Range of motion: Decreased, flexion Decreased, Lateral bending, extension and rotation  reduced is painful. Sacroiliac joint tenderness- significantly improved  Piriformis tenderness: negative bilaterally  SLR : negative bilaterally  Trochanteric bursa tenderness: + right  CVA tenderness:No      Musculoskeletal:     Trigger points No      Extremities:     Tremors:None bilaterally upper and lower  Edema:+     Neurological:     Sensory: Normal to light touch      Motor:   No focal deficits     Dermatology:     Skin:no rashes or lesions noted    Assessment/Plan:    Diagnosis Orders   1. Sacroiliac dysfunction          2. DDD (degenerative disc disease), lumbar          3. Lumbosacral spondylosis without myelopathy          4. Spinal stenosis of lumbar region, unspecified whether neurogenic claudication present          5. S/P cardiac pacemaker procedure          6. Anticoagulated on Coumadin                65 y.o.  male with h/o acute onset right low back/ posterior hip pain and intermittent right LE pain - mainly over the right thigh. Treated conservatively- oral steroids, analgesics. X- ray- hip unremarkable. X-ray LS spine; DDD     Has pacemaker- cant get MRI     S/P right SIJ injection helped the SIJ pain/ posterior hip pain significantly.     Had CT LS spine and is here to discuss the results and treatment. CT Lumbar spine- reviewed and discussed the findings with the patient and family member. Current pain over the right lateral hip and right LE over right thigh and intermittent right LE. Right trochanteric bursa tenderness. On coumadin     PLAN:  Right Trochanteric bursa injection today    Lumbar TFESI right L3 & L4 under fluoroscopy. RBA discussed. Need to hold Coumadin for 5 days prior to the procedure. Check PT/ INR on the day of the procedure. Consider Low dose gabapentin if significant pain. He is reluctant at this point of time. Will revisit this option if needed. Counseling :     Patient encouraged to stay active and to watch/lose weight     Encouraged to continue Regular home exercise program as tolerated - stretching / strengthening. Treatment plan discussed with the patient and family member present including medication and procedure side effects. Controlled Substances Monitoring: OARRS reviewed. We discussed with the patient that combining opioids, benzodiazepines, alcohol, illicit drugs or sleep aids increases the risk of respiratory depression including death. We discussed that these medications may cause drowsiness, sedation or dizziness and have counseled the patient not to drive or operate machinery. We have discussed that these medications will be prescribed only by one provider. We have discussed with the patient about age related risk factors and have thoroughly discussed the importance of taking these medications as prescribed. The patient verbalizes understanding. Clemente Hammonds MD     10/7/2022    Patient: Claudell Palau  :  1945  Age:  65 y.o. Sex:  male     PRE-OPERATIVE DIAGNOSIS:Right  Greater trochanter bursitis. POST-OPERATIVE DIAGNOSIS: Same.     PROCEDURE PERFORMED: Right  Greater trochanter bursa steroid injection     SURGEON: Clemente Hammonds MD    ANESTHESIA: Local    ESTIMATED BLOOD LOSS: None.    BRIEF HISTORY: After discussing the potential risks and benefits of the procedure with the patient. Dar Rowland did request that we proceed. Hakeem's complete History & Physical examination were reviewed in depth, a copy of which is in the chart. DESCRIPTION OF PROCEDURE:      After confirming written and informed consent, a time-out was performed and Westley name and date of birth, the procedure to be performed as well as the plan for the location of the needle insertion were confirmed. Patient was brought into the procedure room and was placed in the lateral decubitus position   The area of the Right  lateral hip was prepped with chloraprep and draped in a sterile manner. The overlying skin and subcutaneous tissues were anesthetized. At this time, a # 25 gauge spinal 3-1/2 inch spinal needle was advanced in AP view until greater trochanter was contacted. After negative aspiration, a solution of 0.25 % marcaine 6 cc and 30 mg DepoMedrol was injected easily after negative aspiration without complications. The needle was then removed and Band-Aid applied. Disposition the patient tolerated the procedure well and there were no complications . Vital signs remained stable throughout the procedure. The patient was escorted to the recovery area where they remained until discharge and written discharge instructions for the procedure were given. Plan: Dar Rowland will return to our pain management center as scheduled.      Kevin Caba MD

## 2022-10-07 NOTE — H&P (VIEW-ONLY)
[facility]  [address] [facility], [address] [place] [address] [place]  [phone]    Follow up Note      Rhea Presley     Date of Visit:  10/7/2022    CC:  Patient presents for follow up   Chief Complaint   Patient presents with    Results     CT scan results. HPI:    Right low back / posterior/lateal hip pain and right LE pain- recent onset. Sudden onset pain - severe in nature    Pain is constant with intermittent exacerbation when walking and is described as sharp and throbbing. Pain does radiate to right  lower extremity mainly over the thigh but occasionally notices over the right leg. X ray hip unremarkable     X ray LS spine - DDD     NOTE:  Has a pacemaker- A.fib  On coumadin. COPD - s/p lobectomy  H/o CKD     Previous treatments:   Physical Therapy : cannot do PT do due to significant pain. Medications: - NSAID's : yes                        - Membrane stabilizers : no                       - Opioids :short course                       - Adjuvants or Others : yes,     Spine Surgeries: no     Anticoagulation medications: yes,  and include Warfarin. H/O Smoking: no  H/O alcohol abuse : no  H/O Illicit drug use : no     Imaging:     CT Lumbar spine: 10/3/2022:     Impression   1. Mild compression deformities again noted involving the T12, L1, and L2   vertebral bodies, when compared to the previous CT scan of the abdomen and   pelvis performed 05/06/2021.       2.  Minimal lumbar scoliosis again seen, with convexity to the right. 3.  Grade 1 spondylolisthesis again seen at L4 over L5.       4.  Osteo-degenerative changes and discogenic disc disease again seen, as   described above. Mild, diffuse, posterior disc bulges at T11-12, L2-3, L3-4, L4-5, and L5-S1. There is an associated left lateral disc protrusion at L2-3. There is an   associated moderate-sized posterior central disc protrusion at L4-5.   Most of   these findings can be seen on the prior CT study. 5.  Mild spinal canal stenosis at L3-4. Moderate to marked spinal canal   stenosis at L4-5.       6.  Other findings, as described above. Xray LS spine and Xray of the right hip: 9/7/2022:  FINDINGS:   L-spine: Multilevel degenerative disc disease is overall mild. Moderate   degenerative facet arthropathy from L4-S1. No fracture or dislocation. Normal MR soft tissues. Right hip: No significant arthropathy. No fracture or dislocation. No other   abnormal bone or soft tissue findings. Impression   Degenerative lumbar spondylosis. Unremarkable right hip. OARRS report[de-identified] reviewed    Past Medical History:   Diagnosis Date    Atrial fibrillation ([address] 01/04/2014    CAD (coronary artery disease)     Cancer (HCC)     VIPoma    Carcinoid syndrome (HCC)     Chronic kidney disease     COPD (chronic obstructive pulmonary disease) (HCC)     DDD (degenerative disc disease), lumbar 09/20/2022    Diabetes mellitus ([address]     Hypertension     Idiopathic chronic gout of multiple sites without tophus 09/05/2019    Meniere disease     MI (myocardial infarction) ([address]     x3    Mixed hyperlipidemia 09/05/2019    Obesity     Pacemaker     Type 2 diabetes mellitus without complication ([address]        Past Surgical History:   Procedure Laterality Date    CARDIAC SURGERY      stents/pacemaker    COLONOSCOPY      CORONARY ANGIOPLASTY       CORONARY ANGIOPLASTY WITH STENT PLACEMENT      HERNIA REPAIR      LUNG SURGERY Right 2014    Dr. Luis Enrique Gambino for \"infection around lung\"    MEDICATION INJECTION Right 9/22/2022    RIGHT SACROILIAC JOINT INJECTION UNDER FLUOROSCOPY performed by William Blackwood MD at [facility] OR    PACEMAKER PLACEMENT      TUMOR REMOVAL      tumors removed from breast ,arms     UPPER GASTROINTESTINAL ENDOSCOPY         Prior to Admission medications    Medication Sig Start Date End Date Taking?  Authorizing Provider   QVAR REDIHALER 80 MCG/ACT AERB inhaler INHALE 2 PUFFS BY MOUTH TWICE DAILY 7/6/22  Yes Historical Provider, MD   latanoprost (XALATAN) 0.005 % ophthalmic solution PLACE 1 DROP INTO BOTH EYES EVERY DAY AT BEDTIME 7/15/22  Yes Historical Provider, MD   hydrocortisone (CORTEF) 10 MG tablet TAKE 1 1/2 TABLETS BY MOUTH EVERY MORNING AND 1 TABLET EVERY EVENING 7/18/22  Yes Dinesh Herrera MD   simvastatin (ZOCOR) 40 MG tablet Take 1 tablet by mouth nightly 6/6/22  Yes Dinesh Herrera MD   warfarin (COUMADIN) 5 MG tablet 1 po qd or as directed 5/23/22  Yes Dinesh Herrera MD   allopurinol (ZYLOPRIM) 100 MG tablet TAKE 1 TABLET BY MOUTH [id number] 5/2/22  Yes Dinesh Herrera MD   hydroCHLOROthiazide (HYDRODIURIL) 25 MG tablet Take 1 tablet by mouth daily 1 po 5 days a week (hold on Tues and Sat)-pt reqesting 90 day supply 3/28/22  Yes Tyrel Craft,    warfarin (COUMADIN) 1 MG tablet Take 1 tablet by mouth daily 1/11/22  Yes Dinesh Herrera MD   Cholecalciferol (VITAMIN D3) 1.25 MG (95356 UT) CAPS Take 1 capsule by mouth once a week   Yes Historical Provider, MD   linagliptin (TRADJENTA) 5 MG tablet Take 1 tablet by mouth daily Lot#[id number] Exp.7/2020 2/28/19  Yes TARIQ Kraus - CNP   umeclidinium-vilanterol (ANORO ELLIPTA) 62.5-25 MCG/INH AEPB inhaler Inhale 1 puff into the lungs daily   Yes Historical Provider, MD   sotalol (BETAPACE) 160 MG tablet Take 1 tablet by mouth 2 times daily 5/18/17  Yes Davie Graham MD   nitroGLYCERIN (NITROSTAT) 0.4 MG SL tablet Place 0.4 mg under the tongue every 5 minutes as needed for Chest pain up to max of 3 total doses. If no relief after 1 dose, call 911.    Yes Historical Provider, MD   albuterol (PROVENTIL) (5 MG/ML) 0.5% nebulizer solution Take 0.5 mLs by nebulization every 6 hours as needed for Wheezing 7/25/16  Yes Shaylee Fontanez MD   glimepiride (AMARYL) 2 MG tablet Take 0.5 mg by mouth every morning (before breakfast)    Yes Historical Provider, MD   octreotide ACETATE (SANDOSTATIN LAR) 30 MG injection Inject 60 mg into the muscle once Every two weeks   Yes Historical Provider, MD   Glucose Blood (BLOOD GLUCOSE TEST STRIPS) STRP Freestyle lite 3/5/12  Yes Syd Guadalupe MD   Fingerstix Lancets MISC  3/5/12  Yes Syd Guadalupe MD       Allergies   Allergen Reactions    Lisinopril      Other reaction(s): Other: See Comments  Black out, nausea       Social History     Socioeconomic History    Marital status: Single     Spouse name: Not on file    Number of children: 3    Years of education: 12    Highest education level: Not on file   Occupational History    Occupation: [employer]     Comment: retired   Tobacco Use    Smoking status: Former     Packs/day: 2.00     Years: 40.00     Pack years: 80.00     Types: Cigarettes     Quit date: 2000     Years since quittin.0    Smokeless tobacco: Never   Vaping Use    Vaping Use: Never used   Substance and Sexual Activity    Alcohol use: No    Drug use: No    Sexual activity: Not Currently     Partners: Female   Other Topics Concern    Not on file   Social History Narrative    Not on file     Social Determinants of Health     Financial Resource Strain: Low Risk     Difficulty of Paying Living Expenses: Not hard at all   Food Insecurity: No Food Insecurity    Worried About Running Out of Food in the Last Year: Never true    Ran Out of Food in the Last Year: Never true   Transportation Needs: Not on file   Physical Activity: Not on file   Stress: Not on file   Social Connections: Not on file   Intimate Partner Violence: Not on file   Housing Stability: Not on file       Family History   Adopted: Yes   Problem Relation Age of Onset    Other Mother         Pt. is adopted    Other Father         Pt. is adopted. REVIEW OF SYSTEMS:     Antoinette Waldrop denies fever/chills, chest pain, shortness of breath, new bowel or bladder complaints. All other review of systems was negative.     PHYSICAL EXAMINATION:      /70   Pulse 70   Temp 97.5 °F (36.4 °C) (Infrared)   Resp 14   Ht 5' 7\" (1.702 m)   Wt 220 lb (99.8 kg)   SpO2 98%   BMI 34.46 kg/m²   General:       General appearance:  Pleasant and well-hydrated, in no distress and A & O x 3  Build:Obese  Function: uses a walker to assist in ambulation     HEENT:     Head:normocephalic, atraumatic     Lungs:     Breathing:normal breathing pattern      CVS:     RRR     Abdomen:     Shape:obese, non-distended, and normal     Cervical spine:     Inspection:normal     Thoracic spine:                Spine inspection:normal      Lumbar spine:     Spine inspection: Normal   Palpation: Tenderness paravertebral muscles Yes right  Range of motion: Decreased, flexion Decreased, Lateral bending, extension and rotation  reduced is painful. Sacroiliac joint tenderness- significantly improved  Piriformis tenderness: negative bilaterally  SLR : negative bilaterally  Trochanteric bursa tenderness: + right  CVA tenderness:No      Musculoskeletal:     Trigger points No      Extremities:     Tremors:None bilaterally upper and lower  Edema:+     Neurological:     Sensory: Normal to light touch      Motor:   No focal deficits     Dermatology:     Skin:no rashes or lesions noted    Assessment/Plan:    Diagnosis Orders   1. Sacroiliac dysfunction          2. DDD (degenerative disc disease), lumbar          3. Lumbosacral spondylosis without myelopathy          4. Spinal stenosis of lumbar region, unspecified whether neurogenic claudication present          5. S/P cardiac pacemaker procedure          6. Anticoagulated on Coumadin                65 y.o.  male with h/o acute onset right low back/ posterior hip pain and intermittent right LE pain - mainly over the right thigh. Treated conservatively- oral steroids, analgesics. X- ray- hip unremarkable. X-ray LS spine; DDD     Has pacemaker- cant get MRI     S/P right SIJ injection helped the SIJ pain/ posterior hip pain significantly.     Had CT LS spine and is here to discuss the results and treatment. CT Lumbar spine- reviewed and discussed the findings with the patient and family member. Current pain over the right lateral hip and right LE over right thigh and intermittent right LE. Right trochanteric bursa tenderness. On coumadin     PLAN:  Right Trochanteric bursa injection today    Lumbar TFESI right L3 & L4 under fluoroscopy. RBA discussed. Need to hold Coumadin for 5 days prior to the procedure. Check PT/ INR on the day of the procedure. Consider Low dose gabapentin if significant pain. He is reluctant at this point of time. Will revisit this option if needed. Counseling :     Patient encouraged to stay active and to watch/lose weight     Encouraged to continue Regular home exercise program as tolerated - stretching / strengthening. Treatment plan discussed with the patient and family member present including medication and procedure side effects. Controlled Substances Monitoring: OARRS reviewed. We discussed with the patient that combining opioids, benzodiazepines, alcohol, illicit drugs or sleep aids increases the risk of respiratory depression including death. We discussed that these medications may cause drowsiness, sedation or dizziness and have counseled the patient not to drive or operate machinery. We have discussed that these medications will be prescribed only by one provider. We have discussed with the patient about age related risk factors and have thoroughly discussed the importance of taking these medications as prescribed. The patient verbalizes understanding. Meggan Serna MD     10/7/2022    Patient: Elysia Amador  :  1945  Age:  65 y.o. Sex:  male     PRE-OPERATIVE DIAGNOSIS:Right  Greater trochanter bursitis. POST-OPERATIVE DIAGNOSIS: Same.     PROCEDURE PERFORMED: Right  Greater trochanter bursa steroid injection     SURGEON: Meggan Serna MD    ANESTHESIA: Local    ESTIMATED BLOOD LOSS: None.    BRIEF HISTORY: After discussing the potential risks and benefits of the procedure with the patient. Olaf Moyer did request that we proceed. Hakeem's complete History & Physical examination were reviewed in depth, a copy of which is in the chart. DESCRIPTION OF PROCEDURE:      After confirming written and informed consent, a time-out was performed and Westley name and date of birth, the procedure to be performed as well as the plan for the location of the needle insertion were confirmed. Patient was brought into the procedure room and was placed in the lateral decubitus position   The area of the Right  lateral hip was prepped with chloraprep and draped in a sterile manner. The overlying skin and subcutaneous tissues were anesthetized. At this time, a # 25 gauge spinal 3-1/2 inch spinal needle was advanced in AP view until greater trochanter was contacted. After negative aspiration, a solution of 0.25 % marcaine 6 cc and 30 mg DepoMedrol was injected easily after negative aspiration without complications. The needle was then removed and Band-Aid applied. Disposition the patient tolerated the procedure well and there were no complications . Vital signs remained stable throughout the procedure. The patient was escorted to the recovery area where they remained until discharge and written discharge instructions for the procedure were given. Plan: Olaf Moyer will return to our pain management center as scheduled.      Clemente Hammonds MD

## 2022-10-10 ENCOUNTER — TELEPHONE (OUTPATIENT)
Dept: PAIN MANAGEMENT | Age: 77
End: 2022-10-10

## 2022-10-10 RX ORDER — GABAPENTIN 100 MG/1
CAPSULE ORAL
Qty: 63 CAPSULE | Refills: 0 | Status: SHIPPED | OUTPATIENT
Start: 2022-10-10 | End: 2022-11-09

## 2022-10-10 NOTE — TELEPHONE ENCOUNTER
Kelly Mello, patient's daughter, called requesting a medication for pain. She said her dad was seen on Friday and was told if the bursa injection doesn't help the pain, Dr. Trena Newton would prescribe medication. Per Dr. Trena Newton last note:   Consider Low dose gabapentin if significant pain. He is reluctant at this point of time. Will revisit this option if needed.

## 2022-10-11 ENCOUNTER — TELEPHONE (OUTPATIENT)
Dept: PAIN MANAGEMENT | Age: 77
End: 2022-10-11

## 2022-10-11 DIAGNOSIS — M79.609 PAIN IN EXTREMITY, UNSPECIFIED EXTREMITY: Primary | ICD-10-CM

## 2022-10-11 NOTE — TELEPHONE ENCOUNTER
Call to Elysia Amador that procedure was approved for 10/20/2022 and that Poncho should call him a few days before for the pre op call and between 2:00 PM and 4:00 PM  the business day before with the arrival time. Instructed Hakeem to hold ibuprofen for 24 hours, naprosyn for 4 days and any aspirin containing products or fish oil for 7 days. Coumadin for 5 days, last dose 10/14/2022. Aware that she will have to have lab work the morning of, PT/INR orders placed. Instructed to call office back if any questions. Santos Quiroz verbalized understanding.     Electronically signed by Parag Schuler RN on 10/11/2022 at 3:17 PM

## 2022-10-13 ENCOUNTER — ANTI-COAG VISIT (OUTPATIENT)
Dept: FAMILY MEDICINE CLINIC | Age: 77
End: 2022-10-13

## 2022-10-13 LAB
INR BLD: 2.7
INR BLD: 2.7
PROTIME: NORMAL

## 2022-10-13 NOTE — PROGRESS NOTES
Previous INR: 2.70     Previous dose: 6mg (5mg +1mg tablets) on mon, wed, and Friday and 5mg all others               Current INR: 2.70     Recommendation: continue 6mg (5mg +1mg tablets) on mon, wed, and Friday and 5mg all others    Next INR: 1 week     Viki Eisenberg MD  10/13/2022  12:12 PM

## 2022-10-14 ENCOUNTER — OFFICE VISIT (OUTPATIENT)
Dept: PRIMARY CARE CLINIC | Age: 77
End: 2022-10-14
Payer: MEDICARE

## 2022-10-14 VITALS
OXYGEN SATURATION: 97 % | SYSTOLIC BLOOD PRESSURE: 118 MMHG | HEART RATE: 71 BPM | WEIGHT: 235 LBS | DIASTOLIC BLOOD PRESSURE: 80 MMHG | BODY MASS INDEX: 36.88 KG/M2 | TEMPERATURE: 97.3 F | HEIGHT: 67 IN

## 2022-10-14 VITALS
WEIGHT: 235 LBS | BODY MASS INDEX: 36.88 KG/M2 | DIASTOLIC BLOOD PRESSURE: 80 MMHG | SYSTOLIC BLOOD PRESSURE: 118 MMHG | TEMPERATURE: 97.3 F | HEIGHT: 67 IN | HEART RATE: 71 BPM | OXYGEN SATURATION: 97 %

## 2022-10-14 DIAGNOSIS — E78.2 MIXED HYPERLIPIDEMIA: ICD-10-CM

## 2022-10-14 DIAGNOSIS — Z23 NEED FOR PROPHYLACTIC VACCINATION AND INOCULATION AGAINST INFLUENZA: Primary | ICD-10-CM

## 2022-10-14 DIAGNOSIS — I48.20 CHRONIC ATRIAL FIBRILLATION (HCC): ICD-10-CM

## 2022-10-14 DIAGNOSIS — M54.41 ACUTE RIGHT-SIDED LOW BACK PAIN WITH RIGHT-SIDED SCIATICA: Primary | ICD-10-CM

## 2022-10-14 DIAGNOSIS — I25.10 CORONARY ARTERY DISEASE INVOLVING NATIVE CORONARY ARTERY OF NATIVE HEART WITHOUT ANGINA PECTORIS: ICD-10-CM

## 2022-10-14 DIAGNOSIS — N18.31 STAGE 3A CHRONIC KIDNEY DISEASE (HCC): ICD-10-CM

## 2022-10-14 DIAGNOSIS — J44.9 CHRONIC OBSTRUCTIVE PULMONARY DISEASE, UNSPECIFIED COPD TYPE (HCC): ICD-10-CM

## 2022-10-14 DIAGNOSIS — D3A.00 CARCINOID (EXCEPT OF APPENDIX): ICD-10-CM

## 2022-10-14 DIAGNOSIS — Z00.00 WELCOME TO MEDICARE PREVENTIVE VISIT: ICD-10-CM

## 2022-10-14 DIAGNOSIS — E66.09 CLASS 2 OBESITY DUE TO EXCESS CALORIES WITHOUT SERIOUS COMORBIDITY WITH BODY MASS INDEX (BMI) OF 36.0 TO 36.9 IN ADULT: ICD-10-CM

## 2022-10-14 DIAGNOSIS — I10 ESSENTIAL HYPERTENSION: ICD-10-CM

## 2022-10-14 DIAGNOSIS — M1A.09X0 IDIOPATHIC CHRONIC GOUT OF MULTIPLE SITES WITHOUT TOPHUS: ICD-10-CM

## 2022-10-14 DIAGNOSIS — E11.9 TYPE 2 DIABETES MELLITUS WITHOUT COMPLICATION, WITHOUT LONG-TERM CURRENT USE OF INSULIN (HCC): ICD-10-CM

## 2022-10-14 PROCEDURE — 90694 VACC AIIV4 NO PRSRV 0.5ML IM: CPT | Performed by: INTERNAL MEDICINE

## 2022-10-14 PROCEDURE — 3044F HG A1C LEVEL LT 7.0%: CPT | Performed by: INTERNAL MEDICINE

## 2022-10-14 PROCEDURE — G0008 ADMIN INFLUENZA VIRUS VAC: HCPCS | Performed by: INTERNAL MEDICINE

## 2022-10-14 PROCEDURE — G0439 PPPS, SUBSEQ VISIT: HCPCS | Performed by: INTERNAL MEDICINE

## 2022-10-14 PROCEDURE — 1123F ACP DISCUSS/DSCN MKR DOCD: CPT | Performed by: INTERNAL MEDICINE

## 2022-10-14 PROCEDURE — 99214 OFFICE O/P EST MOD 30 MIN: CPT | Performed by: INTERNAL MEDICINE

## 2022-10-14 ASSESSMENT — PATIENT HEALTH QUESTIONNAIRE - PHQ9
SUM OF ALL RESPONSES TO PHQ QUESTIONS 1-9: 0
2. FEELING DOWN, DEPRESSED OR HOPELESS: 0
SUM OF ALL RESPONSES TO PHQ QUESTIONS 1-9: 0
1. LITTLE INTEREST OR PLEASURE IN DOING THINGS: 0
SUM OF ALL RESPONSES TO PHQ9 QUESTIONS 1 & 2: 0
SUM OF ALL RESPONSES TO PHQ QUESTIONS 1-9: 0
SUM OF ALL RESPONSES TO PHQ QUESTIONS 1-9: 0

## 2022-10-14 ASSESSMENT — ENCOUNTER SYMPTOMS
EYE PAIN: 0
RHINORRHEA: 1
COUGH: 1
BLOOD IN STOOL: 0
CHEST TIGHTNESS: 0
DIARRHEA: 0
EYE PAIN: 0
CONSTIPATION: 0
CHEST TIGHTNESS: 0
NAUSEA: 0
VOMITING: 0
VOMITING: 0
BLOOD IN STOOL: 0
COUGH: 1
ABDOMINAL PAIN: 0
DIARRHEA: 0
RHINORRHEA: 1
SORE THROAT: 0
SHORTNESS OF BREATH: 1
SORE THROAT: 0
ABDOMINAL PAIN: 0
CONSTIPATION: 0
NAUSEA: 0
SHORTNESS OF BREATH: 1

## 2022-10-14 ASSESSMENT — LIFESTYLE VARIABLES
HOW MANY STANDARD DRINKS CONTAINING ALCOHOL DO YOU HAVE ON A TYPICAL DAY: PATIENT DOES NOT DRINK
HOW OFTEN DO YOU HAVE A DRINK CONTAINING ALCOHOL: NEVER

## 2022-10-14 NOTE — PATIENT INSTRUCTIONS
Personalized Preventive Plan for Sandra Holland - 10/14/2022  Medicare offers a range of preventive health benefits. Some of the tests and screenings are paid in full while other may be subject to a deductible, co-insurance, and/or copay. Some of these benefits include a comprehensive review of your medical history including lifestyle, illnesses that may run in your family, and various assessments and screenings as appropriate. After reviewing your medical record and screening and assessments performed today your provider may have ordered immunizations, labs, imaging, and/or referrals for you. A list of these orders (if applicable) as well as your Preventive Care list are included within your After Visit Summary for your review. Other Preventive Recommendations:    A preventive eye exam performed by an eye specialist is recommended every 1-2 years to screen for glaucoma; cataracts, macular degeneration, and other eye disorders. A preventive dental visit is recommended every 6 months. Try to get at least 150 minutes of exercise per week or 10,000 steps per day on a pedometer . Order or download the FREE \"Exercise & Physical Activity: Your Everyday Guide\" from [employer]. Call 7-790.130.9901 or search [employer] online. You need 0287-7067 mg of calcium and 0253-8561 IU of vitamin D per day. It is possible to meet your calcium requirement with diet alone, but a vitamin D supplement is usually necessary to meet this goal.  When exposed to the sun, use a sunscreen that protects against both UVA and UVB radiation with an SPF of 30 or greater. Reapply every 2 to 3 hours or after sweating, drying off with a towel, or swimming. Always wear a seat belt when traveling in a car. Always wear a helmet when riding a bicycle or motorcycle. Heart-Healthy Diet   Sodium, Fat, and Cholesterol Controlled Diet       What Is a Heart Healthy Diet?    A heart-healthy diet is one that cholesterol actually carries cholesterol away from your arteries and may, therefore, help lower your risk of having a heart attack. You want this level to be high (ideally greater than 60). It is a risk to have a level less than 40. You can raise this good cholesterol by eating olive oil, canola oil, avocados, or nuts. Exercise raises this level, too. Fat    Fat is calorie dense and packs a lot of calories into a small amount of food. Even though fats should be limited due to their high calorie content, not all fats are bad. In fact, some fats are quite healthful. Fat can be broken down into four main types. The good-for-you fats are:   Monounsaturated fat  found in oils such as olive and canola, avocados, and nuts and natural nut butters; can decrease cholesterol levels, while keeping levels of HDL cholesterol high   Polyunsaturated fat  found in oils such as safflower, sunflower, soybean, corn, and sesame; can decrease total cholesterol and LDL cholesterol   Omega-3 fatty acids  particularly those found in fatty fish (such as salmon, trout, tuna, mackerel, herring, and sardines); can decrease risk of arrhythmias, decrease triglyceride levels, and slightly lower blood pressure   The fats that you want to limit are:   Saturated fat  found in animal products, many fast foods, and a few vegetables; increases total blood cholesterol, including LDL levels   Animal fats that are saturated include: butter, lard, whole-milk dairy products, meat fat, and poultry skin   Vegetable fats that are saturated include: hydrogenated shortening, palm oil, coconut oil, cocoa butter   Hydrogenated or trans fat  found in margarine and vegetable shortening, most shelf stable snack foods, and fried foods; increases LDL and decreases HDL     It is generally recommended that you limit your total fat for the day to less than 30% of your total calories.  If you follow an 1800-calorie heart healthy diet, for example, this would mean 60 grams of fat or less per day. Saturated fat and trans fat in your diet raises your blood cholesterol the most, much more than dietary cholesterol does. For this reason, on a heart-healthy diet, less than 7% of your calories should come from saturated fat and ideally 0% from trans fat. On an 1800-calorie diet, this translates into less than 14 grams of saturated fat per day, leaving 46 grams of fat to come from mono- and polyunsaturated fats.    Food Choices on a Heart Healthy Diet   Food Category   Foods Recommended   Foods to Avoid   Grains   Breads and rolls without salted tops Most dry and cooked cereals Unsalted crackers and breadsticks Low-sodium or homemade breadcrumbs or stuffing All rice and pastas   Breads, rolls, and crackers with salted tops High-fat baked goods (eg, muffins, donuts, pastries) Quick breads, self-rising flour, and biscuit mixes Regular bread crumbs Instant hot cereals Commercially prepared rice, pasta, or stuffing mixes   Vegetables   Most fresh, frozen, and low-sodium canned vegetables Low-sodium and salt-free vegetable juices Canned vegetables if unsalted or rinsed   Regular canned vegetables and juices, including sauerkraut and pickled vegetables Frozen vegetables with sauces Commercially prepared potato and vegetable mixes   Fruits   Most fresh, frozen, and canned fruits All fruit juices   Fruits processed with salt or sodium   Milk   Nonfat or low-fat (1%) milk Nonfat or low-fat yogurt Cottage cheese, low-fat ricotta, cheeses labeled as low-fat and low-sodium   Whole milk Reduced-fat (2%) milk Malted and chocolate milk Full fat yogurt Most cheeses (unless low-fat and low salt) Buttermilk (no more than 1 cup per week)   Meats and Beans   Lean cuts of fresh or frozen beef, veal, lamb, or pork (look for the word loin) Fresh or frozen poultry without the skin Fresh or frozen fish and some shellfish Egg whites and egg substitutes (Limit whole eggs to three per week) Tofu Nuts or seeds (unsalted, dry-roasted), low-sodium peanut butter Dried peas, beans, and lentils   Any smoked, cured, salted, or canned meat, fish, or poultry (including garg, chipped beef, cold cuts, hot dogs, sausages, sardines, and anchovies) Poultry skins Breaded and/or fried fish or meats Canned peas, beans, and lentils Salted nuts   Fats and Oils   Olive oil and canola oil Low-sodium, low-fat salad dressings and mayonnaise   Butter, margarine, coconut and palm oils, garg fat   Snacks, Sweets, and Condiments   Low-sodium or unsalted versions of broths, soups, soy sauce, and condiments Pepper, herbs, and spices; vinegar, lemon, or lime juice Low-fat frozen desserts (yogurt, sherbet, fruit bars) Sugar, cocoa powder, honey, syrup, jam, and preserves Low-fat, trans-fat free cookies, cakes, and pies [place] and animal crackers, fig bars, eliu snaps   High-fat desserts Broth, soups, gravies, and sauces, made from instant mixes or other high-sodium ingredients Salted snack foods Canned olives Meat tenderizers, seasoning salt, and most flavored vinegars   Beverages   Low-sodium carbonated beverages Tea and coffee in moderation Soy milk   Commercially softened water   Suggestions   Make whole grains, fruits, and vegetables the base of your diet. Choose heart-healthy fats such as canola, olive, and flaxseed oil, and foods high in heart-healthy fats, such as nuts, seeds, soybeans, tofu, and fish. Eat fish at least twice per week; the fish highest in omega-3 fatty acids and lowest in mercury include salmon, herring, mackerel, sardines, and canned chunk light tuna. If you eat fish less than twice per week or have high triglycerides, talk to your doctor about taking fish oil supplements. Read food labels. For products low in fat and cholesterol, look for fat free, low-fat, cholesterol free, saturated fat free, and trans fat freeAlso scan the Nutrition Facts Label, which lists saturated fat, trans fat, and cholesterol amounts. For products low in sodium, look for sodium free, very low sodium, low sodium, no added salt, and unsalted   Skip the salt when cooking or at the table; if food needs more flavor, get creative and try out different herbs and spices. Garlic and onion also add substantial flavor to foods. Trim any visible fat off meat and poultry before cooking, and drain the fat off after smith. Use cooking methods that require little or no added fat, such as grilling, boiling, baking, poaching, broiling, roasting, steaming, stir-frying, and sauting. Avoid fast food and convenience food. They tend to be high in saturated and trans fat and have a lot of added salt. Talk to a registered dietitian for individualized diet advice. Last Reviewed: March 2011 Blane Hodge MS, MPH, RD   Updated: 3/29/2011     Heart-Healthy Diet   Sodium, Fat, and Cholesterol Controlled Diet       What Is a Heart Healthy Diet? A heart-healthy diet is one that limits sodium , certain types of fat , and cholesterol . This type of diet is recommended for:   People with any form of cardiovascular disease (eg, coronary heart disease , peripheral vascular disease , previous heart attack , previous stroke )   People with risk factors for cardiovascular disease, such as high blood pressure , high cholesterol , or diabetes   Anyone who wants to lower their risk of developing cardiovascular disease   Sodium    Sodium is a mineral found in many foods. In general, most people consume much more sodium than they need. Diets high in sodium can increase blood pressure and lead to edema (water retention). On a heart-healthy diet, you should consume no more than 2,300 mg (milligrams) of sodium per dayabout the amount in one teaspoon of table salt. The foods highest in sodium include table salt (about 50% sodium), processed foods, convenience foods, and preserved foods. Cholesterol    Cholesterol is a fat-like, waxy substance in your blood.  Our bodies make some cholesterol. It is also found in animal products, with the highest amounts in fatty meat, egg yolks, whole milk, cheese, shellfish, and organ meats. On a heart-healthy diet, you should limit your cholesterol intake to less than 200 mg per day. It is normal and important to have some cholesterol in your bloodstream. But too much cholesterol can cause plaque to build up within your arteries, which can eventually lead to a heart attack or stroke. The two types of cholesterol that are most commonly referred to are:   Low-density lipoprotein (LDL) cholesterol  Also known as bad cholesterol, this is the cholesterol that tends to build up along your arteries. Bad cholesterol levels are increased by eating fats that are saturated or hydrogenated. Optimal level of this cholesterol is less than 100. Over 130 starts to get risky for heart disease. High-density lipoprotein (HDL) cholesterol  Also known as good cholesterol, this type of cholesterol actually carries cholesterol away from your arteries and may, therefore, help lower your risk of having a heart attack. You want this level to be high (ideally greater than 60). It is a risk to have a level less than 40. You can raise this good cholesterol by eating olive oil, canola oil, avocados, or nuts. Exercise raises this level, too. Fat    Fat is calorie dense and packs a lot of calories into a small amount of food. Even though fats should be limited due to their high calorie content, not all fats are bad. In fact, some fats are quite healthful. Fat can be broken down into four main types.    The good-for-you fats are:   Monounsaturated fat  found in oils such as olive and canola, avocados, and nuts and natural nut butters; can decrease cholesterol levels, while keeping levels of HDL cholesterol high   Polyunsaturated fat  found in oils such as safflower, sunflower, soybean, corn, and sesame; can decrease total cholesterol and LDL cholesterol   Omega-3 fatty acids  particularly those found in fatty fish (such as salmon, trout, tuna, mackerel, herring, and sardines); can decrease risk of arrhythmias, decrease triglyceride levels, and slightly lower blood pressure   The fats that you want to limit are:   Saturated fat  found in animal products, many fast foods, and a few vegetables; increases total blood cholesterol, including LDL levels   Animal fats that are saturated include: butter, lard, whole-milk dairy products, meat fat, and poultry skin   Vegetable fats that are saturated include: hydrogenated shortening, palm oil, coconut oil, cocoa butter   Hydrogenated or trans fat  found in margarine and vegetable shortening, most shelf stable snack foods, and fried foods; increases LDL and decreases HDL     It is generally recommended that you limit your total fat for the day to less than 30% of your total calories. If you follow an 1800-calorie heart healthy diet, for example, this would mean 60 grams of fat or less per day. Saturated fat and trans fat in your diet raises your blood cholesterol the most, much more than dietary cholesterol does. For this reason, on a heart-healthy diet, less than 7% of your calories should come from saturated fat and ideally 0% from trans fat. On an 1800-calorie diet, this translates into less than 14 grams of saturated fat per day, leaving 46 grams of fat to come from mono- and polyunsaturated fats.    Food Choices on a Heart Healthy Diet   Food Category   Foods Recommended   Foods to Avoid   Grains   Breads and rolls without salted tops Most dry and cooked cereals Unsalted crackers and breadsticks Low-sodium or homemade breadcrumbs or stuffing All rice and pastas   Breads, rolls, and crackers with salted tops High-fat baked goods (eg, muffins, donuts, pastries) Quick breads, self-rising flour, and biscuit mixes Regular bread crumbs Instant hot cereals Commercially prepared rice, pasta, or stuffing mixes   Vegetables   Most fresh, frozen, tenderizers, seasoning salt, and most flavored vinegars   Beverages   Low-sodium carbonated beverages Tea and coffee in moderation Soy milk   Commercially softened water   Suggestions   Make whole grains, fruits, and vegetables the base of your diet. Choose heart-healthy fats such as canola, olive, and flaxseed oil, and foods high in heart-healthy fats, such as nuts, seeds, soybeans, tofu, and fish. Eat fish at least twice per week; the fish highest in omega-3 fatty acids and lowest in mercury include salmon, herring, mackerel, sardines, and canned chunk light tuna. If you eat fish less than twice per week or have high triglycerides, talk to your doctor about taking fish oil supplements. Read food labels. For products low in fat and cholesterol, look for fat free, low-fat, cholesterol free, saturated fat free, and trans fat freeAlso scan the Nutrition Facts Label, which lists saturated fat, trans fat, and cholesterol amounts. For products low in sodium, look for sodium free, very low sodium, low sodium, no added salt, and unsalted   Skip the salt when cooking or at the table; if food needs more flavor, get creative and try out different herbs and spices. Garlic and onion also add substantial flavor to foods. Trim any visible fat off meat and poultry before cooking, and drain the fat off after smith. Use cooking methods that require little or no added fat, such as grilling, boiling, baking, poaching, broiling, roasting, steaming, stir-frying, and sauting. Avoid fast food and convenience food. They tend to be high in saturated and trans fat and have a lot of added salt. Talk to a registered dietitian for individualized diet advice. Last Reviewed: March 2011 Ambar Schwarz MS, MPH, RD   Updated: 3/29/2011     Keep Your Memory Nadia Pérez       Many factors can affect your ability to remembera hectic lifestyle, aging, stress, chronic disease, and certain medicines.  But, there are steps you can take to sharpen your mind and help preserve your memory. Challenge Your Brain   Regularly challenging your mind may help keeps it in top shape. Good mental exercises include:   Crossword puzzlesUse a dictionary if you need it; you will learn more that way. Brainteasers Try some! Crafts, such as wood working and sewing   Hobbies, such as gardening and building model airplanes   SocializingVisit old friends or join groups to meet new ones. Reading   Learning a new language   Taking a class, whether it be art history or merair chi   TravelingExperience the food, history, and culture of your destination   Learning to use a computer   Going to museums, the theater, or thought-provoking movies   Changing things in your daily life, such as reversing your pattern in the grocery store or brushing your teeth using your nondominant hand   Use Memory Aids   There is no need to remember every detail on your own. These memory aids can help:   Calendars and day planners   Electronic organizers to store all sorts of helpful informationThese devices can \"beep\" to remind you of appointments. A book of days to record birthdays, anniversaries, and other occasions that occur on the same date every year   Detailed \"to-do\" lists and strategically placed sticky notes   Quick \"study\" sessionsBefore a gathering, review who will be there so their names will be fresh in your mind. Establish routinesFor example, keep your keys, wallet, and umbrella in the same place all the time or take medicine with your 8:00 AM glass of juice   Live a Healthy Life   Many actions that will keep your body strong will do the same for your mind. For example:   Talk to Your Doctor About Herbs and Supplements    Malnutrition and vitamin deficiencies can impair your mental function. For example, vitamin B12 deficiency can cause a range of symptoms, including confusion. But, what if your nutritional needs are being met?  Can herbs and supplements still offer a benefit? Researchers have investigated a range of natural remedies, such as ginkgo , ginseng , and the supplement phosphatidylserine (PS). So far, though, the evidence is inconsistent as to whether these products can improve memory or thinking. If you are interested in taking herbs and supplements, talk to your doctor first because they may interact with other medicines that you are taking. Exercise Regularly    Among the many benefits of regular exercise are increased blood flow to the brain and decreased risk of certain diseases that can interfere with memory function. One study found that even moderate exercise has a beneficial effect. Examples of \"moderate\" exercise include:   Playing 18 holes of golf once a week, without a cart   Playing tennis twice a week   Walking one mile per day   Manage Stress    It can be tough to remember what is important when your mind is cluttered. Make time for relaxation. Choose activities that calm you down, and make it routine. Manage Chronic Conditions    Side effects of high blood pressure , diabetes, and heart disease can interfere with mental function. Many of the lifestyle steps discussed here can help manage these conditions. Strive to eat a healthy diet, exercise regularly, get stress under control, and follow your doctor's advice for your condition. Minimize Medications    Talk to your doctor about the medicines that you take. Some may be unnecessary. Also, healthy lifestyle habits may lower the need for certain drugs. Last Reviewed: April 2010 Ct Abdalla MD   Updated: 4/13/2010     Keeping Home a [address] [facility]       As we get older, changes in balance, gait, strength, vision, hearing, and cognition make even the most youthful senior more prone to accidents. Falls are one of the leading health risks for older people.  This increased risk of falling is related to:   Aging process (eg, decreased muscle strength, slowed reflexes)   Higher incidence of chronic health problems (eg, arthritis, diabetes) that may limit mobility, agility or sensory awareness   Side effects of medicine (eg, dizziness, blurred vision)especially medicines like prescription pain medicines and drugs used to treat mental health conditions   Depending on the brittleness of your bones, the consequences of a fall can be serious and long lasting. Home Life   Research by the [employer]) shows that some home accidents among older adults can be prevented by making simple lifestyle changes and basic modifications and repairs to the home environment. Here are some lifestyle changes that experts recommend:   Have your hearing and vision checked regularly. Be sure to wear prescription glasses that are right for you. Speak to your doctor or pharmacist about the possible side effects of your medicines. A number of medicines can cause dizziness. If you have problems with sleep, talk to your doctor. Limit your intake of alcohol. If necessary, use a cane or walker to help maintain your balance. Wear supportive, rubber-soled shoes, even at home. If you live in a region that gets wintry weather, you may want to put special cleats on your shoes to prevent you from slipping on the snow and ice. Exercise regularly to help maintain muscle tone, agility, and balance. Always hold the banister when going up or down stairs. Also, use  bars when getting in or out of the bath or shower, or using the toilet. To avoid dizziness, get up slowly from a lying down position. Sit up first, dangling your legs for a minute or two before rising to a standing position. Overall Home Safety Check   According to the Consumer [employer] Safety [employer]'s \"Older Consumer Home Safety Checklist,\" it is important to check for potential hazards in each room. And remember, proper lighting is an essential factor in home safety. If you cannot see clearly, you are more likely to fall.    Important questions to ask yourself include:   Are lamp, electric, extension, and telephone cords placed out of the flow of traffic and maintained in good condition? Have frayed cords been replaced? Are all small rugs and runners slip resistant? If not, you can secure them to the floor with a special double-sided carpet tape. Are smoke detectors properly locatedone on every floor of your home and one outside of every sleeping area? Are they in good working order? Are batteries replaced at least once a year? Do you have a well-maintained carbon monoxide detector outside every sleeping are in your home? Does your furniture layout leave plenty of space to maneuver between and around chairs, tables, beds, and sofas? Are hallways, stairs and passages between rooms well lit? Can you reach a lamp without getting out of bed? Are floor surfaces well maintained? Shag rugs, high-pile carpeting, tile floors, and polished wood floors can be particularly slippery. Stairs should always have handrails and be carpeted or fitted with a non-skid tread. Is your telephone easily reachable. Is the cord safely tucked away? Room by Room   According to the Association [employer]ging, bathrooms and elyse are the two most potentially hazardous rooms in your home. In the Kitchen    Be sure your stove is in proper working order and always make sure burners and the oven are off before you go out or go to sleep. Keep pots on the back burners, turn handles away from the front of the stove, and keep stove clean and free of grease build-up. Kitchen ventilation systems and range exhausts should be working properly. Keep flammable objects such as towels and pot holders away from the cooking area except when in use. Make sure kitchen curtains are tied back. Move cords and appliances away from the sink and hot surfaces. If extension cords are needed, install wiring guides so they do not hang over the sink, range, or working areas.     Look for coffee pots, kettles and toaster ovens with automatic shut-offs. Keep a mop handy in the kitchen so you can wipe up spills instantly. You should also have a small fire extinguisher. Arrange your kitchen with frequently used items on lower shelves to avoid the need to stand on a stepstool to reach them. Make sure countertops are well-lit to avoid injuries while cutting and preparing food. In the Bathroom    Use a non-slip mat or decals in the tub and shower, since wet, soapy tile or porcelain surfaces are extremely slippery. Make sure bathroom rugs are non-skid or tape them firmly to the floor. Bathtubs should have at least one, preferably two, grab bars, firmly attached to structural supports in the wall. (Do not use built-in soap holders or glass shower doors as grab bars.)    Tub seats fitted with non-slip material on the legs allow you to wash sitting down. For people with limited mobility, bathtub transfer benches allow you to slide safely into the tub. Raised toilet seats and toilet safety rails are helpful for those with knee or hip problems. In the [facility]    Make sure you use a nightlight and that the area around your bed is clear of potential obstacles. Be careful with electric blankets and never go to sleep with a heating pad, which can cause serious burns even if on a low setting. Use fire-resistant mattress covers and pillows, and NEVER smoke in bed. Keep a phone next to the bed that is programmed to dial 911 at the push of a button. If you have a chronic condition, you may want to sign on with an automatic call-in service. Typically the system includes a small pendant that connects directly to an emergency medical voice-response system. You should also make arrangements to stay in contact with someonefriend, neighbor, family memberon a regular schedule.    Fire Prevention   According to the [employer]. (Smoke Alarms for Every) [place] senior citizens are one of the two highest risk groups for death and serious injuries due to residential fires. When cooking, wear short-sleeved items, never a bulky long-sleeved robe. The [facility]'s Safety Checklist for Older Consumers emphasizes the importance of checking basements, garages, workshops and storage areas for fire hazards, such as volatile liquids, piles of old rags or clothing and overloaded circuits. Never smoke in bed or when lying down on a couch or recliner chair. Small portable electric or kerosene heaters are responsible for many home fires and should be used cautiously if at all. If you do use one, be sure to keep them away from flammable materials. In case of fire, make sure you have a pre-established emergency exit plan. Have a professional check your fireplace and other fuel-burning appliances yearly. Helping Hands   Baby boomers entering the enrique years will continue to see the development of new products to help older adults live safely and independently in spite of age-related changes. Making Life More Livable  , by Warren Cornelius, lists over 1,000 products for \"living well in the mature years,\" such as bathing and mobility aids, household security devices, ergonomically designed knives and peelers, and faucet valves and knobs for temperature control. Medical supply stores and organizations are good sources of information about products that improve your quality of life and insure your safety.      Last Reviewed: November 2009 Juanpablo Solorzano MD   Updated: 3/7/2011

## 2022-10-14 NOTE — PROGRESS NOTES
[facility]) Physicians   Internal Medicine     10/14/2022  Jenna Baron : 1945 Sex: male  Age:77 y.o. Chief Complaint   Patient presents with    Discuss Labs        HPI:   Patient presents to office for evaluation of the following medical concerns. - States has been having right lower back, right hip and leg pain for the last 4 days. No trauma or injury. States 7/10 on pain scale. States pain is sharp. No numbness . States possible tingling. Some weakness. States worse with walking. States better sitting. Has been taking tyelnol has not helped. has seen pain management. Last visit per reviewed note pain mgt () -sacroiliac dysfunction, degenerative disc disease spinal stenosis plan CT lumbar spine, right sacroiliac joint injection under fluoroscopy, lidocaine patch, short course of Norco    - States feels left hearing aide is stuck in canal.     - States has diabetes. States follows with endocrinology. On glimepiride and tradjenta. No overt hypoglycemia. Follows with optho. On Statin. 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 States following with endocrinology and with [facility]. Receiving Sandostatin monthly. Has had excessive vasoactive intestinal peptide secretion. Last visit with endocrinology per reviewed note (2022) -VIPoma & carcinoid, cont Sandostatin, adrenal hypofunction, cont hydrocortisone stress dose if needed, diabetes cont same hyperparathyroid    - States has high cholesterol. Trying to watch diet. On simvastatin. No reported side effects    - States has High blood pressure. Not checking blood pressure at home. On HCTZ and sotalolol. Nephrology decreased hctz to 12.5mg     - States has Atrial fibrillation. On Sotalol and coumadin. Has pacemaker. Following with cardiology and EP.  States decreased sotalol     - States has CAD. Stats s/p Sent. States cardiomyopathy. Following with cardiology and EP. S/p pacer for simus node dysfunction. Last follow up cardio (4/22) -no changes follow-up in 6 months    - States has COPD. States has had previous surgery for lobectomy for concern for mesothelioma but was negative. Following with pulmonary. On anoro. Also has nebulizer. Last visit with pulm per reviewed note  (5/22) -COPD continue Anoro and Qvar follow-up in 6 months. States could not afford breztri. Restarted anoro and qvar. States anoro was more expensive.     - States has chronic kidney disease. Following with nephrology. Last follow up per reviewed note (8/22) cont hctz 5x/wk f/u 1yr. Last lab (10/2022) slight elevated creatinine.     - States has elevated uric acid. States on allopurinol.     - States has vitamin D def. On vitamin D 50k weekly. Last lab was nornal and improved (10/2022)     Labs from 4/4/2022 reviewed with patient 10/14/2022     Health Maintenance   - immunizations:   Influenza Vaccination (2021), (2022)   Pneumonia Vaccination - (2018) - pneumococcal 23  Zoster/Shingles Vaccine  Tetanus Vaccination  covid pfizer x 3     - Screenings:   Colonoscopy   Prostate     ROS:  Review of Systems   Constitutional:  Negative for appetite change, chills, fever and unexpected weight change. HENT:  Positive for rhinorrhea. Negative for congestion and sore throat. Eyes:  Negative for pain and visual disturbance. Respiratory:  Positive for cough and shortness of breath. Negative for chest tightness. Cardiovascular:  Negative for chest pain and palpitations. Gastrointestinal:  Negative for abdominal pain, blood in stool, constipation, diarrhea, nausea and vomiting. Genitourinary:  Negative for difficulty urinating, dysuria, hematuria, scrotal swelling, testicular pain and urgency. Musculoskeletal:  Negative for arthralgias and gait problem. Skin:  Negative for rash.    Neurological:  Negative for dizziness, syncope, weakness, light-headedness and headaches. Hematological:  Negative for adenopathy. Does not bruise/bleed easily. Psychiatric/Behavioral:  Negative for suicidal ideas. The patient is not nervous/anxious. Current Outpatient Medications:     gabapentin (NEURONTIN) 100 MG capsule, QD x 1 week, BID x 1 week, and then TID thereafter as tolerated. , Disp: 63 capsule, Rfl: 0    QVAR REDIHALER 80 MCG/ACT AERB inhaler, INHALE 2 PUFFS BY MOUTH TWICE DAILY, Disp: , Rfl:     latanoprost (XALATAN) 0.005 % ophthalmic solution, PLACE 1 DROP INTO BOTH EYES EVERY DAY AT BEDTIME, Disp: , Rfl:     hydrocortisone (CORTEF) 10 MG tablet, TAKE 1 1/2 TABLETS BY MOUTH EVERY MORNING AND 1 TABLET EVERY EVENING, Disp: 225 tablet, Rfl: 1    simvastatin (ZOCOR) 40 MG tablet, Take 1 tablet by mouth nightly, Disp: 90 tablet, Rfl: 1    warfarin (COUMADIN) 5 MG tablet, 1 po qd or as directed, Disp: 90 tablet, Rfl: 1    allopurinol (ZYLOPRIM) 100 MG tablet, TAKE 1 TABLET BY MOUTH [id number], Disp: 38 tablet, Rfl: 1    hydroCHLOROthiazide (HYDRODIURIL) 25 MG tablet, Take 1 tablet by mouth daily 1 po 5 days a week (hold on Tues and Sat)-pt reqesting 90 day supply, Disp: 75 tablet, Rfl: 1    warfarin (COUMADIN) 1 MG tablet, Take 1 tablet by mouth daily, Disp: 30 tablet, Rfl: 5    Cholecalciferol (VITAMIN D3) 1.25 MG (81436 UT) CAPS, Take 1 capsule by mouth once a week, Disp: , Rfl:     linagliptin (TRADJENTA) 5 MG tablet, Take 1 tablet by mouth daily Lot#[id number] Exp.7/2020, Disp: 28 tablet, Rfl: 0    umeclidinium-vilanterol (ANORO ELLIPTA) 62.5-25 MCG/INH AEPB inhaler, Inhale 1 puff into the lungs daily, Disp: , Rfl:     sotalol (BETAPACE) 160 MG tablet, Take 1 tablet by mouth 2 times daily, Disp: 60 tablet, Rfl: 3    nitroGLYCERIN (NITROSTAT) 0.4 MG SL tablet, Place 0.4 mg under the tongue every 5 minutes as needed for Chest pain up to max of 3 total doses.  If no relief after 1 dose, call 911., Disp: , Rfl: albuterol (PROVENTIL) (5 MG/ML) 0.5% nebulizer solution, Take 0.5 mLs by nebulization every 6 hours as needed for Wheezing, Disp: 120 each, Rfl: 0    glimepiride (AMARYL) 2 MG tablet, Take 0.5 mg by mouth every morning (before breakfast) , Disp: , Rfl:     octreotide ACETATE (SANDOSTATIN LAR) 30 MG injection, Inject 60 mg into the muscle once Every two weeks, Disp: , Rfl:     Glucose Blood (BLOOD GLUCOSE TEST STRIPS) STRP, Freestyle lite, Disp: 120 strip, Rfl: 0    Fingerstix Lancets MISC, , Disp: 100 each, Rfl: 2    Allergies   Allergen Reactions    Lisinopril      Other reaction(s): Other: See Comments  Black out, nausea       Past Medical History:   Diagnosis Date    Atrial fibrillation ([facility] [address] 01/04/2014    CAD (coronary artery disease)     Cancer (HCC)     VIPoma    Carcinoid syndrome (HCC)     Chronic kidney disease     COPD (chronic obstructive pulmonary disease) (HCC)     DDD (degenerative disc disease), lumbar 09/20/2022    Diabetes mellitus ([address]     Hypertension     Idiopathic chronic gout of multiple sites without tophus 09/05/2019    Meniere disease     MI (myocardial infarction) ([address]     x3    Mixed hyperlipidemia 09/05/2019    Obesity     Pacemaker     Type 2 diabetes mellitus without complication ([address]        Past Surgical History:   Procedure Laterality Date    CARDIAC SURGERY      stents/pacemaker    COLONOSCOPY      CORONARY ANGIOPLASTY       CORONARY ANGIOPLASTY WITH STENT PLACEMENT      HERNIA REPAIR      LUNG SURGERY Right 2014    Dr. Yelena Ariza for \"infection around lung\"    MEDICATION INJECTION Right 9/22/2022    RIGHT SACROILIAC JOINT INJECTION UNDER FLUOROSCOPY performed by Gayle Wright MD at [facility] OR    PACEMAKER PLACEMENT      TUMOR REMOVAL      tumors removed from breast ,arms     UPPER GASTROINTESTINAL ENDOSCOPY         Family History   Adopted: Yes   Problem Relation Age of Onset    Other Mother         Pt. is adopted    Other Father         Pt. is adopted.         Social History Socioeconomic History    Marital status: Single     Spouse name: None    Number of children: 3    Years of education: 12    Highest education level: None   Occupational History    Occupation: [id number]     Comment: retired   Tobacco Use    Smoking status: Former     Packs/day: 2.00     Years: 40.00     Pack years: 80.00     Types: Cigarettes     Quit date: 2000     Years since quittin.1    Smokeless tobacco: Never   Vaping Use    Vaping Use: Never used   Substance and Sexual Activity    Alcohol use: No    Drug use: No    Sexual activity: Not Currently     Partners: Female     Social Determinants of Health     Financial Resource Strain: Low Risk     Difficulty of Paying Living Expenses: Not hard at all   Food Insecurity: No Food Insecurity    Worried About [address] [employer] in the Last Year: Never true    Ran Out of Food in the Last Year: Never true   Physical Activity: Sufficiently Active    Days of Exercise per Week: 3 days    Minutes of Exercise per Session: 50 min        Vitals:    10/14/22 1115   BP: 118/80   Pulse: 71   Temp: 97.3 °F (36.3 °C)   SpO2: 97%   Weight: 235 lb (106.6 kg)   Height: 5' 7\" (1.702 m)       Exam:  Physical Exam  Vitals reviewed. Constitutional:       Appearance: He is well-developed. HENT:      Head: Normocephalic and atraumatic. Right Ear: External ear normal.      Left Ear: External ear normal.   Eyes:      Pupils: Pupils are equal, round, and reactive to light. Neck:      Thyroid: No thyromegaly. Cardiovascular:      Rate and Rhythm: Normal rate and regular rhythm. Heart sounds: Normal heart sounds. No murmur heard. Pulmonary:      Effort: Pulmonary effort is normal.      Breath sounds: Decreased breath sounds and rhonchi present. No wheezing or rales. Abdominal:      General: Bowel sounds are normal.      Palpations: Abdomen is soft. Tenderness: There is no abdominal tenderness. There is no guarding or rebound.    Musculoskeletal: General: Normal range of motion. Cervical back: Normal range of motion and neck supple. Lumbar back: Tenderness present. Right hip: Tenderness present. Lymphadenopathy:      Cervical: No cervical adenopathy. Skin:     General: Skin is warm and dry. Neurological:      Mental Status: He is alert and oriented to person, place, and time. Cranial Nerves: No cranial nerve deficit.    Psychiatric:         Judgment: Judgment normal.       Assessment and Plan:    Diagnoses and all orders for this visit:    Acute right-sided low back pain with right-sided sciatica  - uncertain as to cause poss lumbar disease, burstitis   - xray of rt hip per radiology report unremarkable   - xray of lumbar spine - moderate facet OA l4-5, multilevel degenerative disc disease   - US LE - no dvt   - home exercises  - Heat and ice alternating   - declines PT   - s/p kenlog 40mg IM x 1   - s/p medrol pack   - tramadol did not help   - s/p toradol x 1, no long term nsaids due to ckd3 and anticoagulation (coumadin)   - following with pain management pending     Type 2 diabetes mellitus without complication, without long-term current use of insulin ([address]  - Continue present treatment   - watch diet   - monitor blood glucose at home   - following with endocrinology   - on glimepiride   - on tradjenta   - follow A1c - last was 5.3 (10/2022)   - discussed decreasing medications 10/14/2022    Not on Aspirin - coumadin   Not on A/ARB - CKD   On statin   Follows with optho (3/22)- retinal de, no dm retin    Carcinoid (except of appendix) ([address]  - following with endocrinology and endocrinology surgery (dr. Hernando Jeffries[facility] at [facility]  - States excessive Vasoactive intestinal peptide excretion   - On sandostatin monthly   - on cortef   - Stable     Stage 3a chronic kidney disease ([address]  - following with nephrology   - follow labs   - last lab (10/2022) - creat increased to 2.0    Chronic atrial fibrillation ([address]  - s/p pacer  - following with cardiology and EP   - on sotalol - decreased dose (12/2019)   - on coumadin - INR weekly   - stable     Chronic obstructive pulmonary disease, unspecified COPD type ([facility] [address]  - following with pulmonary   - on anoro - could not afford trelegy   - has home nebulizer with albuterol   - has chronic cough   - has tried flonase or nasacort  - has tried mult antihistamine   - on anoro - stopped due to cost (10/2022)   - on qvar   - stable     Coronary artery disease involving native coronary artery of native heart without angina pectoris  - s/p stent  - following with cardiology and EP   - on sotalol   - on simvastatin   - no aspirin due to coumadin   - stable     Essential hypertension  - watch diet   - on hctz - nephrology decreased to 12.5mg   - on sotalol for afib   - stable 10/14/2022    Mixed hyperlipidemia  - watch diet   - on simvastatin  - follow labs     Idiopathic chronic gout of multiple sites without tophus  - watch diet   - on allopurinol - increase to daily   - stable     Hyperparathyroidism ([facility] [address]  - Possible due to CKD   - last calcium was elevated     Vitamin D deficiency  - nephrology added 50k weekly   - follow labs     Chronic pain of both knees  - check xray     Class 2 obesity due to excess calories without serious comorbidity with body mass index (BMI) of 36.0 to 36.9 in adult  - discussed diet and exercise     Return in about 3 months (around 1/14/2023) for check up and review and labs.     Orders Placed This Encounter   Procedures    Basic Metabolic Panel     Standing Status:   Future     Standing Expiration Date:   10/14/2023       Requested Prescriptions      No prescriptions requested or ordered in this encounter        Roslyn Yeung MD  10/14/2022  11:47 AM

## 2022-10-14 NOTE — PROGRESS NOTES
Medicare Annual Wellness Visit    Rhonda Leal is here for Medicare AWV    Assessment & Plan   Need for prophylactic vaccination and inoculation against influenza  -     Influenza, FLUAD, (age 72 y+), IM, Preservative Free, 0.5 mL    Recommendations for Preventive Services Due: see orders and patient instructions/AVS.  Recommended screening schedule for the next 5-10 years is provided to the patient in written form: see Patient Instructions/AVS.     No follow-ups on file. Subjective       Patient's complete Health Risk Assessment and screening values have been reviewed and are found in Flowsheets. The following problems were reviewed today and where indicated follow up appointments were made and/or referrals ordered.     Positive Risk Factor Screenings with Interventions:             General Health and ACP:  General  In general, how would you say your health is?: Good  In the past 7 days, have you experienced any of the following: New or Increased Pain, New or Increased Fatigue, Loneliness, Social Isolation, Stress or Anger?: (!) Yes  Select all that apply: (!) New or Increased Pain  Do you get the social and emotional support that you need?: Yes  Do you have a Living Will?: (!) No    Advance Directives       Power of  Living Will ACP-Advance Directive ACP-Power of     Not on File Filed on 06/04/13 Filed Not on File        General Health Risk Interventions:  Pain issues: following with pain management     Health Habits/Nutrition:  Physical Activity: Sufficiently Active    Days of Exercise per Week: 3 days    Minutes of Exercise per Session: 50 min     Have you lost any weight without trying in the past 3 months?: No  Body mass index: (!) 36.8  Have you seen the dentist within the past year?: (!) No  Health Habits/Nutrition Interventions:  Nutritional issues:  educational materials for healthy, well-balanced diet provided    Hearing/Vision:  Do you or your family notice any trouble with your hearing that hasn't been managed with hearing aids?: (!) Yes  Do you have difficulty driving, watching TV, or doing any of your daily activities because of your eyesight?: No  Have you had an eye exam within the past year?: Yes  No results found. Hearing/Vision Interventions:  Hearing concerns:  patient declines any further evaluation/treatment for hearing issues    Safety:  Do you have working smoke detectors?: (!) No  Do you have any tripping hazards - loose or unsecured carpets or rugs?: No  Do you have any tripping hazards - clutter in doorways, halls, or stairs?: No  Do you have either shower bars, grab bars, non-slip mats or non-slip surfaces in your shower or bathtub?: Yes  Do all of your stairways have a railing or banister?: Not Applicable  Do you always fasten your seatbelt when you are in a car?: Yes  Safety Interventions:  Home safety tips provided    ADLs:  In the past 7 days, did you need help from others to perform any of the following everyday activities: Eating, dressing, grooming, bathing, toileting, or walking/balance?: (!) Yes  Select all that apply: (!) Walking/Balance (leg is bothering him)  In the past 7 days, did you need help from others to take care of any of the following: Laundry, housekeeping, banking/finances, shopping, telephone use, food preparation, transportation, or taking medications?: No  ADL Interventions:  Patient declines any further evaluation/treatment for this issue          Objective   Vitals:    10/14/22 1103   BP: 118/80   Pulse: 71   Temp: 97.3 °F (36.3 °C)   SpO2: 97%   Weight: 235 lb (106.6 kg)   Height: 5' 7\" (1.702 m)      Body mass index is 36.81 kg/m². Allergies   Allergen Reactions    Lisinopril      Other reaction(s): Other: See Comments  Black out, nausea     Prior to Visit Medications    Medication Sig Taking? Authorizing Provider   gabapentin (NEURONTIN) 100 MG capsule QD x 1 week, BID x 1 week, and then TID thereafter as tolerated.  Yes Shine Giron RON Aguilar   QVAR REDIHALER 80 MCG/ACT AERB inhaler INHALE 2 PUFFS BY MOUTH TWICE DAILY Yes Historical Provider, MD   latanoprost (XALATAN) 0.005 % ophthalmic solution PLACE 1 DROP INTO BOTH EYES EVERY DAY AT BEDTIME Yes Historical Provider, MD   hydrocortisone (CORTEF) 10 MG tablet TAKE 1 1/2 TABLETS BY MOUTH EVERY MORNING AND 1 TABLET EVERY EVENING Yes Fletcher Kasper MD   simvastatin (ZOCOR) 40 MG tablet Take 1 tablet by mouth nightly Yes Fletcher Kasper MD   warfarin (COUMADIN) 5 MG tablet 1 po qd or as directed Yes Fletcher Kasper MD   allopurinol (ZYLOPRIM) 100 MG tablet TAKE 1 TABLET BY MOUTH [id number] Yes Fletcher Kasper MD   hydroCHLOROthiazide (HYDRODIURIL) 25 MG tablet Take 1 tablet by mouth daily 1 po 5 days a week (hold on Tues and Sat)-pt reqesting 90 day supply Yes Tyrel Craft DO   warfarin (COUMADIN) 1 MG tablet Take 1 tablet by mouth daily Yes Fletcher Kasper MD   Cholecalciferol (VITAMIN D3) 1.25 MG (69881 UT) CAPS Take 1 capsule by mouth once a week Yes Historical Provider, MD   linagliptin (TRADJENTA) 5 MG tablet Take 1 tablet by mouth daily Lot#[id number] Exp.7/2020 Yes Bekah Atwood APRN - CNP   umeclidinium-vilanterol (ANORO ELLIPTA) 62.5-25 MCG/INH AEPB inhaler Inhale 1 puff into the lungs daily Yes Historical Provider, MD   sotalol (BETAPACE) 160 MG tablet Take 1 tablet by mouth 2 times daily Yes Johnny Rai MD   nitroGLYCERIN (NITROSTAT) 0.4 MG SL tablet Place 0.4 mg under the tongue every 5 minutes as needed for Chest pain up to max of 3 total doses. If no relief after 1 dose, call 911.  Yes Historical Provider, MD   albuterol (PROVENTIL) (5 MG/ML) 0.5% nebulizer solution Take 0.5 mLs by nebulization every 6 hours as needed for Wheezing Yes Renee Obrien MD   glimepiride (AMARYL) 2 MG tablet Take 0.5 mg by mouth every morning (before breakfast)  Yes Historical Provider, MD   octreotide ACETATE (SANDOSTATIN LAR) 30 MG injection Inject 60 mg into the muscle once Every two weeks Yes Historical Provider, MD   Glucose Blood (BLOOD GLUCOSE TEST STRIPS) STRP Freestyle lite Yes Sincere Everett MD   Fingerstix Joselyn Notice  Yes Sincere Everett MD       CareTeam (Including outside providers/suppliers regularly involved in providing care):   Patient Care Team:  Roslyn Yeung MD as PCP - General (Internal Medicine)  Roslyn Yeung MD as PCP - [facility]  eLwis Brenner MD as Consulting Physician (Electrophysiology)  Adalid Shah MD as Surgeon (Ophthalmology)  Adenike Hernandez MD as Consulting Physician (General Surgery)  Jamarcus Minaya DO as Consulting Physician (Endocrinology)  Lavern Pires MD as Consulting Physician (Pulmonology)  Maria E Sears MD as Consulting Physician (Cardiology)     Reviewed and updated this visit:  Tobacco  Allergies  Meds  Med Hx  Surg Hx  Soc Hx  Fam Hx               Electronically signed by Roslyn Yeung MD on 10/14/2022 at 11:49 AM

## 2022-10-14 NOTE — PROGRESS NOTES
[facility]) Physicians   Internal Medicine     10/14/2022  Wilber Yap : 1945 Sex: male  Age:77 y.o. Chief Complaint   Patient presents with    Medicare AWV        HPI:   Patient presents to office for evaluation of the following medical concerns. - States has been having right lower back, right hip and leg pain for the last 4 days. No trauma or injury. States 7/10 on pain scale. States pain is sharp. No numbness . States possible tingling. Some weakness. States worse with walking. States better sitting. Has been taking tyelnol has not helped. has seen pain management. Last visit per reviewed note pain mgt () -sacroiliac dysfunction, degenerative disc disease spinal stenosis plan CT lumbar spine, right sacroiliac joint injection under fluoroscopy, lidocaine patch, short course of Norco    - States feels left hearing aide is stuck in canal.     - States has diabetes. States follows with endocrinology. On glimepiride and tradjenta. No overt hypoglycemia. Follows with optho. On Statin. 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 States following with endocrinology and [facility]. Receiving Sandostatin monthly. Has had excessive vasoactive intestinal peptide secretion. Last visit with endocrinology per reviewed note (2022) -VIPoma & carcinoid, cont Sandostatin, adrenal hypofunction, cont hydrocortisone stress dose if needed, diabetes cont same hyperparathyroid    - States has high cholesterol. Trying to watch diet. On simvastatin. No reported side effects    - States has High blood pressure. Not checking blood pressure at home. On HCTZ and sotalolol. Nephrology decreased hctz to 12.5mg     - States has Atrial fibrillation. On Sotalol and coumadin. Has pacemaker. Following with cardiology and EP.  States decreased sotalol     - States has CAD. Stats s/p Sent. States cardiomyopathy. Following with cardiology and EP. S/p pacer for simus node dysfunction. Last follow up cardio (4/22) -no changes follow-up in 6 months    - States has COPD. States has had previous surgery for lobectomy for concern for mesothelioma but was negative. Following with pulmonary. On anoro. Also has nebulizer. Last visit with pulm per reviewed note  (5/22) -COPD continue Anoro and Qvar follow-up in 6 months. States could not afford breztri. Restarted anoro and qvar. States anoro was more expensive.     - States has chronic kidney disease. Following with nephrology. Last follow up per reviewed note (8/22) cont hctz 5x/wk f/u 1yr. Last lab (10/2022) slight elevated creatinine.     - States has elevated uric acid. States on allopurinol.     - States has vitamin D def. On vitamin D 50k weekly. Last lab was nornal and improved (10/2022)     Labs from 4/4/2022 reviewed with patient 10/14/2022     Health Maintenance   - immunizations:   Influenza Vaccination (2021), (2022)   Pneumonia Vaccination - (2018) - pneumococcal 23  Zoster/Shingles Vaccine  Tetanus Vaccination  covid pfizer x 3     - Screenings:   Colonoscopy   Prostate     ROS:  Review of Systems   Constitutional:  Negative for appetite change, chills, fever and unexpected weight change. HENT:  Positive for rhinorrhea. Negative for congestion and sore throat. Eyes:  Negative for pain and visual disturbance. Respiratory:  Positive for cough and shortness of breath. Negative for chest tightness. Cardiovascular:  Negative for chest pain and palpitations. Gastrointestinal:  Negative for abdominal pain, blood in stool, constipation, diarrhea, nausea and vomiting. Genitourinary:  Negative for difficulty urinating, dysuria, hematuria, scrotal swelling, testicular pain and urgency. Musculoskeletal:  Negative for arthralgias and gait problem. Skin:  Negative for rash.    Neurological:  Negative for dizziness, syncope, weakness, light-headedness and headaches. Hematological:  Negative for adenopathy. Does not bruise/bleed easily. Psychiatric/Behavioral:  Negative for suicidal ideas. The patient is not nervous/anxious. Current Outpatient Medications:     gabapentin (NEURONTIN) 100 MG capsule, QD x 1 week, BID x 1 week, and then TID thereafter as tolerated. , Disp: 63 capsule, Rfl: 0    QVAR REDIHALER 80 MCG/ACT AERB inhaler, INHALE 2 PUFFS BY MOUTH TWICE DAILY, Disp: , Rfl:     latanoprost (XALATAN) 0.005 % ophthalmic solution, PLACE 1 DROP INTO BOTH EYES EVERY DAY AT BEDTIME, Disp: , Rfl:     hydrocortisone (CORTEF) 10 MG tablet, TAKE 1 1/2 TABLETS BY MOUTH EVERY MORNING AND 1 TABLET EVERY EVENING, Disp: 225 tablet, Rfl: 1    simvastatin (ZOCOR) 40 MG tablet, Take 1 tablet by mouth nightly, Disp: 90 tablet, Rfl: 1    warfarin (COUMADIN) 5 MG tablet, 1 po qd or as directed, Disp: 90 tablet, Rfl: 1    allopurinol (ZYLOPRIM) 100 MG tablet, TAKE 1 TABLET BY MOUTH [id number], Disp: 38 tablet, Rfl: 1    hydroCHLOROthiazide (HYDRODIURIL) 25 MG tablet, Take 1 tablet by mouth daily 1 po 5 days a week (hold on Tues and Sat)-pt reqesting 90 day supply, Disp: 75 tablet, Rfl: 1    warfarin (COUMADIN) 1 MG tablet, Take 1 tablet by mouth daily, Disp: 30 tablet, Rfl: 5    Cholecalciferol (VITAMIN D3) 1.25 MG (28770 UT) CAPS, Take 1 capsule by mouth once a week, Disp: , Rfl:     linagliptin (TRADJENTA) 5 MG tablet, Take 1 tablet by mouth daily Lot#[id number] Exp.7/2020, Disp: 28 tablet, Rfl: 0    umeclidinium-vilanterol (ANORO ELLIPTA) 62.5-25 MCG/INH AEPB inhaler, Inhale 1 puff into the lungs daily, Disp: , Rfl:     sotalol (BETAPACE) 160 MG tablet, Take 1 tablet by mouth 2 times daily, Disp: 60 tablet, Rfl: 3    nitroGLYCERIN (NITROSTAT) 0.4 MG SL tablet, Place 0.4 mg under the tongue every 5 minutes as needed for Chest pain up to max of 3 total doses.  If no relief after 1 dose, call 911., Disp: , Rfl: albuterol (PROVENTIL) (5 MG/ML) 0.5% nebulizer solution, Take 0.5 mLs by nebulization every 6 hours as needed for Wheezing, Disp: 120 each, Rfl: 0    glimepiride (AMARYL) 2 MG tablet, Take 0.5 mg by mouth every morning (before breakfast) , Disp: , Rfl:     octreotide ACETATE (SANDOSTATIN LAR) 30 MG injection, Inject 60 mg into the muscle once Every two weeks, Disp: , Rfl:     Glucose Blood (BLOOD GLUCOSE TEST STRIPS) STRP, Freestyle lite, Disp: 120 strip, Rfl: 0    Fingerstix Lancets MISC, , Disp: 100 each, Rfl: 2    Allergies   Allergen Reactions    Lisinopril      Other reaction(s): Other: See Comments  Black out, nausea       Past Medical History:   Diagnosis Date    Atrial fibrillation ([facility] 01/04/2014    CAD (coronary artery disease)     Cancer (HCC)     VIPoma    Carcinoid syndrome (HCC)     Chronic kidney disease     COPD (chronic obstructive pulmonary disease) (HCC)     DDD (degenerative disc disease), lumbar 09/20/2022    Diabetes mellitus ([address]     Hypertension     Idiopathic chronic gout of multiple sites without tophus 09/05/2019    Meniere disease     MI (myocardial infarction) ([facility]     x3    Mixed hyperlipidemia 09/05/2019    Obesity     Pacemaker     Type 2 diabetes mellitus without complication ([address]        Past Surgical History:   Procedure Laterality Date    CARDIAC SURGERY      stents/pacemaker    COLONOSCOPY      CORONARY ANGIOPLASTY       CORONARY ANGIOPLASTY WITH STENT PLACEMENT      HERNIA REPAIR      LUNG SURGERY Right 2014    Dr. Anu Randolph for \"infection around lung\"    MEDICATION INJECTION Right 9/22/2022    RIGHT SACROILIAC JOINT INJECTION UNDER FLUOROSCOPY performed by Jesse Oakley MD at [facility] OR    PACEMAKER PLACEMENT      TUMOR REMOVAL      tumors removed from breast ,arms     UPPER GASTROINTESTINAL ENDOSCOPY         Family History   Adopted: Yes   Problem Relation Age of Onset    Other Mother         Pt. is adopted    Other Father         Pt. is adopted.         Social History Socioeconomic History    Marital status: Single     Spouse name: None    Number of children: 3    Years of education: 12    Highest education level: None   Occupational History    Occupation: [id number]     Comment: retired   Tobacco Use    Smoking status: Former     Packs/day: 2.00     Years: 40.00     Pack years: 80.00     Types: Cigarettes     Quit date: 2000     Years since quittin.1    Smokeless tobacco: Never   Vaping Use    Vaping Use: Never used   Substance and Sexual Activity    Alcohol use: No    Drug use: No    Sexual activity: Not Currently     Partners: Female     Social Determinants of Health     Financial Resource Strain: Low Risk     Difficulty of Paying Living Expenses: Not hard at all   Food Insecurity: No Food Insecurity    Worried About [address] [employer] in the Last Year: Never true    Ran Out of Food in the Last Year: Never true   Physical Activity: Sufficiently Active    Days of Exercise per Week: 3 days    Minutes of Exercise per Session: 50 min        Vitals:    10/14/22 1103   BP: 118/80   Pulse: 71   Temp: 97.3 °F (36.3 °C)   SpO2: 97%   Weight: 235 lb (106.6 kg)   Height: 5' 7\" (1.702 m)       Exam:  Physical Exam  Vitals reviewed. Constitutional:       Appearance: He is well-developed. HENT:      Head: Normocephalic and atraumatic. Right Ear: External ear normal.      Left Ear: External ear normal.   Eyes:      Pupils: Pupils are equal, round, and reactive to light. Neck:      Thyroid: No thyromegaly. Cardiovascular:      Rate and Rhythm: Normal rate and regular rhythm. Heart sounds: Normal heart sounds. No murmur heard. Pulmonary:      Effort: Pulmonary effort is normal.      Breath sounds: Decreased breath sounds and rhonchi present. No wheezing or rales. Abdominal:      General: Bowel sounds are normal.      Palpations: Abdomen is soft. Tenderness: There is no abdominal tenderness. There is no guarding or rebound.    Musculoskeletal: General: Normal range of motion. Cervical back: Normal range of motion and neck supple. Lumbar back: Tenderness present. Right hip: Tenderness present. Lymphadenopathy:      Cervical: No cervical adenopathy. Skin:     General: Skin is warm and dry. Neurological:      Mental Status: He is alert and oriented to person, place, and time. Cranial Nerves: No cranial nerve deficit.    Psychiatric:         Judgment: Judgment normal.       Assessment and Plan:    Diagnoses and all orders for this visit:    Acute right-sided low back pain with right-sided sciatica  - uncertain as to cause poss lumbar disease, burstitis   - xray of rt hip per radiology report unremarkable   - xray of lumbar spine - moderate facet OA l4-5, multilevel degenerative disc disease   - US LE - no dvt   - home exercises  - Heat and ice alternating   - declines PT   - s/p kenlog 40mg IM x 1   - s/p medrol pack   - tramadol did not help   - s/p toradol x 1, no long term nsaids due to ckd3 and anticoagulation (coumadin)   - following with pain management pending     Type 2 diabetes mellitus without complication, without long-term current use of insulin ([address]  - Continue present treatment   - watch diet   - monitor blood glucose at home   - following with endocrinology   - on glimepiride   - on tradjenta   - follow A1c - last was 5.3 (10/2022)   - discussed decreasing medications 10/14/2022    Not on Aspirin - coumadin   Not on A/ARB - CKD   On statin   Follows with optho (3/22)- retinal de, no dm retin    Carcinoid (except of appendix) ([address]  - following with endocrinology and endocrinology surgery (dr. Iron Kowalski) at [facility]  - States excessive Vasoactive intestinal peptide excretion   - On sandostatin monthly   - on cortef   - Stable     Stage 3a chronic kidney disease ([address]  - following with nephrology   - follow labs   - last lab (10/2022) - creat increased to 2.0    Chronic atrial fibrillation ([address]  - s/p pacer  - following with cardiology and EP   - on sotalol - decreased dose (12/2019)   - on coumadin - INR weekly   - stable     Chronic obstructive pulmonary disease, unspecified COPD type ([facility]  - following with pulmonary   - on anoro - could not afford trelegy   - has home nebulizer with albuterol   - has chronic cough   - has tried flonase or nasacort  - has tried mult antihistamine   - on anoro - stopped due to cost (10/2022)   - on qvar   - stable     Coronary artery disease involving native coronary artery of native heart without angina pectoris  - s/p stent  - following with cardiology and EP   - on sotalol   - on simvastatin   - no aspirin due to coumadin   - stable     Essential hypertension  - watch diet   - on hctz - nephrology decreased to 12.5mg   - on sotalol for afib   - stable 10/14/2022    Mixed hyperlipidemia  - watch diet   - on simvastatin  - follow labs     Idiopathic chronic gout of multiple sites without tophus  - watch diet   - on allopurinol - increase to daily   - stable     Hyperparathyroidism ([facility]  - Possible due to CKD   - last calcium was elevated     Vitamin D deficiency  - nephrology added 50k weekly   - follow labs     Chronic pain of both knees  - check xray     Class 2 obesity due to excess calories without serious comorbidity with body mass index (BMI) of 36.0 to 36.9 in adult  - discussed diet and exercise     No follow-ups on file. No orders of the defined types were placed in this encounter.     Requested Prescriptions      No prescriptions requested or ordered in this encounter        Angel Meyer MD  10/14/2022  11:21 AM

## 2022-10-19 NOTE — PROGRESS NOTES
[facility]  INSTRUCTIONS  . .......................................................................................................................................... [x] Parking the day of Surgery is located in the [facility].   Upon entering the door, make immediate right into the surgery reception room    [x]  Bring photo ID and insurance card     [x] You may have a light breakfast day of procedure    [x]  Wear loose comfortable clothing    [x]  Please follow instructions for medications as given per Dr's office    [x] You can expect a call the business day prior to procedure to notify you of your arrival time     [x] Please arrange for     []  Other instructions

## 2022-10-20 ENCOUNTER — ANTI-COAG VISIT (OUTPATIENT)
Dept: FAMILY MEDICINE CLINIC | Age: 77
End: 2022-10-20
Payer: MEDICARE

## 2022-10-20 ENCOUNTER — HOSPITAL ENCOUNTER (OUTPATIENT)
Dept: GENERAL RADIOLOGY | Age: 77
Setting detail: OUTPATIENT SURGERY
Discharge: HOME | End: 2022-10-22
Attending: ANESTHESIOLOGY
Payer: MEDICARE

## 2022-10-20 ENCOUNTER — HOSPITAL ENCOUNTER (OUTPATIENT)
Age: 77
Setting detail: OUTPATIENT SURGERY
Discharge: HOME | End: 2022-10-20
Attending: ANESTHESIOLOGY | Admitting: ANESTHESIOLOGY
Payer: MEDICARE

## 2022-10-20 VITALS
HEIGHT: 68 IN | TEMPERATURE: 97.5 F | HEART RATE: 77 BPM | SYSTOLIC BLOOD PRESSURE: 139 MMHG | BODY MASS INDEX: 33.34 KG/M2 | OXYGEN SATURATION: 96 % | WEIGHT: 220 LBS | DIASTOLIC BLOOD PRESSURE: 72 MMHG | RESPIRATION RATE: 18 BRPM

## 2022-10-20 DIAGNOSIS — R52 PAIN MANAGEMENT: ICD-10-CM

## 2022-10-20 DIAGNOSIS — I48.91 ATRIAL FIBRILLATION, UNSPECIFIED TYPE (HCC): Primary | ICD-10-CM

## 2022-10-20 LAB
APTT: 21.5 SEC (ref 24.5–35.1)
HCT VFR BLD CALC: 40.8 % (ref 37–54)
HEMOGLOBIN: 13.8 G/DL (ref 12.5–16.5)
INR BLD: 1
MCH RBC QN AUTO: 33.1 PG (ref 26–35)
MCHC RBC AUTO-ENTMCNC: 33.8 % (ref 32–34.5)
MCV RBC AUTO: 97.8 FL (ref 80–99.9)
METER GLUCOSE: 109 MG/DL (ref 74–99)
PDW BLD-RTO: 14.5 FL (ref 11.5–15)
PLATELET # BLD: 171 E9/L (ref 130–450)
PMV BLD AUTO: 8.3 FL (ref 7–12)
PROTHROMBIN TIME: 11.1 SEC (ref 9.3–12.4)
PROTIME: NORMAL
RBC # BLD: 4.17 E12/L (ref 3.8–5.8)
WBC # BLD: 9.3 E9/L (ref 4.5–11.5)

## 2022-10-20 PROCEDURE — 64483 NJX AA&/STRD TFRM EPI L/S 1: CPT | Performed by: ANESTHESIOLOGY

## 2022-10-20 PROCEDURE — 64484 NJX AA&/STRD TFRM EPI L/S EA: CPT | Performed by: ANESTHESIOLOGY

## 2022-10-20 PROCEDURE — 3600000012 HC SURGERY LEVEL 2 ADDTL 15MIN: Performed by: ANESTHESIOLOGY

## 2022-10-20 PROCEDURE — 7100000010 HC PHASE II RECOVERY - FIRST 15 MIN: Performed by: ANESTHESIOLOGY

## 2022-10-20 PROCEDURE — 7100000011 HC PHASE II RECOVERY - ADDTL 15 MIN: Performed by: ANESTHESIOLOGY

## 2022-10-20 PROCEDURE — 82962 GLUCOSE BLOOD TEST: CPT

## 2022-10-20 PROCEDURE — 36415 COLL VENOUS BLD VENIPUNCTURE: CPT

## 2022-10-20 PROCEDURE — 2709999900 HC NON-CHARGEABLE SUPPLY: Performed by: ANESTHESIOLOGY

## 2022-10-20 PROCEDURE — 6360000002 HC RX W HCPCS: Performed by: ANESTHESIOLOGY

## 2022-10-20 PROCEDURE — 3209999900 FLUORO FOR SURGICAL PROCEDURES

## 2022-10-20 PROCEDURE — 85027 COMPLETE CBC AUTOMATED: CPT

## 2022-10-20 PROCEDURE — 85730 THROMBOPLASTIN TIME PARTIAL: CPT

## 2022-10-20 PROCEDURE — 93792 PT/CAREGIVER TRAING HOME INR: CPT | Performed by: INTERNAL MEDICINE

## 2022-10-20 PROCEDURE — 6360000004 HC RX CONTRAST MEDICATION: Performed by: ANESTHESIOLOGY

## 2022-10-20 PROCEDURE — 85610 PROTHROMBIN TIME: CPT

## 2022-10-20 PROCEDURE — 3600000002 HC SURGERY LEVEL 2 BASE: Performed by: ANESTHESIOLOGY

## 2022-10-20 PROCEDURE — 2500000003 HC RX 250 WO HCPCS: Performed by: ANESTHESIOLOGY

## 2022-10-20 RX ORDER — LIDOCAINE HYDROCHLORIDE 5 MG/ML
INJECTION, SOLUTION INFILTRATION; INTRAVENOUS PRN
Status: DISCONTINUED | OUTPATIENT
Start: 2022-10-20 | End: 2022-10-20 | Stop reason: ALTCHOICE

## 2022-10-20 RX ORDER — METHYLPREDNISOLONE ACETATE 40 MG/ML
INJECTION, SUSPENSION INTRA-ARTICULAR; INTRALESIONAL; INTRAMUSCULAR; SOFT TISSUE PRN
Status: DISCONTINUED | OUTPATIENT
Start: 2022-10-20 | End: 2022-10-20 | Stop reason: ALTCHOICE

## 2022-10-20 RX ORDER — SODIUM CHLORIDE 0.9 % (FLUSH) 0.9 %
5-40 SYRINGE (ML) INJECTION PRN
Status: DISCONTINUED | OUTPATIENT
Start: 2022-10-20 | End: 2022-10-20 | Stop reason: HOSPADM

## 2022-10-20 RX ORDER — SODIUM CHLORIDE 9 MG/ML
INJECTION, SOLUTION INTRAVENOUS PRN
Status: DISCONTINUED | OUTPATIENT
Start: 2022-10-20 | End: 2022-10-20 | Stop reason: HOSPADM

## 2022-10-20 RX ORDER — SODIUM CHLORIDE 0.9 % (FLUSH) 0.9 %
5-40 SYRINGE (ML) INJECTION EVERY 12 HOURS SCHEDULED
Status: DISCONTINUED | OUTPATIENT
Start: 2022-10-20 | End: 2022-10-20 | Stop reason: HOSPADM

## 2022-10-20 ASSESSMENT — PAIN DESCRIPTION - DIRECTION: RADIATING_TOWARDS: R LEG

## 2022-10-20 ASSESSMENT — PAIN DESCRIPTION - DESCRIPTORS: DESCRIPTORS: DISCOMFORT

## 2022-10-20 ASSESSMENT — PAIN DESCRIPTION - LOCATION: LOCATION: BACK;HIP

## 2022-10-20 ASSESSMENT — PAIN SCALES - GENERAL: PAINLEVEL_OUTOF10: 7

## 2022-10-20 ASSESSMENT — PAIN DESCRIPTION - ORIENTATION: ORIENTATION: RIGHT

## 2022-10-20 ASSESSMENT — PAIN DESCRIPTION - PAIN TYPE: TYPE: CHRONIC PAIN

## 2022-10-20 NOTE — INTERVAL H&P NOTE
Update History & Physical    The patient's History and Physical of October 7, 2022 was reviewed with the patient and I examined the patient. There was no change. The surgical site was confirmed by the patient and me. Plan: Lumbar TFESI. The risks, benefits, expected outcome, and alternative to the recommended procedure have been discussed with the patient. Patient understands and wants to proceed with the procedure.      Electronically signed by Moise Strong MD on 10/20/2022

## 2022-10-20 NOTE — DISCHARGE INSTRUCTIONS
[facility] Block/Radiofrequency  Home Going Instructions    1-Go home, rest for the remainder of the day  2-Please do not lift over 20 pounds the day of the injection  3-If you received sedation No: alcohol, driving, operating lawn mowers, plows, tractors or other dangerous equipment until next morning. Do not make important decisions or sign legal documents for 24 hours. You may experience light headedness, dizziness, nausea or sleepiness after sedation. Do not stay alone. A responsible adult must be with you for 24 hours. You could be nauseated from the medications you have received. Your IV site may be sore and bruised. 4-No dietary restrictions     5-Resume all medications the same day, blood thinners to be resumed 24 hours after injection if you were instructed to stop any. 6-Keep the surgical site clean and dry, you may shower the next morning and remove the      dressing. 7- No sitz baths, tub baths or hot tubs/swimming for 24 hours. 8- If you have any pain at the injection site(s), application of an ice pack to the area should be       helpful, 20 minutes on/20 minutes off for next 48 hours. 9- Call [facility] immediately at if you develop.   Fever greater than 100.4 F  Have bleeding or drainage from the puncture site  Have progressive Leg/arm numbness and or weakness  Loss of control of bowel and or bladder (wet/soil yourself)  Severe headache with inability to lift head  10-You may return to work the next day

## 2022-10-20 NOTE — PROGRESS NOTES
Previous INR: 2.70     Previous dose: 6mg (5mg +1mg tablets) on mon, wed, and Friday and 5mg all others  - has been on hold frp procedure           Current INR: 1.0     Recommendation: would restart at 7.5mg x then continue 6mg (5mg +1mg tablets) on mon, wed, and Friday and 5mg all others    Next INR: 1 week     Scot Armijo MD  10/20/2022  1:01 PM

## 2022-10-20 NOTE — OP NOTE
Operative Note      Patient: Juve Taylor  YOB: 1945  MRN: [id number]    Date of Procedure: 10/20/2022    Pre-Op Diagnosis: Lumbar radiculopathy [M54.16]    Post-Op Diagnosis: Same       Procedure(s):  LUMBAR TRANSFORAMINAL EPIDURAL STEROID INJECTION RIGHT L3 AND L4 UNDER FLUOROSCOPIC GUIDANCE    Surgeon(s):  Charo Osorio MD    Assistant:   * No surgical staff found *    Anesthesia: Local    Estimated Blood Loss (mL): Minimal    Complications: None    Specimens:   * No specimens in log *    Implants:  * No implants in log *      Drains: * No LDAs found *    Findings: good needle placement    Detailed Description of Procedure:   10/20/2022    Patient: Juve Taylor  :  1945  Age:  65 y.o. Sex:  male     PRE-OPERATIVE DIAGNOSIS: Lumbar disc displacement, lumbar neural foraminal stenosis, lumbar radiculopathy. POST-OPERATIVE DIAGNOSIS: Same. PROCEDURE: Right Transforaminal epidural steroid injection under fluoroscopic guidance at foraminal level L3 and L4.    SURGEON: Charo Osorio MD    ANESTHESIA: Local    ESTIMATED BLOOD LOSS: None.  ______________________________________________________________________  BRIEF HISTORY: Juve Taylor comes in today for the first Right transforaminal epidural steroid injection under fluoroscopic guidance at foraminal level L3 & L4 . The potential complications of this procedure were discussed with him again today. He has elected to undergo the aforementioned procedure. Westley complete History & Physical examination were reviewed in depth, a copy of which is in the chart. DESCRIPTION OF PROCEDURE:    After confirming written and informed consent, a time-out was performed and Westley name and date of birth, the procedure to be performed as well as the plan for the location of the needle insertion were confirmed. The patient was brought into the procedure room and placed in the prone position on the fluoroscopy table. Standard monitors were placed and vital signs were observed throughout the procedure. The area of the lumbar spine was prepped with chloraprep and draped in a sterile manner. The vertebral body was identified with AP fluoroscopy. An oblique view was obtained to better visualize the inferior junction of the pedicle and transverse process . The 6 o'clock position of the pedicle was marked and identified. The skin and subcutaneous tissue were anesthetized with 0.5% lidocaine. TWO # 22 gauge 5 inch pencil point needle was directed toward the targeted point under fluoroscopy until bone was contacted. The needle was then walked inferiorly until the neural foramen was entered . A lateral fluoroscopic view was then used to place the needle tip in the middle of the foramen. Negative aspiration was confirmed for blood and CSF and 0.5-1 cc of Isovue M 300 contrast was injected at each level under live fluoroscopy. Appropriate neurograms were observed under AP fluoroscopy. Then after negative aspiration, a solution of the 3 cc of 0.5% lidocaine and 30 mg DepoMedrol was easily injected at each level. The needles were removed with constant aspiration technique. The patient back was cleaned and a bandage was placed over the needle insertion points    Disposition the patient tolerated the procedure well and there were no complications . Vital signs remained stable throughout the procedure. The patient was escorted to the recovery area where they remained until discharge and written discharge instructions for the procedure were given. Plan: Tyler Ricardo will return to our pain management center as scheduled.      Denver Muir, MD

## 2022-10-20 NOTE — PROGRESS NOTES
Patient's INR today is  1.0. takes 6mg every Monday Wednesday and Friday and 5mg all other days. He has been off of it since Saturday 10/15 for a procedure he is having today.

## 2022-10-24 DIAGNOSIS — R60.0 LOWER LEG EDEMA: ICD-10-CM

## 2022-10-24 DIAGNOSIS — M1A.09X0 IDIOPATHIC CHRONIC GOUT OF MULTIPLE SITES WITHOUT TOPHUS: ICD-10-CM

## 2022-10-24 RX ORDER — HYDROCHLOROTHIAZIDE 25 MG/1
25 TABLET ORAL DAILY
Qty: 90 TABLET | Refills: 3 | Status: SHIPPED | OUTPATIENT
Start: 2022-10-24

## 2022-10-24 RX ORDER — ALLOPURINOL 100 MG/1
TABLET ORAL
Qty: 90 TABLET | Refills: 3 | Status: SHIPPED | OUTPATIENT
Start: 2022-10-24

## 2022-10-27 ENCOUNTER — ANTI-COAG VISIT (OUTPATIENT)
Dept: FAMILY MEDICINE CLINIC | Age: 77
End: 2022-10-27
Payer: MEDICARE

## 2022-10-27 DIAGNOSIS — I48.91 ATRIAL FIBRILLATION, UNSPECIFIED TYPE (HCC): Primary | ICD-10-CM

## 2022-10-27 LAB
INR BLD: 1.8
PROTIME: NORMAL
PROTIME: NORMAL

## 2022-10-27 PROCEDURE — 93793 ANTICOAG MGMT PT WARFARIN: CPT | Performed by: INTERNAL MEDICINE

## 2022-10-27 NOTE — PROGRESS NOTES
Previous INR: 1.0      Previous dose: restart at 7.5mg x then continued 6mg (5mg +1mg tablets) on mon, wed, and Friday and 5mg all others           Current INR: 1.80     Recommendation: 7.5mg x 1 then continue 6mg (5mg +1mg tablets) on mon, wed, and Friday and 5mg all others    Next INR: 1 week     Jordyn Davis MD  10/27/2022  4:44 PM

## 2022-10-31 RX ORDER — HYDROCORTISONE 10 MG/1
TABLET ORAL
Qty: 225 TABLET | Refills: 1 | Status: SHIPPED | OUTPATIENT
Start: 2022-10-31

## 2022-10-31 NOTE — TELEPHONE ENCOUNTER
Last Appointment:  10/14/2022  Future Appointments   Date Time Provider Raegan Mii   11/10/2022  3:15 PM King Rizwan MD [facility]   11/17/2022  1:45 PM SCHEDULE, [facility]   1/20/2023  9:15 AM MD AARTI Cavanaugh [facility]

## 2022-11-03 ENCOUNTER — ANTI-COAG VISIT (OUTPATIENT)
Dept: FAMILY MEDICINE CLINIC | Age: 77
End: 2022-11-03
Payer: MEDICARE

## 2022-11-03 DIAGNOSIS — I48.91 ATRIAL FIBRILLATION, UNSPECIFIED TYPE (HCC): Primary | ICD-10-CM

## 2022-11-03 LAB
INR BLD: 2.3
INR BLD: 2.3
PROTIME: NORMAL

## 2022-11-03 PROCEDURE — 93793 ANTICOAG MGMT PT WARFARIN: CPT | Performed by: INTERNAL MEDICINE

## 2022-11-03 NOTE — PROGRESS NOTES
Previous INR: 1.80     Previous dose:  7.5mg x 1 then continue 6mg (5mg +1mg tablets) on mon, wed, and Friday and 5mg all others           Current INR: 2.30     Recommendation: continue 6mg (5mg +1mg tablets) on mon, wed, and Friday and 5mg all others    Next INR: 1 week     Chago Noguera MD  11/3/2022  4:14 PM

## 2022-11-10 ENCOUNTER — OFFICE VISIT (OUTPATIENT)
Dept: PAIN MANAGEMENT | Age: 77
End: 2022-11-10
Payer: MEDICARE

## 2022-11-10 ENCOUNTER — ANTI-COAG VISIT (OUTPATIENT)
Dept: FAMILY MEDICINE CLINIC | Age: 77
End: 2022-11-10
Payer: MEDICARE

## 2022-11-10 VITALS
BODY MASS INDEX: 36.41 KG/M2 | RESPIRATION RATE: 16 BRPM | WEIGHT: 232 LBS | TEMPERATURE: 98.1 F | DIASTOLIC BLOOD PRESSURE: 75 MMHG | OXYGEN SATURATION: 96 % | HEIGHT: 67 IN | SYSTOLIC BLOOD PRESSURE: 123 MMHG | HEART RATE: 71 BPM

## 2022-11-10 DIAGNOSIS — M51.36 DDD (DEGENERATIVE DISC DISEASE), LUMBAR: Primary | ICD-10-CM

## 2022-11-10 DIAGNOSIS — M48.061 SPINAL STENOSIS OF LUMBAR REGION, UNSPECIFIED WHETHER NEUROGENIC CLAUDICATION PRESENT: ICD-10-CM

## 2022-11-10 DIAGNOSIS — E66.9 OBESITY, UNSPECIFIED CLASSIFICATION, UNSPECIFIED OBESITY TYPE, UNSPECIFIED WHETHER SERIOUS COMORBIDITY PRESENT: ICD-10-CM

## 2022-11-10 DIAGNOSIS — I48.91 ATRIAL FIBRILLATION, UNSPECIFIED TYPE (HCC): Primary | ICD-10-CM

## 2022-11-10 DIAGNOSIS — M54.16 LUMBAR RADICULAR PAIN: ICD-10-CM

## 2022-11-10 DIAGNOSIS — Z79.01 ANTICOAGULATED ON COUMADIN: ICD-10-CM

## 2022-11-10 DIAGNOSIS — M47.817 LUMBOSACRAL SPONDYLOSIS WITHOUT MYELOPATHY: ICD-10-CM

## 2022-11-10 LAB
INR BLD: 2.5
INR BLD: 2.5
PROTIME: NORMAL

## 2022-11-10 PROCEDURE — 3074F SYST BP LT 130 MM HG: CPT | Performed by: ANESTHESIOLOGY

## 2022-11-10 PROCEDURE — 99213 OFFICE O/P EST LOW 20 MIN: CPT | Performed by: ANESTHESIOLOGY

## 2022-11-10 PROCEDURE — 3078F DIAST BP <80 MM HG: CPT | Performed by: ANESTHESIOLOGY

## 2022-11-10 PROCEDURE — 93793 ANTICOAG MGMT PT WARFARIN: CPT | Performed by: INTERNAL MEDICINE

## 2022-11-10 PROCEDURE — 1123F ACP DISCUSS/DSCN MKR DOCD: CPT | Performed by: ANESTHESIOLOGY

## 2022-11-10 PROCEDURE — 99214 OFFICE O/P EST MOD 30 MIN: CPT | Performed by: ANESTHESIOLOGY

## 2022-11-10 RX ORDER — PREGABALIN 25 MG/1
25 CAPSULE ORAL 2 TIMES DAILY
Qty: 60 CAPSULE | Refills: 1 | Status: SHIPPED | OUTPATIENT
Start: 2022-11-10 | End: 2023-01-09

## 2022-11-10 NOTE — PROGRESS NOTES
Do you currently have any of the following:    Fever: No  Headache:  No  Cough: No  Shortness of breath: No  Exposed to anyone with these symptoms: No         Elena Garcia presents to the [facility] on 11/10/2022. Ele Shahid is complaining of pain in his right leg. The pain is constant. The pain is described as aching, sharp, and miserable. Pain is rated on his best day at a 4, on his worst day at a 6, and on average at a 5 on the VAS scale. Any procedures since your last visit: No, with 90 % relief. Pacemaker or defibrillator: Yes managed by Dr. Shawna Magallon. He is not on NSAIDS and is  on anticoagulation medications to include Coumadin and is managed by Roopa Chirinos MD  .     Medication Contract and Consent for Opioid Use Documents Filed        No documents found                    /75   Pulse 71   Temp 98.1 °F (36.7 °C) (Infrared)   Resp 16   Ht 5' 7\" (1.702 m)   Wt 232 lb (105.2 kg)   SpO2 96%   BMI 36.34 kg/m²      No LMP for male patient.

## 2022-11-10 NOTE — PROGRESS NOTES
[address] [facility], [address] [place] [address] [place]  [phone]    Follow up Note      Wilber Yap     Date of Visit:  11/10/2022    CC:  Patient presents for follow up   Chief Complaint   Patient presents with    Follow-up     LUMBAR TRANSFORAMINAL EPIDURAL STEROID INJECTION RIGHT L3 AND L4 UNDER FLUOROSCOPIC GUIDANCE       HPI:    Right low back / posterior/lateal hip pain and right LE pain- recent onset. Sudden onset pain - severe in nature    Pain is constant with intermittent exacerbation when walking and is described as sharp and throbbing. Pain does radiate to right  lower extremity mainly over the thigh but occasionally notices over the right leg. X-ray hip unremarkable     X-ray LS spine - DDD     NOTE:  Has a pacemaker- A.fib  On coumadin. COPD - s/p lobectomy  H/o CKD     Previous treatments:   Physical Therapy : cannot do PT do due to significant pain. Medications: - NSAID's : yes                        - Membrane stabilizers : no                       - Opioids :short course                       - Adjuvants or Others : yes,     Spine Surgeries: no     Anticoagulation medications: yes,  and include Warfarin. H/O Smoking: no  H/O alcohol abuse : no  H/O Illicit drug use : no     Imaging:     CT Lumbar spine: 10/3/2022:     Impression   1. Mild compression deformities again noted involving the T12, L1, and L2   vertebral bodies, when compared to the previous CT scan of the abdomen and   pelvis performed 05/06/2021.       2.  Minimal lumbar scoliosis again seen, with convexity to the right. 3.  Grade 1 spondylolisthesis again seen at L4 over L5.       4.  Osteo-degenerative changes and discogenic disc disease again seen, as   described above. Mild, diffuse, posterior disc bulges at T11-12, L2-3, L3-4, L4-5, and L5-S1. There is an associated left lateral disc protrusion at L2-3.   There is an   associated moderate-sized posterior central disc protrusion at L4-5. Most of   these findings can be seen on the prior CT study. 5.  Mild spinal canal stenosis at L3-4. Moderate to marked spinal canal   stenosis at L4-5.       6.  Other findings, as described above. Xray LS spine and Xray of the right hip: 9/7/2022:  FINDINGS:   L-spine: Multilevel degenerative disc disease is overall mild. Moderate   degenerative facet arthropathy from L4-S1. No fracture or dislocation. Normal MR soft tissues. Right hip: No significant arthropathy. No fracture or dislocation. No other   abnormal bone or soft tissue findings. Impression   Degenerative lumbar spondylosis. Unremarkable right hip.       OARRS report[de-identified] reviewed    Past Medical History:   Diagnosis Date    Atrial fibrillation ([address] 01/04/2014    CAD (coronary artery disease)     Cancer (HCC)     VIPoma    Carcinoid syndrome (HCC)     Chronic kidney disease     COPD (chronic obstructive pulmonary disease) (HCC)     DDD (degenerative disc disease), lumbar 09/20/2022    Diabetes mellitus ([address]     Hypertension     Idiopathic chronic gout of multiple sites without tophus 09/05/2019    Meniere disease     MI (myocardial infarction) ([address]     x3    Mixed hyperlipidemia 09/05/2019    Obesity     Pacemaker     Type 2 diabetes mellitus without complication ([address]        Past Surgical History:   Procedure Laterality Date    CARDIAC SURGERY      stents/pacemaker    COLONOSCOPY      CORONARY ANGIOPLASTY       CORONARY ANGIOPLASTY WITH STENT PLACEMENT      HERNIA REPAIR      LUNG SURGERY Right 2014    Dr. Paras Harrington for \"infection around lung\"    MEDICATION INJECTION Right 9/22/2022    RIGHT SACROILIAC JOINT INJECTION UNDER FLUOROSCOPY performed by Aaliyah [facility]uti[facility]rez [facility] Right 10/20/2022    LUMBAR TRANSFORAMINAL EPIDURAL STEROID INJECTION RIGHT L3 AND L4 UNDER FLUOROSCOPIC GUIDANCE performed by Silva [place] Erik Smith MD at [facility]      tumors removed from breast ,arms     UPPER GASTROINTESTINAL ENDOSCOPY         Prior to Admission medications    Medication Sig Start Date End Date Taking? Authorizing Provider   hydrocortisone (CORTEF) 10 MG tablet TAKE 1 1/2 TABLETS BY MOUTH EVERY MORNING AND 1 TABLET EVERY EVENING 10/31/22  Yes Kelley Rubio MD   allopurinol (ZYLOPRIM) 100 MG tablet TAKE 1 TABLET BY MOUTH [id number] 10/24/22  Yes Kelley Rubio MD   hydroCHLOROthiazide (HYDRODIURIL) 25 MG tablet Take 1 tablet by mouth daily 1 po 5 days a week (hold on Tues and Sat)-pt reqesting 90 day supply 10/24/22  Yes Kelley Rubio MD   QVAR REDIHALER 80 MCG/ACT AERB inhaler INHALE 2 PUFFS BY MOUTH TWICE DAILY 7/6/22  Yes Historical Provider, MD   latanoprost (XALATAN) 0.005 % ophthalmic solution PLACE 1 DROP INTO BOTH EYES EVERY DAY AT BEDTIME 7/15/22  Yes Historical Provider, MD   simvastatin (ZOCOR) 40 MG tablet Take 1 tablet by mouth nightly 6/6/22  Yes Kelley Rubio MD   warfarin (COUMADIN) 5 MG tablet 1 po qd or as directed 5/23/22  Yes Kelley Rubio MD   warfarin (COUMADIN) 1 MG tablet Take 1 tablet by mouth daily 1/11/22  Yes Kelley Rubio MD   Cholecalciferol (VITAMIN D3) 1.25 MG (71857 UT) CAPS Take 1 capsule by mouth once a week   Yes Historical Provider, MD   linagliptin (TRADJENTA) 5 MG tablet Take 1 tablet by mouth daily Lot#[id number] Exp.7/2020 2/28/19  Yes Casey Galeazzi, APRN - CNP   umeclidinium-vilanterol (ANORO ELLIPTA) 62.5-25 MCG/INH AEPB inhaler Inhale 1 puff into the lungs daily   Yes Historical Provider, MD   sotalol (BETAPACE) 160 MG tablet Take 1 tablet by mouth 2 times daily 5/18/17  Yes Jose A Martines MD   nitroGLYCERIN (NITROSTAT) 0.4 MG SL tablet Place 0.4 mg under the tongue every 5 minutes as needed for Chest pain up to max of 3 total doses. If no relief after 1 dose, call 911.    Yes Historical Provider, MD   albuterol (PROVENTIL) (5 MG/ML) 0.5% nebulizer solution Take 0.5 mLs by nebulization every 6 hours as needed for Wheezing 16  Yes Michael Vidal MD   glimepiride (AMARYL) 2 MG tablet Take 0.5 mg by mouth every morning (before breakfast)    Yes Historical Provider, MD   octreotide ACETATE (SANDOSTATIN LAR) 30 MG injection Inject 60 mg into the muscle once Every two weeks   Yes Historical Provider, MD   Glucose Blood (BLOOD GLUCOSE TEST STRIPS) STRP Freestyle lite 3/5/12  Yes Kathrene Bamberger, MD   Fingerstix Lancets MISC  3/5/12  Yes Kathrene Bamberger, MD   gabapentin (NEURONTIN) 100 MG capsule QD x 1 week, BID x 1 week, and then TID thereafter as tolerated. Patient not taking: Reported on 11/10/2022 10/10/22 11/10/22  RON Garcia       Allergies   Allergen Reactions    Lisinopril      Other reaction(s):  Other: See Comments  Black out, nausea       Social History     Socioeconomic History    Marital status: Single     Spouse name: Not on file    Number of children: 3    Years of education: 12    Highest education level: Not on file   Occupational History    Occupation: [employer]     Comment: retired   Tobacco Use    Smoking status: Former     Packs/day: 2.00     Years: 40.00     Pack years: 80.00     Types: Cigarettes     Quit date: 2000     Years since quittin.1    Smokeless tobacco: Never   Vaping Use    Vaping Use: Never used   Substance and Sexual Activity    Alcohol use: No    Drug use: No    Sexual activity: Not Currently     Partners: Female   Other Topics Concern    Not on file   Social History Narrative    Not on file     Social Determinants of Health     Financial Resource Strain: Not on file   Food Insecurity: Not on file   Transportation Needs: Not on file   Physical Activity: Sufficiently Active    Days of Exercise per Week: 3 days    Minutes of Exercise per Session: 50 min   Stress: Not on file   Social Connections: Not on file   Intimate Partner Violence: Not on file   Housing Stability: Not on file       Family History   Adopted: Yes   Problem Relation Age of Onset    Other Mother         Pt. is adopted    Other Father         Pt. is adopted. REVIEW OF SYSTEMS:     Felicity Mohamud denies fever/chills, chest pain, shortness of breath, new bowel or bladder complaints. All other review of systems was negative. PHYSICAL EXAMINATION:      /75   Pulse 71   Temp 98.1 °F (36.7 °C) (Infrared)   Resp 16   Ht 5' 7\" (1.702 m)   Wt 232 lb (105.2 kg)   SpO2 96%   BMI 36.34 kg/m²   General:       General appearance:  Pleasant and well-hydrated, in no distress and A & O x 3  Build:Obese  Function: uses a walker to assist in ambulation     HEENT:     Head:normocephalic, atraumatic     Lungs:     Breathing:normal breathing pattern      CVS:     RRR     Abdomen:     Shape:obese, non-distended, and normal     Cervical spine:     Inspection:normal     Thoracic spine:                Spine inspection:normal      Lumbar spine:     Spine inspection: Normal   Palpation: Tenderness paravertebral muscles Yes right  Range of motion: Decreased, flexion Decreased, Lateral bending, extension and rotation  reduced is painful. Sacroiliac joint tenderness- significantly improved  Piriformis tenderness: negative bilaterally  SLR : negative bilaterally  Trochanteric bursa tenderness: improved  CVA tenderness:No      Musculoskeletal:     Trigger points No      Extremities:     Tremors:None bilaterally upper and lower  Edema:+     Neurological:     Sensory: Normal to light touch      Motor:   No focal deficits     Dermatology:     Skin:no rashes or lesions noted    Assessment/Plan:    Diagnosis Orders   1. Sacroiliac dysfunction          2. DDD (degenerative disc disease), lumbar          3. Lumbosacral spondylosis without myelopathy          4. Spinal stenosis of lumbar region, unspecified whether neurogenic claudication present          5. S/P cardiac pacemaker procedure          6.  Anticoagulated on Coumadin                65 y.o.  male with h/o acute onset right low back/ posterior hip pain and intermittent right LE pain - mainly over the right thigh. Treated conservatively- oral steroids, analgesics. X- ray- hip unremarkable. X-ray LS spine; DDD     Has pacemaker- cant get MRI     S/P right SIJ injection helped the SIJ pain/ posterior hip pain significantly. S/p Right trochanteric bursa injection - helped the latera hip pain    Had CT LS spine    CT Lumbar spine- reviewed and discussed. On coumadin    S/P TFESI - helped the pain significantly. Much better    Gabapentin - did not help. PLAN:    Start low dose Lyrica. 25 mg bid. Use instructions reviewed. Consider repeat TFESI / Interlaminar RICKEY if pain recurs. Need to hold Coumadin for 5 days prior to the procedure. Check PT/ INR on the day of the procedure. F/u in 2-3 months. Or sooner if needed. Counseling :     Patient encouraged to stay active and to watch/lose weight     Encouraged to continue Regular home exercise program as tolerated - stretching / strengthening. Treatment plan discussed with the patient and family member present including medication and procedure side effects. Controlled Substances Monitoring: OARRS reviewed. We discussed with the patient that combining opioids, benzodiazepines, alcohol, illicit drugs or sleep aids increases the risk of respiratory depression including death. We discussed that these medications may cause drowsiness, sedation or dizziness and have counseled the patient not to drive or operate machinery. We have discussed that these medications will be prescribed only by one provider. We have discussed with the patient about age related risk factors and have thoroughly discussed the importance of taking these medications as prescribed. The patient verbalizes understanding.      Joan Saldana MD

## 2022-11-15 RX ORDER — SIMVASTATIN 40 MG
40 TABLET ORAL NIGHTLY
Qty: 90 TABLET | Refills: 3 | Status: SHIPPED | OUTPATIENT
Start: 2022-11-15

## 2022-11-17 ENCOUNTER — ANTI-COAG VISIT (OUTPATIENT)
Dept: FAMILY MEDICINE CLINIC | Age: 77
End: 2022-11-17
Payer: MEDICARE

## 2022-11-17 DIAGNOSIS — I48.91 ATRIAL FIBRILLATION, UNSPECIFIED TYPE (HCC): Primary | ICD-10-CM

## 2022-11-17 LAB
INR BLD: 2.2
INR BLD: 2.2
PROTIME: NORMAL

## 2022-11-17 PROCEDURE — 93793 ANTICOAG MGMT PT WARFARIN: CPT | Performed by: INTERNAL MEDICINE

## 2022-11-17 NOTE — PROGRESS NOTES
Previous INR: 2.50     Previous dose: 6mg (5mg +1mg tablets) on mon, wed, and Friday and 5mg all others           Current INR: 2.20     Recommendation: continue 6mg (5mg +1mg tablets) on mon, wed, and Friday and 5mg all others    Next INR: 1 week     Seth Brito MD  11/17/2022  1:30 PM

## 2022-11-17 NOTE — PROGRESS NOTES
Left message for his daughter Ashwin Dixon to call the office back. Please ask her to check his INR Wednesday next week since we won't be here on Thursday due to the holiday. Thanks.

## 2022-11-23 ENCOUNTER — ANTI-COAG VISIT (OUTPATIENT)
Dept: FAMILY MEDICINE CLINIC | Age: 77
End: 2022-11-23
Payer: MEDICARE

## 2022-11-23 DIAGNOSIS — I48.91 ATRIAL FIBRILLATION, UNSPECIFIED TYPE (HCC): Primary | ICD-10-CM

## 2022-11-23 LAB — INR BLD: 2.7

## 2022-11-23 PROCEDURE — 93793 ANTICOAG MGMT PT WARFARIN: CPT | Performed by: INTERNAL MEDICINE

## 2022-11-23 NOTE — PROGRESS NOTES
Previous INR: 2.20     Previous dose: 6mg (5mg +1mg tablets) on mon, wed, and Friday and 5mg all others           Current INR: 2.70     Recommendation: continue 6mg (5mg +1mg tablets) on mon, wed, and Friday and 5mg all others    Next INR: 1 week     Tyler Frederick MD  11/23/2022  5:32 PM

## 2022-12-01 ENCOUNTER — ANTI-COAG VISIT (OUTPATIENT)
Dept: FAMILY MEDICINE CLINIC | Age: 77
End: 2022-12-01
Payer: MEDICARE

## 2022-12-01 DIAGNOSIS — I48.91 ATRIAL FIBRILLATION, UNSPECIFIED TYPE (HCC): Primary | ICD-10-CM

## 2022-12-01 LAB
INR BLD: 2.1
INR BLD: 2.1
PROTIME: NORMAL

## 2022-12-01 PROCEDURE — 93793 ANTICOAG MGMT PT WARFARIN: CPT | Performed by: INTERNAL MEDICINE

## 2022-12-01 NOTE — PROGRESS NOTES
Previous INR: 2.70     Previous dose: 6mg (5mg +1mg tablets) on mon, wed, and Friday and 5mg all others           Current INR: 2.10     Recommendation: continue 6mg (5mg +1mg tablets) on mon, wed, and Friday and 5mg all others    Next INR: 1 week     Angel Meyer MD  12/1/2022  1:09 PM

## 2022-12-05 DIAGNOSIS — I48.20 CHRONIC ATRIAL FIBRILLATION (HCC): ICD-10-CM

## 2022-12-05 RX ORDER — WARFARIN SODIUM 5 MG/1
TABLET ORAL
Qty: 90 TABLET | Refills: 3 | Status: SHIPPED | OUTPATIENT
Start: 2022-12-05

## 2022-12-08 ENCOUNTER — ANTI-COAG VISIT (OUTPATIENT)
Dept: FAMILY MEDICINE CLINIC | Age: 77
End: 2022-12-08
Payer: MEDICARE

## 2022-12-08 DIAGNOSIS — I48.91 ATRIAL FIBRILLATION, UNSPECIFIED TYPE (HCC): Primary | ICD-10-CM

## 2022-12-08 LAB
INR BLD: 2.8
INR BLD: 2.8
PROTIME: NORMAL

## 2022-12-08 PROCEDURE — 93793 ANTICOAG MGMT PT WARFARIN: CPT | Performed by: INTERNAL MEDICINE

## 2022-12-08 NOTE — PROGRESS NOTES
Previous INR: 2.10     Previous dose: 6mg (5mg +1mg tablets) on mon, wed, and Friday and 5mg all others           Current INR: 2.80     Recommendation: continue 6mg (5mg +1mg tablets) on mon, wed, and Friday and 5mg all others    Next INR: 1 week     Chago Noguera MD  12/8/2022  11:03 AM

## 2022-12-12 ENCOUNTER — NURSE ONLY (OUTPATIENT)
Dept: NON INVASIVE DIAGNOSTICS | Age: 77
End: 2022-12-12

## 2022-12-12 RX ORDER — FUROSEMIDE 20 MG/1
20 TABLET ORAL DAILY
COMMUNITY

## 2022-12-15 ENCOUNTER — ANTI-COAG VISIT (OUTPATIENT)
Dept: FAMILY MEDICINE CLINIC | Age: 77
End: 2022-12-15

## 2022-12-15 DIAGNOSIS — I48.91 ATRIAL FIBRILLATION, UNSPECIFIED TYPE (HCC): Primary | ICD-10-CM

## 2022-12-15 LAB
INR BLD: 2.9
INR BLD: 2.9
PROTIME: NORMAL

## 2022-12-15 NOTE — PROGRESS NOTES
Previous INR: 2.80     Previous dose: 6mg (5mg +1mg tablets) on mon, wed, and Friday and 5mg all others           Current INR: 2.90     Recommendation: continue 6mg (5mg +1mg tablets) on mon, wed, and Friday and 5mg all others    Next INR: 1 week     Casey Delgado MD  12/15/2022  11:48 AM

## 2022-12-19 ENCOUNTER — HOSPITAL ENCOUNTER (OUTPATIENT)
Age: 77
Discharge: HOME | End: 2022-12-19
Payer: MEDICARE

## 2022-12-19 LAB
ALBUMIN SERPL-MCNC: 3.8 G/DL (ref 3.5–5.2)
ALP BLD-CCNC: 67 U/L (ref 40–129)
ALT SERPL-CCNC: 15 U/L (ref 0–40)
ANION GAP SERPL CALCULATED.3IONS-SCNC: 6 MMOL/L (ref 7–16)
AST SERPL-CCNC: 19 U/L (ref 0–39)
BILIRUB SERPL-MCNC: 0.6 MG/DL (ref 0–1.2)
BUN BLDV-MCNC: 30 MG/DL (ref 6–23)
CALCIUM SERPL-MCNC: 10.6 MG/DL (ref 8.6–10.2)
CHLORIDE BLD-SCNC: 111 MMOL/L (ref 98–107)
CO2: 30 MMOL/L (ref 22–29)
CORTISOL TOTAL: 6.13 MCG/DL (ref 2.68–18.4)
CREAT SERPL-MCNC: 1.6 MG/DL (ref 0.7–1.2)
CREATININE URINE: 163 MG/DL (ref 40–278)
GFR SERPL CREATININE-BSD FRML MDRD: 44 ML/MIN/1.73
GLUCOSE BLD-MCNC: 140 MG/DL (ref 74–99)
HBA1C MFR BLD: 5.3 % (ref 4–5.6)
MICROALBUMIN UR-MCNC: 125.4 MG/L
MICROALBUMIN/CREAT UR-RTO: 76.9 (ref 0–30)
PARATHYROID HORMONE INTACT: 143 PG/ML (ref 15–65)
POTASSIUM SERPL-SCNC: 4.4 MMOL/L (ref 3.5–5)
SODIUM BLD-SCNC: 147 MMOL/L (ref 132–146)
TOTAL PROTEIN: 7 G/DL (ref 6.4–8.3)
VITAMIN D 25-HYDROXY: 53 NG/ML (ref 30–100)

## 2022-12-19 PROCEDURE — 83036 HEMOGLOBIN GLYCOSYLATED A1C: CPT

## 2022-12-19 PROCEDURE — 80053 COMPREHEN METABOLIC PANEL: CPT

## 2022-12-19 PROCEDURE — 84586 ASSAY OF VIP: CPT

## 2022-12-19 PROCEDURE — 82533 TOTAL CORTISOL: CPT

## 2022-12-19 PROCEDURE — 83970 ASSAY OF PARATHORMONE: CPT

## 2022-12-19 PROCEDURE — 36415 COLL VENOUS BLD VENIPUNCTURE: CPT

## 2022-12-19 PROCEDURE — 82306 VITAMIN D 25 HYDROXY: CPT

## 2022-12-19 PROCEDURE — 82570 ASSAY OF URINE CREATININE: CPT

## 2022-12-19 PROCEDURE — 82044 UR ALBUMIN SEMIQUANTITATIVE: CPT

## 2022-12-20 LAB
Lab: NORMAL
THIS TEST SENT TO: NORMAL

## 2022-12-22 ENCOUNTER — ANTI-COAG VISIT (OUTPATIENT)
Dept: FAMILY MEDICINE CLINIC | Age: 77
End: 2022-12-22
Payer: MEDICARE

## 2022-12-22 DIAGNOSIS — I48.91 ATRIAL FIBRILLATION, UNSPECIFIED TYPE (HCC): Primary | ICD-10-CM

## 2022-12-22 LAB
INR BLD: 3.5
INR BLD: 3.5
PROTIME: NORMAL

## 2022-12-22 PROCEDURE — 93793 ANTICOAG MGMT PT WARFARIN: CPT | Performed by: INTERNAL MEDICINE

## 2022-12-22 NOTE — PROGRESS NOTES
Previous INR: 2.90     Previous dose: 6mg (5mg +1mg tablets) on mon, wed, and Friday and 5mg all others           Current INR: 3.50     Recommendation: hold x 1 then continue 6mg (5mg +1mg tablets) on mon, wed, and Friday and 5mg all others    Next INR: 1 week     Suzanne Vargas MD  12/22/2022  12:48 PM

## 2022-12-29 ENCOUNTER — ANTI-COAG VISIT (OUTPATIENT)
Dept: FAMILY MEDICINE CLINIC | Age: 77
End: 2022-12-29
Payer: MEDICARE

## 2022-12-29 DIAGNOSIS — I48.91 ATRIAL FIBRILLATION, UNSPECIFIED TYPE (HCC): Primary | ICD-10-CM

## 2022-12-29 LAB
INR BLD: 2
INR BLD: 2
PROTIME: NORMAL

## 2022-12-29 PROCEDURE — 93793 ANTICOAG MGMT PT WARFARIN: CPT | Performed by: INTERNAL MEDICINE

## 2022-12-29 NOTE — PROGRESS NOTES
Previous INR: 3.50     Previous dose: hold x 1 then continued 6mg (5mg +1mg tablets) on mon, wed, and Friday and 5mg all others           Current INR: 2.00     Recommendation: continue 6mg (5mg +1mg tablets) on mon, wed, and Friday and 5mg all others    Next INR: 1 week     Julia Gómez MD  12/29/2022  4:45 PM

## 2023-01-01 ENCOUNTER — ANTI-COAG VISIT (OUTPATIENT)
Dept: FAMILY MEDICINE CLINIC | Age: 78
End: 2023-01-01
Payer: MEDICARE

## 2023-01-01 DIAGNOSIS — I48.91 ATRIAL FIBRILLATION, UNSPECIFIED TYPE (HCC): Primary | ICD-10-CM

## 2023-01-01 LAB
INR BLD: 2.4
INR BLD: 2.6
PROTIME: NORMAL

## 2023-01-01 PROCEDURE — 93793 ANTICOAG MGMT PT WARFARIN: CPT | Performed by: INTERNAL MEDICINE

## 2023-01-05 ENCOUNTER — ANTI-COAG VISIT (OUTPATIENT)
Dept: FAMILY MEDICINE CLINIC | Age: 78
End: 2023-01-05
Payer: MEDICARE

## 2023-01-05 DIAGNOSIS — I48.91 ATRIAL FIBRILLATION, UNSPECIFIED TYPE (HCC): Primary | ICD-10-CM

## 2023-01-05 LAB
INR BLD: 2.1
INR BLD: 2.1
PROTIME: NORMAL

## 2023-01-05 PROCEDURE — 93793 ANTICOAG MGMT PT WARFARIN: CPT | Performed by: INTERNAL MEDICINE

## 2023-01-05 NOTE — PROGRESS NOTES
Previous INR: 2.00     Previous dose: 6mg (5mg +1mg tablets) on mon, wed, and Friday and 5mg all others           Current INR: 2.10     Recommendation: continue 6mg (5mg +1mg tablets) on mon, wed, and Friday and 5mg all others    Next INR: 1 week     Rhonda Montenegro MD  1/5/2023  5:06 PM

## 2023-01-11 NOTE — PROGRESS NOTES
-HbA1c:  5.9-->6.6  -Microalbumin/cr: Normal 2022  -Lipid Panel: LDL at goal, continue atorvastatin 40 mg daily  -Optho: Follows twice yearly given history of cataracts, no evidence of retinopathy--f/u w/ Dr. Edward--next appt 4/2023  -Immunizations: s/p PPSV23, PCV 20, Shingrix. Admin Influenza vaccine. Defers Covid booster.  -Meds: Metformin 500 mg BID, Jardiance 25 mg.  Discussed option to switch one of his oral medications to Ozempic to assist with weight loss & glycemic control. He is w/o contraindications. Discussed side effects. Desires to hold off at this time. Given strict hypoglycemia precautions.  -Continue lifestyle changes with diet, exercise   Previous INR: 2.10     Previous dose: 2.5mg x 1 then continued 6mg (5mg +1mg tablets) on mon, wed, and Friday and 5mg all others               Current INR: 3.50     Recommendation: hold x 1 then continue 6mg (5mg +1mg tablets) on mon, wed, and Friday and 5mg all others    Next INR: 1 week     Milind Desouza MD  9/29/2022  12:22 PM

## 2023-01-12 ENCOUNTER — ANTI-COAG VISIT (OUTPATIENT)
Dept: FAMILY MEDICINE CLINIC | Age: 78
End: 2023-01-12

## 2023-01-12 DIAGNOSIS — I48.91 ATRIAL FIBRILLATION, UNSPECIFIED TYPE (HCC): Primary | ICD-10-CM

## 2023-01-12 LAB — INR BLD: 2.1

## 2023-01-12 NOTE — PROGRESS NOTES
Previous INR: 2.10     Previous dose: 6mg (5mg +1mg tablets) on mon, wed, and Friday and 5mg all others           Current INR: 2.10     Recommendation: continue 6mg (5mg +1mg tablets) on mon, wed, and Friday and 5mg all others    Next INR: 1 week     Casey Delgado MD  1/12/2023  4:30 PM

## 2023-01-13 ENCOUNTER — HOSPITAL ENCOUNTER (OUTPATIENT)
Age: 78
Discharge: HOME | End: 2023-01-13
Payer: MEDICARE

## 2023-01-13 DIAGNOSIS — N18.31 STAGE 3A CHRONIC KIDNEY DISEASE (HCC): ICD-10-CM

## 2023-01-13 LAB
ANION GAP SERPL CALCULATED.3IONS-SCNC: 8 MMOL/L (ref 7–16)
BUN BLDV-MCNC: 48 MG/DL (ref 6–23)
CALCIUM SERPL-MCNC: 10.2 MG/DL (ref 8.6–10.2)
CHLORIDE BLD-SCNC: 105 MMOL/L (ref 98–107)
CO2: 28 MMOL/L (ref 22–29)
CREAT SERPL-MCNC: 2.2 MG/DL (ref 0.7–1.2)
GFR SERPL CREATININE-BSD FRML MDRD: 30 ML/MIN/1.73
GLUCOSE BLD-MCNC: 138 MG/DL (ref 74–99)
POTASSIUM SERPL-SCNC: 4.5 MMOL/L (ref 3.5–5)
SODIUM BLD-SCNC: 141 MMOL/L (ref 132–146)

## 2023-01-13 PROCEDURE — 80048 BASIC METABOLIC PNL TOTAL CA: CPT

## 2023-01-13 PROCEDURE — 36415 COLL VENOUS BLD VENIPUNCTURE: CPT

## 2023-01-15 ENCOUNTER — TELEPHONE (OUTPATIENT)
Dept: FAMILY MEDICINE CLINIC | Age: 78
End: 2023-01-15

## 2023-01-15 NOTE — TELEPHONE ENCOUNTER
Please let the patient know that blood work results showed    Labs showed kidney dysfunction worse when compared to most recent blood work.   Would recommend follow-up with nephrology    Calcium level improved and back in normal range    Other electrolytes were normal    Thanks

## 2023-01-16 NOTE — TELEPHONE ENCOUNTER
Advised his daughter Ta Tirado of the lab results. Faxed a copy to his Kidney doctor, and mailed a copy to Roland Burkitt.

## 2023-01-19 ENCOUNTER — ANTI-COAG VISIT (OUTPATIENT)
Dept: FAMILY MEDICINE CLINIC | Age: 78
End: 2023-01-19
Payer: MEDICARE

## 2023-01-19 DIAGNOSIS — I48.91 ATRIAL FIBRILLATION, UNSPECIFIED TYPE (HCC): Primary | ICD-10-CM

## 2023-01-19 LAB
INR BLD: 2.7
INR BLD: 2.7
PROTIME: NORMAL

## 2023-01-19 PROCEDURE — 93793 ANTICOAG MGMT PT WARFARIN: CPT | Performed by: INTERNAL MEDICINE

## 2023-01-19 NOTE — PROGRESS NOTES
Previous INR: 2.10     Previous dose: 6mg (5mg +1mg tablets) on mon, wed, and Friday and 5mg all others           Current INR: 2.70     Recommendation: continue 6mg (5mg +1mg tablets) on mon, wed, and Friday and 5mg all others    Next INR: 1 week     Alfonso Bhandari MD  1/19/2023  4:52 PM

## 2023-01-20 ENCOUNTER — OFFICE VISIT (OUTPATIENT)
Dept: PRIMARY CARE CLINIC | Age: 78
End: 2023-01-20
Payer: MEDICARE

## 2023-01-20 VITALS
WEIGHT: 228 LBS | HEART RATE: 61 BPM | DIASTOLIC BLOOD PRESSURE: 72 MMHG | TEMPERATURE: 97.6 F | SYSTOLIC BLOOD PRESSURE: 110 MMHG | OXYGEN SATURATION: 95 % | HEIGHT: 67 IN | BODY MASS INDEX: 35.79 KG/M2

## 2023-01-20 DIAGNOSIS — E21.3 HYPERPARATHYROIDISM (HCC): ICD-10-CM

## 2023-01-20 DIAGNOSIS — J44.9 CHRONIC OBSTRUCTIVE PULMONARY DISEASE, UNSPECIFIED COPD TYPE (HCC): ICD-10-CM

## 2023-01-20 DIAGNOSIS — E78.2 MIXED HYPERLIPIDEMIA: ICD-10-CM

## 2023-01-20 DIAGNOSIS — E11.22 TYPE 2 DIABETES MELLITUS WITH STAGE 3A CHRONIC KIDNEY DISEASE, WITHOUT LONG-TERM CURRENT USE OF INSULIN (HCC): ICD-10-CM

## 2023-01-20 DIAGNOSIS — I25.10 CORONARY ARTERY DISEASE INVOLVING NATIVE CORONARY ARTERY OF NATIVE HEART WITHOUT ANGINA PECTORIS: ICD-10-CM

## 2023-01-20 DIAGNOSIS — Z12.5 SCREENING FOR MALIGNANT NEOPLASM OF PROSTATE: ICD-10-CM

## 2023-01-20 DIAGNOSIS — N18.31 STAGE 3A CHRONIC KIDNEY DISEASE (HCC): ICD-10-CM

## 2023-01-20 DIAGNOSIS — M1A.09X0 IDIOPATHIC CHRONIC GOUT OF MULTIPLE SITES WITHOUT TOPHUS: ICD-10-CM

## 2023-01-20 DIAGNOSIS — D3A.00 CARCINOID (EXCEPT OF APPENDIX): ICD-10-CM

## 2023-01-20 DIAGNOSIS — N18.31 TYPE 2 DIABETES MELLITUS WITH STAGE 3A CHRONIC KIDNEY DISEASE, WITHOUT LONG-TERM CURRENT USE OF INSULIN (HCC): ICD-10-CM

## 2023-01-20 DIAGNOSIS — Z79.899 LONG TERM CURRENT USE OF THERAPEUTIC DRUG: ICD-10-CM

## 2023-01-20 DIAGNOSIS — I48.20 CHRONIC ATRIAL FIBRILLATION (HCC): ICD-10-CM

## 2023-01-20 DIAGNOSIS — E66.01 SEVERE OBESITY (BMI 35.0-39.9) WITH COMORBIDITY (HCC): ICD-10-CM

## 2023-01-20 DIAGNOSIS — M54.41 ACUTE RIGHT-SIDED LOW BACK PAIN WITH RIGHT-SIDED SCIATICA: Primary | ICD-10-CM

## 2023-01-20 DIAGNOSIS — R53.83 OTHER FATIGUE: ICD-10-CM

## 2023-01-20 DIAGNOSIS — I10 ESSENTIAL HYPERTENSION: ICD-10-CM

## 2023-01-20 LAB
ALBUMIN SERPL-MCNC: 4 G/DL (ref 3.5–5.2)
ALP BLD-CCNC: 60 U/L (ref 40–129)
ALT SERPL-CCNC: 12 U/L (ref 0–40)
ANION GAP SERPL CALCULATED.3IONS-SCNC: 12 MMOL/L (ref 7–16)
AST SERPL-CCNC: 24 U/L (ref 0–39)
BILIRUB SERPL-MCNC: 0.5 MG/DL (ref 0–1.2)
BUN BLDV-MCNC: 31 MG/DL (ref 6–23)
CALCIUM SERPL-MCNC: 10.4 MG/DL (ref 8.6–10.2)
CHLORIDE BLD-SCNC: 105 MMOL/L (ref 98–107)
CO2: 25 MMOL/L (ref 22–29)
CREAT SERPL-MCNC: 1.7 MG/DL (ref 0.7–1.2)
GFR SERPL CREATININE-BSD FRML MDRD: 41 ML/MIN/1.73
GLUCOSE BLD-MCNC: 157 MG/DL (ref 74–99)
POTASSIUM SERPL-SCNC: 5.1 MMOL/L (ref 3.5–5)
SODIUM BLD-SCNC: 142 MMOL/L (ref 132–146)
TOTAL PROTEIN: 7.3 G/DL (ref 6.4–8.3)

## 2023-01-20 PROCEDURE — 3078F DIAST BP <80 MM HG: CPT | Performed by: INTERNAL MEDICINE

## 2023-01-20 PROCEDURE — 99214 OFFICE O/P EST MOD 30 MIN: CPT | Performed by: INTERNAL MEDICINE

## 2023-01-20 PROCEDURE — 3074F SYST BP LT 130 MM HG: CPT | Performed by: INTERNAL MEDICINE

## 2023-01-20 PROCEDURE — 1123F ACP DISCUSS/DSCN MKR DOCD: CPT | Performed by: INTERNAL MEDICINE

## 2023-01-20 RX ORDER — SACUBITRIL AND VALSARTAN 24; 26 MG/1; MG/1
24-26 TABLET, FILM COATED ORAL 2 TIMES DAILY
COMMUNITY
Start: 2023-01-01

## 2023-01-20 ASSESSMENT — PATIENT HEALTH QUESTIONNAIRE - PHQ9
SUM OF ALL RESPONSES TO PHQ9 QUESTIONS 1 & 2: 0
SUM OF ALL RESPONSES TO PHQ QUESTIONS 1-9: 0
2. FEELING DOWN, DEPRESSED OR HOPELESS: 0
1. LITTLE INTEREST OR PLEASURE IN DOING THINGS: 0

## 2023-01-20 ASSESSMENT — ENCOUNTER SYMPTOMS
NAUSEA: 0
ABDOMINAL PAIN: 0
SORE THROAT: 0
DIARRHEA: 0
BLOOD IN STOOL: 0
EYE PAIN: 0
CONSTIPATION: 0
SHORTNESS OF BREATH: 1
RHINORRHEA: 1
VOMITING: 0
COUGH: 1
CHEST TIGHTNESS: 0

## 2023-01-20 NOTE — PROGRESS NOTES
[facility]   Internal Medicine     2023  Chaparro Spears : 1945 Sex: male  Age:77 y.o. Chief Complaint   Patient presents with    Diabetes    Atrial Fibrillation        HPI:   Patient presents to office for evaluation of the following medical concerns. - States has chronic kidney disease. Following with nephrology. Last follow up per reviewed note () cont hctz 5x/wk f/u 1yr. Last lab (2023) slight elevated creatinine. Was added enttrestro/lasix since     - States has been having right lower back, right hip and leg pain for the last 4 days. No trauma or injury. States 7/10 on pain scale. States pain is sharp. No numbness . States possible tingling. Some weakness. States worse with walking. States better sitting. Has been taking tyelnol has not helped. has seen pain management. Last visit per reviewed note pain mgt () -sacroiliac dysfunction, degenerative disc disease spinal stenosis plan CT lumbar spine, right sacroiliac joint injection under fluoroscopy, lidocaine patch, short course of Norco  pain mgt op (10/22) - Right Transforaminal epidural steroid injection at foraminal level L3 and L4    - States feels left hearing aide is stuck in canal.     - States has diabetes. States follows with endocrinology. On glimepiride and tradjenta. No overt hypoglycemia. Follows with optho. On Statin. 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 States following with endocrinology and with [facility]. Receiving Sandostatin monthly. Has had excessive vasoactive intestinal peptide secretion. Last visit with endocrinology per reviewed note (2022) -VIPoma & carcinoid, cont Sandostatin, adrenal hypofunction, cont hydrocortisone stress dose if needed, diabetes cont same hyperparathyroid    - States has high cholesterol.  Trying to watch diet. On simvastatin. No reported side effects    - States has High blood pressure. Not checking blood pressure at home. On HCTZ and sotalolol. Nephrology decreased hctz to 12.5mg     - States has Atrial fibrillation. On Sotalol and coumadin. Has pacemaker. Following with cardiology and EP. States decreased sotalol     - States has CAD. Stats s/p Sent. States cardiomyopathy. Following with cardiology and EP. S/p pacer for simus node dysfunction. Last follow up cardio (11/22) - h/o CAD, proximal A. fib, hypertension, hyperlipidemia, no lower extremity edema, stop hydrochlorothiazide start Lasix 20 mg daily follow-up in 1 week (12/22) - cad last stress 2017, order Echo, atrial fib sotalol coumadin, hyperlipidemia ldl < 70 goal  f/u 6m. Patient states since added entrestro. - States has COPD. States has had previous surgery for lobectomy for concern for mesothelioma but was negative. Following with pulmonary. On anoro. Also has nebulizer. Last visit with pulm per reviewed note  pulm (11/22) copd continue Anoro and Qvar follow-up 6 months. - States has elevated uric acid. States on allopurinol.     - States has vitamin D def. On vitamin D 50k weekly. Last lab was nornal and improved (10/2022)     Labs from 1/13/2022 reviewed with patient 10/14/2022     Health Maintenance   - immunizations:   Influenza Vaccination (2021), (2022)   Pneumonia Vaccination - (2018) - pneumococcal 23  Zoster/Shingles Vaccine  Tetanus Vaccination  covid pfizer x 3     - Screenings:   Colonoscopy   Prostate     ROS:  Review of Systems   Constitutional:  Negative for appetite change, chills, fever and unexpected weight change. HENT:  Positive for rhinorrhea. Negative for congestion and sore throat. Eyes:  Negative for pain and visual disturbance. Respiratory:  Positive for cough and shortness of breath. Negative for chest tightness. Cardiovascular:  Negative for chest pain and palpitations.    Gastrointestinal:  Negative for abdominal pain, blood in stool, constipation, diarrhea, nausea and vomiting. Genitourinary:  Negative for difficulty urinating, dysuria, hematuria, scrotal swelling, testicular pain and urgency. Musculoskeletal:  Negative for arthralgias and gait problem. Skin:  Negative for rash. Neurological:  Negative for dizziness, syncope, weakness, light-headedness and headaches. Hematological:  Negative for adenopathy. Does not bruise/bleed easily. Psychiatric/Behavioral:  Negative for suicidal ideas. The patient is not nervous/anxious. Current Outpatient Medications:     furosemide (LASIX) 20 MG tablet, Take 20 mg by mouth daily, Disp: , Rfl:     warfarin (COUMADIN) 5 MG tablet, 1 po qd or as directed, Disp: 90 tablet, Rfl: 3    simvastatin (ZOCOR) 40 MG tablet, Take 1 tablet by mouth nightly, Disp: 90 tablet, Rfl: 3    pregabalin (LYRICA) 25 MG capsule, Take 1 capsule by mouth 2 times daily for 60 days. , Disp: 60 capsule, Rfl: 1    hydrocortisone (CORTEF) 10 MG tablet, TAKE 1 1/2 TABLETS BY MOUTH EVERY MORNING AND 1 TABLET EVERY EVENING, Disp: 225 tablet, Rfl: 1    allopurinol (ZYLOPRIM) 100 MG tablet, TAKE 1 TABLET BY MOUTH [id number], Disp: 90 tablet, Rfl: 3    QVAR REDIHALER 80 MCG/ACT AERB inhaler, INHALE 2 PUFFS BY MOUTH TWICE DAILY, Disp: , Rfl:     latanoprost (XALATAN) 0.005 % ophthalmic solution, PLACE 1 DROP INTO BOTH EYES EVERY DAY AT BEDTIME, Disp: , Rfl:     warfarin (COUMADIN) 1 MG tablet, Take 1 tablet by mouth daily, Disp: 30 tablet, Rfl: 5    Cholecalciferol (VITAMIN D3) 1.25 MG (93170 UT) CAPS, Take 1 capsule by mouth once a week, Disp: , Rfl:     linagliptin (TRADJENTA) 5 MG tablet, Take 1 tablet by mouth daily Lot#[id number] Exp.7/2020, Disp: 28 tablet, Rfl: 0    umeclidinium-vilanterol (ANORO ELLIPTA) 62.5-25 MCG/INH AEPB inhaler, Inhale 1 puff into the lungs daily, Disp: , Rfl:     sotalol (BETAPACE) 160 MG tablet, Take 1 tablet by mouth 2 times daily, Disp: 60 tablet, Rfl: 3    nitroGLYCERIN (NITROSTAT) 0.4 MG SL tablet, Place 0.4 mg under the tongue every 5 minutes as needed for Chest pain up to max of 3 total doses. If no relief after 1 dose, call 911., Disp: , Rfl:     albuterol (PROVENTIL) (5 MG/ML) 0.5% nebulizer solution, Take 0.5 mLs by nebulization every 6 hours as needed for Wheezing, Disp: 120 each, Rfl: 0    glimepiride (AMARYL) 2 MG tablet, Take 0.5 mg by mouth every morning (before breakfast) , Disp: , Rfl:     octreotide ACETATE (SANDOSTATIN LAR) 30 MG injection, Inject 60 mg into the muscle once Every two weeks, Disp: , Rfl:     Glucose Blood (BLOOD GLUCOSE TEST STRIPS) STRP, Freestyle lite, Disp: 120 strip, Rfl: 0    Fingerstix Lancets MISC, , Disp: 100 each, Rfl: 2    ENTRESTO 24-26 MG per tablet, Take 24-26 tablets by mouth in the morning and at bedtime, Disp: , Rfl:     Allergies   Allergen Reactions    Lisinopril      Other reaction(s): Other: See Comments  Black out, nausea       Past Medical History:   Diagnosis Date    Atrial fibrillation (HCC) 01/04/2014    CAD (coronary artery disease)     Cancer (HCC)     VIPoma    Carcinoid syndrome (HCC)     Chronic kidney disease     COPD (chronic obstructive pulmonary disease) ([facility])     DDD (degenerative disc disease), lumbar 09/20/2022    Diabetes mellitus ([facility])     Hypertension     Idiopathic chronic gout of multiple sites without tophus 09/05/2019    Meniere disease     MI (myocardial infarction) ([facility])     x3    Mixed hyperlipidemia 09/05/2019    Obesity     Pacemaker     Type 2 diabetes mellitus without complication ([facility])        Past Surgical History:   Procedure Laterality Date    CARDIAC SURGERY      stents/pacemaker    COLONOSCOPY      CORONARY ANGIOPLASTY       CORONARY ANGIOPLASTY WITH STENT PLACEMENT      HERNIA REPAIR      LUNG SURGERY Right 2014    Dr. Panda for \"infection around lung\"    MEDICATION INJECTION Right 9/22/2022    RIGHT SACROILIAC JOINT INJECTION UNDER FLUOROSCOPY performed by Will RAMÍREZ  Mil Pedraza MD at [address] Right 10/20/2022    LUMBAR TRANSFORAMINAL EPIDURAL STEROID INJECTION RIGHT L3 AND L4 UNDER FLUOROSCOPIC GUIDANCE performed by Jefferson Fraga MD at [facility]      tumors removed from breast ,arms     UPPER GASTROINTESTINAL ENDOSCOPY         Family History   Adopted: Yes   Problem Relation Age of Onset    Other Mother         Pt. is adopted    Other Father         Pt. is adopted. Social History     Socioeconomic History    Marital status: Single     Spouse name: None    Number of children: 3    Years of education: 12    Highest education level: None   Occupational History    Occupation: [email or web address] worker     Comment: retired   Tobacco Use    Smoking status: Former     Packs/day: 2.00     Years: 40.00     Pack years: 80.00     Types: Cigarettes     Quit date: 2000     Years since quittin.3    Smokeless tobacco: Never   Vaping Use    Vaping Use: Never used   Substance and Sexual Activity    Alcohol use: No    Drug use: No    Sexual activity: Not Currently     Partners: Female     Social Determinants of Health     Physical Activity: Sufficiently Active    Days of Exercise per Week: 3 days    Minutes of Exercise per Session: 50 min        Vitals:    23 0949   BP: 110/72   Pulse: 61   Temp: 97.6 °F (36.4 °C)   SpO2: 95%   Weight: 228 lb (103.4 kg)   Height: 5' 7\" (1.702 m)       Exam:  Physical Exam  Vitals reviewed. Constitutional:       Appearance: He is well-developed. HENT:      Head: Normocephalic and atraumatic. Right Ear: External ear normal.      Left Ear: External ear normal.   Eyes:      Pupils: Pupils are equal, round, and reactive to light. Neck:      Thyroid: No thyromegaly. Cardiovascular:      Rate and Rhythm: Normal rate and regular rhythm. Heart sounds: Normal heart sounds. No murmur heard.   Pulmonary:      Effort: Pulmonary effort is normal.      Breath sounds: Decreased breath sounds and rhonchi present. No wheezing or rales. Abdominal:      General: Bowel sounds are normal.      Palpations: Abdomen is soft. Tenderness: There is no abdominal tenderness. There is no guarding or rebound. Musculoskeletal:         General: Normal range of motion. Cervical back: Normal range of motion and neck supple. Lumbar back: Tenderness present. Right hip: Tenderness present. Lymphadenopathy:      Cervical: No cervical adenopathy. Skin:     General: Skin is warm and dry. Neurological:      Mental Status: He is alert and oriented to person, place, and time. Cranial Nerves: No cranial nerve deficit.    Psychiatric:         Judgment: Judgment normal.       Assessment and Plan:    Diagnoses and all orders for this visit:    Acute right-sided low back pain with right-sided sciatica  - uncertain as to cause poss lumbar disease, burstitis   - xray of rt hip per radiology report unremarkable   - xray of lumbar spine - moderate facet OA l4-5, multilevel degenerative disc disease   - US LE - no dvt   - home exercises  - Heat and ice alternating   - declines PT   - s/p kenlog 40mg IM x 1   - s/p medrol pack   - tramadol did not help   - s/p toradol x 1, no long term nsaids due to ckd3 and anticoagulation (coumadin)   - following with pain management     Stage 3a chronic kidney disease ([address]  - following with nephrology   - follow labs   - last lab (10/2022) - creat increased to 2.0  - was added lasix and entresto which may be contributing, advise to f/u cardio and nephro     Type 2 diabetes mellitus without complication, without long-term current use of insulin ([address]  - Continue present treatment   - watch diet   - monitor blood glucose at home   - following with endocrinology   - on glimepiride   - on tradjenta   - follow A1c - last was 5.3 (10/2022)   - discussed decreasing medications 10/14/2022    Not on Aspirin - coumadin   Not on A/ARB - CKD   On statin Follows with optho (3/22)- retinal de, no dm retin    Carcinoid (except of appendix) ([address]  - following with endocrinology and endocrinology surgery (dr. Daniel Najera) at [facility]  - States excessive Vasoactive intestinal peptide excretion   - On sandostatin monthly   - on cortef   - Stable     Chronic atrial fibrillation ([address]  - s/p pacer  - following with cardiology and EP   - on sotalol - decreased dose (12/2019)   - on coumadin - INR weekly   - stable     Chronic obstructive pulmonary disease, unspecified COPD type ([address]  - following with pulmonary   - on anoro - could not afford trelegy   - has home nebulizer with albuterol   - has chronic cough   - has tried flonase or nasacort  - has tried mult antihistamine   - on anoro - stopped due to cost (10/2022)   - on qvar   - stable     Coronary artery disease involving native coronary artery of native heart without angina pectoris  - s/p stent  - following with cardiology and EP   - on sotalol   - on simvastatin   - no aspirin due to coumadin   - stable     Essential hypertension  - watch diet   - on hctz - nephrology decreased to 12.5mg   - on sotalol for afib   - stable 10/14/2022    Mixed hyperlipidemia  - watch diet   - on simvastatin  - follow labs     Idiopathic chronic gout of multiple sites without tophus  - watch diet   - on allopurinol - increase to daily   - stable     Hyperparathyroidism ([address]  - Possible due to CKD   - last calcium was elevated     Vitamin D deficiency  - nephrology added 50k weekly   - follow labs     Chronic pain of both knees  - check xray     Class 2 obesity due to excess calories without serious comorbidity with body mass index (BMI) of 36.0 to 36.9 in adult  - discussed diet and exercise     Return in about 3 months (around 4/20/2023) for check up and review and labs.     Orders Placed This Encounter   Procedures    Comprehensive Metabolic Panel     Standing Status:   Future     Standing Expiration Date:   1/20/2024    Magnesium Standing Status:   Future     Standing Expiration Date:   1/20/2024    Lipid, Fasting     Standing Status:   Future     Standing Expiration Date:   1/20/2024    Hemoglobin A1C     Standing Status:   Future     Standing Expiration Date:   1/20/2024    Vitamin D 25 Hydroxy     Standing Status:   Future     Standing Expiration Date:   1/20/2024    TSH     Standing Status:   Future     Standing Expiration Date:   1/20/2024    Urinalysis     Standing Status:   Future     Standing Expiration Date:   1/20/2024    Microalbumin / Creatinine Urine Ratio     Standing Status:   Future     Standing Expiration Date:   1/20/2024    PSA Screening     Standing Status:   Future     Standing Expiration Date:   1/20/2024    CBC with Auto Differential     Standing Status:   Future     Standing Expiration Date:   1/20/2024    Vitamin B12 & Folate     Standing Status:   Future     Standing Expiration Date:   1/20/2024    Uric Acid     Standing Status:   Future     Standing Expiration Date:   1/20/2024    PTH, Intact     Standing Status:   Future     Standing Expiration Date:   1/20/2024       Requested Prescriptions      No prescriptions requested or ordered in this encounter        Aaron Hickey MD  1/20/2023  10:33 AM

## 2023-01-26 ENCOUNTER — ANTI-COAG VISIT (OUTPATIENT)
Dept: FAMILY MEDICINE CLINIC | Age: 78
End: 2023-01-26

## 2023-01-26 DIAGNOSIS — I48.91 ATRIAL FIBRILLATION, UNSPECIFIED TYPE (HCC): Primary | ICD-10-CM

## 2023-01-26 LAB
INR BLD: 3
INR BLD: 3
PROTIME: NORMAL

## 2023-01-26 NOTE — PROGRESS NOTES
Previous INR: 2.70     Previous dose: 6mg (5mg +1mg tablets) on mon, wed, and Friday and 5mg all others           Current INR: 3.00     Recommendation: 2.5mg x 1 then continue 6mg (5mg +1mg tablets) on mon, wed, and Friday and 5mg all others    Next INR: 1 week     Melyssa Duffy MD  1/26/2023  4:40 PM

## 2023-01-30 DIAGNOSIS — I48.20 CHRONIC ATRIAL FIBRILLATION (HCC): ICD-10-CM

## 2023-01-30 RX ORDER — WARFARIN SODIUM 1 MG/1
1 TABLET ORAL DAILY
Qty: 30 TABLET | Refills: 5 | Status: SHIPPED | OUTPATIENT
Start: 2023-01-30

## 2023-01-30 NOTE — TELEPHONE ENCOUNTER
Last Appointment:  1/20/2023  Future Appointments   Date Time Provider Raegan Gold   2/9/2023 10:00 AM Jaret Valdez MD [facility]   3/15/2023  9:00 AM SCHEDULE, ALEXANDER GALLARDO [facility]   4/21/2023  8:45 AM MD AARTI Sapp [facility]

## 2023-02-01 ENCOUNTER — TELEPHONE (OUTPATIENT)
Dept: FAMILY MEDICINE CLINIC | Age: 78
End: 2023-02-01

## 2023-02-01 NOTE — TELEPHONE ENCOUNTER
[facility] dermatology called and requested copies of recent labs. They are considering starting Lamisil and patient does not want to get new labs. States that he spoke w/ you about starting the medication and that you were in agreement. Copy of CMP from 1/20,BMP from 1/13, and CMP from 12/19 sent to [facility] for review.

## 2023-02-02 ENCOUNTER — ANTI-COAG VISIT (OUTPATIENT)
Dept: FAMILY MEDICINE CLINIC | Age: 78
End: 2023-02-02
Payer: MEDICARE

## 2023-02-02 DIAGNOSIS — I48.91 ATRIAL FIBRILLATION, UNSPECIFIED TYPE (HCC): Primary | ICD-10-CM

## 2023-02-02 LAB
INR BLD: 3.5
INR BLD: 3.5
PROTIME: NORMAL

## 2023-02-02 PROCEDURE — 93793 ANTICOAG MGMT PT WARFARIN: CPT | Performed by: INTERNAL MEDICINE

## 2023-02-02 NOTE — PROGRESS NOTES
Previous INR: 3.00     Previous dose: 2.5mg x 1 then 6mg (5mg +1mg tablets) on mon, wed, and Friday and 5mg all others           Current INR: 3.50     Recommendation: hold x 1 then change 6mg (5mg +1mg tablets) on mon and Friday and 5mg all others    Next INR: 1 week     Elena Georges MD  2/2/2023  4:46 PM

## 2023-02-09 ENCOUNTER — OFFICE VISIT (OUTPATIENT)
Dept: PAIN MANAGEMENT | Age: 78
End: 2023-02-09
Payer: MEDICARE

## 2023-02-09 ENCOUNTER — ANTI-COAG VISIT (OUTPATIENT)
Dept: FAMILY MEDICINE CLINIC | Age: 78
End: 2023-02-09

## 2023-02-09 VITALS
OXYGEN SATURATION: 92 % | BODY MASS INDEX: 36.41 KG/M2 | TEMPERATURE: 97.1 F | HEIGHT: 67 IN | WEIGHT: 232 LBS | RESPIRATION RATE: 16 BRPM | SYSTOLIC BLOOD PRESSURE: 122 MMHG | HEART RATE: 84 BPM | DIASTOLIC BLOOD PRESSURE: 79 MMHG

## 2023-02-09 DIAGNOSIS — M48.061 SPINAL STENOSIS OF LUMBAR REGION, UNSPECIFIED WHETHER NEUROGENIC CLAUDICATION PRESENT: ICD-10-CM

## 2023-02-09 DIAGNOSIS — M51.36 DDD (DEGENERATIVE DISC DISEASE), LUMBAR: Primary | ICD-10-CM

## 2023-02-09 DIAGNOSIS — M53.3 SACROILIAC DYSFUNCTION: ICD-10-CM

## 2023-02-09 DIAGNOSIS — I48.91 ATRIAL FIBRILLATION, UNSPECIFIED TYPE (HCC): Primary | ICD-10-CM

## 2023-02-09 DIAGNOSIS — Z95.0 S/P CARDIAC PACEMAKER PROCEDURE: ICD-10-CM

## 2023-02-09 DIAGNOSIS — M54.16 LUMBAR RADICULAR PAIN: ICD-10-CM

## 2023-02-09 DIAGNOSIS — M47.817 LUMBOSACRAL SPONDYLOSIS WITHOUT MYELOPATHY: ICD-10-CM

## 2023-02-09 LAB
INR BLD: 2.4
INR BLD: 2.4
PROTIME: NORMAL

## 2023-02-09 PROCEDURE — 3074F SYST BP LT 130 MM HG: CPT | Performed by: ANESTHESIOLOGY

## 2023-02-09 PROCEDURE — 3078F DIAST BP <80 MM HG: CPT | Performed by: ANESTHESIOLOGY

## 2023-02-09 PROCEDURE — 99213 OFFICE O/P EST LOW 20 MIN: CPT | Performed by: ANESTHESIOLOGY

## 2023-02-09 PROCEDURE — 1123F ACP DISCUSS/DSCN MKR DOCD: CPT | Performed by: ANESTHESIOLOGY

## 2023-02-09 NOTE — PROGRESS NOTES
Do you currently have any of the following:    Fever: No  Headache:  No  Cough: No  Shortness of breath: No  Exposed to anyone with these symptoms: No         Victor Manuel Lopez presents to the [facility] on 2/9/2023. Juan Pablo Friedman is complaining of pain in his right thigh. The pain is constant. The pain is described as aching, stabbing, and dull. Pain is rated on his best day at a 2, on his worst day at a 4, and on average at a 2 on the VAS scale. Any procedures since your last visit: No    Pacemaker or defibrillator: Yes managed by dr. Lugenia Lesches. He is not on NSAIDS and is  on anticoagulation medications to include Coumadin and is managed by Dr. Lugenia Lesches. Medication Contract and Consent for Opioid Use Documents Filed        No documents found                    /79   Pulse 84   Temp 97.1 °F (36.2 °C) (Infrared)   Resp 16   Ht 5' 7\" (1.702 m)   Wt 232 lb (105.2 kg)   SpO2 92%   BMI 36.34 kg/m²      No LMP for male patient.

## 2023-02-09 NOTE — PROGRESS NOTES
[address] [facility], [address] [place] [address] [place]  [phone]    Follow up Note      Clearence Other     Date of Visit:  2/9/2023    CC:  Patient presents for follow up   Chief Complaint   Patient presents with    Follow-up     Right thigh pain        HPI:    Right low back / posterior/lateal hip pain and right LE pain. Pain is  intermittent when walking and is described as sharp and throbbing. Pain does radiate to right  lower extremity mainly over the thigh but occasionally notices over the right leg. X-ray hip unremarkable     X-ray LS spine - DDD     NOTE:  Has a pacemaker- A.fib  On coumadin. COPD - s/p lobectomy  H/o CKD     Previous treatments:   Physical Therapy/ HEP. Medications: - NSAID's : yes                        - Membrane stabilizers : yes                       - Opioids :short course                       - Adjuvants or Others : yes,     Spine Surgeries: no     Anticoagulation medications: yes,  and include Warfarin. H/O Smoking: no  H/O alcohol abuse : no  H/O Illicit drug use : no     Imaging:     CT Lumbar spine: 10/3/2022:     Impression   1. Mild compression deformities again noted involving the T12, L1, and L2   vertebral bodies, when compared to the previous CT scan of the abdomen and   pelvis performed 05/06/2021.       2.  Minimal lumbar scoliosis again seen, with convexity to the right. 3.  Grade 1 spondylolisthesis again seen at L4 over L5.       4.  Osteo-degenerative changes and discogenic disc disease again seen, as   described above. Mild, diffuse, posterior disc bulges at T11-12, L2-3, L3-4, L4-5, and L5-S1. There is an associated left lateral disc protrusion at L2-3. There is an   associated moderate-sized posterior central disc protrusion at L4-5. Most of   these findings can be seen on the prior CT study. 5.  Mild spinal canal stenosis at L3-4. Moderate to marked spinal canal   stenosis at L4-5. 6.  Other findings, as described above. Xray LS spine and Xray of the right hip: 9/7/2022:  FINDINGS:   L-spine: Multilevel degenerative disc disease is overall mild. Moderate   degenerative facet arthropathy from L4-S1. No fracture or dislocation. Normal MR soft tissues. Right hip: No significant arthropathy. No fracture or dislocation. No other   abnormal bone or soft tissue findings. Impression   Degenerative lumbar spondylosis. Unremarkable right hip. OARRS report[de-identified] reviewed    Past Medical History:   Diagnosis Date    Atrial fibrillation ([address] 01/04/2014    CAD (coronary artery disease)     Cancer (HCC)     VIPoma    Carcinoid syndrome (HCC)     Chronic kidney disease     COPD (chronic obstructive pulmonary disease) (HCC)     DDD (degenerative disc disease), lumbar 09/20/2022    Diabetes mellitus ([address]     Hypertension     Idiopathic chronic gout of multiple sites without tophus 09/05/2019    Meniere disease     MI (myocardial infarction) ([address]     x3    Mixed hyperlipidemia 09/05/2019    Obesity     Pacemaker     Type 2 diabetes mellitus without complication ([address]        Past Surgical History:   Procedure Laterality Date    CARDIAC SURGERY      stents/pacemaker    COLONOSCOPY      CORONARY ANGIOPLASTY       CORONARY ANGIOPLASTY WITH STENT PLACEMENT      HERNIA REPAIR      LUNG SURGERY Right 2014    Dr. Arman Nageotte for \"infection around lung\"    MEDICATION INJECTION Right 9/22/2022    RIGHT SACROILIAC JOINT INJECTION UNDER FLUOROSCOPY performed by Anish Pastrana MD at [address] [place] [address] Right 10/20/2022    LUMBAR TRANSFORAMINAL EPIDURAL STEROID INJECTION RIGHT L3 AND L4 UNDER FLUOROSCOPIC GUIDANCE performed by Anish Pastrana MD at [facility]      tumors removed from breast ,arms     UPPER GASTROINTESTINAL ENDOSCOPY         Prior to Admission medications    Medication Sig Start Date End Date Taking? Authorizing Provider   warfarin (COUMADIN) 1 MG tablet Take 1 tablet by mouth daily 1/30/23  Yes Terry Gil MD   furosemide (LASIX) 20 MG tablet Take 20 mg by mouth daily   Yes Historical Provider, MD   warfarin (COUMADIN) 5 MG tablet 1 po qd or as directed 12/5/22  Yes Terry Gil MD   simvastatin (ZOCOR) 40 MG tablet Take 1 tablet by mouth nightly 11/15/22  Yes Terry Gil MD   hydrocortisone (CORTEF) 10 MG tablet TAKE 1 1/2 TABLETS BY MOUTH EVERY MORNING AND 1 TABLET EVERY EVENING 10/31/22  Yes Terry Gil MD   allopurinol (ZYLOPRIM) 100 MG tablet TAKE 1 TABLET BY MOUTH [id number] 10/24/22  Yes Terry Gil MD   QVAR REDIHALER 80 MCG/ACT AERB inhaler INHALE 2 PUFFS BY MOUTH TWICE DAILY 7/6/22  Yes Historical Provider, MD   latanoprost (XALATAN) 0.005 % ophthalmic solution PLACE 1 DROP INTO BOTH EYES EVERY DAY AT BEDTIME 7/15/22  Yes Historical Provider, MD   Cholecalciferol (VITAMIN D3) 1.25 MG (27300 UT) CAPS Take 1 capsule by mouth once a week   Yes Historical Provider, MD   linagliptin (TRADJENTA) 5 MG tablet Take 1 tablet by mouth daily Lot#[id number] Exp.7/2020 2/28/19  Yes Lissette Huggins, APRN - CNP   umeclidinium-vilanterol (ANORO ELLIPTA) 62.5-25 MCG/INH AEPB inhaler Inhale 1 puff into the lungs daily   Yes Historical Provider, MD   sotalol (BETAPACE) 160 MG tablet Take 1 tablet by mouth 2 times daily 5/18/17  Yes Aishwarya Hernandez MD   nitroGLYCERIN (NITROSTAT) 0.4 MG SL tablet Place 0.4 mg under the tongue every 5 minutes as needed for Chest pain up to max of 3 total doses. If no relief after 1 dose, call 911.    Yes Historical Provider, MD   albuterol (PROVENTIL) (5 MG/ML) 0.5% nebulizer solution Take 0.5 mLs by nebulization every 6 hours as needed for Wheezing 7/25/16  Yes Abbie Herrera MD   glimepiride (AMARYL) 2 MG tablet Take 0.5 mg by mouth every morning (before breakfast)    Yes Historical Provider, MD   octreotide ACETATE (SANDOSTATIN LAR) 30 MG injection Inject 60 mg into the muscle once Every two weeks   Yes Historical Provider, MD   Glucose Blood (BLOOD GLUCOSE TEST STRIPS) STRP Freestyle lite 3/5/12  Yes Klyah Forrester MD   Fingerstix Lancets MISC  3/5/12  Yes Kylah Forrester MD   ENTRESTO 24-26 MG per tablet Take 24-26 tablets by mouth in the morning and at bedtime  Patient not taking: Reported on 2023   Historical Provider, MD   pregabalin (LYRICA) 25 MG capsule Take 1 capsule by mouth 2 times daily for 60 days. Patient not taking: Reported on 2023 11/10/22 1/20/23  Lacie Nash MD       Allergies   Allergen Reactions    Lisinopril      Other reaction(s): Other: See Comments  Black out, nausea       Social History     Socioeconomic History    Marital status: Single     Spouse name: Not on file    Number of children: 3    Years of education: 12    Highest education level: Not on file   Occupational History    Occupation: [employer] worker     Comment: retired   Tobacco Use    Smoking status: Former     Packs/day: 2.00     Years: 40.00     Pack years: 80.00     Types: Cigarettes     Quit date: 2000     Years since quittin.4    Smokeless tobacco: Never   Vaping Use    Vaping Use: Never used   Substance and Sexual Activity    Alcohol use: No    Drug use: No    Sexual activity: Not Currently     Partners: Female   Other Topics Concern    Not on file   Social History Narrative    Not on file     Social Determinants of Health     Financial Resource Strain: Not on file   Food Insecurity: Not on file   Transportation Needs: Not on file   Physical Activity: Sufficiently Active    Days of Exercise per Week: 3 days    Minutes of Exercise per Session: 50 min   Stress: Not on file   Social Connections: Not on file   Intimate Partner Violence: Not on file   Housing Stability: Not on file       Family History   Adopted: Yes   Problem Relation Age of Onset    Other Mother         Pt. is adopted    Other Father         Pt. is adopted.         REVIEW OF SYSTEMS:     Shana Stahl denies fever/chills, chest pain, shortness of breath, new bowel or bladder complaints. All other review of systems was negative. PHYSICAL EXAMINATION:      /79   Pulse 84   Temp 97.1 °F (36.2 °C) (Infrared)   Resp 16   Ht 5' 7\" (1.702 m)   Wt 232 lb (105.2 kg)   SpO2 92%   BMI 36.34 kg/m²   General:       General appearance:  Pleasant and well-hydrated, in no distress and A & O x 3  Build:Obese  Function: uses a walker to assist in ambulation     HEENT:     Head:normocephalic, atraumatic     Lungs:     Breathing:normal breathing pattern      CVS:     RRR     Abdomen:     Shape:obese, non-distended, and normal     Cervical spine:     Inspection:normal     Thoracic spine:                Spine inspection:normal      Lumbar spine:     Spine inspection: Normal   Palpation: Tenderness paravertebral muscles Yes right  Range of motion: Decreased, flexion Decreased, Lateral bending, extension and rotation  reduced is painful. Sacroiliac joint tenderness- significantly improved  Piriformis tenderness: negative bilaterally  SLR : negative bilaterally  Trochanteric bursa tenderness: improved  CVA tenderness:No      Musculoskeletal:     Trigger points No      Extremities:     Tremors:None bilaterally upper and lower     Neurological:     Sensory: Normal to light touch      Motor:   No focal deficits     Dermatology:     Skin:no rashes or lesions noted    Assessment/Plan:    Diagnosis Orders   1. Sacroiliac dysfunction          2. DDD (degenerative disc disease), lumbar          3. Lumbosacral spondylosis without myelopathy          4. Spinal stenosis of lumbar region, unspecified whether neurogenic claudication present          5. S/P cardiac pacemaker procedure          6. Anticoagulated on Coumadin                65 y.o.  male with H/o acute onset right low back/ posterior hip pain and intermittent right LE pain - mainly over the right thigh.      Treated conservatively- oral steroids, analgesics. X- ray- hip unremarkable. X-ray LS spine; DDD    CT Lumbar spine- reviewed. Has pacemaker- cant get MRI     S/P right SIJ injection helped the SIJ pain significantly. S/p Right trochanteric bursa injection - helped the lateral hip pain    On coumadin    S/P TFESI - helped the pain significantly. Gabapentin - did not help. Had tried Low dose Lyrica- has discontinued. PLAN:    Consider repeat TFESI / Interlaminar RICKEY if pain recurs. he can callus    Need to hold Coumadin for 5 days prior to the procedure. Check PT/ INR on the day of the procedure. F/u in 2-3 months. Or sooner if needed. Counseling : Patient encouraged to stay active and to watch/lose weight     Encouraged to continue Regular home exercise program as tolerated - stretching / strengthening. Treatment plan discussed with the patient and family member present including medication and procedure side effects. Controlled Substances Monitoring: OARRS reviewed. We discussed with the patient that combining opioids, benzodiazepines, alcohol, illicit drugs or sleep aids increases the risk of respiratory depression including death. We discussed that these medications may cause drowsiness, sedation or dizziness and have counseled the patient not to drive or operate machinery. We have discussed that these medications will be prescribed only by one provider. We have discussed with the patient about age related risk factors and have thoroughly discussed the importance of taking these medications as prescribed. The patient verbalizes understanding.      Rashawn Sheehan MD

## 2023-02-09 NOTE — PROGRESS NOTES
Previous INR: 3.50     Previous dose: held x 1 then change 6mg (5mg +1mg tablets) on mon and Friday and 5mg all others           Current INR: 2.40     Recommendation: continue 6mg (5mg +1mg tablets) on mon and Friday and 5mg all others    Next INR: 1 week     Jose Mendez MD  2/9/2023  3:41 PM

## 2023-02-14 LAB — DIABETIC RETINOPATHY: NEGATIVE

## 2023-02-16 ENCOUNTER — ANTI-COAG VISIT (OUTPATIENT)
Dept: FAMILY MEDICINE CLINIC | Age: 78
End: 2023-02-16
Payer: MEDICARE

## 2023-02-16 DIAGNOSIS — I48.91 ATRIAL FIBRILLATION, UNSPECIFIED TYPE (HCC): Primary | ICD-10-CM

## 2023-02-16 LAB
INR BLD: 2.9
INR BLD: 2.9
PROTIME: NORMAL

## 2023-02-16 PROCEDURE — 93793 ANTICOAG MGMT PT WARFARIN: CPT | Performed by: INTERNAL MEDICINE

## 2023-02-16 NOTE — PROGRESS NOTES
Previous INR: 2.40     Previous dose:  6mg (5mg +1mg tablets) on mon and Friday and 5mg all others           Current INR: 2.90     Recommendation: continue 6mg (5mg +1mg tablets) on mon and Friday and 5mg all others    Next INR: 1 week     Renetta Blandon MD  2/16/2023  5:14 PM

## 2023-02-23 ENCOUNTER — ANTI-COAG VISIT (OUTPATIENT)
Dept: FAMILY MEDICINE CLINIC | Age: 78
End: 2023-02-23

## 2023-02-23 DIAGNOSIS — I48.91 ATRIAL FIBRILLATION, UNSPECIFIED TYPE (HCC): Primary | ICD-10-CM

## 2023-02-23 LAB
INR BLD: 3.4
INR BLD: 3.4
PROTIME: NORMAL

## 2023-02-23 NOTE — PROGRESS NOTES
Previous INR: 2.90     Previous dose:  6mg (5mg +1mg tablets) on mon and Friday and 5mg all others           Current INR: 3.40     Recommendation: hold x 1 then 2.5mg x 1 then continue 6mg (5mg +1mg tablets) on mon and Friday and 5mg all others    Next INR: 1 week     Dinesh Herrera MD  2/23/2023  5:01 PM

## 2023-03-02 ENCOUNTER — ANTI-COAG VISIT (OUTPATIENT)
Dept: FAMILY MEDICINE CLINIC | Age: 78
End: 2023-03-02
Payer: MEDICARE

## 2023-03-02 DIAGNOSIS — I48.91 ATRIAL FIBRILLATION, UNSPECIFIED TYPE (HCC): Primary | ICD-10-CM

## 2023-03-02 LAB
INR BLD: 1.7
INR BLD: 1.7
PROTIME: NORMAL

## 2023-03-02 PROCEDURE — 93793 ANTICOAG MGMT PT WARFARIN: CPT | Performed by: INTERNAL MEDICINE

## 2023-03-02 NOTE — PROGRESS NOTES
Previous INR: 3.40     Previous dose:  held x 1 then 2.5mg x 1 then continue 6mg (5mg +1mg tablets) on mon and Friday and 5mg all others          Current INR: 1.70     Recommendation: 7.5mg x 1 then continue 6mg (5mg +1mg tablets) on mon and Friday and 5mg all others    Next INR: 1 week     Nolberto Lewis MD  3/2/2023  11:52 AM

## 2023-03-09 ENCOUNTER — ANTI-COAG VISIT (OUTPATIENT)
Dept: FAMILY MEDICINE CLINIC | Age: 78
End: 2023-03-09
Payer: MEDICARE

## 2023-03-09 DIAGNOSIS — I48.91 ATRIAL FIBRILLATION, UNSPECIFIED TYPE (HCC): Primary | ICD-10-CM

## 2023-03-09 LAB
INR BLD: 2.9
INR BLD: 2.9
PROTIME: NORMAL

## 2023-03-09 PROCEDURE — 93793 ANTICOAG MGMT PT WARFARIN: CPT | Performed by: INTERNAL MEDICINE

## 2023-03-09 NOTE — PROGRESS NOTES
Previous INR: 1.70     Previous dose: 7.5mg x 1 then continued 6mg (5mg +1mg tablets) on mon and Friday and 5mg all others          Current INR: 2.90     Recommendation: continue 6mg (5mg +1mg tablets) on mon and Friday and 5mg all others    Next INR: 1 week     Heather Retana MD  3/9/2023  12:18 PM

## 2023-03-16 ENCOUNTER — ANTI-COAG VISIT (OUTPATIENT)
Dept: FAMILY MEDICINE CLINIC | Age: 78
End: 2023-03-16
Payer: MEDICARE

## 2023-03-16 DIAGNOSIS — I48.91 ATRIAL FIBRILLATION, UNSPECIFIED TYPE (HCC): Primary | ICD-10-CM

## 2023-03-16 LAB
INR BLD: 2.9
INR BLD: 2.9
PROTIME: NORMAL

## 2023-03-16 PROCEDURE — 93793 ANTICOAG MGMT PT WARFARIN: CPT | Performed by: INTERNAL MEDICINE

## 2023-03-16 NOTE — PROGRESS NOTES
Previous INR: 2.90     Previous dose: 6mg (5mg +1mg tablets) on mon and Friday and 5mg all others          Current INR: 2.90     Recommendation: continue 6mg (5mg +1mg tablets) on mon and Friday and 5mg all others    Next INR: 1 week     Richard Corado MD  3/16/2023  12:04 PM

## 2023-03-23 ENCOUNTER — ANTI-COAG VISIT (OUTPATIENT)
Dept: FAMILY MEDICINE CLINIC | Age: 78
End: 2023-03-23

## 2023-03-23 DIAGNOSIS — I48.91 ATRIAL FIBRILLATION, UNSPECIFIED TYPE (HCC): Primary | ICD-10-CM

## 2023-03-23 LAB
INR BLD: 2.9
INR BLD: 2.9
PROTIME: NORMAL

## 2023-03-23 NOTE — PROGRESS NOTES
Previous INR: 2.90     Previous dose: 6mg (5mg +1mg tablets) on mon and Friday and 5mg all others          Current INR: 2.90     Recommendation: continue 6mg (5mg +1mg tablets) on mon and Friday and 5mg all others    Next INR: 1 week     Francisco Mcmahan MD  3/23/2023  5:13 PM

## 2023-03-30 ENCOUNTER — ANTI-COAG VISIT (OUTPATIENT)
Dept: FAMILY MEDICINE CLINIC | Age: 78
End: 2023-03-30

## 2023-03-30 DIAGNOSIS — I48.91 ATRIAL FIBRILLATION, UNSPECIFIED TYPE (HCC): Primary | ICD-10-CM

## 2023-03-30 LAB
INR BLD: 2.9
INR BLD: 2.9
PROTIME: NORMAL

## 2023-03-30 NOTE — PROGRESS NOTES
Previous INR: 2.90     Previous dose: 6mg (5mg +1mg tablets) on mon and Friday and 5mg all others          Current INR: 2.90     Recommendation: continue 6mg (5mg +1mg tablets) on mon and Friday and 5mg all others    Next INR: 1 week     Jacky Pride MD  3/30/2023  9:46 AM

## 2023-04-05 ENCOUNTER — ANTI-COAG VISIT (OUTPATIENT)
Dept: FAMILY MEDICINE CLINIC | Age: 78
End: 2023-04-05

## 2023-04-05 DIAGNOSIS — I48.91 ATRIAL FIBRILLATION, UNSPECIFIED TYPE (HCC): Primary | ICD-10-CM

## 2023-04-05 LAB — INR BLD: 2.7

## 2023-04-05 NOTE — PROGRESS NOTES
Previous INR: 2.90     Previous dose: 6mg (5mg +1mg tablets) on mon and Friday and 5mg all others          Current INR: 2.70     Recommendation: continue 6mg (5mg +1mg tablets) on mon and Friday and 5mg all others    Next INR: 1 week     Aaron Hickey MD  4/5/2023  12:14 PM

## 2023-04-16 ENCOUNTER — TELEPHONE (OUTPATIENT)
Dept: PRIMARY CARE CLINIC | Age: 78
End: 2023-04-16

## 2023-04-20 ENCOUNTER — ANTI-COAG VISIT (OUTPATIENT)
Dept: FAMILY MEDICINE CLINIC | Age: 78
End: 2023-04-20
Payer: MEDICARE

## 2023-04-20 DIAGNOSIS — I48.91 ATRIAL FIBRILLATION, UNSPECIFIED TYPE (HCC): Primary | ICD-10-CM

## 2023-04-20 LAB
INR BLD: 3.1
INR BLD: 3.1
PROTIME: NORMAL

## 2023-04-20 PROCEDURE — 93793 ANTICOAG MGMT PT WARFARIN: CPT | Performed by: INTERNAL MEDICINE

## 2023-04-20 NOTE — PROGRESS NOTES
Previous INR: 2.00     Previous dose: 6mg (5mg +1mg tablets) on mon and Friday and 5mg all others          Current INR: 3.10      Recommendation: 2.5mg x 1 then continue 6mg (5mg +1mg tablets) on mon and Friday and 5mg all others    Next INR: 1 week     Ryan Eller MD  4/20/2023  2:32 PM

## 2023-04-21 ENCOUNTER — OFFICE VISIT (OUTPATIENT)
Dept: PRIMARY CARE CLINIC | Age: 78
End: 2023-04-21
Payer: MEDICARE

## 2023-04-21 VITALS
DIASTOLIC BLOOD PRESSURE: 60 MMHG | WEIGHT: 231.25 LBS | BODY MASS INDEX: 36.29 KG/M2 | SYSTOLIC BLOOD PRESSURE: 110 MMHG | HEIGHT: 67 IN | HEART RATE: 77 BPM | TEMPERATURE: 97.7 F | OXYGEN SATURATION: 97 %

## 2023-04-21 DIAGNOSIS — M1A.09X0 IDIOPATHIC CHRONIC GOUT OF MULTIPLE SITES WITHOUT TOPHUS: ICD-10-CM

## 2023-04-21 DIAGNOSIS — D3A.00 CARCINOID (EXCEPT OF APPENDIX): ICD-10-CM

## 2023-04-21 DIAGNOSIS — J44.9 CHRONIC OBSTRUCTIVE PULMONARY DISEASE, UNSPECIFIED COPD TYPE (HCC): ICD-10-CM

## 2023-04-21 DIAGNOSIS — E11.22 TYPE 2 DIABETES MELLITUS WITH STAGE 3A CHRONIC KIDNEY DISEASE, WITHOUT LONG-TERM CURRENT USE OF INSULIN (HCC): ICD-10-CM

## 2023-04-21 DIAGNOSIS — E78.2 MIXED HYPERLIPIDEMIA: ICD-10-CM

## 2023-04-21 DIAGNOSIS — I10 ESSENTIAL HYPERTENSION: ICD-10-CM

## 2023-04-21 DIAGNOSIS — N18.31 TYPE 2 DIABETES MELLITUS WITH STAGE 3A CHRONIC KIDNEY DISEASE, WITHOUT LONG-TERM CURRENT USE OF INSULIN (HCC): ICD-10-CM

## 2023-04-21 DIAGNOSIS — M54.41 CHRONIC RIGHT-SIDED LOW BACK PAIN WITH RIGHT-SIDED SCIATICA: Primary | ICD-10-CM

## 2023-04-21 DIAGNOSIS — I25.10 CORONARY ARTERY DISEASE INVOLVING NATIVE CORONARY ARTERY OF NATIVE HEART WITHOUT ANGINA PECTORIS: ICD-10-CM

## 2023-04-21 DIAGNOSIS — N18.31 STAGE 3A CHRONIC KIDNEY DISEASE (HCC): ICD-10-CM

## 2023-04-21 DIAGNOSIS — E21.3 HYPERPARATHYROIDISM (HCC): ICD-10-CM

## 2023-04-21 DIAGNOSIS — G89.29 CHRONIC RIGHT-SIDED LOW BACK PAIN WITH RIGHT-SIDED SCIATICA: Primary | ICD-10-CM

## 2023-04-21 PROCEDURE — 1123F ACP DISCUSS/DSCN MKR DOCD: CPT | Performed by: INTERNAL MEDICINE

## 2023-04-21 PROCEDURE — 99213 OFFICE O/P EST LOW 20 MIN: CPT | Performed by: INTERNAL MEDICINE

## 2023-04-21 PROCEDURE — 3074F SYST BP LT 130 MM HG: CPT | Performed by: INTERNAL MEDICINE

## 2023-04-21 PROCEDURE — 3044F HG A1C LEVEL LT 7.0%: CPT | Performed by: INTERNAL MEDICINE

## 2023-04-21 PROCEDURE — 3078F DIAST BP <80 MM HG: CPT | Performed by: INTERNAL MEDICINE

## 2023-04-21 RX ORDER — TERBINAFINE HYDROCHLORIDE 250 MG/1
TABLET ORAL
COMMUNITY
Start: 2023-04-10

## 2023-04-21 RX ORDER — FLUTICASONE FUROATE, UMECLIDINIUM BROMIDE AND VILANTEROL TRIFENATATE 100; 62.5; 25 UG/1; UG/1; UG/1
POWDER RESPIRATORY (INHALATION)
COMMUNITY
Start: 2023-04-06

## 2023-04-21 ASSESSMENT — ENCOUNTER SYMPTOMS
SORE THROAT: 0
RHINORRHEA: 1
VOMITING: 0
COUGH: 1
ABDOMINAL PAIN: 0
BLOOD IN STOOL: 0
EYE PAIN: 0
CHEST TIGHTNESS: 0
CONSTIPATION: 0
DIARRHEA: 0
NAUSEA: 0
SHORTNESS OF BREATH: 1

## 2023-04-21 NOTE — PROGRESS NOTES
glimepiride   - on tradjenta   - follow A1c - last was 5.1 (4/2023)   - discussed decreasing medications 4/21/2023    Not on Aspirin - coumadin   Not on A/ARB - CKD   On statin   Follows with optho (3/22)- retinal de, no dm retin    Carcinoid (except of appendix) ([address]  - following with endocrinology and endocrinology surgery (dr. 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  - following with pulmonary   - on anoro - could not afford trelegy   - has home nebulizer with albuterol   - has chronic cough   - has tried flonase or nasacort  - has tried mult antihistamine   - off anoro - stopped due to cost (10/2022)   - off qvar   - added trelegy per pulmonary (4/2023)   - stable     Coronary artery disease involving native coronary artery of native heart without angina pectoris  - s/p stent  - following with cardiology and EP   - on sotalol   - on simvastatin   - no aspirin due to coumadin   - stable     Essential hypertension  - watch diet   - on hctz - nephrology decreased to 12.5mg   - on sotalol for afib   - stable 4/21/2023    Mixed hyperlipidemia  - watch diet   - on simvastatin  - follow labs   - last lab (4/2023) - stable, triglycerides elevated     Idiopathic chronic gout of multiple sites without tophus  - watch diet   - on allopurinol - increase to daily   - stable     Hyperparathyroidism ([facility]  - Possible due to CKD   - last calcium was elevated     Vitamin D deficiency  - nephrology added 50k weekly   - follow labs     Chronic pain of both knees  - check xray     Class 2 obesity due to excess calories without serious comorbidity with body mass index (BMI) of 36.0 to 36.9 in adult  -

## 2023-04-24 RX ORDER — HYDROCORTISONE 10 MG/1
TABLET ORAL
Qty: 225 TABLET | Refills: 1 | Status: SHIPPED | OUTPATIENT
Start: 2023-04-24

## 2023-04-24 NOTE — TELEPHONE ENCOUNTER
Needs new script for Hydrocortisone.        Last Appointment:  4/21/2023  Future Appointments   Date Time Provider Raegan Gentileisti   5/11/2023 10:00 AM Miracle Kilpatrick MD [facility]   7/21/2023  7:45 AM MD AARTI Hutson [facility]

## 2023-04-27 LAB — INR BLD: 2.4

## 2023-04-28 ENCOUNTER — ANTI-COAG VISIT (OUTPATIENT)
Dept: FAMILY MEDICINE CLINIC | Age: 78
End: 2023-04-28
Payer: MEDICARE

## 2023-04-28 DIAGNOSIS — I48.91 ATRIAL FIBRILLATION, UNSPECIFIED TYPE (HCC): Primary | ICD-10-CM

## 2023-04-28 PROCEDURE — 93793 ANTICOAG MGMT PT WARFARIN: CPT | Performed by: INTERNAL MEDICINE

## 2023-04-28 NOTE — PROGRESS NOTES
Previous INR: 3.10     Previous dose: 2.5mg x 1 then continued 6mg (5mg +1mg tablets) on mon and Friday and 5mg all others          Current INR: 2.40     Recommendation: continue 6mg (5mg +1mg tablets) on mon and Friday and 5mg all others    Next INR: 1 week     Roslyn Yeung MD  4/28/2023  12:30 PM

## 2023-05-04 ENCOUNTER — ANTI-COAG VISIT (OUTPATIENT)
Dept: FAMILY MEDICINE CLINIC | Age: 78
End: 2023-05-04
Payer: MEDICARE

## 2023-05-04 ENCOUNTER — ANTI-COAG VISIT (OUTPATIENT)
Dept: FAMILY MEDICINE CLINIC | Age: 78
End: 2023-05-04

## 2023-05-04 DIAGNOSIS — I48.91 ATRIAL FIBRILLATION, UNSPECIFIED TYPE (HCC): Primary | ICD-10-CM

## 2023-05-04 LAB
INR BLD: 3.3
INR BLD: 3.3
PROTIME: NORMAL

## 2023-05-04 PROCEDURE — 93793 ANTICOAG MGMT PT WARFARIN: CPT | Performed by: INTERNAL MEDICINE

## 2023-05-04 NOTE — PROGRESS NOTES
Previous INR: 2.40     Previous dose: 6mg (5mg +1mg tablets) on mon and Friday and 5mg all others          Current INR: 3.30     Recommendation: 2.5mg x 1 then continue 6mg (5mg +1mg tablets) on mon and Friday and 5mg all others    Next INR: 1 week     Zarina Romo MD  5/4/2023  10:18 AM

## 2023-05-05 LAB — DIABETIC RETINOPATHY: NEGATIVE

## 2023-05-11 ENCOUNTER — ANTI-COAG VISIT (OUTPATIENT)
Dept: FAMILY MEDICINE CLINIC | Age: 78
End: 2023-05-11
Payer: MEDICARE

## 2023-05-11 DIAGNOSIS — I48.91 ATRIAL FIBRILLATION, UNSPECIFIED TYPE (HCC): Primary | ICD-10-CM

## 2023-05-11 LAB
INR BLD: 2.4
INR BLD: 2.4
PROTIME: NORMAL

## 2023-05-11 PROCEDURE — 93793 ANTICOAG MGMT PT WARFARIN: CPT | Performed by: INTERNAL MEDICINE

## 2023-05-11 NOTE — PROGRESS NOTES
February 6, 2019      [place]ña [place]  [address]  [place] [address]           [facility]  [address] [facility] [address]  Phone: [phone]  Fax: [phone]          Patient: Chao Hawk Jr.   MR Number: [id number]   YOB: 1933   Date of Visit: 1/30/2019       Dear Dr. MONSTER Ureña Jr.:    Thank you for referring Chao Hawk to me for evaluation. Attached you will find relevant portions of my assessment and plan of care.    If you have questions, please do not hesitate to call me. I look forward to following Chao Hawk along with you.    Sincerely,    Obed Sotelo MD    Enclosure  CC:  No Recipients    If you would like to receive this communication electronically, please contact [email or web address] or [phone] to request more information on [email or web address] Link access.    For providers and/or their staff who would like to refer a patient to [facility], please contact us through our one-stop-shop provider referral line, [facility], at [phone].    If you feel you have received this communication in error or would no longer like to receive these types of communications, please e-mail [email or web address]          Left a message for Venus Quinn and requested she return our call.

## 2023-05-11 NOTE — PROGRESS NOTES
Previous INR: 3.30     Previous dose: : 2.5mg x 1 then continued 6mg (5mg +1mg tablets) on mon and Friday and 5mg all others          Current INR: 2.40     Recommendation: continue 6mg (5mg +1mg tablets) on mon and Friday and 5mg all others    Next INR: 1 week     Eleanor Jeffries MD  5/11/2023  12:09 PM

## 2023-05-18 ENCOUNTER — ANTI-COAG VISIT (OUTPATIENT)
Dept: FAMILY MEDICINE CLINIC | Age: 78
End: 2023-05-18
Payer: MEDICARE

## 2023-05-18 DIAGNOSIS — I48.91 ATRIAL FIBRILLATION, UNSPECIFIED TYPE (HCC): Primary | ICD-10-CM

## 2023-05-18 LAB
INR BLD: 2.5
INR BLD: 2.5
PROTIME: NORMAL

## 2023-05-18 PROCEDURE — 93793 ANTICOAG MGMT PT WARFARIN: CPT | Performed by: INTERNAL MEDICINE

## 2023-05-18 NOTE — PROGRESS NOTES
Previous INR: 2.40     Previous dose: 6mg (5mg +1mg tablets) on mon and Friday and 5mg all others          Current INR: 2.50     Recommendation: continue 6mg (5mg +1mg tablets) on mon and Friday and 5mg all others    Next INR: 1 week     Kelley Rubio MD  5/18/2023  2:49 PM

## 2023-05-25 ENCOUNTER — ANTI-COAG VISIT (OUTPATIENT)
Dept: FAMILY MEDICINE CLINIC | Age: 78
End: 2023-05-25

## 2023-05-25 DIAGNOSIS — I48.11 LONGSTANDING PERSISTENT ATRIAL FIBRILLATION (HCC): Primary | ICD-10-CM

## 2023-05-25 LAB
INR BLD: 2.8
INR BLD: 2.8
PROTIME: NORMAL

## 2023-06-01 ENCOUNTER — ANTI-COAG VISIT (OUTPATIENT)
Dept: FAMILY MEDICINE CLINIC | Age: 78
End: 2023-06-01
Payer: MEDICARE

## 2023-06-01 DIAGNOSIS — I48.91 ATRIAL FIBRILLATION, UNSPECIFIED TYPE (HCC): Primary | ICD-10-CM

## 2023-06-01 LAB
INR BLD: 1.9
INR BLD: 1.9
PROTIME: NORMAL

## 2023-06-01 PROCEDURE — 93793 ANTICOAG MGMT PT WARFARIN: CPT | Performed by: INTERNAL MEDICINE

## 2023-06-08 ENCOUNTER — ANTI-COAG VISIT (OUTPATIENT)
Dept: FAMILY MEDICINE CLINIC | Age: 78
End: 2023-06-08
Payer: MEDICARE

## 2023-06-08 DIAGNOSIS — I48.20 CHRONIC ATRIAL FIBRILLATION (HCC): Primary | ICD-10-CM

## 2023-06-08 LAB
INR BLD: 2.2
INR BLD: 2.2
PROTIME: NORMAL

## 2023-06-08 PROCEDURE — 93793 ANTICOAG MGMT PT WARFARIN: CPT | Performed by: INTERNAL MEDICINE

## 2023-06-08 NOTE — PROGRESS NOTES
Left a message for Nalini Morel with dosing instructions and requested a return call to document that message was received.

## 2023-06-08 NOTE — PROGRESS NOTES
Previous INR: 1.90     Previous dose: 6mg (5mg +1mg tablets) on mon and Friday and 5mg all others          Current INR: 2.20     Recommendation: continue 6mg (5mg +1mg tablets) on mon and Friday and 5mg all others    Next INR: 1 week     Viki Eisenberg MD  6/8/2023  3:42 PM

## 2023-06-21 ENCOUNTER — HOSPITAL ENCOUNTER (OUTPATIENT)
Age: 78
Discharge: HOME | End: 2023-06-21
Payer: MEDICARE

## 2023-06-21 LAB
ALBUMIN SERPL-MCNC: 3.9 G/DL (ref 3.5–5.2)
ALP SERPL-CCNC: 77 U/L (ref 40–129)
ALT SERPL-CCNC: 16 U/L (ref 0–40)
ANION GAP SERPL CALCULATED.3IONS-SCNC: 10 MMOL/L (ref 7–16)
AST SERPL-CCNC: 26 U/L (ref 0–39)
BILIRUB SERPL-MCNC: 0.4 MG/DL (ref 0–1.2)
BUN SERPL-MCNC: 34 MG/DL (ref 6–23)
CALCIUM SERPL-MCNC: 10 MG/DL (ref 8.6–10.2)
CHLORIDE SERPL-SCNC: 107 MMOL/L (ref 98–107)
CO2 SERPL-SCNC: 26 MMOL/L (ref 22–29)
CORTIS SERPL-MCNC: 7.76 MCG/DL (ref 2.68–18.4)
CREAT SERPL-MCNC: 1.6 MG/DL (ref 0.7–1.2)
GLUCOSE SERPL-MCNC: 144 MG/DL (ref 74–99)
HBA1C MFR BLD: 5.4 % (ref 4–5.6)
POTASSIUM SERPL-SCNC: 4.4 MMOL/L (ref 3.5–5)
PROT SERPL-MCNC: 7.1 G/DL (ref 6.4–8.3)
PTH-INTACT SERPL-MCNC: 109 PG/ML (ref 15–65)
SODIUM SERPL-SCNC: 143 MMOL/L (ref 132–146)

## 2023-06-21 PROCEDURE — 83036 HEMOGLOBIN GLYCOSYLATED A1C: CPT

## 2023-06-21 PROCEDURE — 82533 TOTAL CORTISOL: CPT

## 2023-06-21 PROCEDURE — 80053 COMPREHEN METABOLIC PANEL: CPT

## 2023-06-21 PROCEDURE — 36415 COLL VENOUS BLD VENIPUNCTURE: CPT

## 2023-06-21 PROCEDURE — 82542 COL CHROMOTOGRAPHY QUAL/QUAN: CPT

## 2023-06-21 PROCEDURE — 83970 ASSAY OF PARATHORMONE: CPT

## 2023-06-22 ENCOUNTER — ANTI-COAG VISIT (OUTPATIENT)
Dept: FAMILY MEDICINE CLINIC | Age: 78
End: 2023-06-22
Payer: MEDICARE

## 2023-06-22 DIAGNOSIS — I48.91 ATRIAL FIBRILLATION, UNSPECIFIED TYPE (HCC): Primary | ICD-10-CM

## 2023-06-22 LAB
INR BLD: 2.5
INR BLD: 2.5
PROTIME: NORMAL

## 2023-06-22 PROCEDURE — 93793 ANTICOAG MGMT PT WARFARIN: CPT | Performed by: INTERNAL MEDICINE

## 2023-06-22 NOTE — PROGRESS NOTES
Previous INR: 2.80     Previous dose: 6mg (5mg +1mg tablets) on mon and Friday and 5mg all others          Current INR: 2.50     Recommendation: continue 6mg (5mg +1mg tablets) on mon and Friday and 5mg all others    Next INR: 1 week     Sandra Scales MD  6/22/2023  4:44 PM

## 2023-06-23 ENCOUNTER — TELEPHONE (OUTPATIENT)
Dept: PRIMARY CARE CLINIC | Age: 78
End: 2023-06-23

## 2023-06-23 NOTE — TELEPHONE ENCOUNTER
Blood work was done in April and noted that had some blood work done here just recently for endocrinology    I do not feel that we need to update any blood work at present and would consider at his next subsequent 3-month visit after this follow-up

## 2023-06-23 NOTE — TELEPHONE ENCOUNTER
Patient in today, requesting labs be ordered to have done prior to 07/21/23 appointment. Please advise.

## 2023-06-29 ENCOUNTER — ANTI-COAG VISIT (OUTPATIENT)
Dept: FAMILY MEDICINE CLINIC | Age: 78
End: 2023-06-29
Payer: MEDICARE

## 2023-06-29 DIAGNOSIS — I48.91 ATRIAL FIBRILLATION, UNSPECIFIED TYPE (HCC): Primary | ICD-10-CM

## 2023-06-29 LAB
INR BLD: 2
INR BLD: 2
PROTIME: NORMAL

## 2023-06-29 PROCEDURE — 93793 ANTICOAG MGMT PT WARFARIN: CPT | Performed by: INTERNAL MEDICINE

## 2023-07-06 ENCOUNTER — ANTI-COAG VISIT (OUTPATIENT)
Dept: FAMILY MEDICINE CLINIC | Age: 78
End: 2023-07-06
Payer: MEDICARE

## 2023-07-06 DIAGNOSIS — I48.91 ATRIAL FIBRILLATION, UNSPECIFIED TYPE (HCC): Primary | ICD-10-CM

## 2023-07-06 LAB
INR BLD: 2.4
INR BLD: 2.4
PROTIME: NORMAL

## 2023-07-06 PROCEDURE — 93793 ANTICOAG MGMT PT WARFARIN: CPT | Performed by: INTERNAL MEDICINE

## 2023-07-06 NOTE — PROGRESS NOTES
Previous INR: 2.00     Previous dose: 6mg (5mg +1mg tablets) on mon and Friday and 5mg all others          Current INR: 2.40     Recommendation: continue 6mg (5mg +1mg tablets) on mon and Friday and 5mg all others    Next INR: 1 week     Michelle Dobson MD  7/6/2023  12:56 PM

## 2023-07-09 LAB — PTH RELATED PROT SERPL-SCNC: NORMAL PMOL/L (ref 0–2.3)

## 2023-07-13 ENCOUNTER — ANTI-COAG VISIT (OUTPATIENT)
Dept: FAMILY MEDICINE CLINIC | Age: 78
End: 2023-07-13
Payer: MEDICARE

## 2023-07-13 DIAGNOSIS — I48.91 ATRIAL FIBRILLATION, UNSPECIFIED TYPE (HCC): Primary | ICD-10-CM

## 2023-07-13 LAB
INR BLD: 1.9
INR BLD: 1.9
PROTIME: NORMAL

## 2023-07-13 PROCEDURE — 93793 ANTICOAG MGMT PT WARFARIN: CPT | Performed by: INTERNAL MEDICINE

## 2023-07-13 NOTE — PROGRESS NOTES
Previous INR: 2.40     Previous dose: 6mg (5mg +1mg tablets) on mon and Friday and 5mg all others          Current INR: 1.90     Recommendation: 7.5mg x 1 then continue 6mg (5mg +1mg tablets) on mon and Friday and 5mg all others    Next INR: 1 week     Kathy Richard MD  7/13/2023  12:37 PM

## 2023-07-20 ENCOUNTER — ANTI-COAG VISIT (OUTPATIENT)
Dept: FAMILY MEDICINE CLINIC | Age: 78
End: 2023-07-20
Payer: MEDICARE

## 2023-07-20 DIAGNOSIS — I48.91 ATRIAL FIBRILLATION, UNSPECIFIED TYPE (HCC): Primary | ICD-10-CM

## 2023-07-20 LAB
INR BLD: 2.4
INR BLD: 2.4
PROTIME: NORMAL

## 2023-07-20 PROCEDURE — 93793 ANTICOAG MGMT PT WARFARIN: CPT | Performed by: INTERNAL MEDICINE

## 2023-07-20 NOTE — PROGRESS NOTES
Previous INR: 1.90     Previous dose: 7.5mg x 1 then continued 6mg (5mg +1mg tablets) on mon and Friday and 5mg all others          Current INR: 2.40     Recommendation: continue 6mg (5mg +1mg tablets) on mon and Friday and 5mg all others    Next INR: 1 week     Vinh Woodall MD  7/20/2023  12:30 PM

## 2023-07-21 ENCOUNTER — OFFICE VISIT (OUTPATIENT)
Dept: PRIMARY CARE CLINIC | Age: 78
End: 2023-07-21
Payer: MEDICARE

## 2023-07-21 VITALS
HEART RATE: 71 BPM | SYSTOLIC BLOOD PRESSURE: 112 MMHG | OXYGEN SATURATION: 95 % | TEMPERATURE: 97.2 F | BODY MASS INDEX: 35.48 KG/M2 | WEIGHT: 226.06 LBS | HEIGHT: 67 IN | DIASTOLIC BLOOD PRESSURE: 70 MMHG

## 2023-07-21 DIAGNOSIS — M70.22 OLECRANON BURSITIS OF LEFT ELBOW: Primary | ICD-10-CM

## 2023-07-21 DIAGNOSIS — E11.22 TYPE 2 DIABETES MELLITUS WITH STAGE 3A CHRONIC KIDNEY DISEASE, WITHOUT LONG-TERM CURRENT USE OF INSULIN (HCC): ICD-10-CM

## 2023-07-21 DIAGNOSIS — N18.31 TYPE 2 DIABETES MELLITUS WITH STAGE 3A CHRONIC KIDNEY DISEASE, WITHOUT LONG-TERM CURRENT USE OF INSULIN (HCC): ICD-10-CM

## 2023-07-21 DIAGNOSIS — I25.10 CORONARY ARTERY DISEASE INVOLVING NATIVE CORONARY ARTERY OF NATIVE HEART WITHOUT ANGINA PECTORIS: ICD-10-CM

## 2023-07-21 DIAGNOSIS — G89.29 CHRONIC RIGHT-SIDED LOW BACK PAIN WITH RIGHT-SIDED SCIATICA: ICD-10-CM

## 2023-07-21 DIAGNOSIS — Z79.899 LONG TERM CURRENT USE OF THERAPEUTIC DRUG: ICD-10-CM

## 2023-07-21 DIAGNOSIS — J44.9 CHRONIC OBSTRUCTIVE PULMONARY DISEASE, UNSPECIFIED COPD TYPE (HCC): ICD-10-CM

## 2023-07-21 DIAGNOSIS — N18.31 STAGE 3A CHRONIC KIDNEY DISEASE (HCC): ICD-10-CM

## 2023-07-21 DIAGNOSIS — E21.3 HYPERPARATHYROIDISM (HCC): ICD-10-CM

## 2023-07-21 DIAGNOSIS — I10 ESSENTIAL HYPERTENSION: ICD-10-CM

## 2023-07-21 DIAGNOSIS — R53.83 OTHER FATIGUE: ICD-10-CM

## 2023-07-21 DIAGNOSIS — M54.41 CHRONIC RIGHT-SIDED LOW BACK PAIN WITH RIGHT-SIDED SCIATICA: ICD-10-CM

## 2023-07-21 DIAGNOSIS — E78.2 MIXED HYPERLIPIDEMIA: ICD-10-CM

## 2023-07-21 DIAGNOSIS — M1A.09X0 IDIOPATHIC CHRONIC GOUT OF MULTIPLE SITES WITHOUT TOPHUS: ICD-10-CM

## 2023-07-21 DIAGNOSIS — I48.20 CHRONIC ATRIAL FIBRILLATION (HCC): ICD-10-CM

## 2023-07-21 DIAGNOSIS — D3A.00 CARCINOID (EXCEPT OF APPENDIX): ICD-10-CM

## 2023-07-21 PROCEDURE — 3044F HG A1C LEVEL LT 7.0%: CPT | Performed by: INTERNAL MEDICINE

## 2023-07-21 PROCEDURE — 3078F DIAST BP <80 MM HG: CPT | Performed by: INTERNAL MEDICINE

## 2023-07-21 PROCEDURE — 99214 OFFICE O/P EST MOD 30 MIN: CPT | Performed by: INTERNAL MEDICINE

## 2023-07-21 PROCEDURE — 1123F ACP DISCUSS/DSCN MKR DOCD: CPT | Performed by: INTERNAL MEDICINE

## 2023-07-21 PROCEDURE — 3074F SYST BP LT 130 MM HG: CPT | Performed by: INTERNAL MEDICINE

## 2023-07-21 RX ORDER — ISOSORBIDE DINITRATE 10 MG/1
10 TABLET ORAL 2 TIMES DAILY
COMMUNITY
Start: 2023-06-26

## 2023-07-21 RX ORDER — HYDRALAZINE HYDROCHLORIDE 10 MG/1
10 TABLET, FILM COATED ORAL 2 TIMES DAILY
COMMUNITY
Start: 2023-06-26

## 2023-07-21 RX ORDER — METHYLPREDNISOLONE 4 MG/1
TABLET ORAL
Qty: 1 KIT | Refills: 0 | Status: SHIPPED | OUTPATIENT
Start: 2023-07-21 | End: 2023-07-27

## 2023-07-21 RX ORDER — SOTALOL HYDROCHLORIDE 160 MG/1
160 TABLET ORAL DAILY
Qty: 30 TABLET | Refills: 3
Start: 2023-07-21

## 2023-07-21 RX ORDER — ALBUTEROL SULFATE 90 UG/1
AEROSOL, METERED RESPIRATORY (INHALATION)
COMMUNITY
Start: 2023-07-05

## 2023-07-21 ASSESSMENT — ENCOUNTER SYMPTOMS
NAUSEA: 0
VOMITING: 0
BLOOD IN STOOL: 0
DIARRHEA: 0
RHINORRHEA: 1
SHORTNESS OF BREATH: 1
EYE PAIN: 0
SORE THROAT: 0
ABDOMINAL PAIN: 0
CONSTIPATION: 0
COUGH: 1
CHEST TIGHTNESS: 0

## 2023-07-21 NOTE — PROGRESS NOTES
ortho   - avoid pressure on elbow     Chronic right-sided low back pain with right-sided sciatica  - uncertain as to cause poss lumbar disease, burstitis   - xray of rt hip per radiology report unremarkable   - xray of lumbar spine - moderate facet OA l4-5, multilevel degenerative disc disease   - US LE - no dvt   - home exercises  - Heat and ice alternating   - declines PT   - s/p kenlog 40mg IM x 1   - s/p medrol pack   - tramadol did not help   - s/p toradol x 1, no long term nsaids due to ckd3 and anticoagulation (coumadin)   - following with pain management   - S/P TFESI  (2023) - helped   - stable     Stage 3a chronic kidney disease ([address])  - following with nephrology   - on lasix  - off entresto   - last lab (6/2023) - creat stable 1.6    Type 2 diabetes mellitus with stage 3a chronic kidney disease, without long-term current use of insulin ([address])  - Continue present treatment   - watch diet   - monitor blood glucose at home   - following with endocrinology   - on glimepiride   - on tradjenta   - follow A1c - last was 5.4 (6/2023)   - discussed decreasing medications 7/21/2023    Not on Aspirin - coumadin   Not on A/ARB - CKD   On statin   Follows with optho (3/22)- retinal de, no dm retin    Carcinoid (except of appendix) ([address])  - following with endocrinology and endocrinology surgery (dr. 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

## 2023-07-27 ENCOUNTER — ANTI-COAG VISIT (OUTPATIENT)
Dept: FAMILY MEDICINE CLINIC | Age: 78
End: 2023-07-27
Payer: MEDICARE

## 2023-07-27 DIAGNOSIS — I48.91 ATRIAL FIBRILLATION, UNSPECIFIED TYPE (HCC): Primary | ICD-10-CM

## 2023-07-27 LAB
INR BLD: 3.6
INR BLD: 3.6
PROTIME: NORMAL

## 2023-07-27 PROCEDURE — 93793 ANTICOAG MGMT PT WARFARIN: CPT | Performed by: INTERNAL MEDICINE

## 2023-07-27 NOTE — PROGRESS NOTES
Previous INR: 2.40     Previous dose:  6mg (5mg +1mg tablets) on mon and Friday and 5mg all others          Current INR: 3.60     Recommendation: hold x 1 then continue 6mg (5mg +1mg tablets) on mon and Friday and 5mg all others    Next INR: 1 week     Ar Murphy MD  7/27/2023  12:47 PM

## 2023-08-03 ENCOUNTER — ANTI-COAG VISIT (OUTPATIENT)
Dept: FAMILY MEDICINE CLINIC | Age: 78
End: 2023-08-03
Payer: MEDICARE

## 2023-08-03 DIAGNOSIS — I48.91 ATRIAL FIBRILLATION, UNSPECIFIED TYPE (HCC): Primary | ICD-10-CM

## 2023-08-03 LAB
INR BLD: 2
INR BLD: 2
PROTIME: NORMAL

## 2023-08-03 PROCEDURE — 93793 ANTICOAG MGMT PT WARFARIN: CPT | Performed by: INTERNAL MEDICINE

## 2023-08-03 NOTE — PROGRESS NOTES
Previous INR: 3.60     Previous dose: hold x 1 then continue 6mg (5mg +1mg tablets) on mon and Friday and 5mg all others          Current INR: 2.00     Recommendation: continue 6mg (5mg +1mg tablets) on mon and Friday and 5mg all others    Next INR: 1 week     Yumiko Jeffery MD  8/3/2023  4:40 PM

## 2023-08-09 ENCOUNTER — HOSPITAL ENCOUNTER (OUTPATIENT)
Age: 78
Discharge: HOME | End: 2023-08-09
Payer: MEDICARE

## 2023-08-09 LAB
25(OH)D3 SERPL-MCNC: 67.9 NG/ML (ref 30–100)
ALBUMIN SERPL-MCNC: 4 G/DL (ref 3.5–5.2)
ALP SERPL-CCNC: 78 U/L (ref 40–129)
ALT SERPL-CCNC: 15 U/L (ref 0–40)
ANION GAP SERPL CALCULATED.3IONS-SCNC: 10 MMOL/L (ref 7–16)
AST SERPL-CCNC: 21 U/L (ref 0–39)
BASOPHILS # BLD: 0.05 K/UL (ref 0–0.2)
BASOPHILS NFR BLD: 1 % (ref 0–2)
BILIRUB SERPL-MCNC: 0.5 MG/DL (ref 0–1.2)
BUN SERPL-MCNC: 36 MG/DL (ref 6–23)
CALCIUM SERPL-MCNC: 10.4 MG/DL (ref 8.6–10.2)
CHLORIDE SERPL-SCNC: 105 MMOL/L (ref 98–107)
CO2 SERPL-SCNC: 27 MMOL/L (ref 22–29)
CREAT SERPL-MCNC: 1.6 MG/DL (ref 0.7–1.2)
CREAT UR-MCNC: 84.7 MG/DL (ref 40–278)
EOSINOPHIL # BLD: 0.15 K/UL (ref 0.05–0.5)
EOSINOPHILS RELATIVE PERCENT: 2 % (ref 0–6)
ERYTHROCYTE [DISTWIDTH] IN BLOOD BY AUTOMATED COUNT: 14.3 % (ref 11.5–15)
GFR SERPL CREATININE-BSD FRML MDRD: 45 ML/MIN/1.73M2
GLUCOSE SERPL-MCNC: 138 MG/DL (ref 74–99)
HCT VFR BLD AUTO: 41.5 % (ref 37–54)
HGB BLD-MCNC: 13.7 G/DL (ref 12.5–16.5)
IMM GRANULOCYTES # BLD AUTO: 0.05 K/UL (ref 0–0.58)
IMM GRANULOCYTES NFR BLD: 1 % (ref 0–5)
LYMPHOCYTES NFR BLD: 1.53 K/UL (ref 1.5–4)
LYMPHOCYTES RELATIVE PERCENT: 19 % (ref 20–42)
MCH RBC QN AUTO: 32.1 PG (ref 26–35)
MCHC RBC AUTO-ENTMCNC: 33 G/DL (ref 32–34.5)
MCV RBC AUTO: 97.2 FL (ref 80–99.9)
MICROALBUMIN UR-MCNC: 33 MG/L (ref 0–19)
MONOCYTES NFR BLD: 0.86 K/UL (ref 0.1–0.95)
MONOCYTES NFR BLD: 11 % (ref 2–12)
NEUTROPHILS NFR BLD: 67 % (ref 43–80)
NEUTS SEG NFR BLD: 5.29 K/UL (ref 1.8–7.3)
PLATELET # BLD AUTO: 198 K/UL (ref 130–450)
PMV BLD AUTO: 8.6 FL (ref 7–12)
POTASSIUM SERPL-SCNC: 4.7 MMOL/L (ref 3.5–5)
PROT SERPL-MCNC: 6.9 G/DL (ref 6.4–8.3)
RBC # BLD AUTO: 4.27 M/UL (ref 3.8–5.8)
SODIUM SERPL-SCNC: 142 MMOL/L (ref 132–146)
TOTAL PROTEIN, URINE: 10 MG/DL (ref 0–12)
URINE TOTAL PROTEIN CREATININE RATIO: 0.12 (ref 0–0.2)
WBC OTHER # BLD: 7.9 K/UL (ref 4.5–11.5)

## 2023-08-09 PROCEDURE — 82306 VITAMIN D 25 HYDROXY: CPT

## 2023-08-09 PROCEDURE — 80053 COMPREHEN METABOLIC PANEL: CPT

## 2023-08-09 PROCEDURE — 82043 UR ALBUMIN QUANTITATIVE: CPT

## 2023-08-09 PROCEDURE — 82570 ASSAY OF URINE CREATININE: CPT

## 2023-08-09 PROCEDURE — 84156 ASSAY OF PROTEIN URINE: CPT

## 2023-08-09 PROCEDURE — 85025 COMPLETE CBC W/AUTO DIFF WBC: CPT

## 2023-08-10 ENCOUNTER — ANTI-COAG VISIT (OUTPATIENT)
Dept: FAMILY MEDICINE CLINIC | Age: 78
End: 2023-08-10
Payer: MEDICARE

## 2023-08-10 DIAGNOSIS — I48.91 ATRIAL FIBRILLATION, UNSPECIFIED TYPE (HCC): Primary | ICD-10-CM

## 2023-08-10 LAB
INR BLD: 2.1
INR BLD: 2.1
PROTIME: NORMAL

## 2023-08-10 PROCEDURE — 93793 ANTICOAG MGMT PT WARFARIN: CPT | Performed by: INTERNAL MEDICINE

## 2023-08-10 NOTE — PROGRESS NOTES
Previous INR: 2.00     Previous dose: 6mg (5mg +1mg tablets) on mon and Friday and 5mg all others          Current INR: 2.10     Recommendation: continue 6mg (5mg +1mg tablets) on mon and Friday and 5mg all others    Next INR: 1 week     Cecilia Craven MD  8/10/2023  12:23 PM On Xarelto

## 2023-08-17 ENCOUNTER — ANTI-COAG VISIT (OUTPATIENT)
Dept: FAMILY MEDICINE CLINIC | Age: 78
End: 2023-08-17
Payer: MEDICARE

## 2023-08-17 DIAGNOSIS — I48.91 ATRIAL FIBRILLATION, UNSPECIFIED TYPE (HCC): Primary | ICD-10-CM

## 2023-08-17 LAB
INR BLD: 2.1
INR BLD: 2.1
PROTIME: NORMAL

## 2023-08-17 PROCEDURE — 93793 ANTICOAG MGMT PT WARFARIN: CPT | Performed by: INTERNAL MEDICINE

## 2023-08-17 NOTE — PROGRESS NOTES
Previous INR: 2.10     Previous dose: 6mg (5mg +1mg tablets) on mon and Friday and 5mg all others          Current INR: 2.10     Recommendation: continue 6mg (5mg +1mg tablets) on mon and Friday and 5mg all others    Next INR: 1 week     Julian Barajas MD  8/17/2023  1:34 PM

## 2023-08-24 ENCOUNTER — ANTI-COAG VISIT (OUTPATIENT)
Dept: FAMILY MEDICINE CLINIC | Age: 78
End: 2023-08-24
Payer: MEDICARE

## 2023-08-24 DIAGNOSIS — I48.91 ATRIAL FIBRILLATION, UNSPECIFIED TYPE (HCC): Primary | ICD-10-CM

## 2023-08-24 LAB
INR BLD: 2.7
INR BLD: 2.7
PROTIME: NORMAL

## 2023-08-24 PROCEDURE — 93793 ANTICOAG MGMT PT WARFARIN: CPT | Performed by: INTERNAL MEDICINE

## 2023-08-24 NOTE — PROGRESS NOTES
Patient's daughter Federico Banuelos who is on his HIPPA form was relayed below message from PCP     Continue 6 mg on mon and fri- 5 mg all other days   Recheck INR in 1 week
Previous INR: 2.10     Previous dose: 6mg (5mg +1mg tablets) on mon and Friday and 5mg all others          Current INR: 2.70     Recommendation: continue 6mg (5mg +1mg tablets) on mon and Friday and 5mg all others    Next INR: 1 week     Angela Colvin MD  8/24/2023  12:50 PM
29-Jan-2023 12:13

## 2023-08-31 ENCOUNTER — APPOINTMENT (OUTPATIENT)
Dept: GENERAL RADIOLOGY | Age: 78
DRG: 190 | End: 2023-08-31
Payer: MEDICARE

## 2023-08-31 ENCOUNTER — TELEPHONE (OUTPATIENT)
Dept: FAMILY MEDICINE CLINIC | Age: 78
End: 2023-08-31

## 2023-08-31 ENCOUNTER — HOSPITAL ENCOUNTER (INPATIENT)
Age: 78
LOS: 5 days | Discharge: HOME | DRG: 190 | End: 2023-09-05
Attending: EMERGENCY MEDICINE | Admitting: INTERNAL MEDICINE
Payer: MEDICARE

## 2023-08-31 DIAGNOSIS — J44.1 COPD EXACERBATION (HCC): Primary | ICD-10-CM

## 2023-08-31 PROBLEM — J96.01 ACUTE RESPIRATORY FAILURE WITH HYPOXIA (HCC): Status: ACTIVE | Noted: 2023-08-31

## 2023-08-31 PROBLEM — B34.8 RHINOVIRUS: Status: ACTIVE | Noted: 2023-08-31

## 2023-08-31 LAB
ALBUMIN SERPL-MCNC: 3.9 G/DL (ref 3.5–5.2)
ALP SERPL-CCNC: 85 U/L (ref 40–129)
ALT SERPL-CCNC: 15 U/L (ref 0–40)
ANION GAP SERPL CALCULATED.3IONS-SCNC: 11 MMOL/L (ref 7–16)
AST SERPL-CCNC: 21 U/L (ref 0–39)
B PARAP IS1001 DNA NPH QL NAA+NON-PROBE: NOT DETECTED
B PERT DNA SPEC QL NAA+PROBE: NOT DETECTED
BASOPHILS # BLD: 0.04 K/UL (ref 0–0.2)
BASOPHILS NFR BLD: 0 % (ref 0–2)
BILIRUB SERPL-MCNC: 0.8 MG/DL (ref 0–1.2)
BNP SERPL-MCNC: 242 PG/ML (ref 0–450)
BUN SERPL-MCNC: 31 MG/DL (ref 6–23)
C PNEUM DNA NPH QL NAA+NON-PROBE: NOT DETECTED
CALCIUM SERPL-MCNC: 10.9 MG/DL (ref 8.6–10.2)
CHLORIDE SERPL-SCNC: 99 MMOL/L (ref 98–107)
CO2 SERPL-SCNC: 28 MMOL/L (ref 22–29)
CREAT SERPL-MCNC: 1.8 MG/DL (ref 0.7–1.2)
EKG ATRIAL RATE: 73 BPM
EKG P AXIS: 146 DEGREES
EKG P-R INTERVAL: 186 MS
EKG Q-T INTERVAL: 410 MS
EKG QRS DURATION: 112 MS
EKG QTC CALCULATION (BAZETT): 442 MS
EKG R AXIS: -49 DEGREES
EKG T AXIS: 55 DEGREES
EKG VENTRICULAR RATE: 70 BPM
EOSINOPHIL # BLD: 0.04 K/UL (ref 0.05–0.5)
EOSINOPHILS RELATIVE PERCENT: 0 % (ref 0–6)
ERYTHROCYTE [DISTWIDTH] IN BLOOD BY AUTOMATED COUNT: 13.9 % (ref 11.5–15)
FLUAV RNA NPH QL NAA+NON-PROBE: NOT DETECTED
FLUBV RNA NPH QL NAA+NON-PROBE: NOT DETECTED
GFR SERPL CREATININE-BSD FRML MDRD: 39 ML/MIN/1.73M2
GLUCOSE BLD-MCNC: 111 MG/DL (ref 74–99)
GLUCOSE BLD-MCNC: 220 MG/DL (ref 74–99)
GLUCOSE SERPL-MCNC: 123 MG/DL (ref 74–99)
HADV DNA NPH QL NAA+NON-PROBE: NOT DETECTED
HCOV 229E RNA NPH QL NAA+NON-PROBE: NOT DETECTED
HCOV HKU1 RNA NPH QL NAA+NON-PROBE: NOT DETECTED
HCOV NL63 RNA NPH QL NAA+NON-PROBE: NOT DETECTED
HCOV OC43 RNA NPH QL NAA+NON-PROBE: NOT DETECTED
HCT VFR BLD AUTO: 42.3 % (ref 37–54)
HGB BLD-MCNC: 14.2 G/DL (ref 12.5–16.5)
HMPV RNA NPH QL NAA+NON-PROBE: NOT DETECTED
HPIV1 RNA NPH QL NAA+NON-PROBE: NOT DETECTED
HPIV2 RNA NPH QL NAA+NON-PROBE: NOT DETECTED
HPIV3 RNA NPH QL NAA+NON-PROBE: NOT DETECTED
HPIV4 RNA NPH QL NAA+NON-PROBE: NOT DETECTED
IMM GRANULOCYTES # BLD AUTO: 0.07 K/UL (ref 0–0.58)
IMM GRANULOCYTES NFR BLD: 1 % (ref 0–5)
INR PPP: 2.6
LYMPHOCYTES NFR BLD: 1.38 K/UL (ref 1.5–4)
LYMPHOCYTES RELATIVE PERCENT: 13 % (ref 20–42)
M PNEUMO DNA NPH QL NAA+NON-PROBE: NOT DETECTED
MCH RBC QN AUTO: 31.8 PG (ref 26–35)
MCHC RBC AUTO-ENTMCNC: 33.6 G/DL (ref 32–34.5)
MCV RBC AUTO: 94.8 FL (ref 80–99.9)
MONOCYTES NFR BLD: 1.29 K/UL (ref 0.1–0.95)
MONOCYTES NFR BLD: 12 % (ref 2–12)
NEUTROPHILS NFR BLD: 73 % (ref 43–80)
NEUTS SEG NFR BLD: 7.66 K/UL (ref 1.8–7.3)
PLATELET # BLD AUTO: 214 K/UL (ref 130–450)
PMV BLD AUTO: 8.8 FL (ref 7–12)
POTASSIUM SERPL-SCNC: 4.6 MMOL/L (ref 3.5–5)
PROT SERPL-MCNC: 7.6 G/DL (ref 6.4–8.3)
PROTHROMBIN TIME: 28 SEC (ref 9.3–12.4)
RBC # BLD AUTO: 4.46 M/UL (ref 3.8–5.8)
RSV RNA NPH QL NAA+NON-PROBE: NOT DETECTED
RV+EV RNA NPH QL NAA+NON-PROBE: DETECTED
SARS-COV-2 RDRP RESP QL NAA+PROBE: NOT DETECTED
SARS-COV-2 RNA NPH QL NAA+NON-PROBE: NOT DETECTED
SODIUM SERPL-SCNC: 138 MMOL/L (ref 132–146)
SPECIMEN DESCRIPTION: ABNORMAL
SPECIMEN DESCRIPTION: NORMAL
TROPONIN I SERPL HS-MCNC: 56 NG/L (ref 0–11)
TROPONIN I SERPL HS-MCNC: 57 NG/L (ref 0–11)
WBC OTHER # BLD: 10.5 K/UL (ref 4.5–11.5)

## 2023-08-31 PROCEDURE — 93010 ELECTROCARDIOGRAM REPORT: CPT | Performed by: INTERNAL MEDICINE

## 2023-08-31 PROCEDURE — 2580000003 HC RX 258: Performed by: NURSE PRACTITIONER

## 2023-08-31 PROCEDURE — 80053 COMPREHEN METABOLIC PANEL: CPT

## 2023-08-31 PROCEDURE — 83880 ASSAY OF NATRIURETIC PEPTIDE: CPT

## 2023-08-31 PROCEDURE — 85025 COMPLETE CBC W/AUTO DIFF WBC: CPT

## 2023-08-31 PROCEDURE — 87635 SARS-COV-2 COVID-19 AMP PRB: CPT

## 2023-08-31 PROCEDURE — 1200000000 HC SEMI PRIVATE

## 2023-08-31 PROCEDURE — 93005 ELECTROCARDIOGRAM TRACING: CPT

## 2023-08-31 PROCEDURE — 94640 AIRWAY INHALATION TREATMENT: CPT

## 2023-08-31 PROCEDURE — 84145 PROCALCITONIN (PCT): CPT

## 2023-08-31 PROCEDURE — 2580000003 HC RX 258: Performed by: EMERGENCY MEDICINE

## 2023-08-31 PROCEDURE — 85610 PROTHROMBIN TIME: CPT

## 2023-08-31 PROCEDURE — 94664 DEMO&/EVAL PT USE INHALER: CPT

## 2023-08-31 PROCEDURE — 6370000000 HC RX 637 (ALT 250 FOR IP): Performed by: NURSE PRACTITIONER

## 2023-08-31 PROCEDURE — 99222 1ST HOSP IP/OBS MODERATE 55: CPT | Performed by: STUDENT IN AN ORGANIZED HEALTH CARE EDUCATION/TRAINING PROGRAM

## 2023-08-31 PROCEDURE — 87899 AGENT NOS ASSAY W/OPTIC: CPT

## 2023-08-31 PROCEDURE — 6370000000 HC RX 637 (ALT 250 FOR IP): Performed by: EMERGENCY MEDICINE

## 2023-08-31 PROCEDURE — 0202U NFCT DS 22 TRGT SARS-COV-2: CPT

## 2023-08-31 PROCEDURE — 99285 EMERGENCY DEPT VISIT HI MDM: CPT

## 2023-08-31 PROCEDURE — 71045 X-RAY EXAM CHEST 1 VIEW: CPT

## 2023-08-31 PROCEDURE — 6360000002 HC RX W HCPCS: Performed by: EMERGENCY MEDICINE

## 2023-08-31 PROCEDURE — 87449 NOS EACH ORGANISM AG IA: CPT

## 2023-08-31 PROCEDURE — 6360000002 HC RX W HCPCS: Performed by: NURSE PRACTITIONER

## 2023-08-31 PROCEDURE — 2500000003 HC RX 250 WO HCPCS: Performed by: NURSE PRACTITIONER

## 2023-08-31 PROCEDURE — 84484 ASSAY OF TROPONIN QUANT: CPT

## 2023-08-31 PROCEDURE — 82962 GLUCOSE BLOOD TEST: CPT

## 2023-08-31 RX ORDER — ACETAMINOPHEN 325 MG/1
650 TABLET ORAL EVERY 6 HOURS PRN
Status: DISCONTINUED | OUTPATIENT
Start: 2023-08-31 | End: 2023-09-05 | Stop reason: HOSPADM

## 2023-08-31 RX ORDER — SODIUM CHLORIDE 9 MG/ML
INJECTION, SOLUTION INTRAVENOUS PRN
Status: DISCONTINUED | OUTPATIENT
Start: 2023-08-31 | End: 2023-09-05 | Stop reason: HOSPADM

## 2023-08-31 RX ORDER — POLYETHYLENE GLYCOL 3350 17 G/17G
17 POWDER, FOR SOLUTION ORAL DAILY PRN
Status: DISCONTINUED | OUTPATIENT
Start: 2023-08-31 | End: 2023-09-05 | Stop reason: HOSPADM

## 2023-08-31 RX ORDER — INSULIN LISPRO 100 [IU]/ML
0-4 INJECTION, SOLUTION INTRAVENOUS; SUBCUTANEOUS NIGHTLY
Status: DISCONTINUED | OUTPATIENT
Start: 2023-08-31 | End: 2023-09-05 | Stop reason: HOSPADM

## 2023-08-31 RX ORDER — ISOSORBIDE DINITRATE 10 MG/1
10 TABLET ORAL 2 TIMES DAILY WITH MEALS
Status: DISCONTINUED | OUTPATIENT
Start: 2023-08-31 | End: 2023-09-05 | Stop reason: HOSPADM

## 2023-08-31 RX ORDER — HYDRALAZINE HYDROCHLORIDE 10 MG/1
10 TABLET, FILM COATED ORAL 2 TIMES DAILY
Status: DISCONTINUED | OUTPATIENT
Start: 2023-08-31 | End: 2023-09-05 | Stop reason: HOSPADM

## 2023-08-31 RX ORDER — METHYLPREDNISOLONE SODIUM SUCCINATE 40 MG/ML
40 INJECTION, POWDER, LYOPHILIZED, FOR SOLUTION INTRAMUSCULAR; INTRAVENOUS EVERY 8 HOURS
Status: DISCONTINUED | OUTPATIENT
Start: 2023-08-31 | End: 2023-09-02

## 2023-08-31 RX ORDER — LATANOPROST 50 UG/ML
1 SOLUTION/ DROPS OPHTHALMIC NIGHTLY
Status: DISCONTINUED | OUTPATIENT
Start: 2023-08-31 | End: 2023-09-05 | Stop reason: HOSPADM

## 2023-08-31 RX ORDER — TERBINAFINE HYDROCHLORIDE 250 MG/1
250 TABLET ORAL DAILY
Status: DISCONTINUED | OUTPATIENT
Start: 2023-08-31 | End: 2023-08-31

## 2023-08-31 RX ORDER — SODIUM CHLORIDE 0.9 % (FLUSH) 0.9 %
5-40 SYRINGE (ML) INJECTION PRN
Status: DISCONTINUED | OUTPATIENT
Start: 2023-08-31 | End: 2023-09-05 | Stop reason: HOSPADM

## 2023-08-31 RX ORDER — WARFARIN SODIUM 5 MG/1
5 TABLET ORAL
Status: COMPLETED | OUTPATIENT
Start: 2023-08-31 | End: 2023-08-31

## 2023-08-31 RX ORDER — FUROSEMIDE 20 MG/1
20 TABLET ORAL DAILY
Status: DISCONTINUED | OUTPATIENT
Start: 2023-08-31 | End: 2023-09-01

## 2023-08-31 RX ORDER — IPRATROPIUM BROMIDE AND ALBUTEROL SULFATE 2.5; .5 MG/3ML; MG/3ML
1 SOLUTION RESPIRATORY (INHALATION) ONCE
Status: COMPLETED | OUTPATIENT
Start: 2023-08-31 | End: 2023-08-31

## 2023-08-31 RX ORDER — SODIUM CHLORIDE 0.9 % (FLUSH) 0.9 %
5-40 SYRINGE (ML) INJECTION EVERY 12 HOURS SCHEDULED
Status: DISCONTINUED | OUTPATIENT
Start: 2023-08-31 | End: 2023-09-05 | Stop reason: HOSPADM

## 2023-08-31 RX ORDER — DEXTROSE MONOHYDRATE 100 MG/ML
INJECTION, SOLUTION INTRAVENOUS CONTINUOUS PRN
Status: DISCONTINUED | OUTPATIENT
Start: 2023-08-31 | End: 2023-09-05 | Stop reason: HOSPADM

## 2023-08-31 RX ORDER — ALLOPURINOL 100 MG/1
100 TABLET ORAL
Status: DISCONTINUED | OUTPATIENT
Start: 2023-09-01 | End: 2023-09-05 | Stop reason: HOSPADM

## 2023-08-31 RX ORDER — SOTALOL HYDROCHLORIDE 80 MG/1
160 TABLET ORAL DAILY
Status: DISCONTINUED | OUTPATIENT
Start: 2023-08-31 | End: 2023-09-05 | Stop reason: HOSPADM

## 2023-08-31 RX ORDER — ALOGLIPTIN 12.5 MG/1
12.5 TABLET, FILM COATED ORAL DAILY
Status: DISCONTINUED | OUTPATIENT
Start: 2023-08-31 | End: 2023-09-01 | Stop reason: DRUGHIGH

## 2023-08-31 RX ORDER — ONDANSETRON 4 MG/1
4 TABLET, ORALLY DISINTEGRATING ORAL EVERY 8 HOURS PRN
Status: DISCONTINUED | OUTPATIENT
Start: 2023-08-31 | End: 2023-08-31

## 2023-08-31 RX ORDER — IPRATROPIUM BROMIDE AND ALBUTEROL SULFATE 2.5; .5 MG/3ML; MG/3ML
1 SOLUTION RESPIRATORY (INHALATION)
Status: DISCONTINUED | OUTPATIENT
Start: 2023-08-31 | End: 2023-09-05 | Stop reason: HOSPADM

## 2023-08-31 RX ORDER — INSULIN LISPRO 100 [IU]/ML
0-4 INJECTION, SOLUTION INTRAVENOUS; SUBCUTANEOUS
Status: DISCONTINUED | OUTPATIENT
Start: 2023-08-31 | End: 2023-09-05 | Stop reason: HOSPADM

## 2023-08-31 RX ORDER — ALBUTEROL SULFATE 2.5 MG/3ML
2.5 SOLUTION RESPIRATORY (INHALATION) EVERY 6 HOURS PRN
Status: DISCONTINUED | OUTPATIENT
Start: 2023-08-31 | End: 2023-09-05 | Stop reason: HOSPADM

## 2023-08-31 RX ORDER — ONDANSETRON 2 MG/ML
4 INJECTION INTRAMUSCULAR; INTRAVENOUS EVERY 6 HOURS PRN
Status: DISCONTINUED | OUTPATIENT
Start: 2023-08-31 | End: 2023-08-31

## 2023-08-31 RX ORDER — ACETAMINOPHEN 650 MG/1
650 SUPPOSITORY RECTAL EVERY 6 HOURS PRN
Status: DISCONTINUED | OUTPATIENT
Start: 2023-08-31 | End: 2023-09-05 | Stop reason: HOSPADM

## 2023-08-31 RX ORDER — SIMVASTATIN 40 MG
40 TABLET ORAL NIGHTLY
Status: DISCONTINUED | OUTPATIENT
Start: 2023-08-31 | End: 2023-08-31 | Stop reason: CLARIF

## 2023-08-31 RX ORDER — ATORVASTATIN CALCIUM 20 MG/1
20 TABLET, FILM COATED ORAL NIGHTLY
Status: DISCONTINUED | OUTPATIENT
Start: 2023-08-31 | End: 2023-09-05 | Stop reason: HOSPADM

## 2023-08-31 RX ADMIN — METHYLPREDNISOLONE SODIUM SUCCINATE 40 MG: 40 INJECTION, POWDER, LYOPHILIZED, FOR SOLUTION INTRAMUSCULAR; INTRAVENOUS at 22:34

## 2023-08-31 RX ADMIN — IPRATROPIUM BROMIDE AND ALBUTEROL SULFATE 1 DOSE: 2.5; .5 SOLUTION RESPIRATORY (INHALATION) at 11:19

## 2023-08-31 RX ADMIN — METHYLPREDNISOLONE SODIUM SUCCINATE 125 MG: 125 INJECTION, POWDER, FOR SOLUTION INTRAMUSCULAR; INTRAVENOUS at 15:28

## 2023-08-31 RX ADMIN — WARFARIN SODIUM 5 MG: 5 TABLET ORAL at 17:44

## 2023-08-31 RX ADMIN — DOXYCYCLINE 100 MG: 100 INJECTION, POWDER, LYOPHILIZED, FOR SOLUTION INTRAVENOUS at 17:21

## 2023-08-31 RX ADMIN — WATER 1000 MG: 1 INJECTION INTRAMUSCULAR; INTRAVENOUS; SUBCUTANEOUS at 15:29

## 2023-08-31 RX ADMIN — SODIUM CHLORIDE, PRESERVATIVE FREE 10 ML: 5 INJECTION INTRAVENOUS at 18:55

## 2023-08-31 RX ADMIN — ISOSORBIDE DINITRATE 10 MG: 10 TABLET ORAL at 18:22

## 2023-08-31 RX ADMIN — IPRATROPIUM BROMIDE AND ALBUTEROL SULFATE 1 DOSE: .5; 2.5 SOLUTION RESPIRATORY (INHALATION) at 20:26

## 2023-08-31 RX ADMIN — AZITHROMYCIN MONOHYDRATE 500 MG: 500 INJECTION, POWDER, LYOPHILIZED, FOR SOLUTION INTRAVENOUS at 15:40

## 2023-08-31 RX ADMIN — LATANOPROST 1 DROP: 50 SOLUTION OPHTHALMIC at 22:24

## 2023-08-31 RX ADMIN — ATORVASTATIN CALCIUM 20 MG: 20 TABLET, FILM COATED ORAL at 22:19

## 2023-08-31 RX ADMIN — SODIUM CHLORIDE, PRESERVATIVE FREE 10 ML: 5 INJECTION INTRAVENOUS at 22:19

## 2023-08-31 RX ADMIN — HYDRALAZINE HYDROCHLORIDE 10 MG: 10 TABLET, FILM COATED ORAL at 22:19

## 2023-08-31 RX ADMIN — SODIUM CHLORIDE, PRESERVATIVE FREE 10 ML: 5 INJECTION INTRAVENOUS at 17:16

## 2023-08-31 ASSESSMENT — PAIN - FUNCTIONAL ASSESSMENT: PAIN_FUNCTIONAL_ASSESSMENT: NONE - DENIES PAIN

## 2023-08-31 NOTE — ED PROVIDER NOTES
[facility] MED SURG/TELE  EMERGENCY DEPARTMENT ENCOUNTER        Pt Name: Garrick Taylor  MRN: [id number]  [facility] [facility] 1945  Date of evaluation: 8/31/2023  Provider: Mario Woods DO  PCP: Manjit Morris MD  Note Started: 10:17 AM EDT 8/31/23    CHIEF COMPLAINT       Chief Complaint   Patient presents with    Shortness of Breath    Cough    Chest Congestion       HISTORY OF PRESENT ILLNESS: 1 or more Elements   History From: Patient    Limitations to history : None  Social Determinants : None    Garrick Taylor is a 77 y.o. male who presents SOB since few days,increased cough and sputum production and change in colour of sputum production. He has had copious amount of greenish sputum since a couple of days. No fevers  He had low Spo2 yesterday as low as 88 at home room air and was told by his pulmonologist to visit ER. H/o stable COPD on nebulisations at home and room air  H/o lung surgery in 2014 for ?empyema and had lobectomy for suspected mesothelioma. 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     Nursing Notes were all reviewed and agreed with or any disagreements were addressed in the HPI.    ROS:   Pertinent positives and negatives are stated within HPI, all other systems reviewed and are negative.      --------------------------------------------- PAST HISTORY ---------------------------------------------  Past Medical History:  has a past medical history of Atrial fibrillation ([address]), CAD (coronary artery disease), Cancer ([address]), Carcinoid syndrome ([address]), Chronic kidney disease, COPD (chronic obstructive pulmonary disease) ([address]), DDD (degenerative disc disease), lumbar, Diabetes mellitus ([address]),

## 2023-08-31 NOTE — ED NOTES
Pt ambulated approx 5 feet and spo2 dropped to 84%. Returned pt to bed and applied 2L o2 via nc. Pt spo2 now at 96% on the 2L.       Mau Moreau RN  08/31/23 [id number]

## 2023-08-31 NOTE — TELEPHONE ENCOUNTER
Ever Montenegro - Daughter   [phone]        She is calling to tell you that she dropped Sangeeta Whitmore off at the ER in [place] because he is having trouble breathing. This was the advice of his Pulmonologist, Dr Parminder Belcher. He did not check his INR yet today.

## 2023-08-31 NOTE — H&P
continue meds    9. H/o cardiomyopathy: no echo on file. Follows with Dr. Denia Curiel. 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 Time spent reviewing chart, clinical exam, discussing case and answering questions with staff/consultants/patient/family = 45 minutes       Code Status: FULL  DVT prophylaxis: lovenox      NOTE: This report was transcribed using voice recognition software. Every effort was made to ensure accuracy; however, inadvertent computerized transcription errors may be present.   Electronically signed by DEEJAY Hinton on 8/31/2023 at 3:54 PM

## 2023-08-31 NOTE — TELEPHONE ENCOUNTER
Thank you for the update! I will follow along with what emergency room findings and please schedule appointment for us to review and notify if any new changes new concerns!

## 2023-09-01 LAB
ANION GAP SERPL CALCULATED.3IONS-SCNC: 10 MMOL/L (ref 7–16)
BASOPHILS # BLD: 0 K/UL (ref 0–0.2)
BASOPHILS NFR BLD: 0 % (ref 0–2)
BUN SERPL-MCNC: 47 MG/DL (ref 6–23)
CALCIUM SERPL-MCNC: 9.9 MG/DL (ref 8.6–10.2)
CHLORIDE SERPL-SCNC: 100 MMOL/L (ref 98–107)
CO2 SERPL-SCNC: 26 MMOL/L (ref 22–29)
CREAT SERPL-MCNC: 2.4 MG/DL (ref 0.7–1.2)
EOSINOPHIL # BLD: 0 K/UL (ref 0.05–0.5)
EOSINOPHILS RELATIVE PERCENT: 0 % (ref 0–6)
ERYTHROCYTE [DISTWIDTH] IN BLOOD BY AUTOMATED COUNT: 13.7 % (ref 11.5–15)
GFR SERPL CREATININE-BSD FRML MDRD: 27 ML/MIN/1.73M2
GLUCOSE BLD-MCNC: 165 MG/DL (ref 74–99)
GLUCOSE BLD-MCNC: 170 MG/DL (ref 74–99)
GLUCOSE BLD-MCNC: 171 MG/DL (ref 74–99)
GLUCOSE BLD-MCNC: 182 MG/DL (ref 74–99)
GLUCOSE SERPL-MCNC: 183 MG/DL (ref 74–99)
HCT VFR BLD AUTO: 38.5 % (ref 37–54)
HGB BLD-MCNC: 13.3 G/DL (ref 12.5–16.5)
IMM GRANULOCYTES # BLD AUTO: 0.06 K/UL (ref 0–0.58)
IMM GRANULOCYTES NFR BLD: 1 % (ref 0–5)
INR PPP: 3
L PNEUMO1 AG UR QL IA.RAPID: NEGATIVE
LYMPHOCYTES NFR BLD: 1.17 K/UL (ref 1.5–4)
LYMPHOCYTES RELATIVE PERCENT: 14 % (ref 20–42)
MCH RBC QN AUTO: 32 PG (ref 26–35)
MCHC RBC AUTO-ENTMCNC: 34.5 G/DL (ref 32–34.5)
MCV RBC AUTO: 92.5 FL (ref 80–99.9)
MONOCYTES NFR BLD: 0.12 K/UL (ref 0.1–0.95)
MONOCYTES NFR BLD: 1 % (ref 2–12)
NEUTROPHILS NFR BLD: 84 % (ref 43–80)
NEUTS SEG NFR BLD: 7.1 K/UL (ref 1.8–7.3)
PLATELET # BLD AUTO: 198 K/UL (ref 130–450)
PMV BLD AUTO: 8.8 FL (ref 7–12)
POTASSIUM SERPL-SCNC: 4.7 MMOL/L (ref 3.5–5)
PROCALCITONIN SERPL-MCNC: 0.14 NG/ML (ref 0–0.08)
PROTHROMBIN TIME: 33.6 SEC (ref 9.3–12.4)
RBC # BLD AUTO: 4.16 M/UL (ref 3.8–5.8)
S PNEUM AG SPEC QL: NEGATIVE
SODIUM SERPL-SCNC: 136 MMOL/L (ref 132–146)
SPECIMEN SOURCE: NORMAL
WBC OTHER # BLD: 8.5 K/UL (ref 4.5–11.5)

## 2023-09-01 PROCEDURE — 1200000000 HC SEMI PRIVATE

## 2023-09-01 PROCEDURE — 6370000000 HC RX 637 (ALT 250 FOR IP): Performed by: NURSE PRACTITIONER

## 2023-09-01 PROCEDURE — 82962 GLUCOSE BLOOD TEST: CPT

## 2023-09-01 PROCEDURE — 87077 CULTURE AEROBIC IDENTIFY: CPT

## 2023-09-01 PROCEDURE — 80048 BASIC METABOLIC PNL TOTAL CA: CPT

## 2023-09-01 PROCEDURE — 94640 AIRWAY INHALATION TREATMENT: CPT

## 2023-09-01 PROCEDURE — 87205 SMEAR GRAM STAIN: CPT

## 2023-09-01 PROCEDURE — 6360000002 HC RX W HCPCS

## 2023-09-01 PROCEDURE — 99232 SBSQ HOSP IP/OBS MODERATE 35: CPT | Performed by: STUDENT IN AN ORGANIZED HEALTH CARE EDUCATION/TRAINING PROGRAM

## 2023-09-01 PROCEDURE — 87070 CULTURE OTHR SPECIMN AEROBIC: CPT

## 2023-09-01 PROCEDURE — 2580000003 HC RX 258: Performed by: INTERNAL MEDICINE

## 2023-09-01 PROCEDURE — 85025 COMPLETE CBC W/AUTO DIFF WBC: CPT

## 2023-09-01 PROCEDURE — 2580000003 HC RX 258: Performed by: NURSE PRACTITIONER

## 2023-09-01 PROCEDURE — 2500000003 HC RX 250 WO HCPCS: Performed by: NURSE PRACTITIONER

## 2023-09-01 PROCEDURE — 2700000000 HC OXYGEN THERAPY PER DAY

## 2023-09-01 PROCEDURE — 6360000002 HC RX W HCPCS: Performed by: NURSE PRACTITIONER

## 2023-09-01 PROCEDURE — 85610 PROTHROMBIN TIME: CPT

## 2023-09-01 RX ORDER — ARFORMOTEROL TARTRATE 15 UG/2ML
15 SOLUTION RESPIRATORY (INHALATION)
Status: DISCONTINUED | OUTPATIENT
Start: 2023-09-01 | End: 2023-09-05 | Stop reason: HOSPADM

## 2023-09-01 RX ORDER — SODIUM CHLORIDE 450 MG/100ML
INJECTION, SOLUTION INTRAVENOUS CONTINUOUS
Status: DISCONTINUED | OUTPATIENT
Start: 2023-09-01 | End: 2023-09-04

## 2023-09-01 RX ORDER — ALOGLIPTIN 6.25 MG/1
6.25 TABLET, FILM COATED ORAL DAILY
Status: DISCONTINUED | OUTPATIENT
Start: 2023-09-01 | End: 2023-09-05 | Stop reason: HOSPADM

## 2023-09-01 RX ADMIN — LATANOPROST 1 DROP: 50 SOLUTION OPHTHALMIC at 20:42

## 2023-09-01 RX ADMIN — ISOSORBIDE DINITRATE 10 MG: 10 TABLET ORAL at 08:36

## 2023-09-01 RX ADMIN — HYDRALAZINE HYDROCHLORIDE 10 MG: 10 TABLET, FILM COATED ORAL at 08:36

## 2023-09-01 RX ADMIN — IPRATROPIUM BROMIDE AND ALBUTEROL SULFATE 1 DOSE: .5; 2.5 SOLUTION RESPIRATORY (INHALATION) at 20:24

## 2023-09-01 RX ADMIN — ARFORMOTEROL TARTRATE 15 MCG: 15 SOLUTION RESPIRATORY (INHALATION) at 20:25

## 2023-09-01 RX ADMIN — ALOGLIPTIN 6.25 MG: 6.25 TABLET, FILM COATED ORAL at 08:35

## 2023-09-01 RX ADMIN — ATORVASTATIN CALCIUM 20 MG: 20 TABLET, FILM COATED ORAL at 20:41

## 2023-09-01 RX ADMIN — SODIUM CHLORIDE, PRESERVATIVE FREE 10 ML: 5 INJECTION INTRAVENOUS at 20:41

## 2023-09-01 RX ADMIN — SODIUM CHLORIDE, PRESERVATIVE FREE 10 ML: 5 INJECTION INTRAVENOUS at 14:19

## 2023-09-01 RX ADMIN — SODIUM CHLORIDE, PRESERVATIVE FREE 10 ML: 5 INJECTION INTRAVENOUS at 08:36

## 2023-09-01 RX ADMIN — IPRATROPIUM BROMIDE AND ALBUTEROL SULFATE 1 DOSE: .5; 2.5 SOLUTION RESPIRATORY (INHALATION) at 15:52

## 2023-09-01 RX ADMIN — METHYLPREDNISOLONE SODIUM SUCCINATE 40 MG: 40 INJECTION, POWDER, LYOPHILIZED, FOR SOLUTION INTRAMUSCULAR; INTRAVENOUS at 06:54

## 2023-09-01 RX ADMIN — DOXYCYCLINE 100 MG: 100 INJECTION, POWDER, LYOPHILIZED, FOR SOLUTION INTRAVENOUS at 16:29

## 2023-09-01 RX ADMIN — ISOSORBIDE DINITRATE 10 MG: 10 TABLET ORAL at 16:25

## 2023-09-01 RX ADMIN — SOTALOL HYDROCHLORIDE 160 MG: 80 TABLET ORAL at 08:36

## 2023-09-01 RX ADMIN — IPRATROPIUM BROMIDE AND ALBUTEROL SULFATE 1 DOSE: .5; 2.5 SOLUTION RESPIRATORY (INHALATION) at 09:24

## 2023-09-01 RX ADMIN — SODIUM CHLORIDE: 4.5 INJECTION, SOLUTION INTRAVENOUS at 14:19

## 2023-09-01 RX ADMIN — ALLOPURINOL 100 MG: 100 TABLET ORAL at 20:41

## 2023-09-01 RX ADMIN — ARFORMOTEROL TARTRATE 15 MCG: 15 SOLUTION RESPIRATORY (INHALATION) at 13:10

## 2023-09-01 RX ADMIN — IPRATROPIUM BROMIDE AND ALBUTEROL SULFATE 1 DOSE: .5; 2.5 SOLUTION RESPIRATORY (INHALATION) at 13:10

## 2023-09-01 RX ADMIN — DOXYCYCLINE 100 MG: 100 INJECTION, POWDER, LYOPHILIZED, FOR SOLUTION INTRAVENOUS at 04:50

## 2023-09-01 RX ADMIN — FUROSEMIDE 20 MG: 20 TABLET ORAL at 06:47

## 2023-09-01 RX ADMIN — METHYLPREDNISOLONE SODIUM SUCCINATE 40 MG: 40 INJECTION, POWDER, LYOPHILIZED, FOR SOLUTION INTRAMUSCULAR; INTRAVENOUS at 14:19

## 2023-09-01 RX ADMIN — HYDRALAZINE HYDROCHLORIDE 10 MG: 10 TABLET, FILM COATED ORAL at 20:41

## 2023-09-01 ASSESSMENT — PAIN SCALES - GENERAL
PAINLEVEL_OUTOF10: 0

## 2023-09-01 NOTE — ACP (ADVANCE CARE PLANNING)
Advance Care Planning   Healthcare Decision Maker:    Primary Decision Maker: Tamiko [facility] - [phone]    Magdalena Wray.

## 2023-09-01 NOTE — PLAN OF CARE
Problem: Safety - Adult  Goal: Free from fall injury  9/1/2023 1105 by Aung Butcher RN  Outcome: Progressing  9/1/2023 0510 by Curly Corcoran RN  Outcome: Progressing

## 2023-09-01 NOTE — CONSULTS
Pulmonary Consultation    Admit Date: 8/31/2023    Requesting Physician: Kathy Richard MD    CC:  acute respiratory failure, COPD exacerbation, rhinovirus +    HPI:  Juanita Howell 77 y.o. male former smoker, patient of Dr. Shyam Valenzuela with PMH of COPD, centrilobular emphysema, atrial fibrillation, CAD, chronic kidney disease, HTN, HLD, type II diabetes, myocardial infarction who presented to the ED 8/31 for shortness of breath, reported hypoxia of 88% on room air and was told to come in under the direction of pulmonology office. ED workup revealed:  BUN/CR 31/1.8, PCT 0.14, WBC 10.5, , Troponin 57>56, respiratory panel +rhino virus  CXR: Stable calcified pleural plaques on the right compatible with prior asbestos  exposure. No new abnormal findings  Patient received IV rocephin x 1 and solumedrol 125 mg x 1. Pulmonary consulted for COPD exacerbation. Patient seen on 2L NC with complaints of shortness of breath with minimal exertion, increased cough with green sputum; increased wheezing.     PMH:    Past Medical History:   Diagnosis Date    Atrial fibrillation ([address]) 01/04/2014    CAD (coronary artery disease)     Cancer (HCC)     VIPoma    Carcinoid syndrome (HCC)     Chronic kidney disease     COPD (chronic obstructive pulmonary disease) (HCC)     DDD (degenerative disc disease), lumbar 09/20/2022    Diabetes mellitus ([address])     Hypertension     Idiopathic chronic gout of multiple sites without tophus 09/05/2019    Meniere disease     MI (myocardial infarction) ([address])     x3    Mixed hyperlipidemia 09/05/2019    Obesity     Pacemaker     Type 2 diabetes mellitus without complication (HCC)      PSH:    Past Surgical History:   Procedure Laterality Date    CARDIAC SURGERY      stents/pacemaker    COLONOSCOPY      CORONARY ANGIOPLASTY       CORONARY ANGIOPLASTY WITH STENT PLACEMENT      HERNIA REPAIR      LUNG SURGERY Right 2014    Dr. Mamta Sahni for \"infection around lung\"    MEDICATION INJECTION
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

## 2023-09-01 NOTE — CARE COORDINATION
Met with pt at bedside; lives independently with son. Uses cane prn. PCP Ashlyn Ravi. Very [place]. Currently on 2lnc (new) . Will monitor for home 02 needs. No DME preference . Referral to [email or web address] . Pt states he has a privately owned nebulizer at home that is 6 yrs old. Requesting a new one. Will ask physicians to order at discharge. Ok with [facility] as supplier. currently on IV solumedrol/rocephin renal also following for BUN 47 cr 2. 4.plan is home at discharge. Declines any HHC needs. Alie Peck.

## 2023-09-02 LAB
ANION GAP SERPL CALCULATED.3IONS-SCNC: 10 MMOL/L (ref 7–16)
BASOPHILS # BLD: 0.01 K/UL (ref 0–0.2)
BASOPHILS NFR BLD: 0 % (ref 0–2)
BUN SERPL-MCNC: 45 MG/DL (ref 6–23)
CALCIUM SERPL-MCNC: 10 MG/DL (ref 8.6–10.2)
CHLORIDE SERPL-SCNC: 102 MMOL/L (ref 98–107)
CO2 SERPL-SCNC: 24 MMOL/L (ref 22–29)
CREAT SERPL-MCNC: 1.8 MG/DL (ref 0.7–1.2)
EOSINOPHIL # BLD: 0 K/UL (ref 0.05–0.5)
EOSINOPHILS RELATIVE PERCENT: 0 % (ref 0–6)
ERYTHROCYTE [DISTWIDTH] IN BLOOD BY AUTOMATED COUNT: 13.9 % (ref 11.5–15)
GFR SERPL CREATININE-BSD FRML MDRD: 38 ML/MIN/1.73M2
GLUCOSE BLD-MCNC: 142 MG/DL (ref 74–99)
GLUCOSE BLD-MCNC: 153 MG/DL (ref 74–99)
GLUCOSE BLD-MCNC: 165 MG/DL (ref 74–99)
GLUCOSE BLD-MCNC: 191 MG/DL (ref 74–99)
GLUCOSE SERPL-MCNC: 202 MG/DL (ref 74–99)
HCT VFR BLD AUTO: 36.4 % (ref 37–54)
HGB BLD-MCNC: 12.2 G/DL (ref 12.5–16.5)
IMM GRANULOCYTES # BLD AUTO: 0.15 K/UL (ref 0–0.58)
IMM GRANULOCYTES NFR BLD: 1 % (ref 0–5)
INR PPP: 4.1
LYMPHOCYTES NFR BLD: 0.97 K/UL (ref 1.5–4)
LYMPHOCYTES RELATIVE PERCENT: 6 % (ref 20–42)
MCH RBC QN AUTO: 31.7 PG (ref 26–35)
MCHC RBC AUTO-ENTMCNC: 33.5 G/DL (ref 32–34.5)
MCV RBC AUTO: 94.5 FL (ref 80–99.9)
MONOCYTES NFR BLD: 0.59 K/UL (ref 0.1–0.95)
MONOCYTES NFR BLD: 4 % (ref 2–12)
NEUTROPHILS NFR BLD: 90 % (ref 43–80)
NEUTS SEG NFR BLD: 14.81 K/UL (ref 1.8–7.3)
PLATELET # BLD AUTO: 224 K/UL (ref 130–450)
PMV BLD AUTO: 8.6 FL (ref 7–12)
POTASSIUM SERPL-SCNC: 5 MMOL/L (ref 3.5–5)
POTASSIUM SERPL-SCNC: 5.2 MMOL/L (ref 3.5–5)
PROTHROMBIN TIME: 45.6 SEC (ref 9.3–12.4)
RBC # BLD AUTO: 3.85 M/UL (ref 3.8–5.8)
SODIUM SERPL-SCNC: 136 MMOL/L (ref 132–146)
WBC OTHER # BLD: 16.5 K/UL (ref 4.5–11.5)

## 2023-09-02 PROCEDURE — 2580000003 HC RX 258: Performed by: INTERNAL MEDICINE

## 2023-09-02 PROCEDURE — 85610 PROTHROMBIN TIME: CPT

## 2023-09-02 PROCEDURE — 82962 GLUCOSE BLOOD TEST: CPT

## 2023-09-02 PROCEDURE — 84132 ASSAY OF SERUM POTASSIUM: CPT

## 2023-09-02 PROCEDURE — 99232 SBSQ HOSP IP/OBS MODERATE 35: CPT | Performed by: STUDENT IN AN ORGANIZED HEALTH CARE EDUCATION/TRAINING PROGRAM

## 2023-09-02 PROCEDURE — 6360000002 HC RX W HCPCS: Performed by: NURSE PRACTITIONER

## 2023-09-02 PROCEDURE — 6360000002 HC RX W HCPCS

## 2023-09-02 PROCEDURE — 94640 AIRWAY INHALATION TREATMENT: CPT

## 2023-09-02 PROCEDURE — 6370000000 HC RX 637 (ALT 250 FOR IP): Performed by: NURSE PRACTITIONER

## 2023-09-02 PROCEDURE — 2700000000 HC OXYGEN THERAPY PER DAY

## 2023-09-02 PROCEDURE — 80048 BASIC METABOLIC PNL TOTAL CA: CPT

## 2023-09-02 PROCEDURE — 85025 COMPLETE CBC W/AUTO DIFF WBC: CPT

## 2023-09-02 PROCEDURE — 6360000002 HC RX W HCPCS: Performed by: CLINICAL NURSE SPECIALIST

## 2023-09-02 PROCEDURE — 2500000003 HC RX 250 WO HCPCS: Performed by: NURSE PRACTITIONER

## 2023-09-02 PROCEDURE — 2580000003 HC RX 258: Performed by: NURSE PRACTITIONER

## 2023-09-02 PROCEDURE — 1200000000 HC SEMI PRIVATE

## 2023-09-02 RX ORDER — METHYLPREDNISOLONE SODIUM SUCCINATE 40 MG/ML
40 INJECTION, POWDER, LYOPHILIZED, FOR SOLUTION INTRAMUSCULAR; INTRAVENOUS EVERY 12 HOURS
Status: DISCONTINUED | OUTPATIENT
Start: 2023-09-02 | End: 2023-09-03

## 2023-09-02 RX ADMIN — METHYLPREDNISOLONE SODIUM SUCCINATE 40 MG: 40 INJECTION, POWDER, LYOPHILIZED, FOR SOLUTION INTRAMUSCULAR; INTRAVENOUS at 00:09

## 2023-09-02 RX ADMIN — HYDRALAZINE HYDROCHLORIDE 10 MG: 10 TABLET, FILM COATED ORAL at 08:29

## 2023-09-02 RX ADMIN — SOTALOL HYDROCHLORIDE 160 MG: 80 TABLET ORAL at 08:30

## 2023-09-02 RX ADMIN — DOXYCYCLINE 100 MG: 100 INJECTION, POWDER, LYOPHILIZED, FOR SOLUTION INTRAVENOUS at 04:35

## 2023-09-02 RX ADMIN — IPRATROPIUM BROMIDE AND ALBUTEROL SULFATE 1 DOSE: .5; 2.5 SOLUTION RESPIRATORY (INHALATION) at 14:10

## 2023-09-02 RX ADMIN — HYDRALAZINE HYDROCHLORIDE 10 MG: 10 TABLET, FILM COATED ORAL at 22:18

## 2023-09-02 RX ADMIN — SODIUM CHLORIDE: 4.5 INJECTION, SOLUTION INTRAVENOUS at 08:33

## 2023-09-02 RX ADMIN — ATORVASTATIN CALCIUM 20 MG: 20 TABLET, FILM COATED ORAL at 22:18

## 2023-09-02 RX ADMIN — METHYLPREDNISOLONE SODIUM SUCCINATE 40 MG: 40 INJECTION, POWDER, LYOPHILIZED, FOR SOLUTION INTRAMUSCULAR; INTRAVENOUS at 06:19

## 2023-09-02 RX ADMIN — SODIUM CHLORIDE, PRESERVATIVE FREE 10 ML: 5 INJECTION INTRAVENOUS at 22:18

## 2023-09-02 RX ADMIN — SODIUM CHLORIDE: 4.5 INJECTION, SOLUTION INTRAVENOUS at 00:10

## 2023-09-02 RX ADMIN — LATANOPROST 1 DROP: 50 SOLUTION OPHTHALMIC at 22:18

## 2023-09-02 RX ADMIN — IPRATROPIUM BROMIDE AND ALBUTEROL SULFATE 1 DOSE: .5; 2.5 SOLUTION RESPIRATORY (INHALATION) at 16:33

## 2023-09-02 RX ADMIN — ISOSORBIDE DINITRATE 10 MG: 10 TABLET ORAL at 16:18

## 2023-09-02 RX ADMIN — SODIUM CHLORIDE, PRESERVATIVE FREE 10 ML: 5 INJECTION INTRAVENOUS at 08:30

## 2023-09-02 RX ADMIN — IPRATROPIUM BROMIDE AND ALBUTEROL SULFATE 1 DOSE: .5; 2.5 SOLUTION RESPIRATORY (INHALATION) at 21:20

## 2023-09-02 RX ADMIN — METHYLPREDNISOLONE SODIUM SUCCINATE 40 MG: 40 INJECTION, POWDER, LYOPHILIZED, FOR SOLUTION INTRAMUSCULAR; INTRAVENOUS at 18:28

## 2023-09-02 RX ADMIN — IPRATROPIUM BROMIDE AND ALBUTEROL SULFATE 1 DOSE: .5; 2.5 SOLUTION RESPIRATORY (INHALATION) at 08:44

## 2023-09-02 RX ADMIN — DOXYCYCLINE 100 MG: 100 INJECTION, POWDER, LYOPHILIZED, FOR SOLUTION INTRAVENOUS at 16:21

## 2023-09-02 RX ADMIN — ARFORMOTEROL TARTRATE 15 MCG: 15 SOLUTION RESPIRATORY (INHALATION) at 08:44

## 2023-09-02 RX ADMIN — ALOGLIPTIN 6.25 MG: 6.25 TABLET, FILM COATED ORAL at 08:30

## 2023-09-02 RX ADMIN — ISOSORBIDE DINITRATE 10 MG: 10 TABLET ORAL at 08:30

## 2023-09-02 RX ADMIN — ARFORMOTEROL TARTRATE 15 MCG: 15 SOLUTION RESPIRATORY (INHALATION) at 21:20

## 2023-09-02 ASSESSMENT — PAIN SCALES - GENERAL: PAINLEVEL_OUTOF10: 0

## 2023-09-02 NOTE — PLAN OF CARE
Problem: Safety - Adult  Goal: Free from fall injury  Outcome: Progressing     Problem: Pain  Goal: Verbalizes/displays adequate comfort level or baseline comfort level  Outcome: Progressing     Problem: Chronic Conditions and Co-morbidities  Goal: Patient's chronic conditions and co-morbidity symptoms are monitored and maintained or improved  9/2/2023 1820 by Trever Westbrook RN  Outcome: Progressing  9/2/2023 1046 by Terver Westbrook RN  Outcome: Progressing

## 2023-09-03 LAB
ANION GAP SERPL CALCULATED.3IONS-SCNC: 7 MMOL/L (ref 7–16)
ANION GAP SERPL CALCULATED.3IONS-SCNC: 8 MMOL/L (ref 7–16)
BASOPHILS # BLD: 0.02 K/UL (ref 0–0.2)
BASOPHILS NFR BLD: 0 % (ref 0–2)
BUN SERPL-MCNC: 35 MG/DL (ref 6–23)
BUN SERPL-MCNC: 37 MG/DL (ref 6–23)
CALCIUM SERPL-MCNC: 10 MG/DL (ref 8.6–10.2)
CALCIUM SERPL-MCNC: 9.9 MG/DL (ref 8.6–10.2)
CHLORIDE SERPL-SCNC: 102 MMOL/L (ref 98–107)
CHLORIDE SERPL-SCNC: 105 MMOL/L (ref 98–107)
CO2 SERPL-SCNC: 27 MMOL/L (ref 22–29)
CO2 SERPL-SCNC: 28 MMOL/L (ref 22–29)
CREAT SERPL-MCNC: 1.5 MG/DL (ref 0.7–1.2)
CREAT SERPL-MCNC: 1.6 MG/DL (ref 0.7–1.2)
EOSINOPHIL # BLD: 0 K/UL (ref 0.05–0.5)
EOSINOPHILS RELATIVE PERCENT: 0 % (ref 0–6)
ERYTHROCYTE [DISTWIDTH] IN BLOOD BY AUTOMATED COUNT: 14.2 % (ref 11.5–15)
GFR SERPL CREATININE-BSD FRML MDRD: 43 ML/MIN/1.73M2
GFR SERPL CREATININE-BSD FRML MDRD: 46 ML/MIN/1.73M2
GLUCOSE BLD-MCNC: 149 MG/DL (ref 74–99)
GLUCOSE BLD-MCNC: 160 MG/DL (ref 74–99)
GLUCOSE BLD-MCNC: 171 MG/DL (ref 74–99)
GLUCOSE BLD-MCNC: 172 MG/DL (ref 74–99)
GLUCOSE SERPL-MCNC: 179 MG/DL (ref 74–99)
GLUCOSE SERPL-MCNC: 184 MG/DL (ref 74–99)
HCT VFR BLD AUTO: 37.2 % (ref 37–54)
HGB BLD-MCNC: 12.4 G/DL (ref 12.5–16.5)
IMM GRANULOCYTES # BLD AUTO: 0.23 K/UL (ref 0–0.58)
IMM GRANULOCYTES NFR BLD: 2 % (ref 0–5)
INR PPP: 4
LYMPHOCYTES NFR BLD: 0.85 K/UL (ref 1.5–4)
LYMPHOCYTES RELATIVE PERCENT: 6 % (ref 20–42)
MCH RBC QN AUTO: 31.7 PG (ref 26–35)
MCHC RBC AUTO-ENTMCNC: 33.3 G/DL (ref 32–34.5)
MCV RBC AUTO: 95.1 FL (ref 80–99.9)
MONOCYTES NFR BLD: 0.63 K/UL (ref 0.1–0.95)
MONOCYTES NFR BLD: 4 % (ref 2–12)
NEUTROPHILS NFR BLD: 88 % (ref 43–80)
NEUTS SEG NFR BLD: 12.7 K/UL (ref 1.8–7.3)
PLATELET # BLD AUTO: 217 K/UL (ref 130–450)
PMV BLD AUTO: 8.6 FL (ref 7–12)
POTASSIUM SERPL-SCNC: 5.1 MMOL/L (ref 3.5–5)
POTASSIUM SERPL-SCNC: 5.5 MMOL/L (ref 3.5–5)
PROTHROMBIN TIME: 45 SEC (ref 9.3–12.4)
RBC # BLD AUTO: 3.91 M/UL (ref 3.8–5.8)
SODIUM SERPL-SCNC: 138 MMOL/L (ref 132–146)
SODIUM SERPL-SCNC: 139 MMOL/L (ref 132–146)
WBC OTHER # BLD: 14.4 K/UL (ref 4.5–11.5)

## 2023-09-03 PROCEDURE — 85610 PROTHROMBIN TIME: CPT

## 2023-09-03 PROCEDURE — 99232 SBSQ HOSP IP/OBS MODERATE 35: CPT | Performed by: STUDENT IN AN ORGANIZED HEALTH CARE EDUCATION/TRAINING PROGRAM

## 2023-09-03 PROCEDURE — 6360000002 HC RX W HCPCS

## 2023-09-03 PROCEDURE — 80048 BASIC METABOLIC PNL TOTAL CA: CPT

## 2023-09-03 PROCEDURE — 6370000000 HC RX 637 (ALT 250 FOR IP): Performed by: NURSE PRACTITIONER

## 2023-09-03 PROCEDURE — 6370000000 HC RX 637 (ALT 250 FOR IP): Performed by: INTERNAL MEDICINE

## 2023-09-03 PROCEDURE — 6360000002 HC RX W HCPCS: Performed by: CLINICAL NURSE SPECIALIST

## 2023-09-03 PROCEDURE — 2500000003 HC RX 250 WO HCPCS: Performed by: NURSE PRACTITIONER

## 2023-09-03 PROCEDURE — 51798 US URINE CAPACITY MEASURE: CPT

## 2023-09-03 PROCEDURE — 82962 GLUCOSE BLOOD TEST: CPT

## 2023-09-03 PROCEDURE — 1200000000 HC SEMI PRIVATE

## 2023-09-03 PROCEDURE — 2700000000 HC OXYGEN THERAPY PER DAY

## 2023-09-03 PROCEDURE — 2580000003 HC RX 258: Performed by: NURSE PRACTITIONER

## 2023-09-03 PROCEDURE — 2580000003 HC RX 258: Performed by: INTERNAL MEDICINE

## 2023-09-03 PROCEDURE — 94640 AIRWAY INHALATION TREATMENT: CPT

## 2023-09-03 PROCEDURE — 85025 COMPLETE CBC W/AUTO DIFF WBC: CPT

## 2023-09-03 RX ORDER — FUROSEMIDE 40 MG/1
40 TABLET ORAL ONCE
Status: COMPLETED | OUTPATIENT
Start: 2023-09-03 | End: 2023-09-03

## 2023-09-03 RX ORDER — PREDNISONE 20 MG/1
40 TABLET ORAL DAILY
Status: DISCONTINUED | OUTPATIENT
Start: 2023-09-04 | End: 2023-09-05 | Stop reason: HOSPADM

## 2023-09-03 RX ADMIN — HYDRALAZINE HYDROCHLORIDE 10 MG: 10 TABLET, FILM COATED ORAL at 21:25

## 2023-09-03 RX ADMIN — FUROSEMIDE 40 MG: 40 TABLET ORAL at 10:47

## 2023-09-03 RX ADMIN — ARFORMOTEROL TARTRATE 15 MCG: 15 SOLUTION RESPIRATORY (INHALATION) at 09:04

## 2023-09-03 RX ADMIN — SODIUM CHLORIDE: 4.5 INJECTION, SOLUTION INTRAVENOUS at 00:25

## 2023-09-03 RX ADMIN — ISOSORBIDE DINITRATE 10 MG: 10 TABLET ORAL at 08:24

## 2023-09-03 RX ADMIN — DOXYCYCLINE 100 MG: 100 INJECTION, POWDER, LYOPHILIZED, FOR SOLUTION INTRAVENOUS at 05:10

## 2023-09-03 RX ADMIN — IPRATROPIUM BROMIDE AND ALBUTEROL SULFATE 1 DOSE: .5; 2.5 SOLUTION RESPIRATORY (INHALATION) at 19:06

## 2023-09-03 RX ADMIN — ISOSORBIDE DINITRATE 10 MG: 10 TABLET ORAL at 16:18

## 2023-09-03 RX ADMIN — HYDRALAZINE HYDROCHLORIDE 10 MG: 10 TABLET, FILM COATED ORAL at 08:24

## 2023-09-03 RX ADMIN — SODIUM CHLORIDE: 4.5 INJECTION, SOLUTION INTRAVENOUS at 15:48

## 2023-09-03 RX ADMIN — SOTALOL HYDROCHLORIDE 160 MG: 80 TABLET ORAL at 08:24

## 2023-09-03 RX ADMIN — IPRATROPIUM BROMIDE AND ALBUTEROL SULFATE 1 DOSE: .5; 2.5 SOLUTION RESPIRATORY (INHALATION) at 12:46

## 2023-09-03 RX ADMIN — DOXYCYCLINE 100 MG: 100 INJECTION, POWDER, LYOPHILIZED, FOR SOLUTION INTRAVENOUS at 16:21

## 2023-09-03 RX ADMIN — METHYLPREDNISOLONE SODIUM SUCCINATE 40 MG: 40 INJECTION, POWDER, LYOPHILIZED, FOR SOLUTION INTRAMUSCULAR; INTRAVENOUS at 06:29

## 2023-09-03 RX ADMIN — SODIUM ZIRCONIUM CYCLOSILICATE 10 G: 10 POWDER, FOR SUSPENSION ORAL at 10:47

## 2023-09-03 RX ADMIN — ATORVASTATIN CALCIUM 20 MG: 20 TABLET, FILM COATED ORAL at 21:25

## 2023-09-03 RX ADMIN — ARFORMOTEROL TARTRATE 15 MCG: 15 SOLUTION RESPIRATORY (INHALATION) at 19:06

## 2023-09-03 RX ADMIN — IPRATROPIUM BROMIDE AND ALBUTEROL SULFATE 1 DOSE: .5; 2.5 SOLUTION RESPIRATORY (INHALATION) at 09:04

## 2023-09-03 RX ADMIN — LATANOPROST 1 DROP: 50 SOLUTION OPHTHALMIC at 21:26

## 2023-09-03 RX ADMIN — ALOGLIPTIN 6.25 MG: 6.25 TABLET, FILM COATED ORAL at 08:24

## 2023-09-03 RX ADMIN — IPRATROPIUM BROMIDE AND ALBUTEROL SULFATE 1 DOSE: .5; 2.5 SOLUTION RESPIRATORY (INHALATION) at 16:48

## 2023-09-03 ASSESSMENT — PAIN SCALES - GENERAL
PAINLEVEL_OUTOF10: 0
PAINLEVEL_OUTOF10: 0

## 2023-09-04 ENCOUNTER — APPOINTMENT (OUTPATIENT)
Dept: GENERAL RADIOLOGY | Age: 78
DRG: 190 | End: 2023-09-04
Payer: MEDICARE

## 2023-09-04 LAB
ANION GAP SERPL CALCULATED.3IONS-SCNC: 10 MMOL/L (ref 7–16)
BUN SERPL-MCNC: 36 MG/DL (ref 6–23)
CALCIUM SERPL-MCNC: 10.2 MG/DL (ref 8.6–10.2)
CHLORIDE SERPL-SCNC: 104 MMOL/L (ref 98–107)
CO2 SERPL-SCNC: 27 MMOL/L (ref 22–29)
CREAT SERPL-MCNC: 1.6 MG/DL (ref 0.7–1.2)
GFR SERPL CREATININE-BSD FRML MDRD: 43 ML/MIN/1.73M2
GLUCOSE BLD-MCNC: 116 MG/DL (ref 74–99)
GLUCOSE BLD-MCNC: 136 MG/DL (ref 74–99)
GLUCOSE BLD-MCNC: 168 MG/DL (ref 74–99)
GLUCOSE BLD-MCNC: 172 MG/DL (ref 74–99)
GLUCOSE SERPL-MCNC: 138 MG/DL (ref 74–99)
INR PPP: 3.2
MICROORGANISM SPEC CULT: ABNORMAL
MICROORGANISM SPEC CULT: ABNORMAL
MICROORGANISM/AGENT SPEC: ABNORMAL
POTASSIUM SERPL-SCNC: 4.8 MMOL/L (ref 3.5–5)
PROTHROMBIN TIME: 34.9 SEC (ref 9.3–12.4)
SODIUM SERPL-SCNC: 141 MMOL/L (ref 132–146)
SPECIMEN DESCRIPTION: ABNORMAL

## 2023-09-04 PROCEDURE — 80048 BASIC METABOLIC PNL TOTAL CA: CPT

## 2023-09-04 PROCEDURE — 99232 SBSQ HOSP IP/OBS MODERATE 35: CPT | Performed by: STUDENT IN AN ORGANIZED HEALTH CARE EDUCATION/TRAINING PROGRAM

## 2023-09-04 PROCEDURE — 2700000000 HC OXYGEN THERAPY PER DAY

## 2023-09-04 PROCEDURE — 1200000000 HC SEMI PRIVATE

## 2023-09-04 PROCEDURE — 2500000003 HC RX 250 WO HCPCS: Performed by: NURSE PRACTITIONER

## 2023-09-04 PROCEDURE — 6370000000 HC RX 637 (ALT 250 FOR IP): Performed by: NURSE PRACTITIONER

## 2023-09-04 PROCEDURE — 85610 PROTHROMBIN TIME: CPT

## 2023-09-04 PROCEDURE — 82962 GLUCOSE BLOOD TEST: CPT

## 2023-09-04 PROCEDURE — 6360000002 HC RX W HCPCS

## 2023-09-04 PROCEDURE — 94640 AIRWAY INHALATION TREATMENT: CPT

## 2023-09-04 PROCEDURE — 2580000003 HC RX 258: Performed by: NURSE PRACTITIONER

## 2023-09-04 PROCEDURE — 6370000000 HC RX 637 (ALT 250 FOR IP): Performed by: CLINICAL NURSE SPECIALIST

## 2023-09-04 PROCEDURE — 71045 X-RAY EXAM CHEST 1 VIEW: CPT

## 2023-09-04 RX ORDER — WARFARIN SODIUM 3 MG/1
3 TABLET ORAL
Status: COMPLETED | OUTPATIENT
Start: 2023-09-04 | End: 2023-09-04

## 2023-09-04 RX ADMIN — SODIUM CHLORIDE, PRESERVATIVE FREE 10 ML: 5 INJECTION INTRAVENOUS at 22:46

## 2023-09-04 RX ADMIN — ISOSORBIDE DINITRATE 10 MG: 10 TABLET ORAL at 08:06

## 2023-09-04 RX ADMIN — ALOGLIPTIN 6.25 MG: 6.25 TABLET, FILM COATED ORAL at 08:06

## 2023-09-04 RX ADMIN — PREDNISONE 40 MG: 20 TABLET ORAL at 08:06

## 2023-09-04 RX ADMIN — DOXYCYCLINE 100 MG: 100 INJECTION, POWDER, LYOPHILIZED, FOR SOLUTION INTRAVENOUS at 17:21

## 2023-09-04 RX ADMIN — ALLOPURINOL 100 MG: 100 TABLET ORAL at 22:41

## 2023-09-04 RX ADMIN — WARFARIN SODIUM 3 MG: 3 TABLET ORAL at 17:16

## 2023-09-04 RX ADMIN — ISOSORBIDE DINITRATE 10 MG: 10 TABLET ORAL at 17:16

## 2023-09-04 RX ADMIN — IPRATROPIUM BROMIDE AND ALBUTEROL SULFATE 1 DOSE: .5; 2.5 SOLUTION RESPIRATORY (INHALATION) at 08:44

## 2023-09-04 RX ADMIN — ATORVASTATIN CALCIUM 20 MG: 20 TABLET, FILM COATED ORAL at 22:41

## 2023-09-04 RX ADMIN — IPRATROPIUM BROMIDE AND ALBUTEROL SULFATE 1 DOSE: .5; 2.5 SOLUTION RESPIRATORY (INHALATION) at 13:10

## 2023-09-04 RX ADMIN — ARFORMOTEROL TARTRATE 15 MCG: 15 SOLUTION RESPIRATORY (INHALATION) at 20:22

## 2023-09-04 RX ADMIN — HYDRALAZINE HYDROCHLORIDE 10 MG: 10 TABLET, FILM COATED ORAL at 08:06

## 2023-09-04 RX ADMIN — IPRATROPIUM BROMIDE AND ALBUTEROL SULFATE 1 DOSE: .5; 2.5 SOLUTION RESPIRATORY (INHALATION) at 20:23

## 2023-09-04 RX ADMIN — DOXYCYCLINE 100 MG: 100 INJECTION, POWDER, LYOPHILIZED, FOR SOLUTION INTRAVENOUS at 04:50

## 2023-09-04 RX ADMIN — IPRATROPIUM BROMIDE AND ALBUTEROL SULFATE 1 DOSE: .5; 2.5 SOLUTION RESPIRATORY (INHALATION) at 16:21

## 2023-09-04 RX ADMIN — LATANOPROST 1 DROP: 50 SOLUTION OPHTHALMIC at 22:46

## 2023-09-04 RX ADMIN — HYDRALAZINE HYDROCHLORIDE 10 MG: 10 TABLET, FILM COATED ORAL at 22:41

## 2023-09-04 RX ADMIN — ARFORMOTEROL TARTRATE 15 MCG: 15 SOLUTION RESPIRATORY (INHALATION) at 08:46

## 2023-09-04 RX ADMIN — SOTALOL HYDROCHLORIDE 160 MG: 80 TABLET ORAL at 08:06

## 2023-09-04 ASSESSMENT — PAIN SCALES - GENERAL
PAINLEVEL_OUTOF10: 0
PAINLEVEL_OUTOF10: 0

## 2023-09-05 VITALS
WEIGHT: 235.8 LBS | DIASTOLIC BLOOD PRESSURE: 77 MMHG | HEART RATE: 76 BPM | HEIGHT: 67 IN | OXYGEN SATURATION: 96 % | SYSTOLIC BLOOD PRESSURE: 142 MMHG | TEMPERATURE: 98 F | RESPIRATION RATE: 14 BRPM | BODY MASS INDEX: 37.01 KG/M2

## 2023-09-05 LAB
GLUCOSE BLD-MCNC: 115 MG/DL (ref 74–99)
GLUCOSE BLD-MCNC: 167 MG/DL (ref 74–99)
GLUCOSE BLD-MCNC: 65 MG/DL (ref 74–99)
INR PPP: 2.4
PROTHROMBIN TIME: 26 SEC (ref 9.3–12.4)

## 2023-09-05 PROCEDURE — 85610 PROTHROMBIN TIME: CPT

## 2023-09-05 PROCEDURE — 82962 GLUCOSE BLOOD TEST: CPT

## 2023-09-05 PROCEDURE — 94640 AIRWAY INHALATION TREATMENT: CPT

## 2023-09-05 PROCEDURE — 2500000003 HC RX 250 WO HCPCS: Performed by: NURSE PRACTITIONER

## 2023-09-05 PROCEDURE — 99239 HOSP IP/OBS DSCHRG MGMT >30: CPT | Performed by: STUDENT IN AN ORGANIZED HEALTH CARE EDUCATION/TRAINING PROGRAM

## 2023-09-05 PROCEDURE — 6370000000 HC RX 637 (ALT 250 FOR IP): Performed by: CLINICAL NURSE SPECIALIST

## 2023-09-05 PROCEDURE — 6370000000 HC RX 637 (ALT 250 FOR IP): Performed by: NURSE PRACTITIONER

## 2023-09-05 PROCEDURE — 2700000000 HC OXYGEN THERAPY PER DAY

## 2023-09-05 PROCEDURE — 2580000003 HC RX 258: Performed by: NURSE PRACTITIONER

## 2023-09-05 PROCEDURE — 6360000002 HC RX W HCPCS

## 2023-09-05 RX ORDER — WARFARIN SODIUM 5 MG/1
5 TABLET ORAL
Status: DISCONTINUED | OUTPATIENT
Start: 2023-09-05 | End: 2023-09-05 | Stop reason: HOSPADM

## 2023-09-05 RX ORDER — PREDNISONE 20 MG/1
40 TABLET ORAL DAILY
Qty: 6 TABLET | Refills: 0 | Status: SHIPPED | OUTPATIENT
Start: 2023-09-06 | End: 2023-09-09

## 2023-09-05 RX ORDER — IPRATROPIUM BROMIDE AND ALBUTEROL SULFATE 2.5; .5 MG/3ML; MG/3ML
1 SOLUTION RESPIRATORY (INHALATION) EVERY 8 HOURS PRN
Status: DISCONTINUED | OUTPATIENT
Start: 2023-09-05 | End: 2023-09-05 | Stop reason: HOSPADM

## 2023-09-05 RX ADMIN — SODIUM CHLORIDE, PRESERVATIVE FREE 10 ML: 5 INJECTION INTRAVENOUS at 08:19

## 2023-09-05 RX ADMIN — PREDNISONE 40 MG: 20 TABLET ORAL at 08:18

## 2023-09-05 RX ADMIN — DOXYCYCLINE 100 MG: 100 INJECTION, POWDER, LYOPHILIZED, FOR SOLUTION INTRAVENOUS at 04:33

## 2023-09-05 RX ADMIN — IPRATROPIUM BROMIDE AND ALBUTEROL SULFATE 1 DOSE: .5; 2.5 SOLUTION RESPIRATORY (INHALATION) at 15:59

## 2023-09-05 RX ADMIN — HYDRALAZINE HYDROCHLORIDE 10 MG: 10 TABLET, FILM COATED ORAL at 08:18

## 2023-09-05 RX ADMIN — IPRATROPIUM BROMIDE AND ALBUTEROL SULFATE 1 DOSE: .5; 2.5 SOLUTION RESPIRATORY (INHALATION) at 09:37

## 2023-09-05 RX ADMIN — SOTALOL HYDROCHLORIDE 160 MG: 80 TABLET ORAL at 08:18

## 2023-09-05 RX ADMIN — ARFORMOTEROL TARTRATE 15 MCG: 15 SOLUTION RESPIRATORY (INHALATION) at 09:37

## 2023-09-05 RX ADMIN — IPRATROPIUM BROMIDE AND ALBUTEROL SULFATE 1 DOSE: .5; 2.5 SOLUTION RESPIRATORY (INHALATION) at 12:30

## 2023-09-05 RX ADMIN — ALOGLIPTIN 6.25 MG: 6.25 TABLET, FILM COATED ORAL at 08:18

## 2023-09-05 RX ADMIN — ISOSORBIDE DINITRATE 10 MG: 10 TABLET ORAL at 08:18

## 2023-09-05 NOTE — DISCHARGE SUMMARY
doctor. Original instructions: Take 1 tablet by mouth daily  What changed: Another medication with the same name was removed. Continue taking this medication, and follow the directions you see here. CONTINUE taking these medications      * albuterol (5 MG/ML) 0.5% nebulizer solution  Commonly known as: PROVENTIL  Take 0.5 mLs by nebulization every 6 hours as needed for Wheezing     * albuterol sulfate  (90 Base) MCG/ACT inhaler  Commonly known as: PROVENTIL;VENTOLIN;PROAIR     allopurinol 100 MG tablet  Commonly known as: ZYLOPRIM  TAKE 1 TABLET BY MOUTH [id number]     blood glucose test strips  Freestyle lite     Fingerstix Lancets Misc     glimepiride 2 MG tablet  Commonly known as: AMARYL     hydrALAZINE 10 MG tablet  Commonly known as: APRESOLINE     hydrocortisone 10 MG tablet  Commonly known as: CORTEF  TAKE 1 1/2 TABLETS BY MOUTH EVERY MORNING AND 1 TABLET EVERY EVENING     isosorbide dinitrate 10 MG tablet  Commonly known as: ISORDIL     latanoprost 0.005 % ophthalmic solution  Commonly known as: XALATAN     linagliptin 5 MG tablet  Commonly known as: TRADJENTA  Take 1 tablet by mouth daily Lot#[id number] Exp.7/2020     nitroGLYCERIN 0.4 MG SL tablet  Commonly known as: NITROSTAT     octreotide ACETATE 30 MG injection  Commonly known as: SANDOSTATIN LAR     simvastatin 40 MG tablet  Commonly known as: ZOCOR  Take 1 tablet by mouth nightly     sotalol 160 MG tablet  Commonly known as: BETAPACE  Take 1 tablet by mouth daily     Trelegy Ellipta 100-62.5-25 MCG/ACT Aepb inhaler  Generic drug: fluticasone-umeclidin-vilant     Vitamin D3 1.25 MG (96746 UT) Caps           * This list has 2 medication(s) that are the same as other medications prescribed for you. Read the directions carefully, and ask your doctor or other care provider to review them with you.                 STOP taking these medications      furosemide 20 MG tablet  Commonly known as: LASIX               Where to Get Your

## 2023-09-05 NOTE — CARE COORDINATION
9/5/2023  Social Work Discharge Planning:Plan is home and son can provide transportation. Pt is on 2l o2 here and [facility] is following for o2. needs. Wean o2. Pulse ox testing is needed. Pt is requesting a new nebulizer. Order will be needed. Referral made to [facility]. Electronically signed by VANDANA Cheatham on 9/5/2023 at 9:14 AM

## 2023-09-06 ENCOUNTER — CARE COORDINATION (OUTPATIENT)
Dept: CARE COORDINATION | Age: 78
End: 2023-09-06

## 2023-09-06 NOTE — CARE COORDINATION
[facility] Care Transitions Initial Follow Up Call    Call within 2 business days of discharge: Yes    Care Transition Nurse attempted initial CT outreach leaving Hipaa VM, purpose of call, my contact, and reminder to schedule recommended appts. Patient: Ian Zamarripa Patient : 1945   MRN: [id number]  Reason for Admission: 2023 - 2023 [address] [place] [address],[address] Msb IP. Resp failure, COPD, Rhinovirus. Discharge Date: 23 RARS: Readmission Risk Score: 13.3  NR  CT    P [facility] CLINICAL/ STAFF routed to schedule 7 day hosp fu PCP. Needs 4 wk appt Pulm/R Sean    START taking:  predniSONE (Yonas Royer)  Start taking on: 2023  CHANGE how you take:  warfarin (Coumadin)  STOP taking:  furosemide 20 MG tablet (LASIX)    PDM: Katelyn Smith [phone]. PMH: CAD, PPM, COPD, DM, CKD 3, AF, HTN, HLD, Gout. Oxygen and nebulizer through [facility]. Last Discharge [address] [facility],[address]       Date Complaint Diagnosis Description Type Department Provider    23 Shortness of Breath; Cough; Chest Congestion COPD exacerbation [facility]) ED to Hosp-Admission (Discharged) (ADMITTED) [facility] Ashvin Doran MD; Jolie Don. ..             Viry Soriano RN

## 2023-09-07 ENCOUNTER — ANTI-COAG VISIT (OUTPATIENT)
Dept: FAMILY MEDICINE CLINIC | Age: 78
End: 2023-09-07
Payer: MEDICARE

## 2023-09-07 ENCOUNTER — CARE COORDINATION (OUTPATIENT)
Dept: CARE COORDINATION | Age: 78
End: 2023-09-07

## 2023-09-07 DIAGNOSIS — I48.91 ATRIAL FIBRILLATION, UNSPECIFIED TYPE (HCC): Primary | ICD-10-CM

## 2023-09-07 LAB
INR BLD: 2.3
INR BLD: 2.3
PROTIME: NORMAL

## 2023-09-07 PROCEDURE — 93793 ANTICOAG MGMT PT WARFARIN: CPT | Performed by: INTERNAL MEDICINE

## 2023-09-07 NOTE — CARE COORDINATION
Care Transitions Outreach Attempt    Call within 2 business days of discharge: Yes     2nd attempt to reach the patient for initial Care Transition call post hospital discharge. HIPAA compliant message left with CTN's contact information requesting return phone call. No further outreaches will be attempted. If no return call by the end of today, CTN will  sign off and resolve  CT program    Noted HFU appt with pcp scheduled for 23. Patient: Christian Sheikh Patient : 1945 MRN: [id number]    Last Discharge [address] [facility],[address]       Date Complaint Diagnosis Description Type Department Provider    23 Shortness of Breath; Cough; Chest Congestion COPD exacerbation [facility]) ED to Hosp-Admission (Discharged) (ADMITTED) [facility] Ashanti Villareal MD; Salina Syed. .. Was this an external facility discharge?  No     Noted following upcoming appointments from discharge chart review:   [facility] follow up appointment(s):   Future Appointments   Date Time Provider [address]   2023 11:00 AM MD AARTI Gusman [facility]   10/25/2023  8:30 AM MD AARTI Gusman [facility]   2023  2:00 PM Yogi Conn MD [facility] PAIN [facility]     Non-[facility] follow up appointment(s):

## 2023-09-07 NOTE — PROGRESS NOTES
Previous INR: 2.70     Previous dose: 6mg (5mg +1mg tablets) on mon and Friday and 5mg all others          Current INR: 2.30     Recommendation: continue 6mg (5mg +1mg tablets) on mon and Friday and 5mg all others    Next INR: 1 week     Mariluz Butler MD  9/7/2023  5:03 PM

## 2023-09-08 ENCOUNTER — CARE COORDINATION (OUTPATIENT)
Dept: CARE COORDINATION | Age: 78
End: 2023-09-08

## 2023-09-08 NOTE — CARE COORDINATION
-CTN received a text message from the pt this morning with a question whether he should try without his O2 since he states in the text his SaO2 remained >92% with ambulation with O2 on. -CTN had made x2 unsuccessful (9/6 and 9/7/23) attempts to reach pt for an initial care transition call post discharge, as pt did not answer and did not return calls. -CTN attempted a 3rd time to call pt, however, no answer. Left detailed VM requesting call back with CTN's contact information provided. Advised pt not to discontinue his O2 until he first discusses it with his pcp at his f/u on 9/13/23.    -CTN asked that pt respond to CTN by phone calls and not text messages.   -If no return call by the end of today, CTN will sign off and resolve CT program.

## 2023-09-13 ENCOUNTER — OFFICE VISIT (OUTPATIENT)
Dept: PRIMARY CARE CLINIC | Age: 78
End: 2023-09-13

## 2023-09-13 VITALS
HEART RATE: 71 BPM | WEIGHT: 230.38 LBS | DIASTOLIC BLOOD PRESSURE: 60 MMHG | BODY MASS INDEX: 36.16 KG/M2 | OXYGEN SATURATION: 99 % | TEMPERATURE: 97.6 F | HEIGHT: 67 IN | SYSTOLIC BLOOD PRESSURE: 112 MMHG

## 2023-09-13 DIAGNOSIS — Z09 HOSPITAL DISCHARGE FOLLOW-UP: ICD-10-CM

## 2023-09-13 DIAGNOSIS — J96.11 CHRONIC RESPIRATORY FAILURE WITH HYPOXIA (HCC): ICD-10-CM

## 2023-09-13 DIAGNOSIS — J44.1 COPD EXACERBATION (HCC): Primary | ICD-10-CM

## 2023-09-13 DIAGNOSIS — F33.0 MILD EPISODE OF RECURRENT MAJOR DEPRESSIVE DISORDER (HCC): ICD-10-CM

## 2023-09-13 RX ORDER — WARFARIN SODIUM 5 MG/1
5 TABLET ORAL
COMMUNITY

## 2023-09-13 SDOH — ECONOMIC STABILITY: FOOD INSECURITY: WITHIN THE PAST 12 MONTHS, THE FOOD YOU BOUGHT JUST DIDN'T LAST AND YOU DIDN'T HAVE MONEY TO GET MORE.: NEVER TRUE

## 2023-09-13 SDOH — ECONOMIC STABILITY: INCOME INSECURITY: HOW HARD IS IT FOR YOU TO PAY FOR THE VERY BASICS LIKE FOOD, HOUSING, MEDICAL CARE, AND HEATING?: NOT HARD AT ALL

## 2023-09-13 SDOH — ECONOMIC STABILITY: HOUSING INSECURITY
IN THE LAST 12 MONTHS, WAS THERE A TIME WHEN YOU DID NOT HAVE A STEADY PLACE TO SLEEP OR SLEPT IN A SHELTER (INCLUDING NOW)?: NO

## 2023-09-13 SDOH — ECONOMIC STABILITY: FOOD INSECURITY: WITHIN THE PAST 12 MONTHS, YOU WORRIED THAT YOUR FOOD WOULD RUN OUT BEFORE YOU GOT MONEY TO BUY MORE.: NEVER TRUE

## 2023-09-13 NOTE — PROGRESS NOTES
[facility], [address]  [address] [facility] [address]      Phone: [phone]   sertraline 50 MG tablet           Medications marked \"taking\" at this time  Outpatient Medications Marked as Taking for the 9/13/23 encounter (Office Visit) with Mariluz Butler MD   Medication Sig Dispense Refill    warfarin (COUMADIN) 5 MG tablet Take 1 tablet by mouth      sertraline (ZOLOFT) 50 MG tablet Take 1 tablet by mouth daily 30 tablet 5    hydrALAZINE (APRESOLINE) 10 MG tablet Take 1 tablet by mouth 2 times daily      isosorbide dinitrate (ISORDIL) 10 MG tablet Take 1 tablet by mouth 2 times daily      albuterol sulfate HFA (PROVENTIL;VENTOLIN;PROAIR) 108 (90 Base) MCG/ACT inhaler INHALE 2 PUFFS INTO THE LUNGS EVERY 4 TO 6 HOURS AS NEEDED      sotalol (BETAPACE) 160 MG tablet Take 1 tablet by mouth daily 30 tablet 3    hydrocortisone (CORTEF) 10 MG tablet TAKE 1 1/2 TABLETS BY MOUTH EVERY MORNING AND 1 TABLET EVERY EVENING 225 tablet 1    TRELEGY ELLIPTA 100-62.5-25 MCG/ACT AEPB inhaler INHALE 1 PUFF INTO THE LUNGS EVERY DAY FOR 30 DAYS      warfarin (COUMADIN) 1 MG tablet Take 1 tablet by mouth daily 30 tablet 5    simvastatin (ZOCOR) 40 MG tablet Take 1 tablet by mouth nightly 90 tablet 3    allopurinol (ZYLOPRIM) 100 MG tablet TAKE 1 TABLET BY MOUTH [id number] 90 tablet 3    latanoprost (XALATAN) 0.005 % ophthalmic solution PLACE 1 DROP INTO BOTH EYES EVERY DAY AT BEDTIME      Cholecalciferol (VITAMIN D3) 1.25 MG (42400 UT) CAPS Take 1 capsule by mouth once a week      linagliptin (TRADJENTA) 5 MG tablet Take 1 tablet by mouth daily Lot#[id number] Exp.7/2020 28 tablet 0    nitroGLYCERIN (NITROSTAT) 0.4 MG SL tablet Place 1 tablet under the tongue every 5 minutes as needed for Chest pain up to max of 3 total doses.  If no relief after 1 dose, call 911.      albuterol (PROVENTIL) (5 MG/ML) 0.5% nebulizer solution Take 0.5 mLs by nebulization every 6 hours as

## 2023-09-14 ENCOUNTER — ANTI-COAG VISIT (OUTPATIENT)
Dept: FAMILY MEDICINE CLINIC | Age: 78
End: 2023-09-14
Payer: MEDICARE

## 2023-09-14 DIAGNOSIS — I48.91 ATRIAL FIBRILLATION, UNSPECIFIED TYPE (HCC): Primary | ICD-10-CM

## 2023-09-14 LAB — INR BLD: 5.8

## 2023-09-14 PROCEDURE — 93793 ANTICOAG MGMT PT WARFARIN: CPT | Performed by: INTERNAL MEDICINE

## 2023-09-14 NOTE — PROGRESS NOTES
Previous INR: 2.30     Previous dose: 6mg (5mg +1mg tablets) on mon and Friday and 5mg all others          Current INR: 5.80     Recommendation: hold x 2 then continue 6mg (5mg +1mg tablets) on mon and Friday and 5mg all others    Next INR: 1 week     Flip Daley MD  9/14/2023  12:06 PM

## 2023-09-21 ENCOUNTER — ANTI-COAG VISIT (OUTPATIENT)
Dept: FAMILY MEDICINE CLINIC | Age: 78
End: 2023-09-21
Payer: MEDICARE

## 2023-09-21 DIAGNOSIS — I48.91 ATRIAL FIBRILLATION, UNSPECIFIED TYPE (HCC): Primary | ICD-10-CM

## 2023-09-21 LAB
INR BLD: 2.8
INR BLD: 2.8
PROTIME: NORMAL

## 2023-09-21 PROCEDURE — 93793 ANTICOAG MGMT PT WARFARIN: CPT | Performed by: INTERNAL MEDICINE

## 2023-09-21 NOTE — PROGRESS NOTES
Previous INR: 5.80     Previous dose: hold x 2 then continue 6mg (5mg +1mg tablets) on mon and Friday and 5mg all others          Current INR: 2.80     Recommendation:  6mg (5mg +1mg tablets) on mon and Friday and 5mg all others    Next INR: 1 week     Josefa Ortiz MD  9/21/2023  12:52 PM

## 2023-09-28 ENCOUNTER — ANTI-COAG VISIT (OUTPATIENT)
Dept: FAMILY MEDICINE CLINIC | Age: 78
End: 2023-09-28
Payer: MEDICARE

## 2023-09-28 DIAGNOSIS — I48.91 ATRIAL FIBRILLATION, UNSPECIFIED TYPE (HCC): Primary | ICD-10-CM

## 2023-09-28 LAB — INR BLD: 2.3

## 2023-09-28 PROCEDURE — 93793 ANTICOAG MGMT PT WARFARIN: CPT | Performed by: INTERNAL MEDICINE

## 2023-09-28 NOTE — PROGRESS NOTES
Previous INR: 2.80     Previous dose: 6mg (5mg +1mg tablets) on mon and Friday and 5mg all others          Current INR: 2.30     Recommendation:  6mg (5mg +1mg tablets) on mon and Friday and 5mg all others    Next INR: 1 week     Yvonne Wright MD  9/28/2023  5:20 PM

## 2023-10-05 ENCOUNTER — ANTI-COAG VISIT (OUTPATIENT)
Dept: FAMILY MEDICINE CLINIC | Age: 78
End: 2023-10-05
Payer: MEDICARE

## 2023-10-05 DIAGNOSIS — I48.91 ATRIAL FIBRILLATION, UNSPECIFIED TYPE (HCC): Primary | ICD-10-CM

## 2023-10-05 LAB
INR BLD: 2.7
INR BLD: 2.7
PROTIME: NORMAL

## 2023-10-05 PROCEDURE — 93793 ANTICOAG MGMT PT WARFARIN: CPT | Performed by: INTERNAL MEDICINE

## 2023-10-05 NOTE — PROGRESS NOTES
Previous INR: 2.30     Previous dose: 6mg (5mg +1mg tablets) on mon and Friday and 5mg all others          Current INR: 2.70     Recommendation:  6mg (5mg +1mg tablets) on mon and Friday and 5mg all others    Next INR: 1 week     Amauri Madera MD  10/5/2023  11:58 AM

## 2023-10-12 ENCOUNTER — ANTI-COAG VISIT (OUTPATIENT)
Dept: FAMILY MEDICINE CLINIC | Age: 78
End: 2023-10-12
Payer: MEDICARE

## 2023-10-12 DIAGNOSIS — I48.91 ATRIAL FIBRILLATION, UNSPECIFIED TYPE (HCC): Primary | ICD-10-CM

## 2023-10-12 LAB
INR BLD: 2.3
INR BLD: 2.3
PROTIME: NORMAL

## 2023-10-12 PROCEDURE — 93793 ANTICOAG MGMT PT WARFARIN: CPT | Performed by: INTERNAL MEDICINE

## 2023-10-12 NOTE — PROGRESS NOTES
Previous INR: 2.70     Previous dose: 6mg (5mg +1mg tablets) on mon and Friday and 5mg all others          Current INR: 2.30     Recommendation:  6mg (5mg +1mg tablets) on mon and Friday and 5mg all others    Next INR: 1 week     Jenaro Pa MD  10/12/2023  12:50 PM

## 2023-10-18 ENCOUNTER — HOSPITAL ENCOUNTER (OUTPATIENT)
Age: 78
Discharge: HOME | End: 2023-10-18
Payer: MEDICARE

## 2023-10-18 DIAGNOSIS — E78.2 MIXED HYPERLIPIDEMIA: ICD-10-CM

## 2023-10-18 DIAGNOSIS — R53.83 OTHER FATIGUE: ICD-10-CM

## 2023-10-18 DIAGNOSIS — N18.31 TYPE 2 DIABETES MELLITUS WITH STAGE 3A CHRONIC KIDNEY DISEASE, WITHOUT LONG-TERM CURRENT USE OF INSULIN (HCC): ICD-10-CM

## 2023-10-18 DIAGNOSIS — E21.3 HYPERPARATHYROIDISM (HCC): ICD-10-CM

## 2023-10-18 DIAGNOSIS — M1A.09X0 IDIOPATHIC CHRONIC GOUT OF MULTIPLE SITES WITHOUT TOPHUS: ICD-10-CM

## 2023-10-18 DIAGNOSIS — E11.22 TYPE 2 DIABETES MELLITUS WITH STAGE 3A CHRONIC KIDNEY DISEASE, WITHOUT LONG-TERM CURRENT USE OF INSULIN (HCC): ICD-10-CM

## 2023-10-18 DIAGNOSIS — N18.31 STAGE 3A CHRONIC KIDNEY DISEASE (HCC): ICD-10-CM

## 2023-10-18 DIAGNOSIS — Z79.899 LONG TERM CURRENT USE OF THERAPEUTIC DRUG: ICD-10-CM

## 2023-10-18 LAB
ALBUMIN SERPL-MCNC: 4 G/DL (ref 3.5–5.2)
ALP SERPL-CCNC: 90 U/L (ref 40–129)
ALT SERPL-CCNC: 13 U/L (ref 0–40)
ANION GAP SERPL CALCULATED.3IONS-SCNC: 7 MMOL/L (ref 7–16)
AST SERPL-CCNC: 20 U/L (ref 0–39)
BASOPHILS # BLD: 0.07 K/UL (ref 0–0.2)
BASOPHILS NFR BLD: 1 % (ref 0–2)
BILIRUB SERPL-MCNC: 0.6 MG/DL (ref 0–1.2)
BILIRUB UR QL STRIP: NEGATIVE
BUN SERPL-MCNC: 25 MG/DL (ref 6–23)
CALCIUM SERPL-MCNC: 10.3 MG/DL (ref 8.6–10.2)
CHLORIDE SERPL-SCNC: 106 MMOL/L (ref 98–107)
CHOLEST SERPL-MCNC: 182 MG/DL
CLARITY UR: CLEAR
CO2 SERPL-SCNC: 29 MMOL/L (ref 22–29)
COLOR UR: YELLOW
COMMENT: NORMAL
CREAT SERPL-MCNC: 1.7 MG/DL (ref 0.7–1.2)
CREAT UR-MCNC: 91.6 MG/DL (ref 40–278)
EOSINOPHIL # BLD: 0.15 K/UL (ref 0.05–0.5)
EOSINOPHILS RELATIVE PERCENT: 2 % (ref 0–6)
ERYTHROCYTE [DISTWIDTH] IN BLOOD BY AUTOMATED COUNT: 14.6 % (ref 11.5–15)
FOLATE SERPL-MCNC: 15.9 NG/ML (ref 4.8–24.2)
GFR SERPL CREATININE-BSD FRML MDRD: 40 ML/MIN/1.73M2
GLUCOSE SERPL-MCNC: 92 MG/DL (ref 74–99)
GLUCOSE UR STRIP-MCNC: NEGATIVE MG/DL
HBA1C MFR BLD: 5.6 % (ref 4–5.6)
HCT VFR BLD AUTO: 41.8 % (ref 37–54)
HDLC SERPL-MCNC: 51 MG/DL
HGB BLD-MCNC: 13.6 G/DL (ref 12.5–16.5)
HGB UR QL STRIP.AUTO: NEGATIVE
IMM GRANULOCYTES # BLD AUTO: 0.07 K/UL (ref 0–0.58)
IMM GRANULOCYTES NFR BLD: 1 % (ref 0–5)
KETONES UR STRIP-MCNC: NEGATIVE MG/DL
LDLC SERPL CALC-MCNC: 87 MG/DL
LEUKOCYTE ESTERASE UR QL STRIP: NEGATIVE
LYMPHOCYTES NFR BLD: 1.74 K/UL (ref 1.5–4)
LYMPHOCYTES RELATIVE PERCENT: 25 % (ref 20–42)
MAGNESIUM SERPL-MCNC: 2.1 MG/DL (ref 1.6–2.6)
MCH RBC QN AUTO: 31.6 PG (ref 26–35)
MCHC RBC AUTO-ENTMCNC: 32.5 G/DL (ref 32–34.5)
MCV RBC AUTO: 97 FL (ref 80–99.9)
MICROALBUMIN UR-MCNC: 124 MG/L (ref 0–19)
MICROALBUMIN/CREAT UR-RTO: 136 MCG/MG CREAT (ref 0–30)
MONOCYTES NFR BLD: 0.87 K/UL (ref 0.1–0.95)
MONOCYTES NFR BLD: 12 % (ref 2–12)
NEUTROPHILS NFR BLD: 59 % (ref 43–80)
NEUTS SEG NFR BLD: 4.11 K/UL (ref 1.8–7.3)
NITRITE UR QL STRIP: NEGATIVE
PH UR STRIP: 6 [PH] (ref 5–9)
PLATELET # BLD AUTO: 208 K/UL (ref 130–450)
PMV BLD AUTO: 8.6 FL (ref 7–12)
POTASSIUM SERPL-SCNC: 4.3 MMOL/L (ref 3.5–5)
PROT SERPL-MCNC: 7.3 G/DL (ref 6.4–8.3)
PROT UR STRIP-MCNC: NEGATIVE MG/DL
RBC # BLD AUTO: 4.31 M/UL (ref 3.8–5.8)
SODIUM SERPL-SCNC: 142 MMOL/L (ref 132–146)
SP GR UR STRIP: 1.01 (ref 1–1.03)
TRIGL SERPL-MCNC: 219 MG/DL
TSH SERPL DL<=0.05 MIU/L-ACNC: 3.69 UIU/ML (ref 0.27–4.2)
URATE SERPL-MCNC: 7.6 MG/DL (ref 3.4–7)
UROBILINOGEN UR STRIP-ACNC: 0.2 EU/DL (ref 0–1)
VIT B12 SERPL-MCNC: 809 PG/ML (ref 211–946)
VLDLC SERPL CALC-MCNC: 44 MG/DL
WBC OTHER # BLD: 7 K/UL (ref 4.5–11.5)

## 2023-10-18 PROCEDURE — 83970 ASSAY OF PARATHORMONE: CPT

## 2023-10-18 PROCEDURE — 83036 HEMOGLOBIN GLYCOSYLATED A1C: CPT

## 2023-10-18 PROCEDURE — 85025 COMPLETE CBC W/AUTO DIFF WBC: CPT

## 2023-10-18 PROCEDURE — 84443 ASSAY THYROID STIM HORMONE: CPT

## 2023-10-18 PROCEDURE — 83735 ASSAY OF MAGNESIUM: CPT

## 2023-10-18 PROCEDURE — 82607 VITAMIN B-12: CPT

## 2023-10-18 PROCEDURE — 84550 ASSAY OF BLOOD/URIC ACID: CPT

## 2023-10-18 PROCEDURE — 82570 ASSAY OF URINE CREATININE: CPT

## 2023-10-18 PROCEDURE — 36415 COLL VENOUS BLD VENIPUNCTURE: CPT

## 2023-10-18 PROCEDURE — 81003 URINALYSIS AUTO W/O SCOPE: CPT

## 2023-10-18 PROCEDURE — 82306 VITAMIN D 25 HYDROXY: CPT

## 2023-10-18 PROCEDURE — 82746 ASSAY OF FOLIC ACID SERUM: CPT

## 2023-10-18 PROCEDURE — 80061 LIPID PANEL: CPT

## 2023-10-18 PROCEDURE — 82043 UR ALBUMIN QUANTITATIVE: CPT

## 2023-10-18 PROCEDURE — 80053 COMPREHEN METABOLIC PANEL: CPT

## 2023-10-19 ENCOUNTER — ANTI-COAG VISIT (OUTPATIENT)
Dept: FAMILY MEDICINE CLINIC | Age: 78
End: 2023-10-19
Payer: MEDICARE

## 2023-10-19 DIAGNOSIS — I48.91 ATRIAL FIBRILLATION, UNSPECIFIED TYPE (HCC): Primary | ICD-10-CM

## 2023-10-19 LAB
INR BLD: 2.4
INR BLD: 2.4
PROTIME: NORMAL

## 2023-10-19 PROCEDURE — 93793 ANTICOAG MGMT PT WARFARIN: CPT | Performed by: INTERNAL MEDICINE

## 2023-10-19 NOTE — PROGRESS NOTES
Previous INR: 2.30     Previous dose: 6mg (5mg +1mg tablets) on mon and Friday and 5mg all others          Current INR: 2.40     Recommendation:  6mg (5mg +1mg tablets) on mon and Friday and 5mg all others    Next INR: 1 week     Charo Weber MD  10/19/2023  5:04 PM

## 2023-10-20 ENCOUNTER — TELEPHONE (OUTPATIENT)
Dept: PRIMARY CARE CLINIC | Age: 78
End: 2023-10-20

## 2023-10-20 LAB
25(OH)D3 SERPL-MCNC: 71.2 NG/ML (ref 30–100)
PTH-INTACT SERPL-MCNC: 101.9 PG/ML (ref 15–65)

## 2023-10-20 NOTE — TELEPHONE ENCOUNTER
Please let the patient know that blood work results showed    Parathyroid hormone was again elevated slightly decreased when compared to previous    Calcium level was borderline increased    Vitamin D level was normal    Urinalysis showed increased microscopic protein. Would recommend follow-up with nephrology    Labs showed kidney dysfunction overall similar when compared to previous    Uric acid level was borderline increased    Cholesterol levels were fair and stable when compared to previous.   Triglyceride level was again elevated but slightly decreased when compared to previous    Hemoglobin A1c is a measure of 3-month sugar control was normal at 5.6    Blood counts were normal    Thyroid levels were normal    Vitamin B91 and folic acid levels were normal    Other electrolytes including magnesium and liver functions were normal    Thanks

## 2023-10-25 ENCOUNTER — OFFICE VISIT (OUTPATIENT)
Dept: PRIMARY CARE CLINIC | Age: 78
End: 2023-10-25
Payer: MEDICARE

## 2023-10-25 VITALS
OXYGEN SATURATION: 96 % | HEIGHT: 67 IN | WEIGHT: 230 LBS | HEART RATE: 77 BPM | DIASTOLIC BLOOD PRESSURE: 78 MMHG | SYSTOLIC BLOOD PRESSURE: 128 MMHG | BODY MASS INDEX: 36.1 KG/M2 | TEMPERATURE: 97.5 F

## 2023-10-25 VITALS
TEMPERATURE: 97.5 F | OXYGEN SATURATION: 96 % | HEART RATE: 77 BPM | DIASTOLIC BLOOD PRESSURE: 78 MMHG | SYSTOLIC BLOOD PRESSURE: 128 MMHG | BODY MASS INDEX: 36.1 KG/M2 | WEIGHT: 230 LBS | HEIGHT: 67 IN

## 2023-10-25 DIAGNOSIS — E78.2 MIXED HYPERLIPIDEMIA: ICD-10-CM

## 2023-10-25 DIAGNOSIS — N18.31 STAGE 3A CHRONIC KIDNEY DISEASE (HCC): ICD-10-CM

## 2023-10-25 DIAGNOSIS — Z00.00 MEDICARE ANNUAL WELLNESS VISIT, SUBSEQUENT: Primary | ICD-10-CM

## 2023-10-25 DIAGNOSIS — J44.9 CHRONIC OBSTRUCTIVE PULMONARY DISEASE, UNSPECIFIED COPD TYPE (HCC): ICD-10-CM

## 2023-10-25 DIAGNOSIS — J96.11 CHRONIC RESPIRATORY FAILURE WITH HYPOXIA (HCC): ICD-10-CM

## 2023-10-25 DIAGNOSIS — F33.0 MILD EPISODE OF RECURRENT MAJOR DEPRESSIVE DISORDER (HCC): ICD-10-CM

## 2023-10-25 DIAGNOSIS — G89.29 CHRONIC RIGHT-SIDED LOW BACK PAIN WITH RIGHT-SIDED SCIATICA: ICD-10-CM

## 2023-10-25 DIAGNOSIS — N18.31 TYPE 2 DIABETES MELLITUS WITH STAGE 3A CHRONIC KIDNEY DISEASE, WITHOUT LONG-TERM CURRENT USE OF INSULIN (HCC): ICD-10-CM

## 2023-10-25 DIAGNOSIS — I48.20 CHRONIC ATRIAL FIBRILLATION (HCC): ICD-10-CM

## 2023-10-25 DIAGNOSIS — E11.22 TYPE 2 DIABETES MELLITUS WITH STAGE 3A CHRONIC KIDNEY DISEASE, WITHOUT LONG-TERM CURRENT USE OF INSULIN (HCC): ICD-10-CM

## 2023-10-25 DIAGNOSIS — I10 ESSENTIAL HYPERTENSION: ICD-10-CM

## 2023-10-25 DIAGNOSIS — I25.10 CORONARY ARTERY DISEASE INVOLVING NATIVE CORONARY ARTERY OF NATIVE HEART WITHOUT ANGINA PECTORIS: ICD-10-CM

## 2023-10-25 DIAGNOSIS — D3A.00 CARCINOID (EXCEPT OF APPENDIX): ICD-10-CM

## 2023-10-25 DIAGNOSIS — M1A.09X0 IDIOPATHIC CHRONIC GOUT OF MULTIPLE SITES WITHOUT TOPHUS: ICD-10-CM

## 2023-10-25 DIAGNOSIS — E21.3 HYPERPARATHYROIDISM (HCC): ICD-10-CM

## 2023-10-25 DIAGNOSIS — Z23 NEED FOR PROPHYLACTIC VACCINATION AND INOCULATION AGAINST INFLUENZA: Primary | ICD-10-CM

## 2023-10-25 DIAGNOSIS — M54.41 CHRONIC RIGHT-SIDED LOW BACK PAIN WITH RIGHT-SIDED SCIATICA: ICD-10-CM

## 2023-10-25 PROCEDURE — 99213 OFFICE O/P EST LOW 20 MIN: CPT | Performed by: INTERNAL MEDICINE

## 2023-10-25 PROCEDURE — 3044F HG A1C LEVEL LT 7.0%: CPT | Performed by: INTERNAL MEDICINE

## 2023-10-25 PROCEDURE — 3074F SYST BP LT 130 MM HG: CPT | Performed by: INTERNAL MEDICINE

## 2023-10-25 PROCEDURE — G0439 PPPS, SUBSEQ VISIT: HCPCS | Performed by: INTERNAL MEDICINE

## 2023-10-25 PROCEDURE — 90694 VACC AIIV4 NO PRSRV 0.5ML IM: CPT | Performed by: INTERNAL MEDICINE

## 2023-10-25 PROCEDURE — 3078F DIAST BP <80 MM HG: CPT | Performed by: INTERNAL MEDICINE

## 2023-10-25 PROCEDURE — 1123F ACP DISCUSS/DSCN MKR DOCD: CPT | Performed by: INTERNAL MEDICINE

## 2023-10-25 PROCEDURE — G0008 ADMIN INFLUENZA VIRUS VAC: HCPCS | Performed by: INTERNAL MEDICINE

## 2023-10-25 ASSESSMENT — ENCOUNTER SYMPTOMS
SORE THROAT: 0
EYE PAIN: 0
ABDOMINAL PAIN: 0
BLOOD IN STOOL: 0
SHORTNESS OF BREATH: 1
CHEST TIGHTNESS: 0
COUGH: 1
NAUSEA: 0
DIARRHEA: 0
VOMITING: 0
RHINORRHEA: 1
CONSTIPATION: 0

## 2023-10-25 ASSESSMENT — PATIENT HEALTH QUESTIONNAIRE - PHQ9
1. LITTLE INTEREST OR PLEASURE IN DOING THINGS: 0
8. MOVING OR SPEAKING SO SLOWLY THAT OTHER PEOPLE COULD HAVE NOTICED. OR THE OPPOSITE, BEING SO FIGETY OR RESTLESS THAT YOU HAVE BEEN MOVING AROUND A LOT MORE THAN USUAL: 1
2. FEELING DOWN, DEPRESSED OR HOPELESS: 0
3. TROUBLE FALLING OR STAYING ASLEEP: 0
5. POOR APPETITE OR OVEREATING: 0
SUM OF ALL RESPONSES TO PHQ9 QUESTIONS 1 & 2: 0
10. IF YOU CHECKED OFF ANY PROBLEMS, HOW DIFFICULT HAVE THESE PROBLEMS MADE IT FOR YOU TO DO YOUR WORK, TAKE CARE OF THINGS AT HOME, OR GET ALONG WITH OTHER PEOPLE: 1
SUM OF ALL RESPONSES TO PHQ QUESTIONS 1-9: 5
SUM OF ALL RESPONSES TO PHQ QUESTIONS 1-9: 5
9. THOUGHTS THAT YOU WOULD BE BETTER OFF DEAD, OR OF HURTING YOURSELF: 0
6. FEELING BAD ABOUT YOURSELF - OR THAT YOU ARE A FAILURE OR HAVE LET YOURSELF OR YOUR FAMILY DOWN: 0
4. FEELING TIRED OR HAVING LITTLE ENERGY: 3
7. TROUBLE CONCENTRATING ON THINGS, SUCH AS READING THE NEWSPAPER OR WATCHING TELEVISION: 1
SUM OF ALL RESPONSES TO PHQ QUESTIONS 1-9: 5
SUM OF ALL RESPONSES TO PHQ QUESTIONS 1-9: 5

## 2023-10-25 ASSESSMENT — COLUMBIA-SUICIDE SEVERITY RATING SCALE - C-SSRS: 3. HAVE YOU BEEN THINKING ABOUT HOW YOU MIGHT KILL YOURSELF?: NO

## 2023-10-25 NOTE — PROGRESS NOTES
reviewed note (7/2023) -VIPoma & carcinoid, cont Sandostatin, adrenal hypofunction, cont hydrocortisone stress dose if needed, diabetes cont same hyperparathyroid    - States has high cholesterol. Trying to watch diet. On simvastatin. No reported side effects. Discussed with patient recall (3/23) -simvastatin. States his lot was fine.     - States has High blood pressure. Not checking blood pressure at home. Off HCTZ and sotalolol. Nephrology decreased hctz to 12.5mg     - States has Atrial fibrillation. On Sotalol and coumadin. Has pacemaker. Following with cardiology and EP. States decreased sotalol to daily     - States has CAD. Stats s/p Sent. States cardiomyopathy. Following with cardiology and EP. S/p pacer for simus node dysfunction. ast stress 2017. Last follow up with cardio per reviewed note (6/23) -h/o CAD, proximal A. fib, hypertension, hyperlipidemia,hfmef stopped Entresto due to kidney disease, Isodril 10 mg twice a day, hydralazine 10 mg twice a day, cont lasix,recommending SGL T-2 such as Jardiance follow-up 6 to 8 weeks    - States has COPD. States has had previous surgery for lobectomy for concern for mesothelioma but was negative. Following with pulmonary. On anoro. Also has nebulizer. Last visit with pulm per reviewed note  pulm (11/22) copd continue Anoro and Qvar follow-up 6 months. States stopped anoro and qvar. States started trelegy. States cannot afford trelegy. Hosp admit acute respiratory failure with hypoxia COPD exacerbation rhinovirus positive oxygen saturation 84% starting supplemental oxygen. States stable with oxygen as needed     - States has elevated uric acid. States on allopurinol. Last uric acid (4/2023) still elevated     - States has vitamin D def. On vitamin D 50k weekly.  Last lab was nornal and improved (10/2022)     - labs from (10/18/2023) reviewed with patient 10/25/2023     Health Maintenance   - immunizations:   Influenza Vaccination (2021), (2022), (2023)   Pneumonia

## 2023-10-26 ENCOUNTER — ANTI-COAG VISIT (OUTPATIENT)
Dept: FAMILY MEDICINE CLINIC | Age: 78
End: 2023-10-26
Payer: MEDICARE

## 2023-10-26 DIAGNOSIS — I48.91 ATRIAL FIBRILLATION, UNSPECIFIED TYPE (HCC): Primary | ICD-10-CM

## 2023-10-26 LAB
INR BLD: 2.6
INR BLD: 2.6
PROTIME: NORMAL

## 2023-10-26 PROCEDURE — 93793 ANTICOAG MGMT PT WARFARIN: CPT | Performed by: INTERNAL MEDICINE

## 2023-10-26 NOTE — PROGRESS NOTES
Previous INR: 2.40     Previous dose: 6mg (5mg +1mg tablets) on mon and Friday and 5mg all others          Current INR: 2.60     Recommendation:  6mg (5mg +1mg tablets) on mon and Friday and 5mg all others    Next INR: 1 week     Cony Sun MD  10/26/2023  3:41 PM

## 2023-10-30 DIAGNOSIS — M1A.09X0 IDIOPATHIC CHRONIC GOUT OF MULTIPLE SITES WITHOUT TOPHUS: ICD-10-CM

## 2023-10-30 RX ORDER — ALLOPURINOL 100 MG/1
TABLET ORAL
Qty: 90 TABLET | Refills: 0 | Status: SHIPPED | OUTPATIENT
Start: 2023-10-30

## 2023-10-30 NOTE — TELEPHONE ENCOUNTER
Last Appointment:  10/25/2023  Future Appointments   Date Time Provider [address]   12/13/2023  2:00 PM Radha Tripp MD [facility] PAIN [facility]   1/31/2024  8:30 AM MD AARTI Eason [facility]

## 2023-10-31 RX ORDER — HYDROCORTISONE 10 MG/1
TABLET ORAL
Qty: 225 TABLET | Refills: 1 | Status: SHIPPED | OUTPATIENT
Start: 2023-10-31

## 2023-11-02 NOTE — PROGRESS NOTES
Previous INR: 2.60     Previous dose: 6mg (5mg +1mg tablets) on mon and Friday and 5mg all others          Current INR: 2.60     Recommendation:  6mg (5mg +1mg tablets) on mon and Friday and 5mg all others    Next INR: 1 week     Aaron Hickey MD  11/2/2023  11:52 AM

## 2023-11-09 ENCOUNTER — TELEPHONE (OUTPATIENT)
Dept: FAMILY MEDICINE CLINIC | Age: 78
End: 2023-11-09

## 2023-11-09 ENCOUNTER — ANTI-COAG VISIT (OUTPATIENT)
Dept: FAMILY MEDICINE CLINIC | Age: 78
End: 2023-11-09

## 2023-11-09 DIAGNOSIS — I48.91 ATRIAL FIBRILLATION, UNSPECIFIED TYPE (HCC): Primary | ICD-10-CM

## 2023-11-09 LAB
INR BLD: 2
INR BLD: 2
PROTIME: NORMAL

## 2023-11-09 NOTE — PROGRESS NOTES
Previous INR: 2.60     Previous dose: 6mg (5mg +1mg tablets) on mon and Friday and 5mg all others          Current INR: 2.0     Recommendation:  6mg (5mg +1mg tablets) on mon and Friday and 5mg all others    Next INR: 1 week     Yvonne Wright MD  11/9/2023  12:49 PM

## 2023-11-16 ENCOUNTER — ANTI-COAG VISIT (OUTPATIENT)
Dept: FAMILY MEDICINE CLINIC | Age: 78
End: 2023-11-16

## 2023-11-16 DIAGNOSIS — I48.91 ATRIAL FIBRILLATION, UNSPECIFIED TYPE (HCC): Primary | ICD-10-CM

## 2023-11-16 LAB
INR BLD: 2
INR BLD: 2
PROTIME: NORMAL

## 2023-11-16 NOTE — PROGRESS NOTES
Previous INR: 2.00     Previous dose: 6mg (5mg +1mg tablets) on mon and Friday and 5mg all others          Current INR: 2.0     Recommendation:  6mg (5mg +1mg tablets) on mon and Friday and 5mg all others    Next INR: 1 week     Mimi Rudolph MD  11/16/2023  4:51 PM

## 2023-11-22 ENCOUNTER — ANTI-COAG VISIT (OUTPATIENT)
Dept: FAMILY MEDICINE CLINIC | Age: 78
End: 2023-11-22
Payer: MEDICARE

## 2023-11-22 DIAGNOSIS — I48.91 ATRIAL FIBRILLATION, UNSPECIFIED TYPE (HCC): Primary | ICD-10-CM

## 2023-11-22 LAB
INR BLD: 2.3
INR BLD: 2.3
PROTIME: NORMAL

## 2023-11-22 PROCEDURE — 93793 ANTICOAG MGMT PT WARFARIN: CPT | Performed by: INTERNAL MEDICINE

## 2023-11-22 NOTE — PROGRESS NOTES
Previous INR: 2.00     Previous dose: 6mg (5mg +1mg tablets) on mon and Friday and 5mg all others          Current INR: 2.30     Recommendation:  6mg (5mg +1mg tablets) on mon and Friday and 5mg all others    Next INR: 1 week     Juvencio Newman MD  11/22/2023  4:53 PM

## 2023-11-30 ENCOUNTER — ANTI-COAG VISIT (OUTPATIENT)
Dept: FAMILY MEDICINE CLINIC | Age: 78
End: 2023-11-30
Payer: MEDICARE

## 2023-11-30 DIAGNOSIS — I48.91 ATRIAL FIBRILLATION, UNSPECIFIED TYPE (HCC): Primary | ICD-10-CM

## 2023-11-30 LAB
INR BLD: 2.7
INR BLD: 2.7
PROTIME: NORMAL

## 2023-11-30 PROCEDURE — 93793 ANTICOAG MGMT PT WARFARIN: CPT | Performed by: INTERNAL MEDICINE

## 2023-11-30 NOTE — PROGRESS NOTES
Previous INR: 2.30     Previous dose: 6mg (5mg +1mg tablets) on mon and Friday and 5mg all others          Current INR: 2.70     Recommendation:  6mg (5mg +1mg tablets) on mon and Friday and 5mg all others    Next INR: 1 week     Danita Sesay MD  11/30/2023  5:03 PM

## 2023-11-30 NOTE — PROGRESS NOTES
Left message for Alphonso Bowen asking her to call the office back.
Leslie doherty
Previous INR: 2.50     Previous dose: 6mg (5mg +1mg tablets) on mon and Friday and 5mg all others          Current INR: 2.80     Recommendation: continue 6mg (5mg +1mg tablets) on mon and Friday and 5mg all others    Next INR: 1 week     Jorge Glaser MD  5/25/2023  5:05 PM
BMP/CBC/Type and Cross/Type and Screen/EKG

## 2023-12-07 ENCOUNTER — ANTI-COAG VISIT (OUTPATIENT)
Dept: FAMILY MEDICINE CLINIC | Age: 78
End: 2023-12-07
Payer: MEDICARE

## 2023-12-07 DIAGNOSIS — I48.91 ATRIAL FIBRILLATION, UNSPECIFIED TYPE (HCC): Primary | ICD-10-CM

## 2023-12-07 LAB — INR BLD: 2.6

## 2023-12-07 PROCEDURE — 93793 ANTICOAG MGMT PT WARFARIN: CPT | Performed by: INTERNAL MEDICINE

## 2023-12-07 NOTE — PROGRESS NOTES
Previous INR: 2.70     Previous dose: 6mg (5mg +1mg tablets) on mon and Friday and 5mg all others          Current INR: 2.60     Recommendation:  6mg (5mg +1mg tablets) on mon and Friday and 5mg all others    Next INR: 1 week     Manjit Morris MD  12/7/2023  12:21 PM

## 2023-12-11 DIAGNOSIS — I48.20 CHRONIC ATRIAL FIBRILLATION (HCC): ICD-10-CM

## 2023-12-11 RX ORDER — WARFARIN SODIUM 1 MG/1
1 TABLET ORAL DAILY
Qty: 30 TABLET | Refills: 5 | Status: SHIPPED | OUTPATIENT
Start: 2023-12-11

## 2023-12-11 RX ORDER — WARFARIN SODIUM 5 MG/1
5 TABLET ORAL DAILY
Qty: 90 TABLET | Refills: 1 | Status: SHIPPED | OUTPATIENT
Start: 2023-12-11

## 2023-12-11 RX ORDER — SIMVASTATIN 40 MG
40 TABLET ORAL NIGHTLY
Qty: 90 TABLET | Refills: 3 | Status: SHIPPED | OUTPATIENT
Start: 2023-12-11

## 2023-12-11 NOTE — TELEPHONE ENCOUNTER
Patient's daughter requesting refills.     Last Appointment:  10/25/2023  Future Appointments   Date Time Provider [address]   1/31/2024  8:30 AM MD AARTI Martinez [facility]

## 2023-12-14 ENCOUNTER — ANTI-COAG VISIT (OUTPATIENT)
Dept: FAMILY MEDICINE CLINIC | Age: 78
End: 2023-12-14
Payer: MEDICARE

## 2023-12-14 DIAGNOSIS — I48.91 ATRIAL FIBRILLATION, UNSPECIFIED TYPE (HCC): Primary | ICD-10-CM

## 2023-12-14 LAB — INR BLD: 2.9

## 2023-12-14 PROCEDURE — 93793 ANTICOAG MGMT PT WARFARIN: CPT | Performed by: INTERNAL MEDICINE

## 2023-12-14 NOTE — PROGRESS NOTES
Previous INR: 2.60     Previous dose: 6mg (5mg +1mg tablets) on mon and Friday and 5mg all others          Current INR: 2.90     Recommendation:  6mg (5mg +1mg tablets) on mon and Friday and 5mg all others    Next INR: 1 week     Ar Murphy MD  12/14/2023  5:00 PM

## 2023-12-28 ENCOUNTER — ANTI-COAG VISIT (OUTPATIENT)
Dept: FAMILY MEDICINE CLINIC | Age: 78
End: 2023-12-28
Payer: MEDICARE

## 2023-12-28 DIAGNOSIS — I48.91 ATRIAL FIBRILLATION, UNSPECIFIED TYPE (HCC): Primary | ICD-10-CM

## 2023-12-28 LAB — INR BLD: 2.4

## 2023-12-28 PROCEDURE — 99999 PR OFFICE/OUTPT VISIT,PROCEDURE ONLY: CPT | Performed by: INTERNAL MEDICINE

## 2023-12-28 PROCEDURE — 93793 ANTICOAG MGMT PT WARFARIN: CPT | Performed by: INTERNAL MEDICINE

## 2023-12-28 NOTE — PROGRESS NOTES
Previous INR: 2.40     Previous dose: 6mg (5mg +1mg tablets) on mon and Friday and 5mg all others          Current INR: 2.40     Recommendation:  6mg (5mg +1mg tablets) on mon and Friday and 5mg all others    Next INR: 1 week     Pedro Luis Chrsity MD  12/28/2023  1:37 PM

## 2024-01-01 ENCOUNTER — APPOINTMENT (OUTPATIENT)
Dept: CT IMAGING | Age: 79
DRG: 064 | End: 2024-01-01
Payer: MEDICARE

## 2024-01-01 ENCOUNTER — HOSPITAL ENCOUNTER (INPATIENT)
Age: 79
LOS: 2 days | DRG: 064 | End: 2024-01-14
Attending: EMERGENCY MEDICINE | Admitting: INTERNAL MEDICINE
Payer: MEDICARE

## 2024-01-01 ENCOUNTER — APPOINTMENT (OUTPATIENT)
Dept: GENERAL RADIOLOGY | Age: 79
DRG: 064 | End: 2024-01-01
Payer: MEDICARE

## 2024-01-01 ENCOUNTER — ANTI-COAG VISIT (OUTPATIENT)
Dept: FAMILY MEDICINE CLINIC | Age: 79
End: 2024-01-01
Payer: MEDICARE

## 2024-01-01 VITALS
HEART RATE: 70 BPM | WEIGHT: 227.96 LBS | OXYGEN SATURATION: 57 % | BODY MASS INDEX: 35.78 KG/M2 | TEMPERATURE: 101.5 F | HEIGHT: 67 IN | SYSTOLIC BLOOD PRESSURE: 135 MMHG | DIASTOLIC BLOOD PRESSURE: 64 MMHG | RESPIRATION RATE: 14 BRPM

## 2024-01-01 DIAGNOSIS — G93.5: ICD-10-CM

## 2024-01-01 DIAGNOSIS — I48.91 ATRIAL FIBRILLATION, UNSPECIFIED TYPE (HCC): Primary | ICD-10-CM

## 2024-01-01 DIAGNOSIS — I61.9 INTRAPARENCHYMAL HEMORRHAGE OF BRAIN (HCC): Primary | ICD-10-CM

## 2024-01-01 DIAGNOSIS — Z79.01 WARFARIN ANTICOAGULATION: ICD-10-CM

## 2024-01-01 LAB
AADO2: 316.4 MMHG
ALBUMIN SERPL-MCNC: 3.5 G/DL (ref 3.5–5.2)
ALBUMIN SERPL-MCNC: 4 G/DL (ref 3.5–5.2)
ALP SERPL-CCNC: 76 U/L (ref 40–129)
ALP SERPL-CCNC: 81 U/L (ref 40–129)
ALT SERPL-CCNC: 10 U/L (ref 0–40)
ALT SERPL-CCNC: 13 U/L (ref 0–40)
ANION GAP SERPL CALCULATED.3IONS-SCNC: 11 MMOL/L (ref 7–16)
ANION GAP SERPL CALCULATED.3IONS-SCNC: 12 MMOL/L (ref 7–16)
ANION GAP SERPL CALCULATED.3IONS-SCNC: 12 MMOL/L (ref 7–16)
AST SERPL-CCNC: 20 U/L (ref 0–39)
AST SERPL-CCNC: 34 U/L (ref 0–39)
B PARAP IS1001 DNA NPH QL NAA+NON-PROBE: NOT DETECTED
B PERT DNA SPEC QL NAA+PROBE: NOT DETECTED
B.E.: 1 MMOL/L (ref -3–3)
B.E.: 1.8 MMOL/L (ref -3–3)
BASOPHILS # BLD: 0.05 K/UL (ref 0–0.2)
BASOPHILS # BLD: 0.07 K/UL (ref 0–0.2)
BASOPHILS NFR BLD: 1 % (ref 0–2)
BASOPHILS NFR BLD: 1 % (ref 0–2)
BILIRUB DIRECT SERPL-MCNC: 0.3 MG/DL (ref 0–0.3)
BILIRUB INDIRECT SERPL-MCNC: 0.9 MG/DL (ref 0–1)
BILIRUB SERPL-MCNC: 0.6 MG/DL (ref 0–1.2)
BILIRUB SERPL-MCNC: 1.2 MG/DL (ref 0–1.2)
BILIRUB UR QL STRIP: NEGATIVE
BUN SERPL-MCNC: 22 MG/DL (ref 6–23)
BUN SERPL-MCNC: 23 MG/DL (ref 6–23)
BUN SERPL-MCNC: 26 MG/DL (ref 6–23)
C PNEUM DNA NPH QL NAA+NON-PROBE: NOT DETECTED
CA-I BLD-SCNC: 1.23 MMOL/L (ref 1.15–1.33)
CALCIUM SERPL-MCNC: 10.6 MG/DL (ref 8.6–10.2)
CALCIUM SERPL-MCNC: 9.8 MG/DL (ref 8.6–10.2)
CALCIUM SERPL-MCNC: 9.9 MG/DL (ref 8.6–10.2)
CHLORIDE SERPL-SCNC: 101 MMOL/L (ref 98–107)
CHLORIDE SERPL-SCNC: 102 MMOL/L (ref 98–107)
CHLORIDE SERPL-SCNC: 103 MMOL/L (ref 98–107)
CLARITY UR: CLEAR
CO2 SERPL-SCNC: 23 MMOL/L (ref 22–29)
CO2 SERPL-SCNC: 24 MMOL/L (ref 22–29)
CO2 SERPL-SCNC: 26 MMOL/L (ref 22–29)
COHB: 0.8 % (ref 0–1.5)
COHB: 1.1 % (ref 0–1.5)
COLOR UR: YELLOW
CREAT SERPL-MCNC: 1.2 MG/DL (ref 0.7–1.2)
CREAT SERPL-MCNC: 1.2 MG/DL (ref 0.7–1.2)
CREAT SERPL-MCNC: 1.3 MG/DL (ref 0.7–1.2)
CRITICAL: ABNORMAL
CRITICAL: ABNORMAL
DATE ANALYZED: ABNORMAL
DATE ANALYZED: ABNORMAL
DATE OF COLLECTION: ABNORMAL
DATE OF COLLECTION: ABNORMAL
EOSINOPHIL # BLD: 0.09 K/UL (ref 0.05–0.5)
EOSINOPHIL # BLD: 0.1 K/UL (ref 0.05–0.5)
EOSINOPHILS RELATIVE PERCENT: 1 % (ref 0–6)
EOSINOPHILS RELATIVE PERCENT: 1 % (ref 0–6)
ERYTHROCYTE [DISTWIDTH] IN BLOOD BY AUTOMATED COUNT: 13.6 % (ref 11.5–15)
ERYTHROCYTE [DISTWIDTH] IN BLOOD BY AUTOMATED COUNT: 14 % (ref 11.5–15)
FIO2: 100 %
FIO2: 60 %
FLUAV RNA NPH QL NAA+NON-PROBE: NOT DETECTED
FLUBV RNA NPH QL NAA+NON-PROBE: NOT DETECTED
GFR SERPL CREATININE-BSD FRML MDRD: 54 ML/MIN/1.73M2
GFR SERPL CREATININE-BSD FRML MDRD: >60 ML/MIN/1.73M2
GFR SERPL CREATININE-BSD FRML MDRD: >60 ML/MIN/1.73M2
GLUCOSE BLD-MCNC: 128 MG/DL (ref 74–99)
GLUCOSE BLD-MCNC: 140 MG/DL (ref 74–99)
GLUCOSE BLD-MCNC: 140 MG/DL (ref 74–99)
GLUCOSE SERPL-MCNC: 143 MG/DL (ref 74–99)
GLUCOSE SERPL-MCNC: 144 MG/DL (ref 74–99)
GLUCOSE SERPL-MCNC: 150 MG/DL (ref 74–99)
GLUCOSE UR STRIP-MCNC: NEGATIVE MG/DL
HADV DNA NPH QL NAA+NON-PROBE: NOT DETECTED
HCO3: 22.6 MMOL/L (ref 22–26)
HCO3: 22.8 MMOL/L (ref 22–26)
HCOV 229E RNA NPH QL NAA+NON-PROBE: NOT DETECTED
HCOV HKU1 RNA NPH QL NAA+NON-PROBE: NOT DETECTED
HCOV NL63 RNA NPH QL NAA+NON-PROBE: NOT DETECTED
HCOV OC43 RNA NPH QL NAA+NON-PROBE: NOT DETECTED
HCT VFR BLD AUTO: 34.2 % (ref 37–54)
HCT VFR BLD AUTO: 38.7 % (ref 37–54)
HGB BLD-MCNC: 12 G/DL (ref 12.5–16.5)
HGB BLD-MCNC: 13.1 G/DL (ref 12.5–16.5)
HGB UR QL STRIP.AUTO: NEGATIVE
HHB: 0.5 % (ref 0–5)
HHB: 4.3 % (ref 0–5)
HMPV RNA NPH QL NAA+NON-PROBE: NOT DETECTED
HPIV1 RNA NPH QL NAA+NON-PROBE: NOT DETECTED
HPIV2 RNA NPH QL NAA+NON-PROBE: NOT DETECTED
HPIV3 RNA NPH QL NAA+NON-PROBE: NOT DETECTED
HPIV4 RNA NPH QL NAA+NON-PROBE: NOT DETECTED
IMM GRANULOCYTES # BLD AUTO: 0.09 K/UL (ref 0–0.58)
IMM GRANULOCYTES # BLD AUTO: 0.1 K/UL (ref 0–0.58)
IMM GRANULOCYTES NFR BLD: 1 % (ref 0–5)
IMM GRANULOCYTES NFR BLD: 1 % (ref 0–5)
INR BLD: 2.5
INR BLD: 2.5
INR PPP: 1.1
INR PPP: 1.2
INR PPP: 2.8
KETONES UR STRIP-MCNC: NEGATIVE MG/DL
LAB: ABNORMAL
LAB: ABNORMAL
LACTATE BLDV-SCNC: 1 MMOL/L (ref 0.5–2.2)
LACTATE BLDV-SCNC: 1 MMOL/L (ref 0.5–2.2)
LEUKOCYTE ESTERASE UR QL STRIP: NEGATIVE
LIPASE SERPL-CCNC: 35 U/L (ref 13–60)
LYMPHOCYTES NFR BLD: 1.23 K/UL (ref 1.5–4)
LYMPHOCYTES NFR BLD: 2 K/UL (ref 1.5–4)
LYMPHOCYTES RELATIVE PERCENT: 12 % (ref 20–42)
LYMPHOCYTES RELATIVE PERCENT: 18 % (ref 20–42)
Lab: 1925
Lab: 548
M PNEUMO DNA NPH QL NAA+NON-PROBE: NOT DETECTED
MAGNESIUM SERPL-MCNC: 1.7 MG/DL (ref 1.6–2.6)
MAGNESIUM SERPL-MCNC: 1.8 MG/DL (ref 1.6–2.6)
MAGNESIUM SERPL-MCNC: 2 MG/DL (ref 1.6–2.6)
MCH RBC QN AUTO: 31.2 PG (ref 26–35)
MCH RBC QN AUTO: 31.2 PG (ref 26–35)
MCHC RBC AUTO-ENTMCNC: 33.9 G/DL (ref 32–34.5)
MCHC RBC AUTO-ENTMCNC: 35.1 G/DL (ref 32–34.5)
MCV RBC AUTO: 88.8 FL (ref 80–99.9)
MCV RBC AUTO: 92.1 FL (ref 80–99.9)
METHB: 0.1 % (ref 0–1.5)
METHB: 0.3 % (ref 0–1.5)
MODE: AC
MODE: AC
MONOCYTES NFR BLD: 0.97 K/UL (ref 0.1–0.95)
MONOCYTES NFR BLD: 1.17 K/UL (ref 0.1–0.95)
MONOCYTES NFR BLD: 12 % (ref 2–12)
MONOCYTES NFR BLD: 9 % (ref 2–12)
NEUTROPHILS NFR BLD: 71 % (ref 43–80)
NEUTROPHILS NFR BLD: 74 % (ref 43–80)
NEUTS SEG NFR BLD: 7.36 K/UL (ref 1.8–7.3)
NEUTS SEG NFR BLD: 7.89 K/UL (ref 1.8–7.3)
NITRITE UR QL STRIP: NEGATIVE
O2 CONTENT: 17.1 ML/DL
O2 SATURATION: 95.6 % (ref 92–98.5)
O2 SATURATION: 99.5 % (ref 92–98.5)
O2HB: 94.3 % (ref 94–97)
O2HB: 98.6 % (ref 94–97)
OPERATOR ID: 7278
OPERATOR ID: 914
PARTIAL THROMBOPLASTIN TIME: 24.1 SEC (ref 24.5–35.1)
PARTIAL THROMBOPLASTIN TIME: 35.6 SEC (ref 24.5–35.1)
PATIENT TEMP: 37 C
PATIENT TEMP: 37 C
PCO2: 26 MMHG (ref 35–45)
PCO2: 27.9 MMHG (ref 35–45)
PEEP/CPAP: 8 CMH2O
PEEP/CPAP: 8 CMH2O
PFO2: 1.13 MMHG/%
PH BLOOD GAS: 7.53 (ref 7.35–7.45)
PH BLOOD GAS: 7.56 (ref 7.35–7.45)
PH UR STRIP: 7.5 [PH] (ref 5–9)
PHOSPHATE SERPL-MCNC: 2.4 MG/DL (ref 2.5–4.5)
PHOSPHATE SERPL-MCNC: 2.6 MG/DL (ref 2.5–4.5)
PLATELET # BLD AUTO: 206 K/UL (ref 130–450)
PLATELET # BLD AUTO: 258 K/UL (ref 130–450)
PMV BLD AUTO: 8.4 FL (ref 7–12)
PMV BLD AUTO: 8.8 FL (ref 7–12)
PO2: 67.8 MMHG (ref 75–100)
PO2: >500 MMHG (ref 75–100)
POTASSIUM SERPL-SCNC: 3.5 MMOL/L (ref 3.5–5)
POTASSIUM SERPL-SCNC: 3.5 MMOL/L (ref 3.5–5)
POTASSIUM SERPL-SCNC: 4.06 MMOL/L (ref 3.5–5)
POTASSIUM SERPL-SCNC: 5 MMOL/L (ref 3.5–5)
PROT SERPL-MCNC: 6.6 G/DL (ref 6.4–8.3)
PROT SERPL-MCNC: 7.8 G/DL (ref 6.4–8.3)
PROT UR STRIP-MCNC: 30 MG/DL
PROTHROMBIN TIME: 12.2 SEC (ref 9.3–12.4)
PROTHROMBIN TIME: 13 SEC (ref 9.3–12.4)
PROTHROMBIN TIME: 30.5 SEC (ref 9.3–12.4)
PROTIME: NORMAL
RBC # BLD AUTO: 3.85 M/UL (ref 3.8–5.8)
RBC # BLD AUTO: 4.2 M/UL (ref 3.8–5.8)
RBC #/AREA URNS HPF: ABNORMAL /HPF
RI(T): 4.67
RR MECHANICAL: 18 B/MIN
RR MECHANICAL: 22 B/MIN
RSV RNA NPH QL NAA+NON-PROBE: NOT DETECTED
RV+EV RNA NPH QL NAA+NON-PROBE: NOT DETECTED
SARS-COV-2 RNA NPH QL NAA+NON-PROBE: DETECTED
SODIUM SERPL-SCNC: 137 MMOL/L (ref 132–146)
SODIUM SERPL-SCNC: 138 MMOL/L (ref 132–146)
SODIUM SERPL-SCNC: 139 MMOL/L (ref 132–146)
SOURCE, BLOOD GAS: ABNORMAL
SOURCE, BLOOD GAS: ABNORMAL
SP GR UR STRIP: 1.02 (ref 1–1.03)
SPECIMEN DESCRIPTION: ABNORMAL
THB: 12.9 G/DL (ref 11.5–16.5)
THB: 13.5 G/DL (ref 11.5–16.5)
TIME ANALYZED: 1931
TIME ANALYZED: 607
TROPONIN I SERPL HS-MCNC: 34 NG/L (ref 0–11)
TROPONIN I SERPL HS-MCNC: 48 NG/L (ref 0–11)
UROBILINOGEN UR STRIP-ACNC: 0.2 EU/DL (ref 0–1)
VT MECHANICAL: 500 ML
VT MECHANICAL: 500 ML
WBC #/AREA URNS HPF: ABNORMAL /HPF
WBC OTHER # BLD: 10 K/UL (ref 4.5–11.5)
WBC OTHER # BLD: 11.1 K/UL (ref 4.5–11.5)

## 2024-01-01 PROCEDURE — 6360000002 HC RX W HCPCS: Performed by: CLINICAL NURSE SPECIALIST

## 2024-01-01 PROCEDURE — 82962 GLUCOSE BLOOD TEST: CPT

## 2024-01-01 PROCEDURE — 74018 RADEX ABDOMEN 1 VIEW: CPT

## 2024-01-01 PROCEDURE — 80053 COMPREHEN METABOLIC PANEL: CPT

## 2024-01-01 PROCEDURE — 2580000003 HC RX 258: Performed by: SURGERY

## 2024-01-01 PROCEDURE — 99222 1ST HOSP IP/OBS MODERATE 55: CPT | Performed by: NEUROLOGICAL SURGERY

## 2024-01-01 PROCEDURE — 36556 INSERT NON-TUNNEL CV CATH: CPT

## 2024-01-01 PROCEDURE — 96374 THER/PROPH/DIAG INJ IV PUSH: CPT

## 2024-01-01 PROCEDURE — 0BH17EZ INSERTION OF ENDOTRACHEAL AIRWAY INTO TRACHEA, VIA NATURAL OR ARTIFICIAL OPENING: ICD-10-PCS | Performed by: INTERNAL MEDICINE

## 2024-01-01 PROCEDURE — 2500000003 HC RX 250 WO HCPCS

## 2024-01-01 PROCEDURE — 70450 CT HEAD/BRAIN W/O DYE: CPT

## 2024-01-01 PROCEDURE — 83690 ASSAY OF LIPASE: CPT

## 2024-01-01 PROCEDURE — 2580000003 HC RX 258: Performed by: NURSE PRACTITIONER

## 2024-01-01 PROCEDURE — 83735 ASSAY OF MAGNESIUM: CPT

## 2024-01-01 PROCEDURE — 84100 ASSAY OF PHOSPHORUS: CPT

## 2024-01-01 PROCEDURE — 6360000002 HC RX W HCPCS

## 2024-01-01 PROCEDURE — 37799 UNLISTED PX VASCULAR SURGERY: CPT

## 2024-01-01 PROCEDURE — 04HY32Z INSERTION OF MONITORING DEVICE INTO LOWER ARTERY, PERCUTANEOUS APPROACH: ICD-10-PCS | Performed by: INTERNAL MEDICINE

## 2024-01-01 PROCEDURE — 83605 ASSAY OF LACTIC ACID: CPT

## 2024-01-01 PROCEDURE — 02HV33Z INSERTION OF INFUSION DEVICE INTO SUPERIOR VENA CAVA, PERCUTANEOUS APPROACH: ICD-10-PCS | Performed by: INTERNAL MEDICINE

## 2024-01-01 PROCEDURE — 82330 ASSAY OF CALCIUM: CPT

## 2024-01-01 PROCEDURE — 85025 COMPLETE CBC W/AUTO DIFF WBC: CPT

## 2024-01-01 PROCEDURE — 99291 CRITICAL CARE FIRST HOUR: CPT | Performed by: SURGERY

## 2024-01-01 PROCEDURE — 6370000000 HC RX 637 (ALT 250 FOR IP): Performed by: NURSE PRACTITIONER

## 2024-01-01 PROCEDURE — 85610 PROTHROMBIN TIME: CPT

## 2024-01-01 PROCEDURE — 4A133J1 MONITORING OF ARTERIAL PULSE, PERIPHERAL, PERCUTANEOUS APPROACH: ICD-10-PCS | Performed by: INTERNAL MEDICINE

## 2024-01-01 PROCEDURE — 94003 VENT MGMT INPAT SUBQ DAY: CPT

## 2024-01-01 PROCEDURE — 2580000003 HC RX 258: Performed by: NEUROLOGICAL SURGERY

## 2024-01-01 PROCEDURE — 81001 URINALYSIS AUTO W/SCOPE: CPT

## 2024-01-01 PROCEDURE — 31500 INSERT EMERGENCY AIRWAY: CPT

## 2024-01-01 PROCEDURE — 6370000000 HC RX 637 (ALT 250 FOR IP): Performed by: CLINICAL NURSE SPECIALIST

## 2024-01-01 PROCEDURE — 80048 BASIC METABOLIC PNL TOTAL CA: CPT

## 2024-01-01 PROCEDURE — 82805 BLOOD GASES W/O2 SATURATION: CPT

## 2024-01-01 PROCEDURE — 2580000003 HC RX 258

## 2024-01-01 PROCEDURE — 84484 ASSAY OF TROPONIN QUANT: CPT

## 2024-01-01 PROCEDURE — 0202U NFCT DS 22 TRGT SARS-COV-2: CPT

## 2024-01-01 PROCEDURE — 4A133B1 MONITORING OF ARTERIAL PRESSURE, PERIPHERAL, PERCUTANEOUS APPROACH: ICD-10-PCS | Performed by: INTERNAL MEDICINE

## 2024-01-01 PROCEDURE — 82248 BILIRUBIN DIRECT: CPT

## 2024-01-01 PROCEDURE — 99291 CRITICAL CARE FIRST HOUR: CPT

## 2024-01-01 PROCEDURE — 6360000002 HC RX W HCPCS: Performed by: INTERNAL MEDICINE

## 2024-01-01 PROCEDURE — APPSS45 APP SPLIT SHARED TIME 31-45 MINUTES: Performed by: CLINICAL NURSE SPECIALIST

## 2024-01-01 PROCEDURE — 85730 THROMBOPLASTIN TIME PARTIAL: CPT

## 2024-01-01 PROCEDURE — 2000000000 HC ICU R&B

## 2024-01-01 PROCEDURE — 93005 ELECTROCARDIOGRAM TRACING: CPT

## 2024-01-01 PROCEDURE — 30283B1 TRANSFUSION OF NONAUTOLOGOUS 4-FACTOR PROTHROMBIN COMPLEX CONCENTRATE INTO VEIN, PERCUTANEOUS APPROACH: ICD-10-PCS | Performed by: INTERNAL MEDICINE

## 2024-01-01 PROCEDURE — 84132 ASSAY OF SERUM POTASSIUM: CPT

## 2024-01-01 PROCEDURE — C9113 INJ PANTOPRAZOLE SODIUM, VIA: HCPCS | Performed by: SURGERY

## 2024-01-01 PROCEDURE — 6360000002 HC RX W HCPCS: Performed by: NEUROLOGICAL SURGERY

## 2024-01-01 PROCEDURE — 99285 EMERGENCY DEPT VISIT HI MDM: CPT

## 2024-01-01 PROCEDURE — 71045 X-RAY EXAM CHEST 1 VIEW: CPT

## 2024-01-01 PROCEDURE — 5A1935Z RESPIRATORY VENTILATION, LESS THAN 24 CONSECUTIVE HOURS: ICD-10-PCS | Performed by: INTERNAL MEDICINE

## 2024-01-01 PROCEDURE — 94002 VENT MGMT INPAT INIT DAY: CPT

## 2024-01-01 PROCEDURE — 4A03X5D MEASUREMENT OF ARTERIAL FLOW, INTRACRANIAL, EXTERNAL APPROACH: ICD-10-PCS | Performed by: INTERNAL MEDICINE

## 2024-01-01 PROCEDURE — 36620 INSERTION CATHETER ARTERY: CPT

## 2024-01-01 PROCEDURE — A4216 STERILE WATER/SALINE, 10 ML: HCPCS | Performed by: SURGERY

## 2024-01-01 PROCEDURE — 93793 ANTICOAG MGMT PT WARFARIN: CPT | Performed by: INTERNAL MEDICINE

## 2024-01-01 PROCEDURE — 6360000002 HC RX W HCPCS: Performed by: SURGERY

## 2024-01-01 RX ORDER — MAGNESIUM SULFATE IN WATER 40 MG/ML
2000 INJECTION, SOLUTION INTRAVENOUS ONCE
Status: DISCONTINUED | OUTPATIENT
Start: 2024-01-01 | End: 2024-01-01

## 2024-01-01 RX ORDER — POTASSIUM CHLORIDE 7.45 MG/ML
10 INJECTION INTRAVENOUS PRN
Status: DISCONTINUED | OUTPATIENT
Start: 2024-01-01 | End: 2024-01-01

## 2024-01-01 RX ORDER — POLYETHYLENE GLYCOL 3350 17 G/17G
17 POWDER, FOR SOLUTION ORAL DAILY PRN
Status: DISCONTINUED | OUTPATIENT
Start: 2024-01-01 | End: 2024-01-01 | Stop reason: HOSPADM

## 2024-01-01 RX ORDER — SODIUM CHLORIDE 0.9 % (FLUSH) 0.9 %
10 SYRINGE (ML) INJECTION PRN
Status: DISCONTINUED | OUTPATIENT
Start: 2024-01-01 | End: 2024-01-01 | Stop reason: HOSPADM

## 2024-01-01 RX ORDER — MIDAZOLAM HYDROCHLORIDE 2 MG/2ML
1 INJECTION, SOLUTION INTRAMUSCULAR; INTRAVENOUS
Status: DISCONTINUED | OUTPATIENT
Start: 2024-01-01 | End: 2024-01-01

## 2024-01-01 RX ORDER — INSULIN LISPRO 100 [IU]/ML
0-4 INJECTION, SOLUTION INTRAVENOUS; SUBCUTANEOUS NIGHTLY
Status: DISCONTINUED | OUTPATIENT
Start: 2024-01-01 | End: 2024-01-01

## 2024-01-01 RX ORDER — MAGNESIUM SULFATE IN WATER 40 MG/ML
2000 INJECTION, SOLUTION INTRAVENOUS PRN
Status: DISCONTINUED | OUTPATIENT
Start: 2024-01-01 | End: 2024-01-01

## 2024-01-01 RX ORDER — ATORVASTATIN CALCIUM 20 MG/1
20 TABLET, FILM COATED ORAL DAILY
Status: DISCONTINUED | OUTPATIENT
Start: 2024-01-01 | End: 2024-01-01

## 2024-01-01 RX ORDER — ONDANSETRON 4 MG/1
4 TABLET, ORALLY DISINTEGRATING ORAL EVERY 8 HOURS PRN
Status: DISCONTINUED | OUTPATIENT
Start: 2024-01-01 | End: 2024-01-01 | Stop reason: HOSPADM

## 2024-01-01 RX ORDER — SENNOSIDES A AND B 8.6 MG/1
1 TABLET, FILM COATED ORAL NIGHTLY
Status: DISCONTINUED | OUTPATIENT
Start: 2024-01-01 | End: 2024-01-01 | Stop reason: HOSPADM

## 2024-01-01 RX ORDER — HYDRALAZINE HYDROCHLORIDE 10 MG/1
10 TABLET, FILM COATED ORAL 2 TIMES DAILY
Status: DISCONTINUED | OUTPATIENT
Start: 2024-01-01 | End: 2024-01-01

## 2024-01-01 RX ORDER — PROPOFOL 10 MG/ML
5-50 INJECTION, EMULSION INTRAVENOUS CONTINUOUS
Status: DISCONTINUED | OUTPATIENT
Start: 2024-01-01 | End: 2024-01-01

## 2024-01-01 RX ORDER — 3% SODIUM CHLORIDE 3 G/100ML
25 INJECTION, SOLUTION INTRAVENOUS CONTINUOUS
Status: DISCONTINUED | OUTPATIENT
Start: 2024-01-01 | End: 2024-01-01

## 2024-01-01 RX ORDER — DEXTROSE MONOHYDRATE 100 MG/ML
INJECTION, SOLUTION INTRAVENOUS CONTINUOUS PRN
Status: DISCONTINUED | OUTPATIENT
Start: 2024-01-01 | End: 2024-01-01

## 2024-01-01 RX ORDER — LABETALOL HYDROCHLORIDE 5 MG/ML
10 INJECTION, SOLUTION INTRAVENOUS EVERY 10 MIN PRN
Status: DISCONTINUED | OUTPATIENT
Start: 2024-01-01 | End: 2024-01-01

## 2024-01-01 RX ORDER — SODIUM CHLORIDE 9 MG/ML
50 INJECTION, SOLUTION INTRAVENOUS ONCE
Status: COMPLETED | OUTPATIENT
Start: 2024-01-01 | End: 2024-01-01

## 2024-01-01 RX ORDER — ALBUTEROL SULFATE 2.5 MG/3ML
2.5 SOLUTION RESPIRATORY (INHALATION) EVERY 6 HOURS PRN
Status: DISCONTINUED | OUTPATIENT
Start: 2024-01-01 | End: 2024-01-01

## 2024-01-01 RX ORDER — POTASSIUM CHLORIDE 7.45 MG/ML
10 INJECTION INTRAVENOUS
Status: DISCONTINUED | OUTPATIENT
Start: 2024-01-01 | End: 2024-01-01

## 2024-01-01 RX ORDER — ACETAMINOPHEN 325 MG/1
650 TABLET ORAL EVERY 6 HOURS PRN
Status: DISCONTINUED | OUTPATIENT
Start: 2024-01-01 | End: 2024-01-01 | Stop reason: HOSPADM

## 2024-01-01 RX ORDER — BISACODYL 10 MG
10 SUPPOSITORY, RECTAL RECTAL DAILY PRN
Status: DISCONTINUED | OUTPATIENT
Start: 2024-01-01 | End: 2024-01-01 | Stop reason: HOSPADM

## 2024-01-01 RX ORDER — MORPHINE SULFATE 2 MG/ML
2 INJECTION, SOLUTION INTRAMUSCULAR; INTRAVENOUS
Status: DISCONTINUED | OUTPATIENT
Start: 2024-01-01 | End: 2024-01-01 | Stop reason: HOSPADM

## 2024-01-01 RX ORDER — SODIUM CHLORIDE 0.9 % (FLUSH) 0.9 %
5-40 SYRINGE (ML) INJECTION PRN
Status: DISCONTINUED | OUTPATIENT
Start: 2024-01-01 | End: 2024-01-01 | Stop reason: HOSPADM

## 2024-01-01 RX ORDER — HYDROCORTISONE 5 MG/1
5 TABLET ORAL 2 TIMES DAILY
Status: DISCONTINUED | OUTPATIENT
Start: 2024-01-01 | End: 2024-01-01

## 2024-01-01 RX ORDER — MIDAZOLAM HYDROCHLORIDE 2 MG/2ML
2 INJECTION, SOLUTION INTRAMUSCULAR; INTRAVENOUS
Status: DISCONTINUED | OUTPATIENT
Start: 2024-01-01 | End: 2024-01-01 | Stop reason: HOSPADM

## 2024-01-01 RX ORDER — SODIUM CHLORIDE 0.9 % (FLUSH) 0.9 %
5-40 SYRINGE (ML) INJECTION EVERY 12 HOURS SCHEDULED
Status: DISCONTINUED | OUTPATIENT
Start: 2024-01-01 | End: 2024-01-01 | Stop reason: HOSPADM

## 2024-01-01 RX ORDER — MANNITOL 20 G/100ML
25 INJECTION, SOLUTION INTRAVENOUS ONCE
Status: COMPLETED | OUTPATIENT
Start: 2024-01-01 | End: 2024-01-01

## 2024-01-01 RX ORDER — SOTALOL HYDROCHLORIDE 80 MG/1
160 TABLET ORAL DAILY
Status: DISCONTINUED | OUTPATIENT
Start: 2024-01-01 | End: 2024-01-01 | Stop reason: HOSPADM

## 2024-01-01 RX ORDER — ACETAMINOPHEN 650 MG/1
650 SUPPOSITORY RECTAL EVERY 6 HOURS PRN
Status: DISCONTINUED | OUTPATIENT
Start: 2024-01-01 | End: 2024-01-01 | Stop reason: HOSPADM

## 2024-01-01 RX ORDER — MIDAZOLAM HYDROCHLORIDE 2 MG/2ML
1 INJECTION, SOLUTION INTRAMUSCULAR; INTRAVENOUS
Status: DISCONTINUED | OUTPATIENT
Start: 2024-01-01 | End: 2024-01-01 | Stop reason: HOSPADM

## 2024-01-01 RX ORDER — SODIUM CHLORIDE 9 MG/ML
INJECTION, SOLUTION INTRAVENOUS CONTINUOUS
Status: DISCONTINUED | OUTPATIENT
Start: 2024-01-01 | End: 2024-01-01

## 2024-01-01 RX ORDER — INSULIN LISPRO 100 [IU]/ML
0-4 INJECTION, SOLUTION INTRAVENOUS; SUBCUTANEOUS
Status: DISCONTINUED | OUTPATIENT
Start: 2024-01-01 | End: 2024-01-01

## 2024-01-01 RX ORDER — MIDAZOLAM HYDROCHLORIDE 2 MG/2ML
0.5 INJECTION, SOLUTION INTRAMUSCULAR; INTRAVENOUS
Status: DISCONTINUED | OUTPATIENT
Start: 2024-01-01 | End: 2024-01-01

## 2024-01-01 RX ORDER — GLYCOPYRROLATE 0.2 MG/ML
0.2 INJECTION INTRAMUSCULAR; INTRAVENOUS EVERY 4 HOURS PRN
Status: DISCONTINUED | OUTPATIENT
Start: 2024-01-01 | End: 2024-01-01 | Stop reason: HOSPADM

## 2024-01-01 RX ORDER — LATANOPROST 50 UG/ML
1 SOLUTION/ DROPS OPHTHALMIC NIGHTLY
Status: DISCONTINUED | OUTPATIENT
Start: 2024-01-01 | End: 2024-01-01

## 2024-01-01 RX ORDER — ALLOPURINOL 100 MG/1
100 TABLET ORAL DAILY
Status: DISCONTINUED | OUTPATIENT
Start: 2024-01-01 | End: 2024-01-01

## 2024-01-01 RX ORDER — ONDANSETRON 2 MG/ML
4 INJECTION INTRAMUSCULAR; INTRAVENOUS EVERY 6 HOURS PRN
Status: DISCONTINUED | OUTPATIENT
Start: 2024-01-01 | End: 2024-01-01 | Stop reason: HOSPADM

## 2024-01-01 RX ORDER — HYDRALAZINE HYDROCHLORIDE 20 MG/ML
10 INJECTION INTRAMUSCULAR; INTRAVENOUS EVERY 10 MIN PRN
Status: DISCONTINUED | OUTPATIENT
Start: 2024-01-01 | End: 2024-01-01

## 2024-01-01 RX ORDER — ISOSORBIDE DINITRATE 10 MG/1
10 TABLET ORAL 2 TIMES DAILY
Status: DISCONTINUED | OUTPATIENT
Start: 2024-01-01 | End: 2024-01-01

## 2024-01-01 RX ORDER — INSULIN LISPRO 100 [IU]/ML
0-4 INJECTION, SOLUTION INTRAVENOUS; SUBCUTANEOUS EVERY 4 HOURS
Status: DISCONTINUED | OUTPATIENT
Start: 2024-01-01 | End: 2024-01-01

## 2024-01-01 RX ORDER — POTASSIUM CHLORIDE 29.8 MG/ML
20 INJECTION INTRAVENOUS PRN
Status: DISCONTINUED | OUTPATIENT
Start: 2024-01-01 | End: 2024-01-01

## 2024-01-01 RX ORDER — SODIUM CHLORIDE 9 MG/ML
INJECTION, SOLUTION INTRAVENOUS PRN
Status: DISCONTINUED | OUTPATIENT
Start: 2024-01-01 | End: 2024-01-01 | Stop reason: HOSPADM

## 2024-01-01 RX ORDER — MORPHINE SULFATE 4 MG/ML
4 INJECTION, SOLUTION INTRAMUSCULAR; INTRAVENOUS
Status: DISCONTINUED | OUTPATIENT
Start: 2024-01-01 | End: 2024-01-01 | Stop reason: HOSPADM

## 2024-01-01 RX ADMIN — MIDAZOLAM 1 MG: 1 INJECTION INTRAMUSCULAR; INTRAVENOUS at 22:26

## 2024-01-01 RX ADMIN — MAGNESIUM SULFATE HEPTAHYDRATE 2000 MG: 40 INJECTION, SOLUTION INTRAVENOUS at 10:49

## 2024-01-01 RX ADMIN — MANNITOL 25 G: 20 INJECTION, SOLUTION INTRAVENOUS at 20:02

## 2024-01-01 RX ADMIN — MORPHINE SULFATE 2 MG: 2 INJECTION, SOLUTION INTRAMUSCULAR; INTRAVENOUS at 23:23

## 2024-01-01 RX ADMIN — MORPHINE SULFATE 2 MG: 2 INJECTION, SOLUTION INTRAMUSCULAR; INTRAVENOUS at 13:46

## 2024-01-01 RX ADMIN — Medication 10 ML: at 22:31

## 2024-01-01 RX ADMIN — GLYCOPYRROLATE 0.2 MG: 0.2 INJECTION INTRAMUSCULAR; INTRAVENOUS at 03:41

## 2024-01-01 RX ADMIN — MIDAZOLAM 1 MG: 1 INJECTION INTRAMUSCULAR; INTRAVENOUS at 00:54

## 2024-01-01 RX ADMIN — MORPHINE SULFATE 2 MG: 2 INJECTION, SOLUTION INTRAMUSCULAR; INTRAVENOUS at 12:58

## 2024-01-01 RX ADMIN — MIDAZOLAM 1 MG: 1 INJECTION INTRAMUSCULAR; INTRAVENOUS at 12:58

## 2024-01-01 RX ADMIN — MORPHINE SULFATE 2 MG: 2 INJECTION, SOLUTION INTRAMUSCULAR; INTRAVENOUS at 02:52

## 2024-01-01 RX ADMIN — MIDAZOLAM 1 MG: 1 INJECTION INTRAMUSCULAR; INTRAVENOUS at 12:24

## 2024-01-01 RX ADMIN — PROPOFOL 20 MCG/KG/MIN: 10 INJECTION, EMULSION INTRAVENOUS at 22:29

## 2024-01-01 RX ADMIN — MIDAZOLAM 1 MG: 1 INJECTION INTRAMUSCULAR; INTRAVENOUS at 03:48

## 2024-01-01 RX ADMIN — SODIUM CHLORIDE 2.5 MG/HR: 9 INJECTION, SOLUTION INTRAVENOUS at 05:00

## 2024-01-01 RX ADMIN — PHYTONADIONE 10 MG: 10 INJECTION, EMULSION INTRAMUSCULAR; INTRAVENOUS; SUBCUTANEOUS at 19:20

## 2024-01-01 RX ADMIN — MORPHINE SULFATE 2 MG: 2 INJECTION, SOLUTION INTRAMUSCULAR; INTRAVENOUS at 02:15

## 2024-01-01 RX ADMIN — MORPHINE SULFATE 2 MG: 2 INJECTION, SOLUTION INTRAMUSCULAR; INTRAVENOUS at 15:02

## 2024-01-01 RX ADMIN — POTASSIUM BICARBONATE 40 MEQ: 782 TABLET, EFFERVESCENT ORAL at 10:45

## 2024-01-01 RX ADMIN — MORPHINE SULFATE 2 MG: 2 INJECTION, SOLUTION INTRAMUSCULAR; INTRAVENOUS at 04:05

## 2024-01-01 RX ADMIN — MORPHINE SULFATE 2 MG: 2 INJECTION, SOLUTION INTRAMUSCULAR; INTRAVENOUS at 21:09

## 2024-01-01 RX ADMIN — GLYCOPYRROLATE 0.2 MG: 0.2 INJECTION INTRAMUSCULAR; INTRAVENOUS at 12:24

## 2024-01-01 RX ADMIN — MIDAZOLAM 1 MG: 1 INJECTION INTRAMUSCULAR; INTRAVENOUS at 02:15

## 2024-01-01 RX ADMIN — ACETAMINOPHEN 650 MG: 650 SUPPOSITORY RECTAL at 23:11

## 2024-01-01 RX ADMIN — MIDAZOLAM 1 MG: 1 INJECTION INTRAMUSCULAR; INTRAVENOUS at 23:23

## 2024-01-01 RX ADMIN — MORPHINE SULFATE 2 MG: 2 INJECTION, SOLUTION INTRAMUSCULAR; INTRAVENOUS at 00:54

## 2024-01-01 RX ADMIN — GLYCOPYRROLATE 0.2 MG: 0.2 INJECTION INTRAMUSCULAR; INTRAVENOUS at 23:23

## 2024-01-01 RX ADMIN — MIDAZOLAM 1 MG: 1 INJECTION INTRAMUSCULAR; INTRAVENOUS at 21:10

## 2024-01-01 RX ADMIN — SODIUM CHLORIDE: 9 INJECTION, SOLUTION INTRAVENOUS at 01:15

## 2024-01-01 RX ADMIN — SODIUM CHLORIDE 25 ML/HR: 3 INJECTION, SOLUTION INTRAVENOUS at 04:47

## 2024-01-01 RX ADMIN — Medication 2500 UNITS: at 18:45

## 2024-01-01 RX ADMIN — SODIUM CHLORIDE 5 MG/HR: 9 INJECTION, SOLUTION INTRAVENOUS at 18:31

## 2024-01-01 RX ADMIN — MIDAZOLAM 1 MG: 1 INJECTION INTRAMUSCULAR; INTRAVENOUS at 02:52

## 2024-01-01 RX ADMIN — MORPHINE SULFATE 2 MG: 2 INJECTION, SOLUTION INTRAMUSCULAR; INTRAVENOUS at 12:23

## 2024-01-01 RX ADMIN — MORPHINE SULFATE 2 MG: 2 INJECTION, SOLUTION INTRAMUSCULAR; INTRAVENOUS at 03:48

## 2024-01-01 RX ADMIN — MORPHINE SULFATE 2 MG: 2 INJECTION, SOLUTION INTRAMUSCULAR; INTRAVENOUS at 16:14

## 2024-01-01 RX ADMIN — MORPHINE SULFATE 2 MG: 2 INJECTION, SOLUTION INTRAMUSCULAR; INTRAVENOUS at 22:26

## 2024-01-01 RX ADMIN — PANTOPRAZOLE SODIUM 40 MG: 40 INJECTION, POWDER, FOR SOLUTION INTRAVENOUS at 08:51

## 2024-01-01 RX ADMIN — MIDAZOLAM 2 MG: 1 INJECTION INTRAMUSCULAR; INTRAVENOUS at 16:14

## 2024-01-01 RX ADMIN — MIDAZOLAM 1 MG: 1 INJECTION INTRAMUSCULAR; INTRAVENOUS at 00:21

## 2024-01-01 RX ADMIN — MORPHINE SULFATE 2 MG: 2 INJECTION, SOLUTION INTRAMUSCULAR; INTRAVENOUS at 00:21

## 2024-01-01 RX ADMIN — SODIUM CHLORIDE 50 ML: 9 INJECTION, SOLUTION INTRAVENOUS at 18:54

## 2024-01-01 RX ADMIN — MIDAZOLAM 2 MG: 1 INJECTION INTRAMUSCULAR; INTRAVENOUS at 15:02

## 2024-01-01 RX ADMIN — MIDAZOLAM 2 MG: 1 INJECTION INTRAMUSCULAR; INTRAVENOUS at 13:46

## 2024-01-01 ASSESSMENT — PULMONARY FUNCTION TESTS
PIF_VALUE: 29
PIF_VALUE: 28
PIF_VALUE: 29
PIF_VALUE: 26
PIF_VALUE: 29
PIF_VALUE: 30
PIF_VALUE: 29
PIF_VALUE: 9
PIF_VALUE: 29
PIF_VALUE: 28
PIF_VALUE: 29
PIF_VALUE: 28

## 2024-01-01 ASSESSMENT — PAIN SCALES - GENERAL
PAINLEVEL_OUTOF10: 0
PAINLEVEL_OUTOF10: 0

## 2024-01-04 ENCOUNTER — ANTI-COAG VISIT (OUTPATIENT)
Dept: FAMILY MEDICINE CLINIC | Age: 79
End: 2024-01-04
Payer: MEDICARE

## 2024-01-04 DIAGNOSIS — I48.91 ATRIAL FIBRILLATION, UNSPECIFIED TYPE (HCC): Primary | ICD-10-CM

## 2024-01-04 LAB
INR BLD: 3.1
INR BLD: 3.1
PROTIME: NORMAL

## 2024-01-04 PROCEDURE — 93793 ANTICOAG MGMT PT WARFARIN: CPT | Performed by: INTERNAL MEDICINE

## 2024-01-04 NOTE — PROGRESS NOTES
Previous INR: 2.40     Previous dose: 6mg (5mg +1mg tablets) on mon and Friday and 5mg all others          Current INR: 3.10     Recommendation:  hold x 1 then 6mg (5mg +1mg tablets) on mon and Friday and 5mg all others    Next INR: 1 week     Aaron Hickey MD  1/4/2024  3:25 PM

## 2024-01-11 NOTE — PROGRESS NOTES
Previous INR: 3.10     Previous dose:  held x 1 then continued 6mg (5mg +1mg tablets) on mon and Friday and 5mg all others          Current INR: 2.50     Recommendation: continue 6mg (5mg +1mg tablets) on mon and Friday and 5mg all others    Next INR: 1 week     Aaron Hickey MD  1/11/2024  12:41 PM

## 2024-01-12 PROBLEM — I62.9 ACUTE INTRACRANIAL HEMORRHAGE (HCC): Status: ACTIVE | Noted: 2024-01-01

## 2024-01-12 NOTE — CONSULTS
I was contacted by the emergency department @ 6750 on 1/12/2024 to review/discuss about this stroke alert case.  The last known well time was reported as 1 hour ago.   I was informed of the relevant medical history and the presenting complaints and I myself did pertinent medical chart review.  I have personally reviewed the neuroimaging wherein Page Mage software was utilized to assist with triage.  No face-to-face interaction with the patient was done.    Personal review of neuroimaging shows massive left hemisphere frontal parietal temporal hemorrhage with significant mass effect and midline shift to the right.  There is complete effacement of the ventricular system on the left side and there is evidence of subfalcine herniation.    Assessment/Plan:    This presentation is most likely consistent with primary intracerebral hemorrhage in setting of supratherapeutic anticoagulation with Coumadin.  There could also be a possibility that this was an ischemic phenomenon to begin with and is now complicated with hemorrhagic transformation.  Irrespective, no neuroendovascular workup is needed on this gentleman.  His prognosis is extremely guarded.    Please reverse Coumadin at this time.  Target systolic blood pressure to strictly less than 140 mmHg.  Please consult neurosurgery service immediately.  Please call me with any additional questions/concerns.    Total time spent reviewing the pertinent clinical presentation, review of neuroimaging, time spent in medical decision making and discussion of case with the physicians involved in the acute care was 35 mins.    Sherif Rascon M.D.  Vascular Neurology & Neuroendovascular Surgery

## 2024-01-12 NOTE — ED NOTES
Stroke/ [facility]ing Alert Time:Stroke @ 1755      Time Neurologist called:Dr Rascon @ 1755  :    Time CT Notified: 1755      BRAIN alert time: (If applicable)

## 2024-01-12 NOTE — ED PROVIDER NOTES
[facility]  EMERGENCY DEPARTMENT ENCOUNTER        Pt Name: Hakeem Garcia  MRN: [id number]  Birthdate 1945  Date of evaluation: 1/12/2024  Provider: Virgilio Rey DO  PCP: Aaron Hickey MD  Note Started: 6:35 PM EST 1/12/24    CHIEF COMPLAINT       Chief Complaint   Patient presents with    Altered Mental Status     LKW 1600. Left sided weakness, facial droop, responds to pain only       HISTORY OF PRESENT ILLNESS: 1 or more Elements   History From: EMS    Limitations to history : Altered Mental Status    Hakeem Garcia is a 78 y.o. male who presents to the emergency department with chief complaint of altered mental status.  Per EMS patient's last known well was 4 PM today where at which time patient developed complete left-sided flaccidity and weakness as well as facial drooping.  Patient was responsive only to pain and was having nausea and vomiting on arrival.  Patient does not significantly arousable to stimuli nothing seems to make patient's symptoms any better.  Patient is not interactive and is not alert and oriented.  Patient unable to follow commands and does not appear to be protecting airway very well.    Unable to obtain ROS due to patient's altered mental status and unable to speak at this time.    Nursing Notes were all reviewed and agreed with or any disagreements were addressed in the HPI.    REVIEW OF SYSTEMS :      Positives and Pertinent negatives as per HPI.     PAST MEDICAL HISTORY/Chronic Conditions Affecting Care      has a past medical history of Atrial fibrillation (HCC) (01/04/2014), CAD (coronary artery disease), Cancer (HCC), Carcinoid syndrome (HCC), Chronic kidney disease, COPD (chronic obstructive pulmonary disease) (formerly [facility]), DDD (degenerative disc disease), lumbar (09/20/2022), Diabetes mellitus (formerly [facility]), Hypertension, Idiopathic chronic gout of multiple sites without tophus (09/05/2019), Meniere disease, MI (myocardial infarction) (formerly [facility]), Mixed hyperlipidemia (09/05/2019),  After emergency department management is complete, patient to be admitted to the hospital.    Differential diagnosis includes but is not limited to: Acute intracranial hemorrhage, cerebrovascular accident, metabolic encephalopathy, aspiration pneumonia, acute renal failure with uremia    Please refer to the ED Course as available for additional MDM.  ED Course as of 01/13/24 0106   Fri Jan 12, 2024   1821 Intubated 8.0 at 24 at lip [RW]   1832 Spoke with NP covering for Dr. Savage who accepts for admission [RW]   1909 Spoke with Dr. Orosco who recommends admission and continued medical management at this time. [RW]   2349 EKG: Atrial paced rhythm at 70 bpm with left axis deviation and QTc of 470.  No significant ST changes or elevation with no prior to compare to at this time.  EKG interpreted by myself. [RW]      ED Course User Index  [RW] Virgilio Rey DO        Social Determinants affecting Dx or Tx: Patient has good medical compliance and fluency as well as established follow-up with family physician.    Records Reviewed: EKG from 8/31/2023 reviewed in comparison to today's.    I am the Primary Clinician of Record.    CONSULTS: (Who and What was discussed)  IP CONSULT TO NEUROSURGERY  IP CONSULT TO PALLIATIVE CARE  IP CONSULT TO SOCIAL WORK    FINAL IMPRESSION      1. Intraparenchymal hemorrhage of brain (HCC)    2. Transtentorial herniation (HCC)    3. Warfarin anticoagulation          DISPOSITION/PLAN     DISPOSITION Admitted 01/12/2024 06:37:01 PM    PATIENT REFERRED TO:  No follow-up provider specified.    DISCHARGE MEDICATIONS:  Current Discharge Medication List               (Please note that portions of this note were completed with a voice recognition program.  Efforts were made to edit the dictations but occasionally words are mis-transcribed.)    Virgilio Rey DO (electronically signed)

## 2024-01-12 NOTE — ED NOTES
Radiology Procedure Waiver   Name: Hakeem Garcia  : 1945  MRN: [id number]    Date:  24    Time: 5:55 PM EST    Benefits of immediately proceeding with Radiology exam(s) without pre-testing outweigh the risks or are not indicated as specified below and therefore the following is/are being waived:    [] Pregnancy test   [] Patients LMP on-time and regular.   [] Patient had Tubal Ligation or has other Contraception Device.   [] Patient  is Menopausal or Premenarcheal.    [] Patient had Full or Partial Hysterectomy.    [] Protocol for Iodine allergy    [] MRI Questionnaire     [x] BUN/Creatinine   [] Patient age w/no hx of renal dysfunction.   [] Patient on Dialysis.   [] Recent Normal Labs.  Electronically signed by Virgilio Rey DO on 24 at 5:55 PM EST

## 2024-01-13 PROBLEM — I61.9 INTRAPARENCHYMAL HEMORRHAGE OF BRAIN (HCC): Status: ACTIVE | Noted: 2024-01-01

## 2024-01-13 NOTE — CONSULTS
Palliative Care Department  [phone]  Palliative Care Initial Consult  Provider TARIQ Ricketts CNP     Hakeem Garcia  [id number]  Hospital Day: 2  Date of Initial Consult: 1/12/24  Referring Provider: Tosha Vivar APRN - CNP  Palliative Medicine was consulted for assistance with: goals of care    HPI:   Hakeem Garcia is a 78 y.o. with a medical history of pacemaker, COPD, CKD, DDD, CVA who was admitted on 1/12/2024 with a CHIEF COMPLAINT of AMS, left-sided weakness and facial droop. Intubated upon arrival to ED. CT head showed massive intraparenchymal hemorrhage throughout left temporal and parietal lobes.  Respiratory panel (+) COVID. Palliative medicine consulted for goals of care.    ASSESSMENT/PLAN:     Pertinent Hospital Diagnoses     Hemorrhagic CVA  HTN  Acute respiratory failure with hypoxia     Palliative Care Encounter / Counseling Regarding Goals of Care  Please see detailed goals of care discussion as below  At this time, Hakeem Garcia, Does Not have capacity for medical decision-making.  Capacity is time limited and situation/question specific  During encounter son and daughter was surrogate medical decision-maker  Outcome of goals of care meeting:   Wait for family to come before withdrawing care  May need Hospice consult  Compassionate extubation orders signed and held  Code status Limited no shock, no medications, no compressions, no intubation  Advanced Directives: no POA or living will in epic  Surrogate/Legal NOK:  Regla Garcia (daughter): [phone]  Vance Garcia (son): [phone]    Spiritual assessment: no spiritual distress identified  Bereavement and grief: to be determined  Referrals to: none today  SUBJECTIVE:     Current medical issues leading to Palliative Medicine involvement include   Active Hospital Problems    Diagnosis Date Noted    Acute intracranial hemorrhage (HCC) [I62.9] 01/12/2024       Details of Conversation:    Chart reviewed. Patient seen

## 2024-01-13 NOTE — PROGRESS NOTES
Patient suctioned via ett and orally prior to extubation. Cuff deflated,ett removed and oral sx to follow.

## 2024-01-13 NOTE — PROGRESS NOTES
..4 Eyes Skin Assessment     NAME:  Hakeem Garcia  YOB: 1945  MEDICAL RECORD NUMBER:  [id number]    The patient is being assessed for  Admission    I agree that at least one RN has performed a thorough Head to Toe Skin Assessment on the patient. ALL assessment sites listed below have been assessed.      Areas assessed by both nurses:    Head, Face, Ears, Shoulders, Back, Chest, Arms, Elbows, Hands, Sacrum. Buttock, Coccyx, Ischium, Legs. Feet and Heels, and Under Medical Devices         Does the Patient have a Wound? No noted wound(s)       Lincoln Prevention initiated by RN: Yes  Wound Care Orders initiated by RN: No    Pressure Injury (Stage 3,4, Unstageable, DTI, NWPT, and Complex wounds) if present, place Wound referral order by RN under : No    New Ostomies, if present place, Ostomy referral order under : No     Nurse 1 eSignature: Electronically signed by Shanon Villalba RN on 1/13/24 at 1:19 AM EST    **SHARE this note so that the co-signing nurse can place an eSignature**    Nurse 2 eSignature: Electronically signed by Genoveva Muir RN on 1/13/24 at 1:33 AM EST

## 2024-01-13 NOTE — PROGRESS NOTES
Intensive Care Unit  Critical Care Consult  Daily Progress Note 1/13/2024    Date of Admission: 1/13    EVENTS:   77 yo M with left side weakness, AMS. Increased lethargy on presentation, intubated in ED. Found to have large L IPH with 18 mm shift and surrounding edema.  Family at bedside. Waiting for grandchildren to arrive. No escalation of care.     No corneal response, pupils fixed/dilated, no cough/gag. No motor response. Not breathing above ventilator.    PHYSICAL EXAM:    /78   Pulse 70   Temp 99.7 °F (37.6 °C) (Bladder)   Resp 18   Ht 1.702 m (5' 7\")   Wt 103.4 kg (227 lb 15.3 oz)   SpO2 99%   BMI 35.70 kg/m²     General appearance:  Comfortable.     Pain Description: none    GCS:    1 - Does not open eyes   2 - Extensor response (decerebrate)  1T - Makes no noise    Pupil size: Left 6 mm  Right 6 mm  Pupil reaction: No      CONSTITUTIONAL: no acute distress, lying in hospital bed  NEUROLOGIC: No corneal response, pupils fixed/dilated, no cough/gag. No motor response. Not breathing above ventilator.  CARDIOVASCULAR: S1 S2, regular rate, regular rhythm, no murmur/gallop/rub. Monitor: NSR  PULMONARY: no rhonchi/rales/wheezes, no use of accessory muscles. intubated  RENAL: clear yellow urine. panda  ABDOMEN: soft, nontender, nondistended, nontympanic, normal bowel sounds   SKIN/EXTREMITIES: no rashes/ecchymosis, no edema/clubbing, warm/dry, good capillary refill     LINES: art   PIV   CVC    Past Medical History:   Diagnosis Date    Atrial fibrillation (HCC) 01/04/2014    CAD (coronary artery disease)     Cancer (HCC)     VIPoma    Carcinoid syndrome (HCC)     Chronic kidney disease     COPD (chronic obstructive pulmonary disease) (HCC)     DDD (degenerative disc disease), lumbar 09/20/2022    Diabetes mellitus (HCC)     Hypertension     Idiopathic chronic gout of multiple sites without tophus 09/05/2019    Meniere disease     MI (myocardial infarction) (HCC)     x3    Mixed hyperlipidemia  a neurological standpoint.    Critical care time = 45 min   Electronically signed by Clary Curiel CNP  1/13/2024 at 11:34 AM

## 2024-01-13 NOTE — CONSULTS
[facility]                  [address] [place] [address]                                  CONSULTATION    PATIENT NAME: GUSTAVO FORRESTER                    :        1945  MED REC NO:   [id number]                            ROOM:       RAFFY  ACCOUNT NO:   [id number]                           ADMIT DATE: 2024  PROVIDER:     Rain Orosco MD    CONSULT DATE:  2024    REASON FOR CONSULTATION:  Intracranial hemorrhage.    HISTORY OF PRESENT ILLNESS:  The patient is a 78-year-old gentleman, who  presented to the emergency room with what was thought to be altered  mental status and stroke.  He subsequently had a CT scan of his head  that showed a large left-sided intracranial hemorrhage for which  Neurosurgery Service was consulted.  At this time, he was intubated for  airway protection, and the intervening examination was somewhat  difficult to complete, and the history was obtained from the medical  record.    PAST MEDICAL HISTORY:  Positive for atrial fibrillation, coronary artery  disease, carcinoid syndrome, chronic kidney disease, COPD, diabetes,  hypertension, Meniere's disease, pacemaker placement.    PAST SURGICAL HISTORY:  Positive for cardiac surgery, coronary  angioplasty with stent placement, pacemaker placement.    FAMILY HISTORY:  Unknown as the patient is adopted.    SOCIAL HISTORY:  Negative for tobacco use, and he does not consume any  alcoholic beverages.    ALLERGIES:  Include LISINOPRIL.    HOME MEDICATIONS:  Include Coumadin.    REVIEW OF SYSTEMS:  Unable to complete a 14-point review of systems  because of the patient's current medical condition.    PHYSICAL EXAMINATION:  VITAL SIGNS:  He is afebrile.  His vital signs are stable with blood  pressure of 129/80, pulse 70, respiratory rate is 22.  GENERAL:  He is resting in bed.  He is in respiratory distress requiring  ventilatory support.  HEENT:  His head is normocephalic

## 2024-01-13 NOTE — PROGRESS NOTES
[facility]  PROGRESS NOTE  ATTENDING NOTE    CRITICAL CARE    Chief Complaint   Patient presents with    Altered Mental Status     LKW 1600. Left sided weakness, facial droop, responds to pain only       HPI  79y/o M with LKW at 4pm yesterday.  He presented with left sided weakness and facial droop.  He had n/v on arrival to ED and increased lethargy.  He was promptly intubated.  CT shows large left hemorrhagic shock with surrounding edema.      Patient Active Problem List   Diagnosis    S/P cardiac pacemaker procedure    Atrial fibrillation (HCC)    Carcinoid (except of appendix) (HCC)    COPD (chronic obstructive pulmonary disease) (HCC)    Excessive vasoactive intestinal peptide secretion    Hyperparathyroidism (HCC)    Chronic kidney disease    Type 2 diabetes mellitus without complication (HCC)    Obesity    Pigmented skin lesion    Allergic rhinitis    Coronary artery disease involving native coronary artery of native heart without angina pectoris    Essential hypertension    Mixed hyperlipidemia    Vitamin D deficiency    Idiopathic chronic gout of multiple sites without tophus    Chronic pain of both knees    Right leg swelling    Sacroiliac dysfunction    DDD (degenerative disc disease), lumbar    Lumbar radicular pain    Type 2 diabetes mellitus with chronic kidney disease    Acute respiratory failure with hypoxia (HCC)    COPD exacerbation (HCC)    Rhinovirus    Acute intracranial hemorrhage (HCC)       OVERNIGHT EVENTS:  N/a     HOSPITAL COURSE:  1/13--admitted, intubated, CVC, Barney    /72   Pulse 70   Temp 97.2 °F (36.2 °C)   Resp 18   Wt 113.4 kg (250 lb)   SpO2 97%   BMI 39.15 kg/m²   Physical Exam  Constitutional:       Appearance: He is obese.   HENT:      Head: Normocephalic and atraumatic.      Nose: Nose normal.      Mouth/Throat:      Mouth: Mucous membranes are moist.      Pharynx: Oropharynx is clear.   Eyes:      Pupils: Pupils are equal, round, and reactive

## 2024-01-14 NOTE — PLAN OF CARE
Problem: Discharge Planning  Goal: Discharge to home or other facility with appropriate resources  Outcome: Progressing  Flowsheets (Taken 1/13/2024 2149)  Discharge to home or other facility with appropriate resources:   Refer to discharge planning if patient needs post-hospital services based on physician order or complex needs related to functional status, cognitive ability or social support system   Arrange for interpreters to assist at discharge as needed   Identify discharge learning needs (meds, wound care, etc)   Arrange for needed discharge resources and transportation as appropriate   Identify barriers to discharge with patient and caregiver     Problem: Pain  Goal: Verbalizes/displays adequate comfort level or baseline comfort level  Recent Flowsheet Documentation  Taken 1/13/2024 2000 by Belkis Lee RN  Verbalizes/displays adequate comfort level or baseline comfort level:   Encourage patient to monitor pain and request assistance   Assess pain using appropriate pain scale   Administer analgesics based on type and severity of pain and evaluate response   Implement non-pharmacological measures as appropriate and evaluate response   Consider cultural and social influences on pain and pain management   Notify Licensed Independent Practitioner if interventions unsuccessful or patient reports new pain     Problem: Dyspnea Due to End of Life  Goal: Demonstrate understanding of and ability to manage respiratory symptoms at end of life  Description: Patient  and or family/caregiver will verbalize recall of breathing strategies to maintain an effective breathing pattern during the inpatient hospice stay  Outcome: Progressing     Problem: Fever Due to End of Life  Goal: Demonstrate understanding of and ability to manage fever at end of life  Description: Patient and/or family/caregiver will verbalize recall the ability to manage fever at end of life during the inpatient hospice stay.  Outcome: Progressing      Problem: Communication Deficit  Goal: Effectively communicate symptoms, needs, and concerns  Description: Patient  and/or family/caregiver will be able to communicate symptoms, needs, and concerns as evidenced by the use of language services during the inpatient hospice stay.  Outcome: Progressing  Intervention: Assess spiritual needs  Note: Provide spiritual care, scriptures, tapes and devotional materials as appropriate. Offer individual and family prayer each visit. Provide the opportunity and encouragement to express feelings and questions about life's meaning; share life stories; recognize self-worth; confession/reconciliation; humor; explore the meaning of hope; explore the concerns about after-life; explore concepts of deity.

## 2024-01-14 NOTE — H&P
Hospital Medicine History & Physical      PCP: Aaron Hickey MD    Date of Admission: 1/12/2024    Date of Service: JAN 13, 2024    Chief Complaint:  CHANGE IN MENTAL STATUS       History Of Present Illness:     78 y.o. male presented with CHANGE IN MENTAL STATUS.  FOUND TO HAVE AN ICH OF LEFT TEMPORAL LOVE WITH HERNIATION.   FAMILY HAS DECIDED ON COMFORT CARE    Past Medical History:          Diagnosis Date    Atrial fibrillation (HCC) 01/04/2014    CAD (coronary artery disease)     Cancer (HCC)     VIPoma    Carcinoid syndrome (HCC)     Chronic kidney disease     COPD (chronic obstructive pulmonary disease) (HCC)     DDD (degenerative disc disease), lumbar 09/20/2022    Diabetes mellitus (HCC)     Hypertension     Idiopathic chronic gout of multiple sites without tophus 09/05/2019    Meniere disease     MI (myocardial infarction) (HCC)     x3    Mixed hyperlipidemia 09/05/2019    Obesity     Pacemaker     Type 2 diabetes mellitus without complication (HCC)        Past Surgical History:          Procedure Laterality Date    CARDIAC SURGERY      stents/pacemaker    COLONOSCOPY      CORONARY ANGIOPLASTY       CORONARY ANGIOPLASTY WITH STENT PLACEMENT      HERNIA REPAIR      LUNG SURGERY Right 2014    Dr. Panda for \"infection around lung\"    MEDICATION INJECTION Right 9/22/2022    RIGHT SACROILIAC JOINT INJECTION UNDER FLUOROSCOPY performed by Will Raymond MD at [facility] OR    PACEMAKER PLACEMENT      PAIN MANAGEMENT PROCEDURE Right 10/20/2022    LUMBAR TRANSFORAMINAL EPIDURAL STEROID INJECTION RIGHT L3 AND L4 UNDER FLUOROSCOPIC GUIDANCE performed by Will Raymond MD at [facility] OR    TUMOR REMOVAL      tumors removed from breast ,arms     UPPER GASTROINTESTINAL ENDOSCOPY         Medications Prior to Admission:      Prior to Admission medications    Medication Sig Start Date End

## 2024-01-14 NOTE — PROGRESS NOTES
Daughter, Regla, and Son, Vance, present at patient time of death. They have not decided on a  home yet but will call the hospital when they decide.

## 2024-01-14 NOTE — PROGRESS NOTES
Patient SpO2 dropped from 60-70s to 30-40s. Called patient daughter, Regla, but no answer. Left voice message. Called patient's son, Vance, and updated him. He stated that him and Regla will be on their way to be with the patient.

## 2024-01-14 NOTE — PROGRESS NOTES
[facility] notified of patient death.  Patient body can be released. Referral # [id number].

## 2024-01-14 NOTE — DEATH NOTES
Death Pronouncement Note  Patient's Name: Hakeem Garcia   Patient's YOB: 1945  MRN Number: [id number]    Admitting Provider: No admitting provider for patient encounter.  Attending Provider: Maldonado Sprague DO    Patient was examined and the following were absent: Pulses, Blood Pressure, and Respiratory effort    I declared the patient dead on 1/14/2024 at 4:30 AM    Preliminary Cause of Death: Acute intracranial hemorrhage (HCC)     Electronically signed by Roseann Hickman MD on 1/14/24 at 5:15 AM EST

## 2024-01-15 LAB
EKG ATRIAL RATE: 70 BPM
EKG P AXIS: -10 DEGREES
EKG P-R INTERVAL: 198 MS
EKG Q-T INTERVAL: 436 MS
EKG QRS DURATION: 118 MS
EKG QTC CALCULATION (BAZETT): 470 MS
EKG R AXIS: -56 DEGREES
EKG T AXIS: 24 DEGREES
EKG VENTRICULAR RATE: 70 BPM

## 2024-01-15 PROCEDURE — 93010 ELECTROCARDIOGRAM REPORT: CPT | Performed by: INTERNAL MEDICINE

## 2024-01-15 NOTE — DISCHARGE SUMMARY
THE ABDOMEN 1/13/2024 8:50 am COMPARISON: X-ray 01/12/2024 HISTORY: ORDERING SYSTEM PROVIDED HISTORY: Confirmation of course of NG/OG/NE tube and location of tip of tube TECHNOLOGIST PROVIDED HISTORY: Reason for exam:->Confirmation of course of NG/OG/NE tube and location of tip of tube Reason for exam:->after advancement of 5cm per previous radiology recommendations Portable?->Yes What reading provider will be dictating this exam?->CRC FINDINGS: Esophagogastric tube terminates within the lateral portion of the proximal stomach fundus     Esophagogastric tube terminates within the lateral portion of the proximal stomach fundus.     CT HEAD WO CONTRAST    Result Date: 1/13/2024  EXAMINATION: CT OF THE HEAD WITHOUT CONTRAST  1/13/2024 3:20 am TECHNIQUE: CT of the head was performed without the administration of intravenous contrast. Automated exposure control, iterative reconstruction, and/or weight based adjustment of the mA/kV was utilized to reduce the radiation dose to as low as reasonably achievable. COMPARISON: None. HISTORY: ORDERING SYSTEM PROVIDED HISTORY: f/u [facility] TECHNOLOGIST PROVIDED HISTORY: Has a \"code stroke\" or \"stroke alert\" been called?->No Reason for exam:->f/u [facility] What reading provider will be dictating this exam?->CRC FINDINGS: BRAIN/VENTRICLES: There is an acute intracranial hemorrhage, in the left temporal lobe.  This process extends into the middle cranial fossa.  There is significant midline shift.  There is surrounding vasogenic edema there is near complete effacement of the left lateral ventricle and hemorrhage extends into the occipital horn of left lateral ventricle.  The epicenter of the high density hemorrhage measures 79 x 41 mm.  The midline is displaced 18 mm from left-to-right.  No involvement of the posterior fossa is observed at this time. The hemorrhage extends into the subarachnoid space and is seen along the frontal lobe.  There is subdural extension along the interhemispheric  D3 1.25 MG (85065 UT) Caps     * warfarin 1 MG tablet  Commonly known as: Coumadin  Take as directed. If you are unsure how to take this medication, talk to your nurse or doctor.  Original instructions: Take 1 tablet by mouth daily     * warfarin 5 MG tablet  Commonly known as: COUMADIN  Take as directed. If you are unsure how to take this medication, talk to your nurse or doctor.  Original instructions: Take 1 tablet by mouth daily           * This list has 2 medication(s) that are the same as other medications prescribed for you. Read the directions carefully, and ask your doctor or other care provider to review them with you.                  +++++++++++++++++++++++++++++++++++++++++++++++++  DO Song Ram [place], [place]  +++++++++++++++++++++++++++++++++++++++++++++++++  NOTE: This report was transcribed using voice recognition software. Every effort was made to ensure accuracy; however, inadvertent computerized transcription errors may be present.

## 2025-05-16 NOTE — PROGRESS NOTES
Left a detailed message for Iain Dela Cruz letting her know that Hakeem's dosing will remain the same. Asked her to call the office before 4:30PM today if she has questions. Shelbie called back and scheduled for 5/20

## (undated) DEVICE — Device: Brand: PORTEX

## (undated) DEVICE — GLOVE ORANGE PI 7 1/2   MSG9075

## (undated) DEVICE — Z DISCONTINUED APPLICATOR SURG PREP 0.35OZ 2% CHG 70% ISO ALC W/ HI LT

## (undated) DEVICE — GAUZE,SPONGE,4"X4",12PLY,STERILE,LF,2'S: Brand: MEDLINE

## (undated) DEVICE — SYRINGE, LUER LOCK, 5ML: Brand: MEDLINE

## (undated) DEVICE — BANDAGE ADH W1XL3IN NAT FAB WVN FLX DURABLE N ADH PD SEAL

## (undated) DEVICE — NON-DEHP CATHETER EXTENSION SET, MALE LUER LOCK ADAPTER

## (undated) DEVICE — 6 ML SYRINGE LUER-LOCK TIP: Brand: MONOJECT

## (undated) DEVICE — 3M™ RED DOT™ MONITORING ELECTRODE WITH FOAM TAPE AND STICKY GEL 2560, 50/BAG, 20/CASE, 72/PLT: Brand: RED DOT™

## (undated) DEVICE — NEEDLE HYPO 18GA L1.5IN PNK POLYPR HUB S STL THN WALL FILL

## (undated) DEVICE — NEEDLE HYPO 25GA L1.5IN BLU POLYPR HUB S STL REG BVL STR

## (undated) DEVICE — 12 ML SYRINGE,LUER-LOCK TIP: Brand: MONOJECT